# Patient Record
Sex: FEMALE | Race: WHITE | Employment: OTHER | URBAN - METROPOLITAN AREA
[De-identification: names, ages, dates, MRNs, and addresses within clinical notes are randomized per-mention and may not be internally consistent; named-entity substitution may affect disease eponyms.]

---

## 2017-02-06 RX ORDER — FENOFIBRATE 160 MG/1
160 TABLET ORAL DAILY
Qty: 90 TABLET | Refills: 3 | Status: SHIPPED | OUTPATIENT
Start: 2017-02-06 | End: 2018-02-14 | Stop reason: SDUPTHER

## 2017-02-06 RX ORDER — HYDROXYZINE HYDROCHLORIDE 10 MG/1
10 TABLET, FILM COATED ORAL 3 TIMES DAILY PRN
Qty: 60 TABLET | Refills: 0 | Status: SHIPPED | OUTPATIENT
Start: 2017-02-06 | End: 2018-11-05 | Stop reason: SDUPTHER

## 2017-03-16 RX ORDER — ATORVASTATIN CALCIUM 40 MG/1
TABLET, FILM COATED ORAL
Qty: 90 TABLET | Refills: 2 | Status: SHIPPED | OUTPATIENT
Start: 2017-03-16 | End: 2018-04-16 | Stop reason: SDUPTHER

## 2017-04-03 DIAGNOSIS — I10 ESSENTIAL HYPERTENSION: Primary | Chronic | ICD-10-CM

## 2017-04-03 DIAGNOSIS — E78.00 PURE HYPERCHOLESTEROLEMIA: Chronic | ICD-10-CM

## 2017-06-01 RX ORDER — LISINOPRIL 5 MG/1
TABLET ORAL
Qty: 90 TABLET | Refills: 2 | Status: SHIPPED | OUTPATIENT
Start: 2017-06-01 | End: 2017-11-06 | Stop reason: ALTCHOICE

## 2017-07-27 ENCOUNTER — OFFICE VISIT (OUTPATIENT)
Dept: CARDIOLOGY | Age: 62
End: 2017-07-27

## 2017-07-27 VITALS
HEART RATE: 61 BPM | DIASTOLIC BLOOD PRESSURE: 69 MMHG | OXYGEN SATURATION: 98 % | RESPIRATION RATE: 18 BRPM | BODY MASS INDEX: 23.3 KG/M2 | HEIGHT: 57 IN | SYSTOLIC BLOOD PRESSURE: 118 MMHG | WEIGHT: 108 LBS | TEMPERATURE: 97.4 F

## 2017-07-27 DIAGNOSIS — I10 ESSENTIAL HYPERTENSION: Primary | Chronic | ICD-10-CM

## 2017-07-27 DIAGNOSIS — E78.00 PURE HYPERCHOLESTEROLEMIA: Chronic | ICD-10-CM

## 2017-07-27 DIAGNOSIS — M79.605 PAIN IN BOTH LOWER EXTREMITIES: ICD-10-CM

## 2017-07-27 DIAGNOSIS — E11.8 TYPE 2 DIABETES MELLITUS WITH COMPLICATION, WITHOUT LONG-TERM CURRENT USE OF INSULIN (HCC): ICD-10-CM

## 2017-07-27 DIAGNOSIS — M79.604 PAIN IN BOTH LOWER EXTREMITIES: ICD-10-CM

## 2017-07-27 PROCEDURE — 99214 OFFICE O/P EST MOD 30 MIN: CPT | Performed by: INTERNAL MEDICINE

## 2017-07-27 PROCEDURE — 93000 ELECTROCARDIOGRAM COMPLETE: CPT | Performed by: INTERNAL MEDICINE

## 2017-07-27 NOTE — PROGRESS NOTES
[facility]     63 y.o. female  Chief Complaint   Patient presents with    6 Month Follow-Up    Hyperlipidemia    Hypertension        History and Physical:  Mrs. Aly Shook is a 80-year-old female at my office with her sister for a regular cardiovascular evaluation. She is most symptomatic with myalgia of the lower extremities, this is not a new. She does have a restless leg problem. The discomfort is not specific to the joints. She denies any chest pain or tightness. She is not having any unusual difficulty breathing. She is not having any heart palpitations and no fainting. She denies any syncope and nothing to indicate TIA or claudications. PAST MEDICAL HISTORY:  -     History mild diabetes type 2  -     Mild hypertension  -     Abnormal lipids  -     Underactive thyroid on replacement  -     Restless leg syndrome  -     Psoriasis    Past Medical History: She  has a past medical history of Alcoholism ([facility] [address] Arthritis; Chronic lung disease; Depression; Hyperlipidemia; Hypertension; Hypothyroidism; and Second hand smoke exposure. Past Surgical History: She  has a past surgical history that includes Hysterectomy; Appendectomy; Cholecystectomy; and Biopsy mouth lesion (Bilateral, 12/19/2016). Family History:   Family History   Problem Relation Age of Onset    Heart Disease Mother     Hypertension Mother     Diabetes Mother     Stroke Mother     Heart Disease Father     Hypertension Father     Heart Disease Sister     Hypertension Sister     Heart Disease Brother     Hypertension Brother     Diabetes Brother     Cancer Maternal Grandmother     Diabetes Maternal Grandfather        Social History: She  reports that she has never smoked. She does not have any smokeless tobacco history on file. She reports that she drinks about 16.8 oz of alcohol per week  She reports that she does not use illicit drugs.     Current Medications:   Current Outpatient Prescriptions on File Prior to Visit   Medication Sig Dispense Refill    lisinopril (PRINIVIL;ZESTRIL) 5 MG tablet TAKE 1 TABLET ONCE DAILY 90 tablet 2    metFORMIN (GLUCOPHAGE) 500 MG tablet Take 1 tablet by mouth 2 times daily (with meals) 60 tablet 11    atorvastatin (LIPITOR) 40 MG tablet TAKE 1 TABLET BY MOUTH DAILY 90 tablet 2    hydrOXYzine (ATARAX) 10 MG tablet Take 1 tablet by mouth 3 times daily as needed for Itching 60 tablet 0    fenofibrate 160 MG tablet Take 1 tablet by mouth daily 90 tablet 3    levothyroxine (SYNTHROID) 75 MCG tablet Take 1 tablet by mouth Daily 90 tablet 3    atorvastatin (LIPITOR) 20 MG tablet Take 20 mg by mouth daily      b complex vitamins capsule Take 1 capsule by mouth daily      ranitidine (ZANTAC) 150 MG tablet Take 1 tablet by mouth 2 times daily 180 tablet 3     No current facility-administered medications on file prior to visit. Allergies:  Morphine    Review of Systems  Review of Systems   Constitutional:        The patient is doing well she lives with her sister and is able to do all of her normal activities. HENT: Negative. Eyes: Negative. Respiratory: Negative for chest tightness and shortness of breath. Cardiovascular: Negative for chest pain, palpitations and leg swelling. Gastrointestinal: Negative. Endocrine:        The patient is being treated for mild diabetes type 2 doing well on her current medicine regimen. Genitourinary: Negative. Musculoskeletal: Positive for myalgias. The patient is complaining of increased myalgia discomfort of the lower extremities with a known history of restless leg. Allergic/Immunologic: Negative. Neurological: Negative for dizziness, syncope and light-headedness. Hematological: Negative. Psychiatric/Behavioral:        Mrs. Albert Alfonso is known to have had learning disability, currently lives with her sister            PHYSICAL EXAM: The exam reveale end of dictation d Mrs. Albert Alfonso to be in no distress she is on the thin side with a BMI of 23. O2 sat 98%. Heart rate 64/m. Blood pressure 118/70. Temperature 97.4. Skin is slightly pale but warm and dry. JVP down. No cervical bruit. No lymphadenopathy or thyromegaly. The lungs are clear. The heart sounds are normal, could not be sure of any extra sounds. The abdominal exam revealed no rigidity, no tenderness, and no organomegaly. The lower extremity exam revealed 3+ pulses and no edema. Pin Prick care examination revealed no problem to indicate peripheral neuropathy. The 12 leads EKG revealed the presence of sinus mechanism incomplete right bundle branch block and minor nonspecific ST and T wave changes    ASSESSMENT:   No overt coronary artery disease but markedly increased risk. Normotensive    Well controlled diabetes with no recurrence of hypoglycemia since the glipizide was discontinued  last year and she has refrained from exces alcohol drinking. Nonspecific myalgia of the lower extremity with no clinical evidence of any compromise to the arterial supply. This is mostly at night probably related to the restless leg. She has interrupted the statin medications for the past 3 months with no changes in symptoms and the CPK was normal.     RECOMMENDATIONS:     The above was discussed with the the patient and her sister    Recommended repeating complete lab including CPK ,TSH, lipids, sed rate, rheumatoid factor, CRP, lead count, and complete chemistry. Recommended a therapeutic trial of Neurontin 300 mg at bedtime    Recommended genetic Requip 0.25 mg also had bedtime    Noted that the Curt atenolol and SCD 0.1% cream for this arises    Regular appointment in 6 month to call earlier [facility]ocort earlier should there be any change of status      Diagnostics:    Orders Placed This Encounter   Procedures    EKG 12 lead     Order Specific Question:   Reason for Exam?     Answer:    Other       Medications:  No orders of the defined types were placed in this

## 2017-07-28 LAB
ALBUMIN SERPL-MCNC: 4.3 G/DL (ref 3.9–4.9)
ALP BLD-CCNC: 42 U/L (ref 40–130)
ALT SERPL-CCNC: 29 U/L (ref 0–33)
ANION GAP SERPL CALCULATED.3IONS-SCNC: 14 MEQ/L (ref 7–13)
AST SERPL-CCNC: 29 U/L (ref 0–35)
BILIRUB SERPL-MCNC: 0.6 MG/DL (ref 0–1.2)
BUN BLDV-MCNC: 10 MG/DL (ref 8–23)
C-REACTIVE PROTEIN: 1.2 MG/L (ref 0–5)
CALCIUM SERPL-MCNC: 8.9 MG/DL (ref 8.6–10.2)
CHLORIDE BLD-SCNC: 103 MEQ/L (ref 98–107)
CHOLESTEROL, TOTAL: 148 MG/DL (ref 0–199)
CO2: 25 MEQ/L (ref 22–29)
CREAT SERPL-MCNC: 0.44 MG/DL (ref 0.5–0.9)
GFR AFRICAN AMERICAN: >60
GFR NON-AFRICAN AMERICAN: >60
GLOBULIN: 2.4 G/DL (ref 2.3–3.5)
GLUCOSE BLD-MCNC: 109 MG/DL (ref 74–109)
HCT VFR BLD CALC: 37.1 % (ref 37–47)
HDLC SERPL-MCNC: 73 MG/DL (ref 40–59)
HEMOGLOBIN: 12.2 G/DL (ref 12–16)
LDL CHOLESTEROL CALCULATED: 57 MG/DL (ref 0–129)
MCH RBC QN AUTO: 29.5 PG (ref 27–31.3)
MCHC RBC AUTO-ENTMCNC: 32.9 % (ref 33–37)
MCV RBC AUTO: 89.7 FL (ref 82–100)
PDW BLD-RTO: 14.5 % (ref 11.5–14.5)
PLATELET # BLD: 165 K/UL (ref 130–400)
POTASSIUM SERPL-SCNC: 4.3 MEQ/L (ref 3.5–5.1)
RBC # BLD: 4.14 M/UL (ref 4.2–5.4)
RHEUMATOID FACTOR: <10 IU/ML (ref 0–14)
SEDIMENTATION RATE, ERYTHROCYTE: 2 MM (ref 0–30)
SODIUM BLD-SCNC: 142 MEQ/L (ref 132–144)
TOTAL CK: 48 U/L (ref 0–170)
TOTAL PROTEIN: 6.7 G/DL (ref 6.4–8.1)
TRIGL SERPL-MCNC: 88 MG/DL (ref 0–200)
TSH SERPL DL<=0.05 MIU/L-ACNC: 0.04 UIU/ML (ref 0.27–4.2)
WBC # BLD: 6.1 K/UL (ref 4.8–10.8)

## 2017-08-24 RX ORDER — TRIAMCINOLONE ACETONIDE 1 MG/G
CREAM TOPICAL
Refills: 11 | COMMUNITY
Start: 2017-08-07 | End: 2017-08-25 | Stop reason: SDUPTHER

## 2017-08-24 RX ORDER — ROPINIROLE 0.25 MG/1
1 TABLET, FILM COATED ORAL DAILY
Refills: 6 | COMMUNITY
Start: 2017-07-28 | End: 2018-01-02 | Stop reason: SDUPTHER

## 2017-08-24 RX ORDER — GABAPENTIN 300 MG/1
CAPSULE ORAL
Refills: 6 | COMMUNITY
Start: 2017-07-28 | End: 2017-11-06 | Stop reason: ALTCHOICE

## 2017-08-25 RX ORDER — TRIAMCINOLONE ACETONIDE 1 MG/G
CREAM TOPICAL 2 TIMES DAILY PRN
Qty: 453.6 G | Refills: 11 | Status: SHIPPED | OUTPATIENT
Start: 2017-08-25 | End: 2018-03-02 | Stop reason: SDUPTHER

## 2017-10-03 ASSESSMENT — ENCOUNTER SYMPTOMS
SHORTNESS OF BREATH: 0
ALLERGIC/IMMUNOLOGIC NEGATIVE: 1
GASTROINTESTINAL NEGATIVE: 1
EYES NEGATIVE: 1
CHEST TIGHTNESS: 0

## 2017-10-25 DIAGNOSIS — E03.9 ACQUIRED HYPOTHYROIDISM: Chronic | ICD-10-CM

## 2017-10-25 DIAGNOSIS — K21.9 GASTROESOPHAGEAL REFLUX DISEASE WITHOUT ESOPHAGITIS: ICD-10-CM

## 2017-10-25 DIAGNOSIS — R12 HEARTBURN: ICD-10-CM

## 2017-10-30 RX ORDER — RANITIDINE 150 MG/1
150 TABLET ORAL 2 TIMES DAILY
Qty: 60 TABLET | Refills: 3 | Status: SHIPPED | OUTPATIENT
Start: 2017-10-30 | End: 2017-11-06 | Stop reason: ALTCHOICE

## 2017-10-30 RX ORDER — LEVOTHYROXINE SODIUM 0.07 MG/1
75 TABLET ORAL DAILY
Qty: 30 TABLET | Refills: 3 | Status: SHIPPED | OUTPATIENT
Start: 2017-10-30 | End: 2017-11-06 | Stop reason: ALTCHOICE

## 2017-11-06 DIAGNOSIS — R73.09 ABNORMAL GLUCOSE: Chronic | ICD-10-CM

## 2017-11-06 DIAGNOSIS — F10.20 ALCOHOLISM (HCC): ICD-10-CM

## 2017-11-06 DIAGNOSIS — Z11.59 NEED FOR HEPATITIS C SCREENING TEST: ICD-10-CM

## 2017-11-06 DIAGNOSIS — E03.9 ACQUIRED HYPOTHYROIDISM: Chronic | ICD-10-CM

## 2017-11-06 DIAGNOSIS — Z11.4 SCREENING FOR HIV (HUMAN IMMUNODEFICIENCY VIRUS): ICD-10-CM

## 2017-11-06 PROBLEM — E55.9 VITAMIN D DEFICIENCY: Chronic | Status: ACTIVE | Noted: 2017-11-06

## 2017-11-06 PROBLEM — L40.9 PSORIASIS: Chronic | Status: ACTIVE | Noted: 2017-11-06

## 2017-11-06 PROBLEM — G25.81 RESTLESS LEG SYNDROME: Chronic | Status: ACTIVE | Noted: 2017-11-06

## 2017-11-06 LAB
ALBUMIN SERPL-MCNC: 4.8 G/DL (ref 3.9–4.9)
ALP BLD-CCNC: 51 U/L (ref 40–130)
ALT SERPL-CCNC: 39 U/L (ref 0–33)
ANION GAP SERPL CALCULATED.3IONS-SCNC: 17 MEQ/L (ref 7–13)
AST SERPL-CCNC: 34 U/L (ref 0–35)
BASOPHILS ABSOLUTE: 0.1 K/UL (ref 0–0.2)
BASOPHILS RELATIVE PERCENT: 1.3 %
BILIRUB SERPL-MCNC: 0.5 MG/DL (ref 0–1.2)
BUN BLDV-MCNC: 13 MG/DL (ref 8–23)
CALCIUM SERPL-MCNC: 9.9 MG/DL (ref 8.6–10.2)
CHLORIDE BLD-SCNC: 99 MEQ/L (ref 98–107)
CO2: 25 MEQ/L (ref 22–29)
CREAT SERPL-MCNC: 0.63 MG/DL (ref 0.5–0.9)
EOSINOPHILS ABSOLUTE: 0.3 K/UL (ref 0–0.7)
EOSINOPHILS RELATIVE PERCENT: 3.1 %
GFR AFRICAN AMERICAN: >60
GFR NON-AFRICAN AMERICAN: >60
GLOBULIN: 2.8 G/DL (ref 2.3–3.5)
GLUCOSE FASTING: 90 MG/DL (ref 74–109)
HBA1C MFR BLD: 5.6 % (ref 4.8–5.9)
HCT VFR BLD CALC: 39.9 % (ref 37–47)
HEMOGLOBIN: 13 G/DL (ref 12–16)
HEPATITIS C ANTIBODY INTERPRETATION: NORMAL
LYMPHOCYTES ABSOLUTE: 1.2 K/UL (ref 1–4.8)
LYMPHOCYTES RELATIVE PERCENT: 12.6 %
MCH RBC QN AUTO: 29.1 PG (ref 27–31.3)
MCHC RBC AUTO-ENTMCNC: 32.6 % (ref 33–37)
MCV RBC AUTO: 89.5 FL (ref 82–100)
MONOCYTES ABSOLUTE: 0.7 K/UL (ref 0.2–0.8)
MONOCYTES RELATIVE PERCENT: 7.1 %
NEUTROPHILS ABSOLUTE: 7.1 K/UL (ref 1.4–6.5)
NEUTROPHILS RELATIVE PERCENT: 75.9 %
PDW BLD-RTO: 14.5 % (ref 11.5–14.5)
PLATELET # BLD: 220 K/UL (ref 130–400)
POTASSIUM SERPL-SCNC: 4.9 MEQ/L (ref 3.5–5.1)
RBC # BLD: 4.46 M/UL (ref 4.2–5.4)
SODIUM BLD-SCNC: 141 MEQ/L (ref 132–144)
T4 FREE: 2.14 NG/DL (ref 0.93–1.7)
TOTAL PROTEIN: 7.6 G/DL (ref 6.4–8.1)
TSH REFLEX: 0.07 UIU/ML (ref 0.27–4.2)
WBC # BLD: 9.4 K/UL (ref 4.8–10.8)

## 2017-11-07 LAB — HIV-1 AND HIV-2 ANTIBODIES: NEGATIVE

## 2017-11-20 PROBLEM — Z13.820 SCREENING FOR OSTEOPOROSIS: Chronic | Status: ACTIVE | Noted: 2017-11-20

## 2017-11-20 PROBLEM — F39 MOOD DISORDER (HCC): Chronic | Status: ACTIVE | Noted: 2017-11-20

## 2017-11-20 PROBLEM — M79.605 BILATERAL LEG PAIN: Chronic | Status: ACTIVE | Noted: 2017-11-20

## 2017-11-20 PROBLEM — M79.604 BILATERAL LEG PAIN: Chronic | Status: ACTIVE | Noted: 2017-11-20

## 2017-11-24 ENCOUNTER — HOSPITAL ENCOUNTER (OUTPATIENT)
Dept: WOMENS IMAGING | Age: 62
Discharge: HOME | End: 2017-11-24
Payer: COMMERCIAL

## 2017-11-24 DIAGNOSIS — Z13.820 SCREENING FOR OSTEOPOROSIS: Chronic | ICD-10-CM

## 2017-11-24 PROCEDURE — 77080 DXA BONE DENSITY AXIAL: CPT

## 2018-01-19 ENCOUNTER — HOSPITAL ENCOUNTER (OUTPATIENT)
Dept: GENERAL RADIOLOGY | Age: 63
Discharge: HOME | End: 2018-01-19
Payer: COMMERCIAL

## 2018-01-19 DIAGNOSIS — M21.932: ICD-10-CM

## 2018-01-19 DIAGNOSIS — M79.642 LEFT HAND PAIN: ICD-10-CM

## 2018-01-19 PROCEDURE — 73110 X-RAY EXAM OF WRIST: CPT

## 2018-02-05 DIAGNOSIS — M79.604 BILATERAL LEG PAIN: Chronic | ICD-10-CM

## 2018-02-05 DIAGNOSIS — M79.605 BILATERAL LEG PAIN: Chronic | ICD-10-CM

## 2018-02-05 RX ORDER — ACETAMINOPHEN 500 MG
500 TABLET ORAL 2 TIMES DAILY PRN
Qty: 60 TABLET | Refills: 2 | Status: SHIPPED | OUTPATIENT
Start: 2018-02-05 | End: 2018-07-03 | Stop reason: SDUPTHER

## 2018-02-05 NOTE — TELEPHONE ENCOUNTER
Rx request   Requested Prescriptions     Pending Prescriptions Disp Refills    acetaminophen (APAP EXTRA STRENGTH) 500 MG tablet 60 tablet      Sig: Take 1 tablet by mouth 2 times daily as needed for Pain     LOV 11/20/2017  Next Visit Date:  Future Appointments  Date Time Provider Cami Ledesma   3/9/2018 2:00 PM Mehrdad Gracia MD [facility]       Patient is also asking if her Requip can be increased. Her legs are really bothering her.

## 2018-02-16 RX ORDER — FENOFIBRATE 160 MG/1
160 TABLET ORAL DAILY
Qty: 90 TABLET | Refills: 3 | Status: SHIPPED | OUTPATIENT
Start: 2018-02-16 | End: 2019-01-23 | Stop reason: SDUPTHER

## 2018-02-20 ENCOUNTER — TELEPHONE (OUTPATIENT)
Dept: FAMILY MEDICINE CLINIC | Age: 63
End: 2018-02-20

## 2018-02-20 DIAGNOSIS — E03.9 ACQUIRED HYPOTHYROIDISM: Chronic | ICD-10-CM

## 2018-02-20 DIAGNOSIS — R73.09 ABNORMAL GLUCOSE: Primary | Chronic | ICD-10-CM

## 2018-02-20 NOTE — TELEPHONE ENCOUNTER
Needs to be seen for med adjustments. Has missed her appointments with me and needs follow-up. Labs ordered.

## 2018-02-23 RX ORDER — TRIAMCINOLONE ACETONIDE 1 MG/G
CREAM TOPICAL 2 TIMES DAILY PRN
Qty: 453.6 G | Refills: 11 | OUTPATIENT
Start: 2018-02-23

## 2018-03-02 ENCOUNTER — OFFICE VISIT (OUTPATIENT)
Dept: FAMILY MEDICINE CLINIC | Age: 63
End: 2018-03-02
Payer: COMMERCIAL

## 2018-03-02 ENCOUNTER — HOSPITAL ENCOUNTER (OUTPATIENT)
Dept: GENERAL RADIOLOGY | Age: 63
Discharge: HOME | End: 2018-03-04
Payer: COMMERCIAL

## 2018-03-02 VITALS
DIASTOLIC BLOOD PRESSURE: 68 MMHG | OXYGEN SATURATION: 97 % | BODY MASS INDEX: 25.75 KG/M2 | HEART RATE: 62 BPM | TEMPERATURE: 97.5 F | WEIGHT: 119 LBS | SYSTOLIC BLOOD PRESSURE: 112 MMHG

## 2018-03-02 DIAGNOSIS — E03.9 ACQUIRED HYPOTHYROIDISM: Chronic | ICD-10-CM

## 2018-03-02 DIAGNOSIS — F39 MOOD DISORDER (HCC): Chronic | ICD-10-CM

## 2018-03-02 DIAGNOSIS — L40.9 PSORIASIS: Primary | Chronic | ICD-10-CM

## 2018-03-02 DIAGNOSIS — M79.604 BILATERAL LEG PAIN: Chronic | ICD-10-CM

## 2018-03-02 DIAGNOSIS — E78.2 MIXED HYPERLIPIDEMIA: Chronic | ICD-10-CM

## 2018-03-02 DIAGNOSIS — R73.09 ABNORMAL GLUCOSE: Chronic | ICD-10-CM

## 2018-03-02 DIAGNOSIS — M85.89 OSTEOPENIA OF MULTIPLE SITES: Chronic | ICD-10-CM

## 2018-03-02 DIAGNOSIS — G25.81 RESTLESS LEG SYNDROME: Chronic | ICD-10-CM

## 2018-03-02 DIAGNOSIS — F10.20 ALCOHOLISM (HCC): ICD-10-CM

## 2018-03-02 DIAGNOSIS — M79.605 BILATERAL LEG PAIN: Chronic | ICD-10-CM

## 2018-03-02 LAB
HBA1C MFR BLD: 6 % (ref 4.8–5.9)
TSH SERPL DL<=0.05 MIU/L-ACNC: 14.49 UIU/ML (ref 0.27–4.2)

## 2018-03-02 PROCEDURE — 3014F SCREEN MAMMO DOC REV: CPT | Performed by: FAMILY MEDICINE

## 2018-03-02 PROCEDURE — 99214 OFFICE O/P EST MOD 30 MIN: CPT | Performed by: FAMILY MEDICINE

## 2018-03-02 PROCEDURE — 73521 X-RAY EXAM HIPS BI 2 VIEWS: CPT

## 2018-03-02 PROCEDURE — 3017F COLORECTAL CA SCREEN DOC REV: CPT | Performed by: FAMILY MEDICINE

## 2018-03-02 PROCEDURE — G8482 FLU IMMUNIZE ORDER/ADMIN: HCPCS | Performed by: FAMILY MEDICINE

## 2018-03-02 PROCEDURE — G8427 DOCREV CUR MEDS BY ELIG CLIN: HCPCS | Performed by: FAMILY MEDICINE

## 2018-03-02 PROCEDURE — 1036F TOBACCO NON-USER: CPT | Performed by: FAMILY MEDICINE

## 2018-03-02 PROCEDURE — 72110 X-RAY EXAM L-2 SPINE 4/>VWS: CPT

## 2018-03-02 PROCEDURE — G8419 CALC BMI OUT NRM PARAM NOF/U: HCPCS | Performed by: FAMILY MEDICINE

## 2018-03-02 RX ORDER — LEVOTHYROXINE SODIUM 0.05 MG/1
50 TABLET ORAL DAILY
Qty: 30 TABLET | Refills: 5 | Status: SHIPPED | OUTPATIENT
Start: 2018-03-02 | End: 2018-08-31 | Stop reason: SDUPTHER

## 2018-03-02 RX ORDER — ALENDRONATE SODIUM 35 MG/1
35 TABLET ORAL
Qty: 4 TABLET | Refills: 5 | Status: SHIPPED | OUTPATIENT
Start: 2018-03-02 | End: 2018-08-19 | Stop reason: SDUPTHER

## 2018-03-02 RX ORDER — TRIAMCINOLONE ACETONIDE 1 MG/G
CREAM TOPICAL 2 TIMES DAILY PRN
Qty: 453.6 G | Refills: 2 | Status: SHIPPED | OUTPATIENT
Start: 2018-03-02 | End: 2019-04-05 | Stop reason: SDUPTHER

## 2018-03-02 RX ORDER — ROPINIROLE 0.5 MG/1
0.5 TABLET, FILM COATED ORAL NIGHTLY
Qty: 30 TABLET | Refills: 2 | Status: SHIPPED | OUTPATIENT
Start: 2018-03-02 | End: 2018-06-04 | Stop reason: SDUPTHER

## 2018-03-02 NOTE — PROGRESS NOTES
Narrative    No narrative on file     Family History   Problem Relation Age of Onset    Heart Disease Mother     Hypertension Mother     Diabetes Mother     Stroke Mother     Heart Disease Father     Hypertension Father     Heart Disease Sister     Hypertension Sister     Heart Disease Brother     Hypertension Brother     Diabetes Brother     Cancer Maternal Grandmother     Diabetes Maternal Grandfather      Allergies   Allergen Reactions    Morphine Swelling     Current Outpatient Prescriptions   Medication Sig Dispense Refill    Calcium 600-400 MG-UNIT CHEW Take 1 tablet by mouth 2 times daily 60 tablet 11    rOPINIRole (REQUIP) 0.5 MG tablet Take 1 tablet by mouth nightly 30 tablet 2    triamcinolone (KENALOG) 0.1 % cream Apply topically 2 times daily as needed (Apply to affected areas twice daily as needed.) Apply topically 2 times daily. 453.6 g 2    alendronate (FOSAMAX) 35 MG tablet Take 1 tablet by mouth every 7 days 4 tablet 5    fenofibrate 160 MG tablet Take 1 tablet by mouth daily 90 tablet 3    acetaminophen (APAP EXTRA STRENGTH) 500 MG tablet Take 1 tablet by mouth 2 times daily as needed for Pain 60 tablet 2    pantoprazole (PROTONIX) 40 MG tablet Take 1 tablet by mouth daily 30 tablet 2    Cholecalciferol (VITAMIN D) 2000 units TABS tablet Take 1 tablet by mouth daily 30 tablet 11    escitalopram (LEXAPRO) 10 MG tablet Take 1 tablet by mouth daily 30 tablet 5    atorvastatin (LIPITOR) 40 MG tablet TAKE 1 TABLET BY MOUTH DAILY 90 tablet 2    hydrOXYzine (ATARAX) 10 MG tablet Take 1 tablet by mouth 3 times daily as needed for Itching 60 tablet 0    naproxen (NAPROSYN) 500 MG tablet Take 1 tablet by mouth 2 times daily as needed for Pain 60 tablet 1     No current facility-administered medications for this visit. PMH, Surgical Hx, Family Hx, and Social Hx reviewed and updated. Health Maintenance reviewed.     Objective    Vitals:    03/02/18 1047   BP: 112/68   Site: Left Arm   Position: Sitting   Cuff Size: Medium Adult   Pulse: 62   Temp: 97.5 °F (36.4 °C)   TempSrc: Tympanic   SpO2: 97%   Weight: 119 lb (54 kg)       Physical Exam   Constitutional: She is oriented to person, place, and time. She appears well-developed and well-nourished. HENT:   Head: Normocephalic. Eyes: Conjunctivae are normal.   Pulmonary/Chest: Effort normal.   Neurological: She is alert and oriented to person, place, and time. Skin:   Psoriasis mild, shins, elbows   Psychiatric: She has a normal mood and affect. Her behavior is normal. Judgment and thought content normal.       Assessment & Plan   1. Psoriasis  triamcinolone (KENALOG) 0.1 % cream   2. Restless leg syndrome  rOPINIRole (REQUIP) 0.5 MG tablet   3. Osteopenia of multiple sites  Calcium 600-400 MG-UNIT CHEW    alendronate (FOSAMAX) 35 MG tablet   4. Bilateral leg pain  XR HIP BILATERAL W AP PELVIS (2 VIEWS)    XR LUMBAR SPINE (MIN 4 VIEWS)   5. Abnormal glucose     6. Acquired hypothyroidism     7. Alcoholism ([address]     8. Mixed hyperlipidemia     9. Mood disorder ([facility] [address]       1. Psoriasis - rx'd HEAD. May need PA. 2. RLS - increase Requip to 0.5 mg at night. 3. Osteopenia - Calcium/kevin D 1200/800 daily. Fosamax 35 mg weekly. Reassess in 2 years. Stop drinking ETOH. Hyperthyroidism may have contributed. 4. Leg and hip pain - check xrays and reassess in 2 weeks. 5. Abnl glucose - no previous documentation of abnormal A1C. Only one abnl glucose taken after iv glucose administration. D/C'd metformin and rechecked A1C. Now elevated at 6.0. Restart metformin. 6. Hypothyroidism - suspect TSH suppression caused by persistent  Hyperthyroidism caused by levothyroxine. Possible med compliance issues. Now start low dose levothyroxine and recheck lab in 2 months. 7. ETOH - encouraged cessation    8. HLD - take full 40 mg dose of Lipitor. 9. Mood disorder - improving, good control, continue lexapro.  COntinue with counseling. Reviewed with the patient: current clinical status, medications, activities and diet.      Side effects, adverse effects of the medication prescribed today, as well as treatment plan/ rationale and result expectations have been discussed with the patient who expresses understanding and desires to proceed.     Close follow up to evaluate treatment results and for coordination of care. I have reviewed the patient's medical history in detail and updated the computerized patient record. Orders Placed This Encounter   Procedures    XR HIP BILATERAL W AP PELVIS (2 VIEWS)     Standing Status:   Future     Number of Occurrences:   1     Standing Expiration Date:   6/2/2018     Order Specific Question:   Reason for exam:     Answer:   right > left hip pain, right femur pain    XR LUMBAR SPINE (MIN 4 VIEWS)     Standing Status:   Future     Number of Occurrences:   1     Standing Expiration Date:   3/2/2019     Order Specific Question:   Reason for exam:     Answer:   B/L uper leg pain     Orders Placed This Encounter   Medications    Calcium 600-400 MG-UNIT CHEW     Sig: Take 1 tablet by mouth 2 times daily     Dispense:  60 tablet     Refill:  11    rOPINIRole (REQUIP) 0.5 MG tablet     Sig: Take 1 tablet by mouth nightly     Dispense:  30 tablet     Refill:  2    triamcinolone (KENALOG) 0.1 % cream     Sig: Apply topically 2 times daily as needed (Apply to affected areas twice daily as needed.) Apply topically 2 times daily. Dispense:  453.6 g     Refill:  2     Please dispense the Jar not the bottle.  alendronate (FOSAMAX) 35 MG tablet     Sig: Take 1 tablet by mouth every 7 days     Dispense:  4 tablet     Refill:  5     Medications Discontinued During This Encounter   Medication Reason    rOPINIRole (REQUIP) 0.25 MG tablet Dose adjustment    triamcinolone (KENALOG) 0.1 % cream Reorder     Return in about 2 weeks (around 3/16/2018) for leg pain.         Controlled Substances Monitoring:

## 2018-03-05 ENCOUNTER — TELEPHONE (OUTPATIENT)
Dept: FAMILY MEDICINE CLINIC | Age: 63
End: 2018-03-05

## 2018-03-06 ENCOUNTER — TELEPHONE (OUTPATIENT)
Dept: FAMILY MEDICINE CLINIC | Age: 63
End: 2018-03-06

## 2018-03-15 ENCOUNTER — TELEPHONE (OUTPATIENT)
Dept: FAMILY MEDICINE CLINIC | Age: 63
End: 2018-03-15

## 2018-03-15 DIAGNOSIS — M79.605 BILATERAL LEG PAIN: Chronic | ICD-10-CM

## 2018-03-15 DIAGNOSIS — M79.604 BILATERAL LEG PAIN: Chronic | ICD-10-CM

## 2018-03-15 NOTE — TELEPHONE ENCOUNTER
Patient called requesting a refill for naproxen 500mg approve or deny       Cant pend the medication states I cant pend that amount of MG

## 2018-03-16 DIAGNOSIS — M79.605 BILATERAL LEG PAIN: Chronic | ICD-10-CM

## 2018-03-16 DIAGNOSIS — M79.604 BILATERAL LEG PAIN: Chronic | ICD-10-CM

## 2018-03-16 RX ORDER — NAPROXEN 500 MG/1
500 TABLET ORAL 2 TIMES DAILY PRN
Qty: 60 TABLET | Refills: 1 | Status: SHIPPED | OUTPATIENT
Start: 2018-03-16 | End: 2018-07-03 | Stop reason: SDUPTHER

## 2018-03-28 DIAGNOSIS — K21.9 GASTROESOPHAGEAL REFLUX DISEASE WITHOUT ESOPHAGITIS: Chronic | ICD-10-CM

## 2018-03-29 RX ORDER — PANTOPRAZOLE SODIUM 40 MG/1
40 TABLET, DELAYED RELEASE ORAL DAILY
Qty: 30 TABLET | Refills: 2 | Status: SHIPPED | OUTPATIENT
Start: 2018-03-29 | End: 2018-07-03 | Stop reason: SDUPTHER

## 2018-04-23 ENCOUNTER — OFFICE VISIT (OUTPATIENT)
Dept: FAMILY MEDICINE CLINIC | Age: 63
End: 2018-04-23
Payer: COMMERCIAL

## 2018-04-23 VITALS
DIASTOLIC BLOOD PRESSURE: 64 MMHG | OXYGEN SATURATION: 97 % | HEART RATE: 59 BPM | TEMPERATURE: 96.3 F | SYSTOLIC BLOOD PRESSURE: 106 MMHG | WEIGHT: 118.13 LBS | BODY MASS INDEX: 25.56 KG/M2

## 2018-04-23 DIAGNOSIS — M85.89 OSTEOPENIA OF MULTIPLE SITES: Chronic | ICD-10-CM

## 2018-04-23 DIAGNOSIS — M79.605 BILATERAL LEG PAIN: Chronic | ICD-10-CM

## 2018-04-23 DIAGNOSIS — E03.9 ACQUIRED HYPOTHYROIDISM: ICD-10-CM

## 2018-04-23 DIAGNOSIS — Z12.11 SCREEN FOR COLON CANCER: ICD-10-CM

## 2018-04-23 DIAGNOSIS — R73.09 ABNORMAL GLUCOSE: ICD-10-CM

## 2018-04-23 DIAGNOSIS — M47.819 FACET ARTHROPATHY, MULTILEVEL: Primary | Chronic | ICD-10-CM

## 2018-04-23 DIAGNOSIS — Z12.39 SCREENING FOR BREAST CANCER: ICD-10-CM

## 2018-04-23 DIAGNOSIS — M79.604 BILATERAL LEG PAIN: Chronic | ICD-10-CM

## 2018-04-23 PROCEDURE — 3017F COLORECTAL CA SCREEN DOC REV: CPT | Performed by: FAMILY MEDICINE

## 2018-04-23 PROCEDURE — G8427 DOCREV CUR MEDS BY ELIG CLIN: HCPCS | Performed by: FAMILY MEDICINE

## 2018-04-23 PROCEDURE — 1036F TOBACCO NON-USER: CPT | Performed by: FAMILY MEDICINE

## 2018-04-23 PROCEDURE — 99214 OFFICE O/P EST MOD 30 MIN: CPT | Performed by: FAMILY MEDICINE

## 2018-04-23 PROCEDURE — G8419 CALC BMI OUT NRM PARAM NOF/U: HCPCS | Performed by: FAMILY MEDICINE

## 2018-04-23 RX ORDER — ACETAMINOPHEN 500 MG
TABLET ORAL
COMMUNITY
Start: 2018-03-15 | End: 2018-04-23 | Stop reason: SDUPTHER

## 2018-04-23 ASSESSMENT — ENCOUNTER SYMPTOMS
COUGH: 0
BACK PAIN: 1
ABDOMINAL PAIN: 0

## 2018-04-27 ENCOUNTER — TELEPHONE (OUTPATIENT)
Dept: FAMILY MEDICINE CLINIC | Age: 63
End: 2018-04-27

## 2018-05-15 DIAGNOSIS — F39 MOOD DISORDER (HCC): Chronic | ICD-10-CM

## 2018-05-15 RX ORDER — ESCITALOPRAM OXALATE 10 MG/1
TABLET ORAL
Qty: 30 TABLET | Refills: 1 | Status: SHIPPED | OUTPATIENT
Start: 2018-05-15 | End: 2018-07-03 | Stop reason: SDUPTHER

## 2018-06-04 DIAGNOSIS — G25.81 RESTLESS LEG SYNDROME: Chronic | ICD-10-CM

## 2018-06-05 RX ORDER — ROPINIROLE 0.5 MG/1
0.5 TABLET, FILM COATED ORAL NIGHTLY
Qty: 30 TABLET | Refills: 2 | Status: SHIPPED | OUTPATIENT
Start: 2018-06-05 | End: 2018-09-05 | Stop reason: SDUPTHER

## 2018-07-03 DIAGNOSIS — F39 MOOD DISORDER (HCC): Chronic | ICD-10-CM

## 2018-07-03 DIAGNOSIS — M79.604 BILATERAL LEG PAIN: Chronic | ICD-10-CM

## 2018-07-03 DIAGNOSIS — K21.9 GASTROESOPHAGEAL REFLUX DISEASE WITHOUT ESOPHAGITIS: Chronic | ICD-10-CM

## 2018-07-03 DIAGNOSIS — M79.605 BILATERAL LEG PAIN: Chronic | ICD-10-CM

## 2018-07-03 RX ORDER — ACETAMINOPHEN 500 MG
500 TABLET ORAL 2 TIMES DAILY PRN
Qty: 60 TABLET | Refills: 2 | Status: SHIPPED | OUTPATIENT
Start: 2018-07-03 | End: 2019-01-18 | Stop reason: SDUPTHER

## 2018-07-03 RX ORDER — NAPROXEN 500 MG/1
500 TABLET ORAL 2 TIMES DAILY PRN
Qty: 60 TABLET | Refills: 1 | Status: SHIPPED | OUTPATIENT
Start: 2018-07-03 | End: 2019-11-19

## 2018-07-03 RX ORDER — ESCITALOPRAM OXALATE 10 MG/1
TABLET ORAL
Qty: 30 TABLET | Refills: 1 | Status: SHIPPED | OUTPATIENT
Start: 2018-07-03 | End: 2018-11-23 | Stop reason: SDUPTHER

## 2018-07-03 RX ORDER — PANTOPRAZOLE SODIUM 40 MG/1
40 TABLET, DELAYED RELEASE ORAL DAILY
Qty: 30 TABLET | Refills: 2 | Status: SHIPPED | OUTPATIENT
Start: 2018-07-03 | End: 2018-09-30 | Stop reason: SDUPTHER

## 2018-07-03 NOTE — TELEPHONE ENCOUNTER
Rx request   Requested Prescriptions     Pending Prescriptions Disp Refills    escitalopram (LEXAPRO) 10 MG tablet 30 tablet 1     Sig: TAKE 1 TABLET BY MOUTH EVERY DAY    pantoprazole (PROTONIX) 40 MG tablet 30 tablet 2     Sig: Take 1 tablet by mouth daily    acetaminophen (APAP EXTRA STRENGTH) 500 MG tablet 60 tablet 2     Sig: Take 1 tablet by mouth 2 times daily as needed for Pain    naproxen (NAPROSYN) 500 MG tablet 60 tablet 1     Sig: Take 1 tablet by mouth 2 times daily as needed for Pain     LOV 4/23/2018  Next Visit Date:  Future Appointments  Date Time Provider Cami Ledesma   7/23/2018 1:30 PM Carlos Shaw MD [address] [place] [address]

## 2018-07-16 DIAGNOSIS — R73.09 ABNORMAL GLUCOSE: ICD-10-CM

## 2018-07-16 DIAGNOSIS — E03.9 ACQUIRED HYPOTHYROIDISM: ICD-10-CM

## 2018-07-16 LAB
HBA1C MFR BLD: 5.5 % (ref 4.8–5.9)
TSH SERPL DL<=0.05 MIU/L-ACNC: 1.18 UIU/ML (ref 0.27–4.2)

## 2018-07-23 ENCOUNTER — OFFICE VISIT (OUTPATIENT)
Dept: FAMILY MEDICINE CLINIC | Age: 63
End: 2018-07-23
Payer: COMMERCIAL

## 2018-07-23 VITALS
DIASTOLIC BLOOD PRESSURE: 70 MMHG | BODY MASS INDEX: 25.24 KG/M2 | HEIGHT: 57 IN | HEART RATE: 68 BPM | SYSTOLIC BLOOD PRESSURE: 104 MMHG | WEIGHT: 117 LBS | TEMPERATURE: 98.2 F | OXYGEN SATURATION: 97 % | RESPIRATION RATE: 16 BRPM

## 2018-07-23 DIAGNOSIS — E78.2 MIXED HYPERLIPIDEMIA: Primary | Chronic | ICD-10-CM

## 2018-07-23 DIAGNOSIS — E55.9 VITAMIN D DEFICIENCY: Chronic | ICD-10-CM

## 2018-07-23 DIAGNOSIS — F10.20 ALCOHOLISM (HCC): ICD-10-CM

## 2018-07-23 DIAGNOSIS — F39 MOOD DISORDER (HCC): Chronic | ICD-10-CM

## 2018-07-23 DIAGNOSIS — G25.81 RESTLESS LEG SYNDROME: Chronic | ICD-10-CM

## 2018-07-23 DIAGNOSIS — M25.561 CHRONIC PAIN OF RIGHT KNEE: Chronic | ICD-10-CM

## 2018-07-23 DIAGNOSIS — L40.9 PSORIASIS: Chronic | ICD-10-CM

## 2018-07-23 DIAGNOSIS — K21.9 GASTROESOPHAGEAL REFLUX DISEASE WITHOUT ESOPHAGITIS: Chronic | ICD-10-CM

## 2018-07-23 DIAGNOSIS — M85.89 OSTEOPENIA OF MULTIPLE SITES: Chronic | ICD-10-CM

## 2018-07-23 DIAGNOSIS — G89.29 CHRONIC PAIN OF RIGHT KNEE: Chronic | ICD-10-CM

## 2018-07-23 DIAGNOSIS — E03.9 ACQUIRED HYPOTHYROIDISM: Chronic | ICD-10-CM

## 2018-07-23 DIAGNOSIS — R73.09 ABNORMAL GLUCOSE: Chronic | ICD-10-CM

## 2018-07-23 PROCEDURE — 1036F TOBACCO NON-USER: CPT | Performed by: FAMILY MEDICINE

## 2018-07-23 PROCEDURE — 3017F COLORECTAL CA SCREEN DOC REV: CPT | Performed by: FAMILY MEDICINE

## 2018-07-23 PROCEDURE — G8427 DOCREV CUR MEDS BY ELIG CLIN: HCPCS | Performed by: FAMILY MEDICINE

## 2018-07-23 PROCEDURE — G8419 CALC BMI OUT NRM PARAM NOF/U: HCPCS | Performed by: FAMILY MEDICINE

## 2018-07-23 PROCEDURE — 99215 OFFICE O/P EST HI 40 MIN: CPT | Performed by: FAMILY MEDICINE

## 2018-07-23 NOTE — PROGRESS NOTES
Subjective  Linda Sanchez, 66 y.o. female presents today with:  Chief Complaint   Patient presents with    Thyroid Problem     Patient is here for her follow up with thyroid and other health problems.  Knee Pain     patient has right knee and thigh pain           HPI    HLD. Patient does not follow heart healthy diet nor does she exercise. She is compliant with her atorvastatin 40 mg daily. She has no side effects from it. Abnormal glucose. Hemoglobin A1c within normal limits on metformin 500 mg twice a day which she tolerates well. GERD. On Protonix. Asymptomatic. Vitamin D deficiency takes her vitamin D daily with food. Hypothyroidism. No diarrhea or constipation. No palpitations. No weight loss. No difficulty sleeping. Compliant with levothyroxine without side effects. Alcoholism. She is running out of money and living with her sister. She is drinking less alcohol than usual for her. No signs or symptoms of delirium tremens or other alcohol withdrawal.    Restless leg syndrome well controlled on ropinirole without side effects. Mood disorder/depression. She is under a lot of stress because she can't find a job and her disability will come through for at least 18 months. She is living with her sister who is very supportive. No suicidality. Osteopenia. Is compliant with calcium and alendronate with which she has no side effects. Chronic pain of right knee. She states she has diffuse right knee pain which limits her mobility. She has equal pain going up and down stairs. Pain is sharp and severe. Worse with weightbearing than at rest.  No known injury. No edema. Anti-inflammatories and Tylenol use to help but no longer do. Psoriasis. Triamcinolone ointment controls the skin rash. No other questions and or concerns for today's visit      Review of Systems This patient has no chest pain or pressure. There is no shortness of breath.   There is no nausea, diaphoresis or radiation of pain into the neck or arm or back. No abdominal pain, diarrhea or constipation. No edema. Past Medical History:   Diagnosis Date    Alcoholism ([address]     Arthritis     Chronic lung disease     Depression     Hyperlipidemia     Hypertension     Hypothyroidism     Second hand smoke exposure      Past Surgical History:   Procedure Laterality Date    APPENDECTOMY      BIOPSY MOUTH LESION Bilateral 12/19/2016    REMOVAL  OF BILATERAL LINGUAL MACIE performed by French Spatz, DDS at [place] [address].       Social History     Social History    Marital status: Single     Spouse name: N/A    Number of children: N/A    Years of education: N/A     Occupational History    Not on file.      Social History Main Topics    Smoking status: Never Smoker    Smokeless tobacco: Never Used    Alcohol use 16.8 oz/week     28 Shots of liquor per week    Drug use: No    Sexual activity: Not on file     Other Topics Concern    Not on file     Social History Narrative    No narrative on file     Family History   Problem Relation Age of Onset    Heart Disease Mother     Hypertension Mother     Diabetes Mother     Stroke Mother     Heart Disease Father     Hypertension Father     Heart Disease Sister     Hypertension Sister     Heart Disease Brother     Hypertension Brother     Diabetes Brother     Cancer Maternal Grandmother     Diabetes Maternal Grandfather      Allergies   Allergen Reactions    Morphine Swelling     Current Outpatient Prescriptions   Medication Sig Dispense Refill    calcium carbonate-vitamin D (CALTRATE) 600-400 MG-UNIT TABS per tab Take 1 tablet by mouth 2 times daily  11    escitalopram (LEXAPRO) 10 MG tablet TAKE 1 TABLET BY MOUTH EVERY DAY 30 tablet 1    pantoprazole (PROTONIX) 40 MG tablet Take 1 tablet by mouth daily 30 tablet 2    acetaminophen (APAP EXTRA STRENGTH) 500 MG tablet Take 1 tablet by mouth 2 times daily as needed for Pain 60 tablet 2    naproxen (NAPROSYN) 500 MG tablet Take 1 tablet by mouth 2 times daily as needed for Pain 60 tablet 1    rOPINIRole (REQUIP) 0.5 MG tablet Take 1 tablet by mouth nightly 30 tablet 2    atorvastatin (LIPITOR) 40 MG tablet TAKE 1 TABLET BY MOUTH ONCE DAILY 30 tablet 5    triamcinolone (KENALOG) 0.1 % cream Apply topically 2 times daily as needed (Apply to affected areas twice daily as needed.) Apply topically 2 times daily. 453.6 g 2    alendronate (FOSAMAX) 35 MG tablet Take 1 tablet by mouth every 7 days 4 tablet 5    levothyroxine (SYNTHROID) 50 MCG tablet Take 1 tablet by mouth Daily 30 tablet 5    metFORMIN (GLUCOPHAGE) 500 MG tablet Take 1 tablet by mouth 2 times daily (with meals) 60 tablet 5    fenofibrate 160 MG tablet Take 1 tablet by mouth daily 90 tablet 3    Cholecalciferol (VITAMIN D) 2000 units TABS tablet Take 1 tablet by mouth daily 30 tablet 11    hydrOXYzine (ATARAX) 10 MG tablet Take 1 tablet by mouth 3 times daily as needed for Itching 60 tablet 0    Calcium 600-400 MG-UNIT CHEW Take 1 tablet by mouth 2 times daily 60 tablet 11     No current facility-administered medications for this visit. PMH, Surgical Hx, Family Hx, and Social Hx reviewed and updated. Health Maintenance reviewed. Objective    Vitals:    07/23/18 1335   BP: 104/70   Site: Left Arm   Position: Sitting   Cuff Size: Medium Adult   Pulse: 68   Resp: 16   Temp: 98.2 °F (36.8 °C)   TempSrc: Temporal   SpO2: 97%   Weight: 117 lb (53.1 kg)   Height: 4' 9\" (1.448 m)       Physical Exam   Constitutional: She is oriented to person, place, and time. She appears well-developed and well-nourished. HENT:   Head: Normocephalic and atraumatic. Eyes: Conjunctivae are normal.   Neck: Neck supple. Carotid bruit is not present. No thyromegaly present. Cardiovascular: Normal rate, regular rhythm, S1 normal, S2 normal and intact distal pulses.     Pulmonary/Chest: Effort normal. She has no wheezes. She has no rales. Musculoskeletal: She exhibits no edema. Right knee: She exhibits decreased range of motion, LCL laxity and abnormal meniscus (Medial and lateral joint line tenderness). She exhibits no swelling, no effusion, no ecchymosis, no deformity, no erythema, normal patellar mobility, no bony tenderness and no MCL laxity. Tenderness found. Medial joint line, lateral joint line (flexion and extension), MCL and LCL tenderness noted. No patellar tendon tenderness noted. Lymphadenopathy:     She has no cervical adenopathy. Neurological: She is alert and oriented to person, place, and time. Skin: Skin is warm and dry. Psychiatric: She has a normal mood and affect. Lab Results   Component Value Date    LABA1C 5.5 07/16/2018    LABA1C 6.0 (H) 03/02/2018    LABA1C 5.6 11/06/2017     Lab Results   Component Value Date    CREATININE 0.63 11/06/2017     Lab Results   Component Value Date    ALT 39 (H) 11/06/2017    AST 34 11/06/2017     Lab Results   Component Value Date    CHOL 148 07/28/2017    TRIG 88 07/28/2017    HDL 73 (H) 07/28/2017    LDLCALC 57 07/28/2017        Assessment & Plan   Visit Diagnoses and Associated Orders     Mixed hyperlipidemia    -  Primary    Stable, well controlled on Lipitor 40 mg daily. Follow labs. Lipid, Fasting [[id number] Custom]   - Future Order    Hepatic Function Panel [[id number] Custom]   - Future Order         Abnormal glucose        Stable, well controlled on metformin 500 mg twice a day. Follow labs and recheck in 6 months. Gastroesophageal reflux disease without esophagitis        Stable, well controlled on PPI. Next visit consider change to ranitidine. Vitamin D deficiency        Continue vitamin D 2000 units daily check level in 6 months. Acquired hypothyroidism        Stable, well controlled on 50 µg of levothyroxine. Recheck in 6 months. Alcoholism (HCC)        Improving, fair control.   Reviewed health consequences of alcohol abuse. Urged continued cessation efforts. Restless leg syndrome        Stable, well controlled on ropinirole 0.5 mg daily at bedtime. Follow clinically. Mood disorder (HCC)        Worse, fair control. Continue Lexapro 10 mg daily. Mood changes related to life stressors. Follow clinically. Osteopenia of multiple sites        Stable, no recent fractures. Continue Fosamax 35 mg weekly. Continue calcium supplementation. Chronic pain of right knee        Given diffusely painful knee with nonspecific exam and diffuse tenderness will refer to orthopedics    Amb External Referral To Orthopedic Surgery [[id number] Custom]           Psoriasis        Stable, well controlled on triamcinolone ointment. Follow clinically. ORDERS WITHOUT AN ASSOCIATED DIAGNOSIS    calcium carbonate-vitamin D (CALTRATE) 600-400 MG-UNIT TABS per tab [[id number]]              Reviewed with the patient: all disease processes, current clinical status, medications, activities and diet.      Side effects, adverse effects of the medication prescribed today, as well as treatment plan/ rationale and result expectations have been discussed with the patient who expresses understanding and desires to proceed.     Close follow up to evaluate treatment results and for coordination of care. I have reviewed the patient's medical history in detail and updated the computerized patient record. More than 50% of the appointment was spent in face-to-face counseling, education and care coordination.       Orders Placed This Encounter   Procedures    Lipid, Fasting     Standing Status:   Future     Standing Expiration Date:   1/23/2019    Hepatic Function Panel     Standing Status:   Future     Standing Expiration Date:   1/23/2019    Amb External Referral To Orthopedic Surgery     Referral Priority:   Routine     Referral Type:   Consult for Advice and Opinion     Referral Reason:   Specialty Services Required     Referred to Provider:   Jessica Atkinson MD     Requested Specialty:   Orthopedic Surgery     Number of Visits Requested:   1     No orders of the defined types were placed in this encounter. There are no discontinued medications. Return in about 3 months (around 10/23/2018) for HTN, HLD, Mood Disorder, GERD, COPD, Thyroid. Controlled Substances Monitoring:     RX Monitoring 11/6/2017   Attestation The Prescription Monitoring Report for this patient was reviewed today.        Jasmina Zhang MD

## 2018-08-17 ENCOUNTER — HOSPITAL ENCOUNTER (OUTPATIENT)
Dept: ORTHOPEDIC SURGERY | Age: 63
Discharge: HOME | End: 2018-08-19
Payer: COMMERCIAL

## 2018-08-17 DIAGNOSIS — R52 PAIN: ICD-10-CM

## 2018-08-17 PROCEDURE — 73562 X-RAY EXAM OF KNEE 3: CPT

## 2018-08-31 NOTE — TELEPHONE ENCOUNTER
Rx request   Requested Prescriptions     Pending Prescriptions Disp Refills    levothyroxine (SYNTHROID) 50 MCG tablet 30 tablet 5     Sig: Take 1 tablet by mouth Daily     LOV 7/23/2018  Next Visit Date:  Future Appointments  Date Time Provider Cami Ledesma   10/24/2018 8:00 AM Nii Rhoades MD [address] [place] [address]

## 2018-09-04 RX ORDER — LEVOTHYROXINE SODIUM 0.05 MG/1
50 TABLET ORAL DAILY
Qty: 30 TABLET | Refills: 5 | Status: SHIPPED | OUTPATIENT
Start: 2018-09-04 | End: 2019-04-05 | Stop reason: SDUPTHER

## 2018-09-04 RX ORDER — LEVOTHYROXINE SODIUM 0.05 MG/1
50 TABLET ORAL DAILY
Qty: 30 TABLET | Refills: 5 | Status: SHIPPED | OUTPATIENT
Start: 2018-09-04 | End: 2018-11-05 | Stop reason: SDUPTHER

## 2018-09-04 NOTE — TELEPHONE ENCOUNTER
Rx request   Requested Prescriptions     Pending Prescriptions Disp Refills    metFORMIN (GLUCOPHAGE) 500 MG tablet 60 tablet 5     Sig: Take 1 tablet by mouth 2 times daily (with meals)    levothyroxine (SYNTHROID) 50 MCG tablet 30 tablet 5     Sig: Take 1 tablet by mouth Daily     LOV 7/23/2018  Next Visit Date:  Future Appointments  Date Time Provider Cami Ledesma   10/24/2018 8:00 AM Niki Britton MD [address] [place] [address]

## 2018-09-05 DIAGNOSIS — G25.81 RESTLESS LEG SYNDROME: Chronic | ICD-10-CM

## 2018-09-05 RX ORDER — ROPINIROLE 0.5 MG/1
0.5 TABLET, FILM COATED ORAL NIGHTLY
Qty: 30 TABLET | Refills: 1 | Status: SHIPPED | OUTPATIENT
Start: 2018-09-05 | End: 2018-11-02 | Stop reason: SDUPTHER

## 2018-09-30 DIAGNOSIS — K21.9 GASTROESOPHAGEAL REFLUX DISEASE WITHOUT ESOPHAGITIS: Chronic | ICD-10-CM

## 2018-09-30 RX ORDER — PANTOPRAZOLE SODIUM 40 MG/1
40 TABLET, DELAYED RELEASE ORAL DAILY
Qty: 30 TABLET | Refills: 2 | Status: SHIPPED | OUTPATIENT
Start: 2018-09-30 | End: 2019-01-03 | Stop reason: SDUPTHER

## 2018-10-15 RX ORDER — ATORVASTATIN CALCIUM 40 MG/1
TABLET, FILM COATED ORAL
Qty: 30 TABLET | Refills: 5 | Status: SHIPPED | OUTPATIENT
Start: 2018-10-15 | End: 2019-04-24 | Stop reason: SDUPTHER

## 2018-11-05 ENCOUNTER — OFFICE VISIT (OUTPATIENT)
Dept: FAMILY MEDICINE CLINIC | Age: 63
End: 2018-11-05
Payer: COMMERCIAL

## 2018-11-05 VITALS
OXYGEN SATURATION: 99 % | BODY MASS INDEX: 24.81 KG/M2 | WEIGHT: 115 LBS | HEIGHT: 57 IN | HEART RATE: 53 BPM | TEMPERATURE: 98.1 F | SYSTOLIC BLOOD PRESSURE: 112 MMHG | RESPIRATION RATE: 20 BRPM | DIASTOLIC BLOOD PRESSURE: 64 MMHG

## 2018-11-05 DIAGNOSIS — G25.81 RESTLESS LEG SYNDROME: Chronic | ICD-10-CM

## 2018-11-05 DIAGNOSIS — L40.9 PSORIASIS: Chronic | ICD-10-CM

## 2018-11-05 DIAGNOSIS — E78.2 MIXED HYPERLIPIDEMIA: Primary | Chronic | ICD-10-CM

## 2018-11-05 DIAGNOSIS — F39 MOOD DISORDER (HCC): Chronic | ICD-10-CM

## 2018-11-05 DIAGNOSIS — F10.20 ALCOHOLISM (HCC): ICD-10-CM

## 2018-11-05 DIAGNOSIS — R73.09 ABNORMAL GLUCOSE: Chronic | ICD-10-CM

## 2018-11-05 DIAGNOSIS — E03.9 ACQUIRED HYPOTHYROIDISM: Chronic | ICD-10-CM

## 2018-11-05 DIAGNOSIS — K21.9 GASTROESOPHAGEAL REFLUX DISEASE WITHOUT ESOPHAGITIS: Chronic | ICD-10-CM

## 2018-11-05 DIAGNOSIS — E55.9 VITAMIN D DEFICIENCY: Chronic | ICD-10-CM

## 2018-11-05 DIAGNOSIS — Z23 FLU VACCINE NEED: ICD-10-CM

## 2018-11-05 DIAGNOSIS — J40 BRONCHITIS: ICD-10-CM

## 2018-11-05 DIAGNOSIS — E78.2 MIXED HYPERLIPIDEMIA: Chronic | ICD-10-CM

## 2018-11-05 LAB
ALBUMIN SERPL-MCNC: 4.3 G/DL (ref 3.9–4.9)
ALP BLD-CCNC: 55 U/L (ref 40–130)
ALT SERPL-CCNC: 18 U/L (ref 0–33)
AST SERPL-CCNC: 23 U/L (ref 0–35)
BILIRUB SERPL-MCNC: 0.3 MG/DL (ref 0–1.2)
BILIRUBIN DIRECT: <0.2 MG/DL (ref 0–0.3)
BILIRUBIN, INDIRECT: ABNORMAL MG/DL (ref 0–0.6)
CHOLESTEROL, FASTING: 147 MG/DL (ref 0–199)
HDLC SERPL-MCNC: 53 MG/DL (ref 40–59)
LDL CHOLESTEROL CALCULATED: 68 MG/DL (ref 0–129)
TOTAL PROTEIN: 6.3 G/DL (ref 6.4–8.1)
TRIGLYCERIDE, FASTING: 128 MG/DL (ref 0–200)

## 2018-11-05 PROCEDURE — G8420 CALC BMI NORM PARAMETERS: HCPCS | Performed by: FAMILY MEDICINE

## 2018-11-05 PROCEDURE — 90471 IMMUNIZATION ADMIN: CPT | Performed by: FAMILY MEDICINE

## 2018-11-05 PROCEDURE — 90688 IIV4 VACCINE SPLT 0.5 ML IM: CPT | Performed by: FAMILY MEDICINE

## 2018-11-05 PROCEDURE — G8427 DOCREV CUR MEDS BY ELIG CLIN: HCPCS | Performed by: FAMILY MEDICINE

## 2018-11-05 PROCEDURE — 1036F TOBACCO NON-USER: CPT | Performed by: FAMILY MEDICINE

## 2018-11-05 PROCEDURE — 3017F COLORECTAL CA SCREEN DOC REV: CPT | Performed by: FAMILY MEDICINE

## 2018-11-05 PROCEDURE — G8482 FLU IMMUNIZE ORDER/ADMIN: HCPCS | Performed by: FAMILY MEDICINE

## 2018-11-05 PROCEDURE — 99214 OFFICE O/P EST MOD 30 MIN: CPT | Performed by: FAMILY MEDICINE

## 2018-11-05 RX ORDER — HYDROXYZINE HYDROCHLORIDE 10 MG/1
10 TABLET, FILM COATED ORAL 3 TIMES DAILY PRN
Qty: 60 TABLET | Refills: 0 | Status: SHIPPED | OUTPATIENT
Start: 2018-11-05 | End: 2020-01-13 | Stop reason: SDUPTHER

## 2018-11-05 RX ORDER — AZITHROMYCIN 250 MG/1
TABLET, FILM COATED ORAL
Qty: 5 TABLET | Refills: 0 | Status: SHIPPED | OUTPATIENT
Start: 2018-11-05 | End: 2019-04-05 | Stop reason: ALTCHOICE

## 2018-11-05 ASSESSMENT — PATIENT HEALTH QUESTIONNAIRE - PHQ9
SUM OF ALL RESPONSES TO PHQ9 QUESTIONS 1 & 2: 2
SUM OF ALL RESPONSES TO PHQ QUESTIONS 1-9: 2
2. FEELING DOWN, DEPRESSED OR HOPELESS: 1
1. LITTLE INTEREST OR PLEASURE IN DOING THINGS: 1
SUM OF ALL RESPONSES TO PHQ QUESTIONS 1-9: 2

## 2018-11-05 NOTE — PROGRESS NOTES
azithromycin (ZITHROMAX) 250 MG tablet Take 2 tablets now then 1 tablet daily for four days 5 tablet 0    rOPINIRole (REQUIP) 0.5 MG tablet Take 1 tablet by mouth nightly 30 tablet 2    atorvastatin (LIPITOR) 40 MG tablet TAKE 1 TABLET BY MOUTH ONCE DAILY 30 tablet 5    pantoprazole (PROTONIX) 40 MG tablet Take 1 tablet by mouth daily 30 tablet 2    levothyroxine (SYNTHROID) 50 MCG tablet Take 1 tablet by mouth Daily 30 tablet 5    metFORMIN (GLUCOPHAGE) 500 MG tablet Take 1 tablet by mouth 2 times daily (with meals) 60 tablet 5    alendronate (FOSAMAX) 35 MG tablet TAKE 1 TABLET BY MOUTH EVERY 7 DAYS 4 tablet 5    calcium carbonate-vitamin D (CALTRATE) 600-400 MG-UNIT TABS per tab Take 1 tablet by mouth 2 times daily  11    escitalopram (LEXAPRO) 10 MG tablet TAKE 1 TABLET BY MOUTH EVERY DAY 30 tablet 1    acetaminophen (APAP EXTRA STRENGTH) 500 MG tablet Take 1 tablet by mouth 2 times daily as needed for Pain 60 tablet 2    naproxen (NAPROSYN) 500 MG tablet Take 1 tablet by mouth 2 times daily as needed for Pain 60 tablet 1    Calcium 600-400 MG-UNIT CHEW Take 1 tablet by mouth 2 times daily 60 tablet 11    triamcinolone (KENALOG) 0.1 % cream Apply topically 2 times daily as needed (Apply to affected areas twice daily as needed.) Apply topically 2 times daily. 453.6 g 2    fenofibrate 160 MG tablet Take 1 tablet by mouth daily 90 tablet 3    Cholecalciferol (VITAMIN D) 2000 units TABS tablet Take 1 tablet by mouth daily 30 tablet 11     No current facility-administered medications for this visit. PMH, Surgical Hx, Family Hx, and Social Hxreviewed and updated. Health Maintenance reviewed.     Objective    Vitals:    11/05/18 0855   BP: 112/64   Site: Left Upper Arm   Position: Sitting   Cuff Size: Medium Adult   Pulse: 53   Resp: 20   Temp: 98.1 °F (36.7 °C)   TempSrc: Temporal   SpO2: 99%   Weight: 115 lb (52.2 kg)   Height: 4' 9\" (1.448 m)       Physical Exam   Constitutional: She is oriented YRS AND OLDER, IM, MDV, 0.5ML (FLUZONE QUADV)    Hemoglobin A1C     Standing Status:   Future     Standing Expiration Date:   5/5/2019    Vitamin D 1,25 Dihydroxy     Standing Status:   Future     Standing Expiration Date:   11/5/2019    TSH Without Reflex     Standing Status:   Future     Standing Expiration Date:   11/5/2019     Orders Placed This Encounter   Medications    hydrOXYzine (ATARAX) 10 MG tablet     Sig: Take 1 tablet by mouth 3 times daily as needed for Itching     Dispense:  60 tablet     Refill:  0    azithromycin (ZITHROMAX) 250 MG tablet     Sig: Take 2 tablets now then 1 tablet daily for four days     Dispense:  5 tablet     Refill:  0     Medications Discontinued During This Encounter   Medication Reason    levothyroxine (SYNTHROID) 50 MCG tablet DUPLICATE    hydrOXYzine (ATARAX) 10 MG tablet REORDER     Return in about 3 months (around 2/5/2019) for Mood Disorder, GERD, Thyroid, others. Controlled Substances Monitoring:     RX Monitoring 11/6/2017   Attestation The Prescription Monitoring Report for this patient was reviewed today.        Jeramy Kelly MD

## 2019-01-04 DIAGNOSIS — E55.9 VITAMIN D DEFICIENCY: Chronic | ICD-10-CM

## 2019-01-04 RX ORDER — CHOLECALCIFEROL (VITAMIN D3) 50 MCG
2000 TABLET ORAL DAILY
Qty: 30 TABLET | Refills: 11 | Status: SHIPPED | OUTPATIENT
Start: 2019-01-04 | End: 2019-12-16 | Stop reason: SDUPTHER

## 2019-01-18 DIAGNOSIS — M79.604 BILATERAL LEG PAIN: Chronic | ICD-10-CM

## 2019-01-18 DIAGNOSIS — M79.605 BILATERAL LEG PAIN: Chronic | ICD-10-CM

## 2019-01-21 RX ORDER — ACETAMINOPHEN 500 MG
500 TABLET ORAL 2 TIMES DAILY PRN
Qty: 60 TABLET | Refills: 1 | Status: SHIPPED | OUTPATIENT
Start: 2019-01-21 | End: 2019-02-25 | Stop reason: SDUPTHER

## 2019-02-05 DIAGNOSIS — G25.81 RESTLESS LEG SYNDROME: Chronic | ICD-10-CM

## 2019-02-05 DIAGNOSIS — M85.89 OSTEOPENIA OF MULTIPLE SITES: Chronic | ICD-10-CM

## 2019-02-05 RX ORDER — ALENDRONATE SODIUM 35 MG/1
35 TABLET ORAL
Qty: 4 TABLET | Refills: 5 | Status: SHIPPED | OUTPATIENT
Start: 2019-02-05 | End: 2019-06-25 | Stop reason: SDUPTHER

## 2019-02-05 RX ORDER — ROPINIROLE 0.5 MG/1
0.5 TABLET, FILM COATED ORAL NIGHTLY
Qty: 30 TABLET | Refills: 5 | Status: SHIPPED | OUTPATIENT
Start: 2019-02-05 | End: 2019-07-25 | Stop reason: SDUPTHER

## 2019-02-25 RX ORDER — FENOFIBRATE 160 MG/1
160 TABLET ORAL DAILY
Qty: 90 TABLET | Refills: 1 | Status: SHIPPED | OUTPATIENT
Start: 2019-02-25 | End: 2019-08-12 | Stop reason: SDUPTHER

## 2019-04-05 ENCOUNTER — TELEPHONE (OUTPATIENT)
Dept: FAMILY MEDICINE CLINIC | Age: 64
End: 2019-04-05

## 2019-04-05 ENCOUNTER — OFFICE VISIT (OUTPATIENT)
Dept: FAMILY MEDICINE CLINIC | Age: 64
End: 2019-04-05
Payer: COMMERCIAL

## 2019-04-05 VITALS
OXYGEN SATURATION: 98 % | BODY MASS INDEX: 24.59 KG/M2 | RESPIRATION RATE: 16 BRPM | DIASTOLIC BLOOD PRESSURE: 66 MMHG | SYSTOLIC BLOOD PRESSURE: 104 MMHG | HEART RATE: 78 BPM | HEIGHT: 57 IN | WEIGHT: 114 LBS | TEMPERATURE: 98.2 F

## 2019-04-05 DIAGNOSIS — R06.09 DYSPNEA ON EXERTION: Primary | ICD-10-CM

## 2019-04-05 DIAGNOSIS — E55.9 VITAMIN D DEFICIENCY: Chronic | ICD-10-CM

## 2019-04-05 DIAGNOSIS — R53.83 FATIGUE, UNSPECIFIED TYPE: ICD-10-CM

## 2019-04-05 DIAGNOSIS — K21.9 GASTROESOPHAGEAL REFLUX DISEASE WITHOUT ESOPHAGITIS: Chronic | ICD-10-CM

## 2019-04-05 DIAGNOSIS — R94.31 ABNORMAL EKG: Chronic | ICD-10-CM

## 2019-04-05 DIAGNOSIS — H61.22 IMPACTED CERUMEN OF LEFT EAR: ICD-10-CM

## 2019-04-05 DIAGNOSIS — F39 MOOD DISORDER (HCC): Chronic | ICD-10-CM

## 2019-04-05 DIAGNOSIS — H66.001 ACUTE SUPPURATIVE OTITIS MEDIA OF RIGHT EAR WITHOUT SPONTANEOUS RUPTURE OF TYMPANIC MEMBRANE, RECURRENCE NOT SPECIFIED: ICD-10-CM

## 2019-04-05 DIAGNOSIS — E03.9 ACQUIRED HYPOTHYROIDISM: Chronic | ICD-10-CM

## 2019-04-05 DIAGNOSIS — L40.9 PSORIASIS: Chronic | ICD-10-CM

## 2019-04-05 DIAGNOSIS — M85.89 OSTEOPENIA OF MULTIPLE SITES: Chronic | ICD-10-CM

## 2019-04-05 DIAGNOSIS — F10.20 ALCOHOLISM (HCC): ICD-10-CM

## 2019-04-05 DIAGNOSIS — E78.2 MIXED HYPERLIPIDEMIA: Chronic | ICD-10-CM

## 2019-04-05 DIAGNOSIS — G25.81 RESTLESS LEG SYNDROME: Chronic | ICD-10-CM

## 2019-04-05 DIAGNOSIS — R73.09 ABNORMAL GLUCOSE: Chronic | ICD-10-CM

## 2019-04-05 PROCEDURE — 1036F TOBACCO NON-USER: CPT | Performed by: FAMILY MEDICINE

## 2019-04-05 PROCEDURE — 99215 OFFICE O/P EST HI 40 MIN: CPT | Performed by: FAMILY MEDICINE

## 2019-04-05 PROCEDURE — G8420 CALC BMI NORM PARAMETERS: HCPCS | Performed by: FAMILY MEDICINE

## 2019-04-05 PROCEDURE — G8427 DOCREV CUR MEDS BY ELIG CLIN: HCPCS | Performed by: FAMILY MEDICINE

## 2019-04-05 PROCEDURE — 96160 PT-FOCUSED HLTH RISK ASSMT: CPT | Performed by: FAMILY MEDICINE

## 2019-04-05 PROCEDURE — 3017F COLORECTAL CA SCREEN DOC REV: CPT | Performed by: FAMILY MEDICINE

## 2019-04-05 PROCEDURE — 93000 ELECTROCARDIOGRAM COMPLETE: CPT | Performed by: FAMILY MEDICINE

## 2019-04-05 RX ORDER — AMOXICILLIN 875 MG/1
875 TABLET, COATED ORAL 2 TIMES DAILY
Qty: 14 TABLET | Refills: 0 | Status: SHIPPED | OUTPATIENT
Start: 2019-04-05 | End: 2019-04-12

## 2019-04-05 RX ORDER — MELOXICAM 15 MG/1
TABLET ORAL
Refills: 2 | COMMUNITY
Start: 2019-03-24 | End: 2019-10-15 | Stop reason: SDUPTHER

## 2019-04-05 RX ORDER — TRIAMCINOLONE ACETONIDE 1 MG/G
CREAM TOPICAL 2 TIMES DAILY PRN
Qty: 453.6 G | Refills: 2 | Status: SHIPPED | OUTPATIENT
Start: 2019-04-05 | End: 2019-06-24 | Stop reason: SDUPTHER

## 2019-04-05 ASSESSMENT — PATIENT HEALTH QUESTIONNAIRE - PHQ9
10. IF YOU CHECKED OFF ANY PROBLEMS, HOW DIFFICULT HAVE THESE PROBLEMS MADE IT FOR YOU TO DO YOUR WORK, TAKE CARE OF THINGS AT HOME, OR GET ALONG WITH OTHER PEOPLE: 1
4. FEELING TIRED OR HAVING LITTLE ENERGY: 2
SUM OF ALL RESPONSES TO PHQ QUESTIONS 1-9: 9
8. MOVING OR SPEAKING SO SLOWLY THAT OTHER PEOPLE COULD HAVE NOTICED. OR THE OPPOSITE, BEING SO FIGETY OR RESTLESS THAT YOU HAVE BEEN MOVING AROUND A LOT MORE THAN USUAL: 2
SUM OF ALL RESPONSES TO PHQ QUESTIONS 1-9: 9
SUM OF ALL RESPONSES TO PHQ9 QUESTIONS 1 & 2: 3
9. THOUGHTS THAT YOU WOULD BE BETTER OFF DEAD, OR OF HURTING YOURSELF: 0
2. FEELING DOWN, DEPRESSED OR HOPELESS: 2
5. POOR APPETITE OR OVEREATING: 2
3. TROUBLE FALLING OR STAYING ASLEEP: 0
1. LITTLE INTEREST OR PLEASURE IN DOING THINGS: 1
6. FEELING BAD ABOUT YOURSELF - OR THAT YOU ARE A FAILURE OR HAVE LET YOURSELF OR YOUR FAMILY DOWN: 0

## 2019-04-05 NOTE — TELEPHONE ENCOUNTER
[facility] ambulatory encounter  FAMILY PRACTICE ANNUAL PHYSICAL EXAM    CHIEF COMPLAINT:  UTI       HISTORY OF PRESENT ILLNESS:    Lauren Vázquez is a pleasant 28 year old female who presents today for establishing care and UTI symptoms.    New concerns discussed include:     1. UTI Symptoms: Started a few a days ago as painful urination. When she is almost done urinating, she has dull intense pain in the lower abdomen/suprapubic. She is urinating more oftne. Never had on in the past. She is having some blood in her urine described as light but in last few hours has noticed clotting. She is not a smoker. Her menstrual cycle is due in a few weeks. She is having some back pain but described as a tinge. Not severe.     2. She sees psychiatrist for all her psych diagnoses.    3. History of thyroid cancer in the family.    4. BMI 37: She is busy. She is in school for medical .     PROBLEM LIST:    Patient Active Problem List   Diagnosis   • Unspecified episodic mood disorder   • Attention deficit disorder without mention of hyperactivity   • Social phobia   • Chronic right-sided low back pain with right-sided sciatica   • Thyroid fullness   • Major depressive disorder, recurrent episode, moderate (CMS/HCC)       HISTORIES:    I have reviewed the past medical history, family history, social history, medications and allergies listed in the medical record as obtained by my nursing staff and support staff and agree with their documentation.  Allergies, Medications, Medical history, Surgical history, Social history and Family history were reviewed and updated.  ALLERGIES:   Allergen Reactions   • Grass Other (See Comments)     congestion   • Ragweed Other (See Comments)     congestion     Current Outpatient Medications   Medication Sig Dispense Refill   • traZODone (DESYREL) 50 MG tablet Take 1 tablet by mouth nightly. 30 tablet 2   • methylpheniDATE ER (CONCERTA) 36 MG CR tablet Take 2 tablets by mouth every morning.  Patient called to let you know that ear drop prescribed by you are on back order at her pharmacy. Was wondering if you can prescribe something else for her. Please advise and thank you! 60 tablet 0   • gabapentin (NEURONTIN) 600 MG tablet Take 1 tablet by mouth nightly. 30 tablet 3   • fluoxetine (PROZAC) 40 MG capsule Take 1 capsule by mouth daily. 30 capsule 2   • buPROPion (WELLBUTRIN XL) 300 MG 24 hr tablet Take 1 tablet by mouth daily. 30 tablet 2   • methylpheniDATE ER (CONCERTA) 36 MG CR tablet Take 2 tablets by mouth every morning. 60 tablet 0   • clonazePAM (KLONOPIN) 0.5 MG tablet Take 1 tablet by mouth 2 times daily as needed for Anxiety. 60 tablet 2   • ibuprofen (MOTRIN) 600 MG tablet Take 600 mg by mouth every 6 hours as needed.     • nitrofurantoin (MACRODANTIN) 100 MG capsule Take 1 capsule by mouth 2 times daily for 5 days. 19 capsule 0   • Phenazopyridine HCl (AZO URINARY PAIN) 97.5 MG Tab Take 1 tablet by mouth 3 times daily as needed (urine pain). 6 tablet 0     No current facility-administered medications for this visit.        REVIEW OF SYSTEMS:    Constitutional: Negative for fever and chills.   Skin: Negative for rash.   HEENT: Negative for eye drainage, rhinorrhea, ear pain or sore throat.  Respiratory: Negative for cough, wheezing or shortness of breath.    Cardiovascular: Negative for chest pain, chest pressure, palpitations or diaphoresis.   Gastrointestinal: Negative for nausea, vomiting, diarrhea  Extremities: Negative for joint swelling or joint pain.  Neurologic: Negative for change in sensory or motor function.  Negative for headache.  Endocrine: Negative for heat or cold intolerance, weight loss or gain.  Hematological: Negative for bleeding, bruising or adenopathy.  Psychiatric: Negative for change in affect, change in mentation or sleep disturbance.     PHYSICAL EXAM:    Vital Signs:    Visit Vitals  /66   Pulse 98   Temp 98.4 °F (36.9 °C) (Tympanic)   Ht 5' 8\" (1.727 m)   Wt 112.5 kg   BMI 37.71 kg/m²     General:   Alert, cooperative, conversive in no acute distress.  Skin:  Warm and dry without rash.    Head:  Normocephalic, atraumatic.   Neck:  Trachea  is midline. No adenopathy.  Normal thyroid without mass or tenderness..   Eyes:  Normal conjunctivae and sclerae.  Pupils equal, round and reactive to light.  Extraocular movements intact.  ENT:  Mucous membranes are moist.  Normal tympanic membranes and external auditory canals bilaterally.  No pharyngeal erythema or exudate.  No facial tenderness.  Normal nasal mucosa.  Cardiovascular:  Symmetrical pulses.  Regular rate and rhythm without murmur.  Respiratory:  Normal respiratory effort.  Clear to auscultation.  No wheezes, rales or rhonchi.  Gastrointestinal:  Soft and non tender.  Normal bowel sounds.  No hepatomegaly or splenomegaly.   Musculoskeletal:  No deformity or edema.  No tenderness to palpation.  Back:   Normal alignment.  No costovertebral angle tenderness or midline bony tenderness.  Neurologic:   Oriented x4.  Cranial nerves II-XII are intact.  No focal deficits or lateralizing signs.  Psychiatric:   Cooperative.  Appropriate mood and affect.    LABORATORY DATA:    No recent labs.    IMAGING STUDIES:    N/A    ASSESSMENT:    1. Urine frequency    2. BMI 37.0-37.9, adult    3. Dysuria        PLAN:    Orders Placed This Encounter   • Urinalysis with Micro & Culture if Indicated   • Urinalysis with Micro & Culture if Indicated   • Lipid Panel with Reflex   • Thyroid Stimulating Hormone Reflex   • Glycohemoglobin   • nitrofurantoin (MACRODANTIN) 100 MG capsule   • Phenazopyridine HCl (AZO URINARY PAIN) 97.5 MG Tab       DISCUSSION:    Will treat based on her symptoms. She is going to get urinalysis today as well. Once UTI sx resolved, will repeat to rule out any continuing hematuria.    Routine labs ordered.    Discuss my fitness pal and diet changes at length. Also discussed standing/walking more than sitting. She voiced understanding. She will return to office in one month to see how she is doing up on this.      FOLLOW UP:    Return in about 1 month (around 12/29/2018) for f/u  weight.      Screenings/Treatments Needed:  Immunizations reviewed and patient needs: Influenza    Instructions provided as documented in the after visit summary.    The patient indicated understanding of the diagnosis and agreed with the plan of care.

## 2019-04-05 NOTE — PROGRESS NOTES
Subjective  Armando Keenan, 66 y.o. female presents today with:  Chief Complaint   Patient presents with    Thyroid Problem     3 month follow up, needs labs states she will complete today.  Gastroesophageal Reflux    Hyperlipidemia    Mood Swings    Alcohol Problem    Rash     psoriasis. pt requesting cream refills states pharmacy told her there is no rx there-refill showed 03/02/19 with 2 refills.  Medication Problem     pt states she is having trouble swallowing the calcium pills. HPI    GERD. Well controlled on Protonix. Does not follow special diets.     Abnormal glucose. Compliant with metformin. Has occasional diarrhea. Last hemoglobin A1c was 5.5. Not following a healthy, low carb diet     Psoriasis. Well controlled with triamcinolone cream and Atarax 10 mg 3 times a day as needed for pruritus.     Anxiety and depression. States hat she is down a lot. But doesn't feel very low all the time. Patient states it's currently fairly well controlled on Lexapro. No thoughts of suicide. Sleep is normal.     Restless leg syndrome. Well controlled with ropinirole with which she has no side effects.     Mixed HLD. Does not follow a healthy diet. Is taking fenofibrate 160 mg twice a day and Lipitor 40 mg daily.     Hypothyroidism. Well controlled on levothyroxine 50 µg daily. Compliant with meds but taking incorrectly.     Osteopenia. Compliant with alendronate, calcium, vitamin D. Not due for DEXA for one year.     Vitamin D deficiency. Taking vitamin D supplementation as instructed.     Bilateral leg pain. Well controlled on intermittent naproxen which she takes with food and water. Worse on right side. Worse with going up and down stairs. Worst in the morning and causes stiffness so bad sometimes she can't make ot to the bathroom in time. Is not related to stain or alendronate.     Alcohol use disorder. Continues to consume 4 mixed drinks a day.   She drinks Coke and on file   Tobacco Use    Smoking status: Never Smoker    Smokeless tobacco: Never Used   Substance and Sexual Activity    Alcohol use: Yes     Alcohol/week: 16.8 oz     Types: 28 Shots of liquor per week    Drug use: No    Sexual activity: Not on file   Lifestyle    Physical activity:     Days per week: Not on file     Minutes per session: Not on file    Stress: Not on file   Relationships    Social connections:     Talks on phone: Not on file     Gets together: Not on file     Attends Worship service: Not on file     Active member of club or organization: Not on file     Attends meetings of clubs or organizations: Not on file     Relationship status: Not on file    Intimate partner violence:     Fear of current or ex partner: Not on file     Emotionally abused: Not on file     Physically abused: Not on file     Forced sexual activity: Not on file   Other Topics Concern    Not on file   Social History Narrative    Not on file     Family History   Problem Relation Age of Onset    Heart Disease Mother     Hypertension Mother     Diabetes Mother     Stroke Mother     Heart Disease Father     Hypertension Father     Heart Disease Sister     Hypertension Sister     Heart Disease Brother     Hypertension Brother     Diabetes Brother     Cancer Maternal Grandmother     Diabetes Maternal Grandfather      Allergies   Allergen Reactions    Morphine Swelling     Current Outpatient Medications   Medication Sig Dispense Refill    meloxicam (MOBIC) 15 MG tablet TAKE 1 TABLET DAILY AS NEEDED.  2    triamcinolone (KENALOG) 0.1 % cream Apply topically 2 times daily as needed (Apply to affected areas twice daily as needed.) Apply topically 2 times daily.  453.6 g 2    amoxicillin (AMOXIL) 875 MG tablet Take 1 tablet by mouth 2 times daily for 7 days 14 tablet 0    carbamide peroxide (DEBROX) 6.5 % otic solution Place 5 drops into the left ear 2 times daily 14.8 mL 1    ACETAMINOPHEN EXTRA STRENGTH 500 MG tablet Take 1 tablet by mouth 2 times daily as needed for Pain 60 tablet 3    metFORMIN (GLUCOPHAGE) 500 MG tablet Take 1 tablet by mouth 2 times daily (with meals) 60 tablet 3    levothyroxine (SYNTHROID) 50 MCG tablet Take 1 tablet by mouth Daily 30 tablet 3    fenofibrate 160 MG tablet Take 1 tablet by mouth daily 90 tablet 1    alendronate (FOSAMAX) 35 MG tablet Take 1 tablet by mouth every 7 days 4 tablet 5    rOPINIRole (REQUIP) 0.5 MG tablet Take 1 tablet by mouth nightly 30 tablet 5    pantoprazole (PROTONIX) 40 MG tablet TAKE 1 TABLET BY MOUTH DAILY 30 tablet 3    Cholecalciferol (VITAMIN D) 2000 units TABS tablet Take 1 tablet by mouth daily 30 tablet 11    escitalopram (LEXAPRO) 10 MG tablet TAKE 1 TABLET BY MOUTH EVERY DAY 30 tablet 5    hydrOXYzine (ATARAX) 10 MG tablet Take 1 tablet by mouth 3 times daily as needed for Itching 60 tablet 0    atorvastatin (LIPITOR) 40 MG tablet TAKE 1 TABLET BY MOUTH ONCE DAILY 30 tablet 5    calcium carbonate-vitamin D (CALTRATE) 600-400 MG-UNIT TABS per tab Take 1 tablet by mouth 2 times daily  11    naproxen (NAPROSYN) 500 MG tablet Take 1 tablet by mouth 2 times daily as needed for Pain 60 tablet 1    Calcium 600-400 MG-UNIT CHEW Take 1 tablet by mouth 2 times daily 60 tablet 11     No current facility-administered medications for this visit. PMH, Surgical Hx, Family Hx, and Social Hxreviewed and updated. Health Maintenance reviewed. Objective    Vitals:    04/05/19 0939   BP: 104/66   Pulse: 78   Resp: 16   Temp: 98.2 °F (36.8 °C)   SpO2: 98%   Weight: 114 lb (51.7 kg)   Height: 4' 9\" (1.448 m)        Physical Exam   Constitutional: She is oriented to person, place, and time. She appears well-developed and well-nourished. HENT:   Head: Normocephalic and atraumatic.    Right Ear: External ear normal.   Left Ear: External ear normal.   Nose: Nose normal.   Mouth/Throat: Oropharynx is clear and moist.   Left cerumen impaction; right TM flat, dull, red, no d/c   Eyes: Conjunctivae are normal. No scleral icterus. Neck: Normal range of motion. Neck supple. Carotid bruit is not present. No thyromegaly present. Cardiovascular: Normal rate, regular rhythm, S1 normal, S2 normal, normal heart sounds and intact distal pulses. Pulmonary/Chest: Effort normal and breath sounds normal. She has no wheezes. She has no rales. Musculoskeletal: She exhibits no edema. Lymphadenopathy:     She has no cervical adenopathy. Neurological: She is alert and oriented to person, place, and time. Skin: Skin is warm and dry. Psychiatric: She has a normal mood and affect. EKG - RBBB, occasional PVC, unchanged Twave inversion V1-4  Lab Results   Component Value Date    LABA1C 5.5 07/16/2018    LABA1C 6.0 (H) 03/02/2018    LABA1C 5.6 11/06/2017     Lab Results   Component Value Date    CREATININE 0.63 11/06/2017     Lab Results   Component Value Date    ALT 18 11/05/2018    AST 23 11/05/2018     Lab Results   Component Value Date    CHOL 148 07/28/2017    TRIG 88 07/28/2017    HDL 53 11/05/2018    LDLCALC 68 11/05/2018        Assessment & Plan   Visit Diagnoses and Associated Orders     Dyspnea on exertion    -  Primary    ABnormmal EKG (similar to prior EKGs). Stress ECHO given h/o abnl glucose, fatigue, BARRIENTOS, HLD, ETOH. EKG 12 Lead [[id number] Custom]   - Future Order    EKG 12 Lead [[id number] Custom]      ECHO Pharmacological Stress Test [[id number] Custom]           Fatigue, unspecified type        Comprehensive Metabolic Panel [[id number] Custom]   - Future Order    CBC Auto Differential [[id number] Custom]           Gastroesophageal reflux disease without esophagitis        Stable, well-controlled on current meds. Acquired hypothyroidism        Stable, well-controlled on current meds. Reviewed proper admin. Check labs. TSH Without Reflex [[id number] Custom]   - Future Order         Osteopenia of multiple sites        Reviewed proper admin of meds. DEXA in 1 year. Abnormal glucose        Stable, well-controlled on current meds. Hemoglobin A1C [[id number] Custom]   - Future Order         Alcoholism ([address]        ETOH use persists. Encouraged cessation. Mixed hyperlipidemia        Lipid, Fasting [[id number] Custom]   - Future Order         Mood disorder (HCC)        Stable, fair control on Lexapro. Advised stopping ETOH. Restless leg syndrome        Stable, well-controlled on current meds. Vitamin D deficiency        check levels    Vitamin D 25 Hydroxy [[id number] Custom]   - Future Order         Acute suppurative otitis media of right ear without spontaneous rupture of tympanic membrane, recurrence not specified        Amoxicillin. If hearing loss persists, then ENT and audiology. amoxicillin (AMOXIL) 875 MG tablet [11723]           Impacted cerumen of left ear        Lavage and Debrox. See above. EAR CERUMEN REMOVAL [[id number] Custom]      carbamide peroxide (DEBROX) 6.5 % otic solution [0899]           Psoriasis        Stable, well-controlled on current meds. triamcinolone (KENALOG) 0.1 % cream [6813]           Abnormal EKG        ECHO Pharmacological Stress Test [[id number] Custom]           ORDERS WITHOUT AN ASSOCIATED DIAGNOSIS    meloxicam (MOBIC) 15 MG tablet [[id number]]              Reviewed with the patient: all disease processes, current clinical status, medications, activities and diet.      Side effects, adverse effects of the medication prescribed today, as well as treatment plan/ rationale and result expectations have been discussed with the patient who expresses understanding and desires to proceed.     Close follow up to evaluate treatment results and for coordination of care. I have reviewed the patient's medical history in detail and updated the computerized patient record. More than 50% of the 45 minute appointment was spent in face-to-face counseling, education and care coordination.       Orders Placed This Encounter   Procedures    EAR CERUMEN

## 2019-04-08 DIAGNOSIS — E55.9 VITAMIN D DEFICIENCY: Chronic | ICD-10-CM

## 2019-04-08 DIAGNOSIS — E78.2 MIXED HYPERLIPIDEMIA: Chronic | ICD-10-CM

## 2019-04-08 DIAGNOSIS — R73.09 ABNORMAL GLUCOSE: Chronic | ICD-10-CM

## 2019-04-08 DIAGNOSIS — E03.9 ACQUIRED HYPOTHYROIDISM: Chronic | ICD-10-CM

## 2019-04-08 DIAGNOSIS — R53.83 FATIGUE, UNSPECIFIED TYPE: ICD-10-CM

## 2019-04-08 LAB
ALBUMIN SERPL-MCNC: 4.6 G/DL (ref 3.5–4.6)
ALP BLD-CCNC: 38 U/L (ref 40–130)
ALT SERPL-CCNC: 19 U/L (ref 0–33)
ANION GAP SERPL CALCULATED.3IONS-SCNC: 17 MEQ/L (ref 9–15)
AST SERPL-CCNC: 27 U/L (ref 0–35)
BASOPHILS ABSOLUTE: 0 K/UL (ref 0–0.2)
BASOPHILS RELATIVE PERCENT: 0.6 %
BILIRUB SERPL-MCNC: 0.6 MG/DL (ref 0.2–0.7)
BUN BLDV-MCNC: 13 MG/DL (ref 8–23)
CALCIUM SERPL-MCNC: 10 MG/DL (ref 8.5–9.9)
CHLORIDE BLD-SCNC: 98 MEQ/L (ref 95–107)
CHOLESTEROL, FASTING: 136 MG/DL (ref 0–199)
CO2: 23 MEQ/L (ref 20–31)
CREAT SERPL-MCNC: 0.78 MG/DL (ref 0.5–0.9)
EOSINOPHILS ABSOLUTE: 0.3 K/UL (ref 0–0.7)
EOSINOPHILS RELATIVE PERCENT: 4.5 %
GFR AFRICAN AMERICAN: >60
GFR NON-AFRICAN AMERICAN: >60
GLOBULIN: 2.5 G/DL (ref 2.3–3.5)
GLUCOSE BLD-MCNC: 89 MG/DL (ref 70–99)
HBA1C MFR BLD: 5.2 % (ref 4.8–5.9)
HCT VFR BLD CALC: 35.5 % (ref 37–47)
HDLC SERPL-MCNC: 48 MG/DL (ref 40–59)
HEMOGLOBIN: 11.4 G/DL (ref 12–16)
LDL CHOLESTEROL CALCULATED: 58 MG/DL (ref 0–129)
LYMPHOCYTES ABSOLUTE: 1 K/UL (ref 1–4.8)
LYMPHOCYTES RELATIVE PERCENT: 18.1 %
MCH RBC QN AUTO: 29.9 PG (ref 27–31.3)
MCHC RBC AUTO-ENTMCNC: 32.2 % (ref 33–37)
MCV RBC AUTO: 93 FL (ref 82–100)
MONOCYTES ABSOLUTE: 0.4 K/UL (ref 0.2–0.8)
MONOCYTES RELATIVE PERCENT: 7.5 %
NEUTROPHILS ABSOLUTE: 3.9 K/UL (ref 1.4–6.5)
NEUTROPHILS RELATIVE PERCENT: 69.3 %
PDW BLD-RTO: 15.6 % (ref 11.5–14.5)
PLATELET # BLD: 183 K/UL (ref 130–400)
POTASSIUM SERPL-SCNC: 4.2 MEQ/L (ref 3.4–4.9)
RBC # BLD: 3.81 M/UL (ref 4.2–5.4)
SODIUM BLD-SCNC: 138 MEQ/L (ref 135–144)
TOTAL PROTEIN: 7.1 G/DL (ref 6.3–8)
TRIGLYCERIDE, FASTING: 148 MG/DL (ref 0–150)
TSH SERPL DL<=0.05 MIU/L-ACNC: 2.74 UIU/ML (ref 0.44–3.86)
VITAMIN D 25-HYDROXY: 77.1 NG/ML (ref 30–100)
WBC # BLD: 5.6 K/UL (ref 4.8–10.8)

## 2019-04-09 DIAGNOSIS — D64.9 ANEMIA, UNSPECIFIED TYPE: Primary | ICD-10-CM

## 2019-04-10 ENCOUNTER — TELEPHONE (OUTPATIENT)
Dept: FAMILY MEDICINE CLINIC | Age: 64
End: 2019-04-10

## 2019-04-10 LAB — VITAMIN D 1,25-DIHYDROXY: 41.3 PG/ML (ref 19.9–79.3)

## 2019-04-10 NOTE — TELEPHONE ENCOUNTER
Pt called into office wondering [facility] message never received a reply         4/5/19 3:49 PM      Gema Ivans contacted [facility]           4/5/19 3:49 PM   Note      Patient called to let you know that ear drop prescribed by you are on back order at her pharmacy. Was wondering if you can prescribe something else for her.    Please advise and thank you!                     4/5/19 3:51 PM   Ruth New routed this conversation to Mio Flores MD \"    Please advise

## 2019-04-24 DIAGNOSIS — K21.9 GASTROESOPHAGEAL REFLUX DISEASE WITHOUT ESOPHAGITIS: Chronic | ICD-10-CM

## 2019-04-24 RX ORDER — PANTOPRAZOLE SODIUM 40 MG/1
40 TABLET, DELAYED RELEASE ORAL DAILY
Qty: 30 TABLET | Refills: 3 | Status: SHIPPED | OUTPATIENT
Start: 2019-04-24 | End: 2019-08-19 | Stop reason: SDUPTHER

## 2019-04-24 RX ORDER — ATORVASTATIN CALCIUM 40 MG/1
TABLET, FILM COATED ORAL
Qty: 30 TABLET | Refills: 5 | Status: SHIPPED | OUTPATIENT
Start: 2019-04-24 | End: 2019-10-15 | Stop reason: SDUPTHER

## 2019-04-24 NOTE — TELEPHONE ENCOUNTER
Rx request   Requested Prescriptions     Pending Prescriptions Disp Refills    pantoprazole (PROTONIX) 40 MG tablet 30 tablet 3     Sig: Take 1 tablet by mouth daily    atorvastatin (LIPITOR) 40 MG tablet 30 tablet 5     LOV 4/5/2019  Next Visit Date:  Future Appointments   Date Time Provider Cami Ledesma   5/9/2019  9:00 AM Dagmar Sever, MD [address] [place] [address]

## 2019-05-09 ENCOUNTER — OFFICE VISIT (OUTPATIENT)
Dept: FAMILY MEDICINE CLINIC | Age: 64
End: 2019-05-09
Payer: COMMERCIAL

## 2019-05-09 VITALS
TEMPERATURE: 97.8 F | HEART RATE: 68 BPM | SYSTOLIC BLOOD PRESSURE: 106 MMHG | RESPIRATION RATE: 16 BRPM | OXYGEN SATURATION: 97 % | DIASTOLIC BLOOD PRESSURE: 70 MMHG | HEIGHT: 57 IN | WEIGHT: 109.8 LBS | BODY MASS INDEX: 23.69 KG/M2

## 2019-05-09 DIAGNOSIS — D64.9 ANEMIA, UNSPECIFIED TYPE: ICD-10-CM

## 2019-05-09 DIAGNOSIS — Z23 NEED FOR PNEUMOCOCCAL VACCINATION: ICD-10-CM

## 2019-05-09 DIAGNOSIS — H61.22 EXCESSIVE CERUMEN IN LEFT EAR CANAL: ICD-10-CM

## 2019-05-09 DIAGNOSIS — Z12.39 BREAST CANCER SCREENING: ICD-10-CM

## 2019-05-09 DIAGNOSIS — R94.31 ABNORMAL EKG: Chronic | ICD-10-CM

## 2019-05-09 DIAGNOSIS — R06.09 DOE (DYSPNEA ON EXERTION): Primary | Chronic | ICD-10-CM

## 2019-05-09 LAB
BASOPHILS ABSOLUTE: 0 K/UL (ref 0–0.2)
BASOPHILS RELATIVE PERCENT: 0.6 %
EOSINOPHILS ABSOLUTE: 0.2 K/UL (ref 0–0.7)
EOSINOPHILS RELATIVE PERCENT: 3.2 %
HCT VFR BLD CALC: 36 % (ref 37–47)
HEMOGLOBIN: 11.4 G/DL (ref 12–16)
LYMPHOCYTES ABSOLUTE: 1.2 K/UL (ref 1–4.8)
LYMPHOCYTES RELATIVE PERCENT: 25.2 %
MCH RBC QN AUTO: 29.6 PG (ref 27–31.3)
MCHC RBC AUTO-ENTMCNC: 31.6 % (ref 33–37)
MCV RBC AUTO: 93.7 FL (ref 82–100)
MONOCYTES ABSOLUTE: 0.3 K/UL (ref 0.2–0.8)
MONOCYTES RELATIVE PERCENT: 7.2 %
NEUTROPHILS ABSOLUTE: 3 K/UL (ref 1.4–6.5)
NEUTROPHILS RELATIVE PERCENT: 63.8 %
PDW BLD-RTO: 15.5 % (ref 11.5–14.5)
PLATELET # BLD: 170 K/UL (ref 130–400)
RBC # BLD: 3.84 M/UL (ref 4.2–5.4)
WBC # BLD: 4.7 K/UL (ref 4.8–10.8)

## 2019-05-09 PROCEDURE — G8420 CALC BMI NORM PARAMETERS: HCPCS | Performed by: FAMILY MEDICINE

## 2019-05-09 PROCEDURE — G8427 DOCREV CUR MEDS BY ELIG CLIN: HCPCS | Performed by: FAMILY MEDICINE

## 2019-05-09 PROCEDURE — 3017F COLORECTAL CA SCREEN DOC REV: CPT | Performed by: FAMILY MEDICINE

## 2019-05-09 PROCEDURE — 90732 PPSV23 VACC 2 YRS+ SUBQ/IM: CPT | Performed by: FAMILY MEDICINE

## 2019-05-09 PROCEDURE — 1036F TOBACCO NON-USER: CPT | Performed by: FAMILY MEDICINE

## 2019-05-09 PROCEDURE — 99214 OFFICE O/P EST MOD 30 MIN: CPT | Performed by: FAMILY MEDICINE

## 2019-05-09 PROCEDURE — 90471 IMMUNIZATION ADMIN: CPT | Performed by: FAMILY MEDICINE

## 2019-05-09 NOTE — PROGRESS NOTES
Subjective  Armando Keenan, 66 y.o. female presents today with:  Chief Complaint   Patient presents with   [address]     pt completed this morning-no results. completed last ones 04/08/19    Fatigue     pt states she is very fatigued upon exertion.  Otalgia     bilateral. completed ATB. pt states she never got the ear drops because the pharmacy didnt have.  Health Maintenance     refused mammogram           HPI    Right ear pain is a lot better. Left ear is with decreased hearing. Never got prescribed drops but is using OTC drops. BARRIENTOS persists. Can only clean one room at a time. Going up one flight of stairs leaves her breathless. Recovers after a few minutes. No chest pain. No palpitations. No edema. Never got a call for stress ECHO. States she will call them. No other questions and or concerns for today's visit      Review of Systems No fevers, chills, sweats. No unintended weight loss. No abdominal pain, nausea, vomiting, diarrhea, constipation, bloody stools, black tarry stools. No rashes. No swollen glands.         Past Medical History:   Diagnosis Date    [facility]ism ([facility] [address]     Arthritis     Chronic lung disease     Depression     Hyperlipidemia     Hypertension     Hypothyroidism     Second hand smoke exposure      Past Surgical History:   Procedure Laterality Date    APPENDECTOMY      BIOPSY MOUTH LESION Bilateral 12/19/2016    REMOVAL  OF BILATERAL LINGUAL MACIE performed by Alicia Alanis DDS at [place] [address].       Social History     Socioeconomic History    Marital status: Single     Spouse name: Not on file    Number of children: Not on file    Years of education: Not on file    Highest education level: Not on file   Occupational History    Not on file   Social Needs    Financial resource strain: Not on file    Food insecurity:     Worry: Not on file     Inability: Not on file    Transportation needs:     Medical: Not on file     Non-medical: Not on file   Tobacco Use    Smoking status: Never Smoker    Smokeless tobacco: Never Used   Substance and Sexual Activity    Alcohol use: Yes     Alcohol/week: 16.8 oz     Types: 28 Shots of liquor per week    Drug use: No    Sexual activity: Not on file   Lifestyle    Physical activity:     Days per week: Not on file     Minutes per session: Not on file    Stress: Not on file   Relationships    Social connections:     Talks on phone: Not on file     Gets together: Not on file     Attends [employer] service: Not on file     Active member of club or organization: Not on file     Attends meetings of clubs or organizations: Not on file     Relationship status: Not on file    Intimate partner violence:     Fear of current or ex partner: Not on file     Emotionally abused: Not on file     Physically abused: Not on file     Forced sexual activity: Not on file   Other Topics Concern    Not on file   Social History Narrative    Not on file     Family History   Problem Relation Age of Onset    Heart Disease Mother     Hypertension Mother     Diabetes Mother     Stroke Mother     Heart Disease Father     Hypertension Father     Heart Disease Sister     Hypertension Sister     Heart Disease Brother     Hypertension Brother     Diabetes Brother     Cancer Maternal Grandmother     Diabetes Maternal Grandfather      Allergies   Allergen Reactions    Morphine Swelling     Current Outpatient Medications   Medication Sig Dispense Refill    pantoprazole (PROTONIX) 40 MG tablet Take 1 tablet by mouth daily 30 tablet 3    atorvastatin (LIPITOR) 40 MG tablet TAKE 1 TABLET BY MOUTH ONCE DAILY 30 tablet 5    meloxicam (MOBIC) 15 MG tablet TAKE 1 TABLET DAILY AS NEEDED.  2    triamcinolone (KENALOG) 0.1 % cream Apply topically 2 times daily as needed (Apply to affected areas twice daily as needed.) Apply topically 2 times daily.  453.6 g 2    ACETAMINOPHEN EXTRA STRENGTH 500 MG Cardiovascular: Normal rate, regular rhythm, S1 normal, S2 normal, normal heart sounds and intact distal pulses. Pulmonary/Chest: Effort normal and breath sounds normal. She has no wheezes. She has no rales. Musculoskeletal: She exhibits no edema. Lymphadenopathy:     She has no cervical adenopathy. Neurological: She is alert and oriented to person, place, and time. Skin: Skin is warm and dry. Psychiatric: She has a normal mood and affect. Lab Results   Component Value Date    LABA1C 5.2 04/08/2019    LABA1C 5.5 07/16/2018    LABA1C 6.0 (H) 03/02/2018     Lab Results   Component Value Date    CREATININE 0.78 04/08/2019     Lab Results   Component Value Date    ALT 19 04/08/2019    AST 27 04/08/2019     Lab Results   Component Value Date    CHOL 148 07/28/2017    TRIG 88 07/28/2017    HDL 48 04/08/2019    LDLCALC 58 04/08/2019        Assessment & Plan   Visit Diagnoses and Associated Orders     BARRIENTOS (dyspnea on exertion)    -  Primary    Patient will schedule stress echo         Abnormal EKG        Patient will schedule stress echo         Excessive cerumen in left ear canal        Removed successfully with lavage    EAR CERUMEN REMOVAL [[id number] Custom]           Breast cancer screening        SARAH DIGITAL SCREEN W OR WO CAD BILATERAL [[id number] Custom]   - Future Order         Need for pneumococcal vaccination        PNEUMOVAX 23 subcutaneous/IM (Pneumococcal polysaccharide vaccine 23-valent >= 1yo) [[id number] Custom]                   Reviewed with the patient: all disease processes, current clinical status, medications, activities and diet.      Side effects, adverse effects of the medication prescribed today, as well as treatment plan/ rationale and result expectations have been discussed with the patient who expresses understanding and desires to proceed.     Close follow up to evaluate treatment results and for coordination of care.   I have reviewed the patient's medical history in detail and updated the computerized patient record. More than 50% of the appointment was spent in face-to-face counseling, education and care coordination. Orders Placed This Encounter   Procedures    EAR CERUMEN REMOVAL    SARAH DIGITAL SCREEN W OR WO CAD BILATERAL     Standing Status:   Future     Standing Expiration Date:   7/9/2020    PNEUMOVAX 23 subcutaneous/IM (Pneumococcal polysaccharide vaccine 23-valent >= 3yo)     No orders of the defined types were placed in this encounter. There are no discontinued medications. Return in about 4 months (around 9/9/2019) for HTN, HLD, Mood Disorder, GERD, Thyroid. Controlled Substances Monitoring:     RX Monitoring 11/6/2017   Attestation The Prescription Monitoring Report for this patient was reviewed today.        Lior Mccabe MD

## 2019-05-28 ENCOUNTER — HOSPITAL ENCOUNTER (OUTPATIENT)
Dept: NON INVASIVE DIAGNOSTICS | Age: 64
Discharge: HOME | End: 2019-05-28
Payer: COMMERCIAL

## 2019-05-28 ENCOUNTER — HOSPITAL ENCOUNTER (OUTPATIENT)
Dept: WOMENS IMAGING | Age: 64
Discharge: HOME | End: 2019-05-30
Payer: COMMERCIAL

## 2019-05-28 VITALS — BODY MASS INDEX: 22.65 KG/M2 | HEIGHT: 57 IN | WEIGHT: 105 LBS

## 2019-05-28 DIAGNOSIS — Z12.39 BREAST CANCER SCREENING: ICD-10-CM

## 2019-05-28 PROCEDURE — 2580000003 HC RX 258: Performed by: FAMILY MEDICINE

## 2019-05-28 PROCEDURE — 6360000002 HC RX W HCPCS: Performed by: FAMILY MEDICINE

## 2019-05-28 PROCEDURE — 77067 SCR MAMMO BI INCL CAD: CPT

## 2019-05-28 PROCEDURE — 93350 STRESS TTE ONLY: CPT

## 2019-05-28 PROCEDURE — 93017 CV STRESS TEST TRACING ONLY: CPT

## 2019-05-28 RX ORDER — 0.9 % SODIUM CHLORIDE 0.9 %
500 INTRAVENOUS SOLUTION INTRAVENOUS ONCE
Status: COMPLETED | OUTPATIENT
Start: 2019-05-28 | End: 2019-05-28

## 2019-05-28 RX ORDER — DOBUTAMINE HYDROCHLORIDE 200 MG/100ML
5 INJECTION INTRAVENOUS CONTINUOUS
Status: DISCONTINUED | OUTPATIENT
Start: 2019-05-28 | End: 2019-05-29 | Stop reason: HOSPADM

## 2019-05-28 RX ADMIN — DOBUTAMINE HYDROCHLORIDE 5 MCG/KG/MIN: 200 INJECTION INTRAVENOUS at 08:32

## 2019-05-28 RX ADMIN — SODIUM CHLORIDE 500 ML: 9 INJECTION, SOLUTION INTRAVENOUS at 08:32

## 2019-05-28 NOTE — FLOWSHEET NOTE
Hx,allergies and medications reviewed. Procedure explained and informed consent obtained for Dobutamine Stress Echo    Baseline four echo images started and Dobutamine infustion started at 5mcg. Reached 85% target HR of 134bpm.    Final four echo images obtained. Returned to baseline in recovery. Denies chest pain or pressure. EKG show some ST depression.

## 2019-05-29 DIAGNOSIS — F39 MOOD DISORDER (HCC): Chronic | ICD-10-CM

## 2019-05-29 DIAGNOSIS — M85.89 OSTEOPENIA OF MULTIPLE SITES: Chronic | ICD-10-CM

## 2019-05-29 RX ORDER — ESCITALOPRAM OXALATE 10 MG/1
TABLET ORAL
Qty: 30 TABLET | Refills: 5 | Status: SHIPPED | OUTPATIENT
Start: 2019-05-29 | End: 2019-11-19 | Stop reason: SDUPTHER

## 2019-05-29 NOTE — TELEPHONE ENCOUNTER
Pt requesting refill, she will need PA completed for calcium once signed. Please approve or deny this refill request. The order is pended. Thank you.     LOV 5/9/2019    Next Visit Date:  Future Appointments   Date Time Provider Cami Ledesma   9/9/2019 10:00 AM Blanca Escalante MD [facility] [place]     Requested Prescriptions     Pending Prescriptions Disp Refills    calcium carbonate-vitamin D (CALTRATE) 600-400 MG-UNIT TABS per tab 30 tablet 11     Sig: Take 1 tablet by mouth 2 times daily    escitalopram (LEXAPRO) 10 MG tablet 30 tablet 5     Sig: TAKE 1 TABLET BY MOUTH EVERY DAY

## 2019-06-24 DIAGNOSIS — L40.9 PSORIASIS: Chronic | ICD-10-CM

## 2019-06-24 DIAGNOSIS — M85.89 OSTEOPENIA OF MULTIPLE SITES: Chronic | ICD-10-CM

## 2019-06-24 NOTE — TELEPHONE ENCOUNTER
Office received incoming fax from pharmacy requesting the listed. Please review, approve or deny. Rx request   Requested Prescriptions     Pending Prescriptions Disp Refills    alendronate (FOSAMAX) 35 MG tablet 4 tablet 5     Sig: Take 1 tablet by mouth every 7 days    triamcinolone (KENALOG) 0.1 % cream 453.6 g 2     Sig: Apply topically 2 times daily as needed (Apply to affected areas twice daily as needed.) Apply topically 2 times daily.      LOV 5/9/2019  Next Visit Date:  Future Appointments   Date Time Provider Cami Ledesma   9/9/2019 10:00 AM Chantell Escalona MD [address] [place] [address]

## 2019-06-25 RX ORDER — TRIAMCINOLONE ACETONIDE 1 MG/G
CREAM TOPICAL 2 TIMES DAILY PRN
Qty: 453.6 G | Refills: 2 | Status: SHIPPED | OUTPATIENT
Start: 2019-06-25 | End: 2019-10-15 | Stop reason: SDUPTHER

## 2019-06-25 RX ORDER — ALENDRONATE SODIUM 35 MG/1
35 TABLET ORAL
Qty: 4 TABLET | Refills: 5 | Status: SHIPPED | OUTPATIENT
Start: 2019-06-25 | End: 2020-01-13 | Stop reason: SDUPTHER

## 2019-07-24 DIAGNOSIS — G25.81 RESTLESS LEG SYNDROME: Chronic | ICD-10-CM

## 2019-07-25 RX ORDER — ROPINIROLE 0.5 MG/1
0.5 TABLET, FILM COATED ORAL NIGHTLY
Qty: 30 TABLET | Refills: 5 | Status: SHIPPED | OUTPATIENT
Start: 2019-07-25 | End: 2020-01-13 | Stop reason: SDUPTHER

## 2019-08-12 RX ORDER — FENOFIBRATE 160 MG/1
160 TABLET ORAL DAILY
Qty: 90 TABLET | Refills: 1 | Status: SHIPPED | OUTPATIENT
Start: 2019-08-12 | End: 2020-01-13 | Stop reason: SDUPTHER

## 2019-08-19 DIAGNOSIS — K21.9 GASTROESOPHAGEAL REFLUX DISEASE WITHOUT ESOPHAGITIS: Chronic | ICD-10-CM

## 2019-08-19 RX ORDER — PANTOPRAZOLE SODIUM 40 MG/1
40 TABLET, DELAYED RELEASE ORAL DAILY
Qty: 30 TABLET | Refills: 0 | Status: SHIPPED | OUTPATIENT
Start: 2019-08-19 | End: 2019-09-09 | Stop reason: SDUPTHER

## 2019-09-09 DIAGNOSIS — K21.9 GASTROESOPHAGEAL REFLUX DISEASE WITHOUT ESOPHAGITIS: Chronic | ICD-10-CM

## 2019-09-09 RX ORDER — PANTOPRAZOLE SODIUM 40 MG/1
40 TABLET, DELAYED RELEASE ORAL DAILY
Qty: 30 TABLET | Refills: 1 | Status: SHIPPED | OUTPATIENT
Start: 2019-09-09 | End: 2019-11-19 | Stop reason: SDUPTHER

## 2019-09-09 NOTE — TELEPHONE ENCOUNTER
Rx request   Requested Prescriptions     Pending Prescriptions Disp Refills    pantoprazole (PROTONIX) 40 MG tablet 30 tablet 0     Sig: Take 1 tablet by mouth daily     LOV 5/9/2019  Next Visit Date:  No future appointments.

## 2019-10-15 DIAGNOSIS — L40.9 PSORIASIS: Chronic | ICD-10-CM

## 2019-10-15 RX ORDER — ATORVASTATIN CALCIUM 40 MG/1
TABLET, FILM COATED ORAL
Qty: 30 TABLET | Refills: 0 | Status: SHIPPED | OUTPATIENT
Start: 2019-10-15 | End: 2019-11-19 | Stop reason: SDUPTHER

## 2019-10-15 RX ORDER — TRIAMCINOLONE ACETONIDE 1 MG/G
CREAM TOPICAL 2 TIMES DAILY PRN
Qty: 453.6 G | Refills: 0 | Status: SHIPPED | OUTPATIENT
Start: 2019-10-15 | End: 2019-11-19 | Stop reason: SDUPTHER

## 2019-10-15 RX ORDER — MELOXICAM 15 MG/1
TABLET ORAL
Qty: 30 TABLET | Refills: 0 | Status: SHIPPED | OUTPATIENT
Start: 2019-10-15 | End: 2019-11-19

## 2019-11-18 DIAGNOSIS — L40.9 PSORIASIS: Chronic | ICD-10-CM

## 2019-11-18 DIAGNOSIS — K21.9 GASTROESOPHAGEAL REFLUX DISEASE WITHOUT ESOPHAGITIS: Chronic | ICD-10-CM

## 2019-11-18 DIAGNOSIS — F39 MOOD DISORDER (HCC): Chronic | ICD-10-CM

## 2019-11-19 RX ORDER — ESCITALOPRAM OXALATE 10 MG/1
TABLET ORAL
Qty: 30 TABLET | Refills: 0 | Status: SHIPPED | OUTPATIENT
Start: 2019-11-19 | End: 2019-12-16 | Stop reason: SDUPTHER

## 2019-11-19 RX ORDER — ATORVASTATIN CALCIUM 40 MG/1
TABLET, FILM COATED ORAL
Qty: 30 TABLET | Refills: 0 | Status: SHIPPED | OUTPATIENT
Start: 2019-11-19 | End: 2019-12-16 | Stop reason: SDUPTHER

## 2019-11-19 RX ORDER — PANTOPRAZOLE SODIUM 40 MG/1
40 TABLET, DELAYED RELEASE ORAL DAILY
Qty: 30 TABLET | Refills: 0 | Status: SHIPPED | OUTPATIENT
Start: 2019-11-19 | End: 2019-12-16 | Stop reason: SDUPTHER

## 2019-11-19 RX ORDER — TRIAMCINOLONE ACETONIDE 1 MG/G
CREAM TOPICAL 2 TIMES DAILY PRN
Qty: 453.6 G | Refills: 0 | Status: SHIPPED | OUTPATIENT
Start: 2019-11-19 | End: 2020-01-13 | Stop reason: SDUPTHER

## 2019-12-16 DIAGNOSIS — E55.9 VITAMIN D DEFICIENCY: Chronic | ICD-10-CM

## 2019-12-16 DIAGNOSIS — K21.9 GASTROESOPHAGEAL REFLUX DISEASE WITHOUT ESOPHAGITIS: Chronic | ICD-10-CM

## 2019-12-16 DIAGNOSIS — F39 MOOD DISORDER (HCC): Chronic | ICD-10-CM

## 2019-12-16 RX ORDER — ESCITALOPRAM OXALATE 10 MG/1
TABLET ORAL
Qty: 30 TABLET | Refills: 0 | Status: SHIPPED | OUTPATIENT
Start: 2019-12-16 | End: 2020-01-13 | Stop reason: SDUPTHER

## 2019-12-16 RX ORDER — ATORVASTATIN CALCIUM 40 MG/1
TABLET, FILM COATED ORAL
Qty: 30 TABLET | Refills: 0 | Status: SHIPPED | OUTPATIENT
Start: 2019-12-16 | End: 2020-01-13 | Stop reason: SDUPTHER

## 2019-12-16 RX ORDER — PANTOPRAZOLE SODIUM 40 MG/1
40 TABLET, DELAYED RELEASE ORAL DAILY
Qty: 30 TABLET | Refills: 0 | Status: SHIPPED | OUTPATIENT
Start: 2019-12-16 | End: 2020-02-17 | Stop reason: SDUPTHER

## 2019-12-16 RX ORDER — CHOLECALCIFEROL (VITAMIN D3) 50 MCG
2000 TABLET ORAL DAILY
Qty: 30 TABLET | Refills: 0 | Status: SHIPPED | OUTPATIENT
Start: 2019-12-16 | End: 2020-01-13 | Stop reason: SDUPTHER

## 2019-12-23 DIAGNOSIS — K21.9 GASTROESOPHAGEAL REFLUX DISEASE WITHOUT ESOPHAGITIS: Chronic | ICD-10-CM

## 2019-12-23 DIAGNOSIS — M79.605 BILATERAL LEG PAIN: Chronic | ICD-10-CM

## 2019-12-23 DIAGNOSIS — M85.89 OSTEOPENIA OF MULTIPLE SITES: Chronic | ICD-10-CM

## 2019-12-23 DIAGNOSIS — F39 MOOD DISORDER (HCC): Chronic | ICD-10-CM

## 2019-12-23 DIAGNOSIS — M79.604 BILATERAL LEG PAIN: Chronic | ICD-10-CM

## 2019-12-23 RX ORDER — PANTOPRAZOLE SODIUM 40 MG/1
40 TABLET, DELAYED RELEASE ORAL DAILY
Qty: 30 TABLET | Refills: 0 | Status: CANCELLED | OUTPATIENT
Start: 2019-12-23

## 2019-12-23 RX ORDER — ESCITALOPRAM OXALATE 10 MG/1
TABLET ORAL
Qty: 30 TABLET | Refills: 0 | Status: CANCELLED | OUTPATIENT
Start: 2019-12-23

## 2019-12-23 RX ORDER — ALENDRONATE SODIUM 35 MG/1
35 TABLET ORAL
Qty: 4 TABLET | Refills: 5 | Status: CANCELLED | OUTPATIENT
Start: 2019-12-23

## 2019-12-23 RX ORDER — ACETAMINOPHEN 500 MG
500 TABLET ORAL 2 TIMES DAILY PRN
Qty: 60 TABLET | Refills: 5 | Status: CANCELLED | OUTPATIENT
Start: 2019-12-23 | End: 2020-01-22

## 2020-01-10 RX ORDER — LEVOTHYROXINE SODIUM 0.05 MG/1
50 TABLET ORAL DAILY
Qty: 30 TABLET | Refills: 5 | Status: CANCELLED | OUTPATIENT
Start: 2020-01-10

## 2020-01-11 NOTE — TELEPHONE ENCOUNTER
Patient called requesting refill on the listed. Scheduled for an appt for Monday. Please advise and approve or deny.     Rx request   Requested Prescriptions     Pending Prescriptions Disp Refills    levothyroxine (SYNTHROID) 50 MCG tablet 30 tablet 5     Sig: Take 1 tablet by mouth Daily    metFORMIN (GLUCOPHAGE) 500 MG tablet 60 tablet 5     Sig: Take 1 tablet by mouth 2 times daily (with meals)     LOV 5/9/2019  Next Visit Date:  Future Appointments   Date Time Provider Cami Yeboahi   1/13/2020 12:45 PM Corey Lees MD [address] [place] [address]

## 2020-01-13 ENCOUNTER — OFFICE VISIT (OUTPATIENT)
Dept: FAMILY MEDICINE CLINIC | Age: 65
End: 2020-01-13
Payer: COMMERCIAL

## 2020-01-13 VITALS
HEIGHT: 57 IN | TEMPERATURE: 96.1 F | HEART RATE: 54 BPM | BODY MASS INDEX: 22.78 KG/M2 | WEIGHT: 105.6 LBS | OXYGEN SATURATION: 96 % | DIASTOLIC BLOOD PRESSURE: 72 MMHG | RESPIRATION RATE: 16 BRPM | SYSTOLIC BLOOD PRESSURE: 118 MMHG

## 2020-01-13 PROBLEM — D64.9 ANEMIA: Chronic | Status: ACTIVE | Noted: 2020-01-13

## 2020-01-13 PROBLEM — R63.4 WEIGHT LOSS: Chronic | Status: ACTIVE | Noted: 2020-01-13

## 2020-01-13 PROCEDURE — G8482 FLU IMMUNIZE ORDER/ADMIN: HCPCS | Performed by: FAMILY MEDICINE

## 2020-01-13 PROCEDURE — 1036F TOBACCO NON-USER: CPT | Performed by: FAMILY MEDICINE

## 2020-01-13 PROCEDURE — 99215 OFFICE O/P EST HI 40 MIN: CPT | Performed by: FAMILY MEDICINE

## 2020-01-13 PROCEDURE — G8427 DOCREV CUR MEDS BY ELIG CLIN: HCPCS | Performed by: FAMILY MEDICINE

## 2020-01-13 PROCEDURE — G8420 CALC BMI NORM PARAMETERS: HCPCS | Performed by: FAMILY MEDICINE

## 2020-01-13 PROCEDURE — 3017F COLORECTAL CA SCREEN DOC REV: CPT | Performed by: FAMILY MEDICINE

## 2020-01-13 RX ORDER — ALENDRONATE SODIUM 35 MG/1
35 TABLET ORAL
Qty: 4 TABLET | Refills: 12 | Status: SHIPPED | OUTPATIENT
Start: 2020-01-13 | End: 2020-08-06 | Stop reason: SDUPTHER

## 2020-01-13 RX ORDER — LEVOTHYROXINE SODIUM 0.05 MG/1
50 TABLET ORAL DAILY
Qty: 30 TABLET | Refills: 12 | Status: SHIPPED | OUTPATIENT
Start: 2020-01-13 | End: 2020-08-06 | Stop reason: SDUPTHER

## 2020-01-13 RX ORDER — MELOXICAM 7.5 MG/1
7.5 TABLET ORAL DAILY PRN
Qty: 30 TABLET | Refills: 1 | Status: SHIPPED | OUTPATIENT
Start: 2020-01-13 | End: 2020-08-06 | Stop reason: SDUPTHER

## 2020-01-13 RX ORDER — ESCITALOPRAM OXALATE 10 MG/1
TABLET ORAL
Qty: 30 TABLET | Refills: 12 | Status: SHIPPED | OUTPATIENT
Start: 2020-01-13 | End: 2020-08-06 | Stop reason: SDUPTHER

## 2020-01-13 RX ORDER — MELOXICAM 15 MG/1
TABLET ORAL
Refills: 0 | COMMUNITY
Start: 2019-12-12 | End: 2020-01-13 | Stop reason: SDUPTHER

## 2020-01-13 RX ORDER — CHOLECALCIFEROL (VITAMIN D3) 50 MCG
2000 TABLET ORAL DAILY
Qty: 30 TABLET | Refills: 12 | Status: SHIPPED | OUTPATIENT
Start: 2020-01-13 | End: 2021-04-15 | Stop reason: ALTCHOICE

## 2020-01-13 RX ORDER — TRIAMCINOLONE ACETONIDE 1 MG/G
CREAM TOPICAL 2 TIMES DAILY PRN
Qty: 453.6 G | Refills: 0 | Status: SHIPPED | OUTPATIENT
Start: 2020-01-13 | End: 2020-08-24 | Stop reason: ALTCHOICE

## 2020-01-13 RX ORDER — FENOFIBRATE 160 MG/1
160 TABLET ORAL DAILY
Qty: 30 TABLET | Refills: 12 | Status: SHIPPED | OUTPATIENT
Start: 2020-01-13 | End: 2020-08-06 | Stop reason: SDUPTHER

## 2020-01-13 RX ORDER — ATORVASTATIN CALCIUM 40 MG/1
TABLET, FILM COATED ORAL
Qty: 30 TABLET | Refills: 12 | Status: SHIPPED | OUTPATIENT
Start: 2020-01-13 | End: 2020-08-06 | Stop reason: SDUPTHER

## 2020-01-13 RX ORDER — ROPINIROLE 0.5 MG/1
0.5 TABLET, FILM COATED ORAL NIGHTLY
Qty: 30 TABLET | Refills: 12 | Status: SHIPPED | OUTPATIENT
Start: 2020-01-13 | End: 2020-08-06 | Stop reason: SDUPTHER

## 2020-01-13 RX ORDER — HYDROXYZINE HYDROCHLORIDE 10 MG/1
10 TABLET, FILM COATED ORAL 3 TIMES DAILY PRN
Qty: 60 TABLET | Refills: 0 | Status: SHIPPED | OUTPATIENT
Start: 2020-01-13 | End: 2020-02-17 | Stop reason: SDUPTHER

## 2020-01-13 ASSESSMENT — PATIENT HEALTH QUESTIONNAIRE - PHQ9
SUM OF ALL RESPONSES TO PHQ QUESTIONS 1-9: 0
2. FEELING DOWN, DEPRESSED OR HOPELESS: 0
SUM OF ALL RESPONSES TO PHQ9 QUESTIONS 1 & 2: 0
DEPRESSION UNABLE TO ASSESS: PT REFUSES
SUM OF ALL RESPONSES TO PHQ QUESTIONS 1-9: 0
1. LITTLE INTEREST OR PLEASURE IN DOING THINGS: 0

## 2020-01-13 NOTE — PROGRESS NOTES
Subjective  Nolijennifer Cheung, 60 y.o. female presents today with:  Chief Complaint   Patient presents with   [address] [facility] [address] [facility]Up     Patient present today for a check up and medication refill. Patient not been seen since 05/2019.  Health Maintenance     Patient refused Shingles Vaccine. HPI    GERD. Well-controlled with PPI, caffeine restriction, diet restriction. No weight loss. No abdominal pain. No bloody or black tarry stools. Hypothyroidism. Compliant with levothyroxine which is taken correctly. No diarrhea, constipation, palpitations, dry skin, depression, difficulty sleeping or fatigue. No weight gain. Does have unintentional weight loss in spite of normal appetite. No significant exercise. Eats high-carb diet. Neg Cologuard. Patient is being treated for depression and has been compliant with meds which do not cause side effects. Mood is improved. No suicidal ideation. Sleep is normal.    ETOH - drinks more than 20 drinks a week in 4 sittings total.    Fatigue present since last visit persists. Psoriasis is stable. Osteopenia - taking Fosamax, due for DEXA. No other questions and or concerns for today's visit    Review of Systems  No fevers, chills, sweats. No unintended weight loss. No abdominal pain, nausea, vomiting, diarrhea, constipation, bloody stools, black tarry stools. No rashes. No swollen glands. No cough. No SOB. No CP.     Past Medical History:   Diagnosis Date    Alcoholism ([address]     Arthritis     Chronic lung disease     Depression     Hyperlipidemia     Hypertension     Hypothyroidism     Second hand smoke exposure      Past Surgical History:   Procedure Laterality Date    APPENDECTOMY      BIOPSY MOUTH LESION Bilateral 12/19/2016    REMOVAL  OF BILATERAL LINGUAL MACIE performed by Sima Spaulding DDS at [address] [place] [address]       Social History     Socioeconomic History    Marital status: Single     Spouse name: Not on tablet [[id number]]           Alcoholism ([facility]        Improving and fair control. Comprehensive Metabolic Panel [[id number] Custom]   - Future Order    US ABDOMEN LIMITED [[id number] Custom]   - Future Order         Abnormal glucose        Stable and well-controlled on current meds. metFORMIN (GLUCOPHAGE) 500 MG tablet [[id number]]      Hemoglobin A1C [[id number] Custom]   - Future Order         Anemia, unspecified type        Follow labs. Suspect worse. May need add'l labs and EGD and colonoscopy. Encouraged limited NSAIDS and no ETOH use. CBC With Auto Differential [id number] Custom]   - Future Order    Comprehensive Metabolic Panel [[id number] Custom]   - Future Order         Mixed hyperlipidemia        Stable and well-controlled on current meds. Follow labs    Lipid, Fasting [[id number] Custom]   - Future Order    atorvastatin (LIPITOR) 40 MG tablet [[id number]]      fenofibrate 160 MG tablet [[id number]]           Psoriasis        Stable and well-controlled on current meds. hydrOXYzine (ATARAX) 10 MG tablet [2342]      triamcinolone (KENALOG) 0.1 % cream [7507]           Osteopenia of multiple sites        Dexa ordered. DEXA BONE DENSITY AXIAL SKELETON [[id number] Custom]   - Future Order    alendronate (FOSAMAX) 35 MG tablet [[id number]]      calcium carbonate-vitamin D (CALTRATE) 600-400 MG-UNIT TABS per tab [[id number]]           Weight loss        UNclear etiology. COnsider ETOH. Labs. Consider scop and CT. US ABDOMEN LIMITED [[id number] Custom]   - Future Order         Mood disorder ([facility]         Stable with fair control.  Still consuming substantial ETOH with little insight into complications related to ETOHism    escitalopram (LEXAPRO) 10 MG tablet [[id number]]           Osteopenia of multiple sites        DEXA BONE DENSITY AXIAL SKELETON [[id number] Custom]   - Future Order    alendronate (FOSAMAX) 35 MG tablet [[id number]]      calcium carbonate-vitamin D (CALTRATE) 600-400 MG-UNIT TABS per tab [[id number]]           Psoriasis        Stable, well controlled using as treatment plan/ rationale and result expectations have been discussed with the patient who expresses understanding and desires to proceed.     Close follow up to evaluate treatment results and for coordination of care. I have reviewed the patient's medical history in detail and updated the computerized patient record. More than 50% of the appointment was spent in face-to-face counseling, education and care coordination. Please note this report has been partially produced using speech recognition software and may contain mistakes related to that system including errors in grammar, punctuation and spelling as well as words and phrases that may seem inappropriate. If there are questions or concerns, please feel free to contact me to clarify. Orders Placed This Encounter   Procedures    DEXA BONE DENSITY AXIAL SKELETON     Standing Status:   Future     Standing Expiration Date:   1/13/2021     Order Specific Question:   Reason for exam:     Answer:   treated for osteopenia with alendronate    US ABDOMEN LIMITED     Standing Status:   Future     Standing Expiration Date:   1/13/2021     Order Specific Question:   Reason for exam:     Answer:   weight loss, ETOHism     Order Specific Question:   Specify organ?      Answer:   LIVER    CBC With Auto Differential     Standing Status:   Future     Standing Expiration Date:   1/13/2021    TSH Without Reflex     Standing Status:   Future     Standing Expiration Date:   1/13/2021    Lipid, Fasting     Standing Status:   Future     Standing Expiration Date:   1/13/2021    Comprehensive Metabolic Panel     Standing Status:   Future     Standing Expiration Date:   4/13/2020    Hemoglobin A1C     Standing Status:   Future     Standing Expiration Date:   3/13/2020     Orders Placed This Encounter   Medications    escitalopram (LEXAPRO) 10 MG tablet     Sig: TAKE 1 TABLET BY MOUTH EVERY DAY     Dispense:  30 tablet     Refill:  12    alendronate (FOSAMAX) 35 MG tablet     Sig: Take 1 tablet by mouth every 7 days     Dispense:  4 tablet     Refill:  12    hydrOXYzine (ATARAX) 10 MG tablet     Sig: Take 1 tablet by mouth 3 times daily as needed for Itching     Dispense:  60 tablet     Refill:  0    triamcinolone (KENALOG) 0.1 % cream     Sig: Apply topically 2 times daily as needed (Apply to affected areas twice daily as needed.) Apply topically 2 times daily. Dispense:  453.6 g     Refill:  0     Please dispense the Jar not the bottle.     rOPINIRole (REQUIP) 0.5 MG tablet     Sig: Take 1 tablet by mouth nightly     Dispense:  30 tablet     Refill:  12    Cholecalciferol (VITAMIN D) 50 MCG (2000 UT) TABS tablet     Sig: Take 1 tablet by mouth daily     Dispense:  30 tablet     Refill:  12    atorvastatin (LIPITOR) 40 MG tablet     Sig: TAKE 1 TABLET BY MOUTH ONCE DAILY     Dispense:  30 tablet     Refill:  12    calcium carbonate-vitamin D (CALTRATE) 600-400 MG-UNIT TABS per tab     Sig: Take 1 tablet by mouth 2 times daily     Dispense:  30 tablet     Refill:  12    fenofibrate 160 MG tablet     Sig: Take 1 tablet by mouth daily     Dispense:  30 tablet     Refill:  12    levothyroxine (SYNTHROID) 50 MCG tablet     Sig: Take 1 tablet by mouth Daily     Dispense:  30 tablet     Refill:  12    metFORMIN (GLUCOPHAGE) 500 MG tablet     Sig: Take 1 tablet by mouth 2 times daily (with meals)     Dispense:  60 tablet     Refill:  12    meloxicam (MOBIC) 7.5 MG tablet     Sig: Take 1 tablet by mouth daily as needed for Pain     Dispense:  30 tablet     Refill:  1     Medications Discontinued During This Encounter   Medication Reason    escitalopram (LEXAPRO) 10 MG tablet REORDER    alendronate (FOSAMAX) 35 MG tablet REORDER    hydrOXYzine (ATARAX) 10 MG tablet REORDER    triamcinolone (KENALOG) 0.1 % cream REORDER    rOPINIRole (REQUIP) 0.5 MG tablet REORDER    Cholecalciferol (VITAMIN D) 50 MCG (2000 UT) TABS tablet REORDER    atorvastatin (LIPITOR) 40 MG tablet REORDER    calcium carbonate-vitamin D (CALTRATE) 600-400 MG-UNIT TABS per tab REORDER    fenofibrate 160 MG tablet REORDER    levothyroxine (SYNTHROID) 50 MCG tablet REORDER    metFORMIN (GLUCOPHAGE) 500 MG tablet REORDER    meloxicam (MOBIC) 15 MG tablet REORDER     Return in about 3 months (around 4/13/2020) for ccc. Controlled Substance Monitoring:    Acute and Chronic Pain Monitoring:   RX Monitoring 11/6/2017   Attestation The Prescription Monitoring Report for this patient was reviewed today.            Abimael Da Silva MD

## 2020-01-22 ENCOUNTER — HOSPITAL ENCOUNTER (OUTPATIENT)
Dept: WOMENS IMAGING | Age: 65
Discharge: HOME | End: 2020-01-24
Payer: COMMERCIAL

## 2020-01-22 ENCOUNTER — HOSPITAL ENCOUNTER (OUTPATIENT)
Dept: ULTRASOUND IMAGING | Age: 65
Discharge: HOME | End: 2020-01-24
Payer: COMMERCIAL

## 2020-01-22 DIAGNOSIS — D64.9 ANEMIA, UNSPECIFIED TYPE: Chronic | ICD-10-CM

## 2020-01-22 DIAGNOSIS — R73.09 ABNORMAL GLUCOSE: Chronic | ICD-10-CM

## 2020-01-22 DIAGNOSIS — F10.20 ALCOHOLISM (HCC): ICD-10-CM

## 2020-01-22 DIAGNOSIS — E78.2 MIXED HYPERLIPIDEMIA: Chronic | ICD-10-CM

## 2020-01-22 DIAGNOSIS — E03.9 ACQUIRED HYPOTHYROIDISM: Chronic | ICD-10-CM

## 2020-01-22 LAB
ALBUMIN SERPL-MCNC: 4.9 G/DL (ref 3.5–4.6)
ALP BLD-CCNC: 43 U/L (ref 40–130)
ALT SERPL-CCNC: 25 U/L (ref 0–33)
ANION GAP SERPL CALCULATED.3IONS-SCNC: 17 MEQ/L (ref 9–15)
AST SERPL-CCNC: 38 U/L (ref 0–35)
BASOPHILS ABSOLUTE: 0 K/UL (ref 0–0.2)
BASOPHILS RELATIVE PERCENT: 0.6 %
BILIRUB SERPL-MCNC: 0.7 MG/DL (ref 0.2–0.7)
BUN BLDV-MCNC: 22 MG/DL (ref 8–23)
CALCIUM SERPL-MCNC: 10.1 MG/DL (ref 8.5–9.9)
CHLORIDE BLD-SCNC: 97 MEQ/L (ref 95–107)
CHOLESTEROL, FASTING: 165 MG/DL (ref 0–199)
CO2: 23 MEQ/L (ref 20–31)
CREAT SERPL-MCNC: 0.92 MG/DL (ref 0.5–0.9)
EOSINOPHILS ABSOLUTE: 0.1 K/UL (ref 0–0.7)
EOSINOPHILS RELATIVE PERCENT: 1.5 %
GFR AFRICAN AMERICAN: >60
GFR NON-AFRICAN AMERICAN: >60
GLOBULIN: 2.8 G/DL (ref 2.3–3.5)
GLUCOSE BLD-MCNC: 94 MG/DL (ref 70–99)
HBA1C MFR BLD: 5.4 % (ref 4.8–5.9)
HCT VFR BLD CALC: 35.4 % (ref 37–47)
HDLC SERPL-MCNC: 56 MG/DL (ref 40–59)
HEMOGLOBIN: 11.7 G/DL (ref 12–16)
LDL CHOLESTEROL CALCULATED: 76 MG/DL (ref 0–129)
LYMPHOCYTES ABSOLUTE: 1.2 K/UL (ref 1–4.8)
LYMPHOCYTES RELATIVE PERCENT: 17 %
MCH RBC QN AUTO: 30.3 PG (ref 27–31.3)
MCHC RBC AUTO-ENTMCNC: 33 % (ref 33–37)
MCV RBC AUTO: 91.6 FL (ref 82–100)
MONOCYTES ABSOLUTE: 0.5 K/UL (ref 0.2–0.8)
MONOCYTES RELATIVE PERCENT: 7.4 %
NEUTROPHILS ABSOLUTE: 5.1 K/UL (ref 1.4–6.5)
NEUTROPHILS RELATIVE PERCENT: 73.5 %
PDW BLD-RTO: 15.5 % (ref 11.5–14.5)
PLATELET # BLD: 202 K/UL (ref 130–400)
POTASSIUM SERPL-SCNC: 4.3 MEQ/L (ref 3.4–4.9)
RBC # BLD: 3.86 M/UL (ref 4.2–5.4)
SODIUM BLD-SCNC: 137 MEQ/L (ref 135–144)
TOTAL PROTEIN: 7.7 G/DL (ref 6.3–8)
TRIGLYCERIDE, FASTING: 163 MG/DL (ref 0–150)
TSH SERPL DL<=0.05 MIU/L-ACNC: 5.04 UIU/ML (ref 0.44–3.86)
WBC # BLD: 6.9 K/UL (ref 4.8–10.8)

## 2020-01-22 PROCEDURE — 77080 DXA BONE DENSITY AXIAL: CPT

## 2020-01-22 PROCEDURE — 76705 ECHO EXAM OF ABDOMEN: CPT

## 2020-02-17 RX ORDER — HYDROXYZINE HYDROCHLORIDE 10 MG/1
10 TABLET, FILM COATED ORAL 3 TIMES DAILY PRN
Qty: 60 TABLET | Refills: 1 | Status: SHIPPED | OUTPATIENT
Start: 2020-02-17 | End: 2020-04-06 | Stop reason: SDUPTHER

## 2020-02-17 RX ORDER — PANTOPRAZOLE SODIUM 40 MG/1
40 TABLET, DELAYED RELEASE ORAL DAILY
Qty: 90 TABLET | Refills: 1 | Status: SHIPPED | OUTPATIENT
Start: 2020-02-17 | End: 2020-08-06 | Stop reason: SDUPTHER

## 2020-04-06 RX ORDER — HYDROXYZINE HYDROCHLORIDE 10 MG/1
10 TABLET, FILM COATED ORAL 3 TIMES DAILY PRN
Qty: 60 TABLET | Refills: 1 | Status: SHIPPED | OUTPATIENT
Start: 2020-04-06 | End: 2020-05-22

## 2020-04-06 NOTE — TELEPHONE ENCOUNTER
\"  Requested Prescriptions     Pending Prescriptions Disp Refills    hydrOXYzine (ATARAX) 10 MG tablet 60 tablet 1     Sig: Take 1 tablet by mouth 3 times daily as needed for Itching

## 2020-05-12 ENCOUNTER — TELEPHONE (OUTPATIENT)
Dept: FAMILY MEDICINE CLINIC | Age: 65
End: 2020-05-12

## 2020-05-13 NOTE — TELEPHONE ENCOUNTER
Call patient and inquire about fenofibrate. When was it prescribed, by home, and when? Has she been on a statin medication like atorvastatin, pravastatin, simvastatin in the past?  If so, why she not on one now? Also, please make sure she has an appointment scheduled.   She also needs to have her TSH checked

## 2020-06-17 RX ORDER — HYDROXYZINE HYDROCHLORIDE 10 MG/1
TABLET, FILM COATED ORAL
Qty: 60 TABLET | Refills: 0 | Status: SHIPPED | OUTPATIENT
Start: 2020-06-17 | End: 2020-07-17

## 2020-07-17 RX ORDER — HYDROXYZINE HYDROCHLORIDE 10 MG/1
TABLET, FILM COATED ORAL
Qty: 60 TABLET | Refills: 0 | Status: SHIPPED | OUTPATIENT
Start: 2020-07-17 | End: 2020-08-06 | Stop reason: SDUPTHER

## 2020-07-17 NOTE — TELEPHONE ENCOUNTER
This will be the last refill until [place] is seen in virtual visit or in the office.   Please help her schedule that appointment

## 2020-08-07 RX ORDER — MELOXICAM 7.5 MG/1
7.5 TABLET ORAL DAILY PRN
Qty: 45 TABLET | Refills: 0 | Status: ON HOLD | OUTPATIENT
Start: 2020-08-07 | End: 2020-11-03 | Stop reason: HOSPADM

## 2020-08-07 RX ORDER — ESCITALOPRAM OXALATE 10 MG/1
TABLET ORAL
Qty: 90 TABLET | Refills: 0 | Status: SHIPPED | OUTPATIENT
Start: 2020-08-07 | End: 2020-09-30

## 2020-08-07 RX ORDER — LEVOTHYROXINE SODIUM 0.05 MG/1
50 TABLET ORAL DAILY
Qty: 90 TABLET | Refills: 0 | Status: SHIPPED | OUTPATIENT
Start: 2020-08-07 | End: 2020-09-30

## 2020-08-07 RX ORDER — ATORVASTATIN CALCIUM 40 MG/1
TABLET, FILM COATED ORAL
Qty: 90 TABLET | Refills: 0 | Status: SHIPPED | OUTPATIENT
Start: 2020-08-07 | End: 2020-09-30

## 2020-08-07 RX ORDER — ALENDRONATE SODIUM 35 MG/1
35 TABLET ORAL
Qty: 12 TABLET | Refills: 0 | Status: SHIPPED | OUTPATIENT
Start: 2020-08-07 | End: 2020-09-21

## 2020-08-07 RX ORDER — PANTOPRAZOLE SODIUM 40 MG/1
40 TABLET, DELAYED RELEASE ORAL DAILY
Qty: 90 TABLET | Refills: 0 | Status: SHIPPED | OUTPATIENT
Start: 2020-08-07 | End: 2020-09-30

## 2020-08-07 RX ORDER — ROPINIROLE 0.5 MG/1
0.5 TABLET, FILM COATED ORAL NIGHTLY
Qty: 90 TABLET | Refills: 0 | Status: SHIPPED | OUTPATIENT
Start: 2020-08-07 | End: 2020-09-30

## 2020-08-07 RX ORDER — HYDROXYZINE HYDROCHLORIDE 10 MG/1
TABLET, FILM COATED ORAL
Qty: 270 TABLET | Refills: 0 | Status: SHIPPED | OUTPATIENT
Start: 2020-08-07 | End: 2020-08-24 | Stop reason: DRUGHIGH

## 2020-08-07 RX ORDER — FENOFIBRATE 160 MG/1
160 TABLET ORAL DAILY
Qty: 90 TABLET | Refills: 0 | Status: SHIPPED | OUTPATIENT
Start: 2020-08-07 | End: 2020-11-06 | Stop reason: SDUPTHER

## 2020-08-17 ENCOUNTER — TELEPHONE (OUTPATIENT)
Dept: FAMILY MEDICINE CLINIC | Age: 65
End: 2020-08-17

## 2020-08-17 NOTE — TELEPHONE ENCOUNTER
Patient is calling to let you know that her Finofibrate 160 mg isn't covered by her insurance [facility]. She got a letter from [employer], stating that these other ones are covered:    *Triglide  *Gemfibrozil  *Fenoglide    Please advise and thanks!

## 2020-08-23 NOTE — TELEPHONE ENCOUNTER
Called patient and she was unsure as to why it was not covered and stated that the medication is 100 and is wondering if it can switched to another medication that is cheaper. Patient has an appointment for tomorrow with provider.

## 2020-08-24 ENCOUNTER — VIRTUAL VISIT (OUTPATIENT)
Dept: FAMILY MEDICINE CLINIC | Age: 65
End: 2020-08-24

## 2020-08-24 PROCEDURE — 99214 OFFICE O/P EST MOD 30 MIN: CPT | Performed by: FAMILY MEDICINE

## 2020-08-24 RX ORDER — FLUOCINONIDE 0.5 MG/G
OINTMENT TOPICAL
Qty: 180 G | Refills: 1 | Status: SHIPPED | OUTPATIENT
Start: 2020-08-24 | End: 2021-04-06

## 2020-08-24 RX ORDER — HYDROXYZINE HYDROCHLORIDE 25 MG/1
25 TABLET, FILM COATED ORAL EVERY 8 HOURS PRN
Qty: 90 TABLET | Refills: 2 | Status: SHIPPED | OUTPATIENT
Start: 2020-08-24 | End: 2020-11-16

## 2020-08-24 NOTE — PROGRESS NOTES
patches on legs and elbows. Diffusely itchy (all over body and head) in spite of Atarax which she is taking 4 times a day. Wants referral to derm and rheum. Scratches so hard it goes to her bones and then she has trouble lifting arms and legs. Feels like her arthritis is getting worse. No fevers or rashes other than psoriasis. DEXA scan in January 2020 reveals osteopenia is improving. GERD. Well-controlled with PPI, caffeine restriction, diet restriction. No weight loss. No abdominal pain. No bloody or black tarry stools. Hypothyroidism. Compliant with levothyroxine which is taken correctly. No diarrhea, constipation, palpitations, dry skin, depression, difficulty sleeping or fatigue. No weight gain or loss. Patient states that her weight loss has stabilized and her clothes have been fitting the same for several months. Her appetite is normal.    Alcoholism. Due to limitations and restrictions related to COVID-19 pandemic, Jada Simms has decreased her alcohol consumption and consumes approximately 4 drinks a week. No history of DTs. Unclear if chronic pruritus is related to psoriasis for liver damage related to alcohol use. Review of Systems    Prior to Visit Medications    Medication Sig Taking? Authorizing Provider   fluocinonide (LIDEX) 0.05 % ointment Apply topically 2 times daily.  Yes Dirk Mac MD   hydrOXYzine (ATARAX) 25 MG tablet Take 1 tablet by mouth every 8 hours as needed for Itching Yes Dirk Mac MD   metFORMIN (GLUCOPHAGE) 500 MG tablet Take 1 tablet by mouth 2 times daily (with meals)  Dirk Mac MD   meloxicam (MOBIC) 7.5 MG tablet Take 1 tablet by mouth daily as needed for Pain  Dirk Mac MD   pantoprazole (PROTONIX) 40 MG tablet Take 1 tablet by mouth daily  Dirk Mac MD   rOPINIRole (REQUIP) 0.5 MG tablet Take 1 tablet by mouth nightly  Dirk Mac MD   alendronate (FOSAMAX) 35 MG tablet Take 1 tablet by mouth every 7 days  Derwood Castleman, MD   atorvastatin (LIPITOR) 40 MG tablet TAKE 1 TABLET BY MOUTH ONCE DAILY  Derwood Castleman, MD   escitalopram (LEXAPRO) 10 MG tablet TAKE 1 TABLET BY MOUTH EVERY DAY  Derwood Castleman, MD   fenofibrate (TRIGLIDE) 160 MG tablet Take 1 tablet by mouth daily  Derwood Castleman, MD   levothyroxine (SYNTHROID) 50 MCG tablet Take 1 tablet by mouth Daily  Derwood Castleman, MD   Cholecalciferol (VITAMIN D) 50 MCG (2000 UT) TABS tablet Take 1 tablet by mouth daily  Derwood Castleman, MD   calcium carbonate-vitamin D (CALTRATE) 600-400 MG-UNIT TABS per tab Take 1 tablet by mouth 2 times daily  Derwood Castleman, MD   ACETAMINOPHEN EXTRA STRENGTH 500 MG tablet Take 1 tablet by mouth 2 times daily as needed for Pain  Derwood Castleman, MD   Calcium 600-400 MG-UNIT CHEW Take 1 tablet by mouth 2 times daily  Derwood Castleman, MD       Social History     Tobacco Use    Smoking status: Never Smoker    Smokeless tobacco: Never Used   Substance Use Topics    Alcohol use: Yes     Alcohol/week: 28.0 standard drinks     Types: 28 Shots of liquor per week    Drug use: No        Allergies   Allergen Reactions    Morphine Swelling   ,   Past Medical History:   Diagnosis Date    [facility]ism ([facility] [address]     Arthritis     Chronic lung disease     Depression     Hyperlipidemia     Hypertension     Hypothyroidism     Second hand smoke exposure    ,   Past Surgical History:   Procedure Laterality Date    APPENDECTOMY      BIOPSY MOUTH LESION Bilateral 12/19/2016    REMOVAL  OF BILATERAL LINGUAL MACIE performed by Michelle Jefferson DDS at [address]     ,   Social History     Tobacco Use    Smoking status: Never Smoker    Smokeless tobacco: Never Used   Substance Use Topics    Alcohol use:  Yes     Alcohol/week: 28.0 standard drinks Types: 29 Shots of liquor per week    Drug use: No   ,   Family History   Problem Relation Age of Onset    Heart Disease Mother     Hypertension Mother     Diabetes Mother     Stroke Mother     Heart Disease Father     Hypertension Father     Heart Disease Sister     Hypertension Sister     Heart Disease Brother     Hypertension Brother     Diabetes Brother     Cancer Maternal Grandmother     Diabetes Maternal Grandfather        PHYSICAL EXAMINATION:  [ INSTRUCTIONS:  \"[x]\" Indicates a positive item  \"[]\" Indicates a negative item  -- DELETE ALL ITEMS NOT EXAMINED]  Vital Signs: unavailable    Patient appears to be alert and oriented to person, place, time, situation and is in no acute distress. Respiratory effort appears normal. Mood appears stable and speech and thought are grossly normal.    ASSESSMENT/PLAN:  Cushing was seen today for joint pain. Diagnoses and all orders for this visit:    Psoriasis  Comments:  Changed to fluocinonide. Referred to dermatology. Patient interested in biologic. Orders:  -     Swathi Crouch MD, Dermatology, [facility]  -     Amb External Referral To Rheumatology  -     fluocinonide (LIDEX) 0.05 % ointment; Apply topically 2 times daily. -     hydrOXYzine (ATARAX) 25 MG tablet; Take 1 tablet by mouth every 8 hours as needed for Itching    Osteopenia of multiple sites  Comments:  Continue on low-dose Fosamax and calcium with vitamin D. Weight loss  Comments:  Stabilized. Follow labs. Orders:  -     Comprehensive Metabolic Panel; Future  -     Prealbumin; Future    Gastroesophageal reflux disease without esophagitis  Comments:  Stable on PPI. Acquired hypothyroidism  Comments: Follow labs. Reviewed proper administration. Orders:  -     TSH Without Reflex; Future    Vitamin D deficiency  -     Vitamin D 25 Hydroxy; Future    Alcoholism ([facility] [address]  Comments:  Improving, fair control. Review labs.   Orders:  -     CBC With Auto Differential; Future  -     Comprehensive Metabolic Panel; Future    Abnormal glucose  -     Comprehensive Metabolic Panel; Future    Multiple joint pain    Chronic pruritus  -     hydrOXYzine (ATARAX) 25 MG tablet; Take 1 tablet by mouth every 8 hours as needed for Itching    Osteopenia, unspecified location  Comments:  Continue Fosamax  Orders:  -     Amb External Referral To Rheumatology          Return in about 6 months (around 2/24/2021) for multi - 6 months - OV or VVClare Almaraz is a 63 y.o. female being evaluated by a Virtual Visit (telephonic visit) encounter to address concerns as mentioned above. A caregiver was present when appropriate. Due to this being a TeleHealth encounter (During XSPSQ-41 public health emergency), evaluation of the following organ systems was limited: Vitals/Constitutional/EENT/Resp/CV/GI//MS/Neuro/Skin/Heme-Lymph-Imm. Pursuant to the emergency declaration under the [address] [place] [address], [address] [place] [address] and the [employer] Act, this Virtual Visit was conducted with patient's (and/or legal guardian's) consent, to reduce the patient's risk of exposure to COVID-19 and provide necessary medical care. The patient (and/or legal guardian) has also been advised to contact this office for worsening conditions or problems, and seek emergency medical treatment and/or call 911 if deemed necessary. Services were provided through a telephonic synchronous discussion virtually to substitute for in-person clinic visit. Patient and provider were located at their individual homes. --Bertrand Collins MD on 8/24/2020 at 5:31 PM    An electronic signature was used to authenticate this note.

## 2020-08-31 ENCOUNTER — TELEPHONE (OUTPATIENT)
Dept: FAMILY MEDICINE CLINIC | Age: 65
End: 2020-08-31

## 2020-08-31 NOTE — TELEPHONE ENCOUNTER
.  Patient will need to call her insurance carrier to find out who is covered under her plan. Meanwhile I have written a generic order for referral to rheumatology.

## 2020-08-31 NOTE — TELEPHONE ENCOUNTER
Patient has a referral to Dr. Haroldo Valera but states that she can't go to the [facility]. Would like to have a different referral to rheumatology in [facility] if possible because she has ride issues. Please advise and thanks!

## 2020-09-04 ENCOUNTER — TELEPHONE (OUTPATIENT)
Dept: FAMILY MEDICINE CLINIC | Age: 65
End: 2020-09-04

## 2020-09-04 NOTE — TELEPHONE ENCOUNTER
Patient is calling stating that the fenofibrate is out of stock currently. Can you send in an alternative.

## 2020-09-14 NOTE — TELEPHONE ENCOUNTER
Patient called to check on the status of her medication. The Sarina Dione is not available and [facility] will need an alternative sent in. She is almost out of her meds.

## 2020-09-18 DIAGNOSIS — F10.20 ALCOHOLISM (HCC): ICD-10-CM

## 2020-09-18 DIAGNOSIS — E03.9 ACQUIRED HYPOTHYROIDISM: Chronic | ICD-10-CM

## 2020-09-18 DIAGNOSIS — R63.4 WEIGHT LOSS: Chronic | ICD-10-CM

## 2020-09-18 DIAGNOSIS — R73.09 ABNORMAL GLUCOSE: Chronic | ICD-10-CM

## 2020-09-18 DIAGNOSIS — E55.9 VITAMIN D DEFICIENCY: Chronic | ICD-10-CM

## 2020-09-18 LAB
ALBUMIN SERPL-MCNC: 4.2 G/DL (ref 3.5–4.6)
ALP BLD-CCNC: 52 U/L (ref 40–130)
ALT SERPL-CCNC: 22 U/L (ref 0–33)
ANION GAP SERPL CALCULATED.3IONS-SCNC: 13 MEQ/L (ref 9–15)
AST SERPL-CCNC: 30 U/L (ref 0–35)
BASOPHILS ABSOLUTE: 0 K/UL (ref 0–0.2)
BASOPHILS RELATIVE PERCENT: 0.6 %
BILIRUB SERPL-MCNC: 0.4 MG/DL (ref 0.2–0.7)
BUN BLDV-MCNC: 16 MG/DL (ref 8–23)
CALCIUM SERPL-MCNC: 8.5 MG/DL (ref 8.5–9.9)
CHLORIDE BLD-SCNC: 102 MEQ/L (ref 95–107)
CO2: 26 MEQ/L (ref 20–31)
CREAT SERPL-MCNC: 0.96 MG/DL (ref 0.5–0.9)
EOSINOPHILS ABSOLUTE: 0.2 K/UL (ref 0–0.7)
EOSINOPHILS RELATIVE PERCENT: 2.9 %
GFR AFRICAN AMERICAN: >60
GFR NON-AFRICAN AMERICAN: 58.6
GLOBULIN: 2.6 G/DL (ref 2.3–3.5)
GLUCOSE BLD-MCNC: 108 MG/DL (ref 70–99)
HCT VFR BLD CALC: 32.9 % (ref 37–47)
HEMOGLOBIN: 10.8 G/DL (ref 12–16)
LYMPHOCYTES ABSOLUTE: 1.2 K/UL (ref 1–4.8)
LYMPHOCYTES RELATIVE PERCENT: 15.2 %
MCH RBC QN AUTO: 29.8 PG (ref 27–31.3)
MCHC RBC AUTO-ENTMCNC: 32.8 % (ref 33–37)
MCV RBC AUTO: 90.9 FL (ref 82–100)
MONOCYTES ABSOLUTE: 0.4 K/UL (ref 0.2–0.8)
MONOCYTES RELATIVE PERCENT: 4.9 %
NEUTROPHILS ABSOLUTE: 6 K/UL (ref 1.4–6.5)
NEUTROPHILS RELATIVE PERCENT: 76.4 %
PDW BLD-RTO: 16.6 % (ref 11.5–14.5)
PLATELET # BLD: 205 K/UL (ref 130–400)
POTASSIUM SERPL-SCNC: 3.5 MEQ/L (ref 3.4–4.9)
PREALBUMIN: 18.6 MG/DL (ref 20–40)
RBC # BLD: 3.62 M/UL (ref 4.2–5.4)
SODIUM BLD-SCNC: 141 MEQ/L (ref 135–144)
TOTAL PROTEIN: 6.8 G/DL (ref 6.3–8)
TSH SERPL DL<=0.05 MIU/L-ACNC: 0.95 UIU/ML (ref 0.44–3.86)
VITAMIN D 25-HYDROXY: 50.8 NG/ML (ref 30–100)
WBC # BLD: 7.8 K/UL (ref 4.8–10.8)

## 2020-09-21 RX ORDER — ALENDRONATE SODIUM 35 MG/1
35 TABLET ORAL
Qty: 12 TABLET | Refills: 3 | Status: SHIPPED | OUTPATIENT
Start: 2020-09-21 | End: 2021-06-16

## 2020-09-21 NOTE — TELEPHONE ENCOUNTER
Rx request   Requested Prescriptions     Pending Prescriptions Disp Refills    alendronate (FOSAMAX) 35 MG tablet [Pharmacy Med Name: ALENDRONATE  35MG  TAB] 12 tablet 3     Sig: TAKE 1 TABLET BY MOUTH  EVERY 7 DAYS     LOV 8/24/2020  Next Visit Date:  Future Appointments   Date Time Provider Cami Ledesma   2/25/2021 12:30 PM Asiya Potter MD [address] [place] [address]

## 2020-09-30 RX ORDER — ESCITALOPRAM OXALATE 10 MG/1
TABLET ORAL
Qty: 90 TABLET | Refills: 3 | Status: SHIPPED | OUTPATIENT
Start: 2020-09-30 | End: 2021-02-25 | Stop reason: DRUGHIGH

## 2020-09-30 RX ORDER — ATORVASTATIN CALCIUM 40 MG/1
TABLET, FILM COATED ORAL
Qty: 90 TABLET | Refills: 3 | Status: SHIPPED | OUTPATIENT
Start: 2020-09-30 | End: 2021-07-28 | Stop reason: SDUPTHER

## 2020-09-30 RX ORDER — LEVOTHYROXINE SODIUM 0.05 MG/1
50 TABLET ORAL DAILY
Qty: 90 TABLET | Refills: 3 | Status: SHIPPED | OUTPATIENT
Start: 2020-09-30 | End: 2021-07-28 | Stop reason: SDUPTHER

## 2020-09-30 RX ORDER — PANTOPRAZOLE SODIUM 40 MG/1
40 TABLET, DELAYED RELEASE ORAL DAILY
Qty: 90 TABLET | Refills: 3 | Status: SHIPPED | OUTPATIENT
Start: 2020-09-30 | End: 2021-07-28 | Stop reason: SDUPTHER

## 2020-09-30 RX ORDER — ROPINIROLE 0.5 MG/1
TABLET, FILM COATED ORAL
Qty: 90 TABLET | Refills: 3 | Status: SHIPPED | OUTPATIENT
Start: 2020-09-30 | End: 2021-06-16

## 2020-11-02 ENCOUNTER — APPOINTMENT (OUTPATIENT)
Dept: CT IMAGING | Age: 65
DRG: 897 | End: 2020-11-02
Payer: MEDICARE

## 2020-11-02 ENCOUNTER — HOSPITAL ENCOUNTER (INPATIENT)
Age: 65
LOS: 2 days | Discharge: HOME | DRG: 897 | End: 2020-11-04
Attending: INTERNAL MEDICINE | Admitting: INTERNAL MEDICINE
Payer: MEDICARE

## 2020-11-02 ENCOUNTER — APPOINTMENT (OUTPATIENT)
Dept: GENERAL RADIOLOGY | Age: 65
DRG: 897 | End: 2020-11-02
Payer: MEDICARE

## 2020-11-02 ENCOUNTER — APPOINTMENT (OUTPATIENT)
Dept: MRI IMAGING | Age: 65
DRG: 897 | End: 2020-11-02
Payer: MEDICARE

## 2020-11-02 PROBLEM — R56.9 SEIZURE (HCC): Status: ACTIVE | Noted: 2020-11-02

## 2020-11-02 LAB
ALBUMIN SERPL-MCNC: 4.3 G/DL (ref 3.5–4.6)
ALP BLD-CCNC: 57 U/L (ref 40–130)
ALT SERPL-CCNC: 16 U/L (ref 0–33)
AMMONIA: 26 UMOL/L (ref 11–51)
AMPHETAMINE SCREEN, URINE: NORMAL
ANION GAP SERPL CALCULATED.3IONS-SCNC: 20 MEQ/L (ref 9–15)
ANION GAP SERPL CALCULATED.3IONS-SCNC: 26 MEQ/L (ref 9–15)
AST SERPL-CCNC: 27 U/L (ref 0–35)
BARBITURATE SCREEN URINE: NORMAL
BASOPHILS ABSOLUTE: 0.1 K/UL (ref 0–0.2)
BASOPHILS RELATIVE PERCENT: 0.7 %
BENZODIAZEPINE SCREEN, URINE: NORMAL
BILIRUB SERPL-MCNC: 0.5 MG/DL (ref 0.2–0.7)
BUN BLDV-MCNC: 16 MG/DL (ref 8–23)
BUN BLDV-MCNC: 17 MG/DL (ref 8–23)
CALCIUM SERPL-MCNC: 6.4 MG/DL (ref 8.5–9.9)
CALCIUM SERPL-MCNC: 6.6 MG/DL (ref 8.5–9.9)
CANNABINOID SCREEN URINE: NORMAL
CHLORIDE BLD-SCNC: 102 MEQ/L (ref 95–107)
CHLORIDE BLD-SCNC: 102 MEQ/L (ref 95–107)
CHOLESTEROL, TOTAL: 158 MG/DL (ref 0–199)
CO2: 15 MEQ/L (ref 20–31)
CO2: 19 MEQ/L (ref 20–31)
COCAINE METABOLITE SCREEN URINE: NORMAL
CREAT SERPL-MCNC: 0.96 MG/DL (ref 0.5–0.9)
CREAT SERPL-MCNC: 1 MG/DL (ref 0.5–0.9)
EKG ATRIAL RATE: 84 BPM
EKG P AXIS: 46 DEGREES
EKG P-R INTERVAL: 210 MS
EKG Q-T INTERVAL: 462 MS
EKG QRS DURATION: 130 MS
EKG QTC CALCULATION (BAZETT): 545 MS
EKG R AXIS: -42 DEGREES
EKG T AXIS: 10 DEGREES
EKG VENTRICULAR RATE: 84 BPM
EOSINOPHILS ABSOLUTE: 0.6 K/UL (ref 0–0.7)
EOSINOPHILS RELATIVE PERCENT: 5.8 %
ETHANOL PERCENT: NORMAL G/DL
ETHANOL: <10 MG/DL (ref 0–0.08)
GFR AFRICAN AMERICAN: >60
GFR AFRICAN AMERICAN: >60
GFR NON-AFRICAN AMERICAN: 55.9
GFR NON-AFRICAN AMERICAN: 58.6
GLOBULIN: 2.3 G/DL (ref 2.3–3.5)
GLUCOSE BLD-MCNC: 112 MG/DL (ref 70–99)
GLUCOSE BLD-MCNC: 117 MG/DL (ref 70–99)
HCT VFR BLD CALC: 30.7 % (ref 37–47)
HDLC SERPL-MCNC: 66 MG/DL (ref 40–59)
HEMOGLOBIN: 10.2 G/DL (ref 12–16)
LACTIC ACID: 2.2 MMOL/L (ref 0.5–2.2)
LDL CHOLESTEROL CALCULATED: 69 MG/DL (ref 0–129)
LYMPHOCYTES ABSOLUTE: 2.6 K/UL (ref 1–4.8)
LYMPHOCYTES RELATIVE PERCENT: 24.6 %
Lab: NORMAL
MAGNESIUM: 0.6 MG/DL (ref 1.7–2.4)
MAGNESIUM: 0.6 MG/DL (ref 1.7–2.4)
MCH RBC QN AUTO: 30.4 PG (ref 27–31.3)
MCHC RBC AUTO-ENTMCNC: 33.4 % (ref 33–37)
MCV RBC AUTO: 90.9 FL (ref 82–100)
METHADONE SCREEN, URINE: NORMAL
MONOCYTES ABSOLUTE: 0.8 K/UL (ref 0.2–0.8)
MONOCYTES RELATIVE PERCENT: 7.2 %
NEUTROPHILS ABSOLUTE: 6.6 K/UL (ref 1.4–6.5)
NEUTROPHILS RELATIVE PERCENT: 61.7 %
OPIATE SCREEN URINE: NORMAL
OXYCODONE URINE: NORMAL
PDW BLD-RTO: 15 % (ref 11.5–14.5)
PHENCYCLIDINE SCREEN URINE: NORMAL
PLATELET # BLD: 211 K/UL (ref 130–400)
POTASSIUM SERPL-SCNC: 3.2 MEQ/L (ref 3.4–4.9)
POTASSIUM SERPL-SCNC: 3.2 MEQ/L (ref 3.4–4.9)
PROPOXYPHENE SCREEN: NORMAL
RBC # BLD: 3.37 M/UL (ref 4.2–5.4)
SODIUM BLD-SCNC: 141 MEQ/L (ref 135–144)
SODIUM BLD-SCNC: 143 MEQ/L (ref 135–144)
TOTAL PROTEIN: 6.6 G/DL (ref 6.3–8)
TRIGL SERPL-MCNC: 114 MG/DL (ref 0–150)
TROPONIN: <0.01 NG/ML (ref 0–0.01)
WBC # BLD: 10.7 K/UL (ref 4.8–10.8)

## 2020-11-02 PROCEDURE — G0378 HOSPITAL OBSERVATION PER HR: HCPCS

## 2020-11-02 PROCEDURE — 80307 DRUG TEST PRSMV CHEM ANLYZR: CPT

## 2020-11-02 PROCEDURE — G0480 DRUG TEST DEF 1-7 CLASSES: HCPCS

## 2020-11-02 PROCEDURE — 83605 ASSAY OF LACTIC ACID: CPT

## 2020-11-02 PROCEDURE — 99285 EMERGENCY DEPT VISIT HI MDM: CPT

## 2020-11-02 PROCEDURE — 84425 ASSAY OF VITAMIN B-1: CPT

## 2020-11-02 PROCEDURE — 96375 TX/PRO/DX INJ NEW DRUG ADDON: CPT

## 2020-11-02 PROCEDURE — 2580000003 HC RX 258: Performed by: PHYSICIAN ASSISTANT

## 2020-11-02 PROCEDURE — A9579 GAD-BASE MR CONTRAST NOS,1ML: HCPCS | Performed by: PSYCHIATRY & NEUROLOGY

## 2020-11-02 PROCEDURE — 82140 ASSAY OF AMMONIA: CPT

## 2020-11-02 PROCEDURE — 93010 ELECTROCARDIOGRAM REPORT: CPT | Performed by: INTERNAL MEDICINE

## 2020-11-02 PROCEDURE — 93005 ELECTROCARDIOGRAM TRACING: CPT | Performed by: PHYSICIAN ASSISTANT

## 2020-11-02 PROCEDURE — 96372 THER/PROPH/DIAG INJ SC/IM: CPT

## 2020-11-02 PROCEDURE — 80053 COMPREHEN METABOLIC PANEL: CPT

## 2020-11-02 PROCEDURE — 80061 LIPID PANEL: CPT

## 2020-11-02 PROCEDURE — 70450 CT HEAD/BRAIN W/O DYE: CPT

## 2020-11-02 PROCEDURE — 96361 HYDRATE IV INFUSION ADD-ON: CPT

## 2020-11-02 PROCEDURE — 1210000000 HC MED SURG R&B

## 2020-11-02 PROCEDURE — 36415 COLL VENOUS BLD VENIPUNCTURE: CPT

## 2020-11-02 PROCEDURE — 96366 THER/PROPH/DIAG IV INF ADDON: CPT

## 2020-11-02 PROCEDURE — 70553 MRI BRAIN STEM W/O & W/DYE: CPT

## 2020-11-02 PROCEDURE — 6360000002 HC RX W HCPCS: Performed by: PHYSICIAN ASSISTANT

## 2020-11-02 PROCEDURE — 84484 ASSAY OF TROPONIN QUANT: CPT

## 2020-11-02 PROCEDURE — 83735 ASSAY OF MAGNESIUM: CPT

## 2020-11-02 PROCEDURE — 6360000004 HC RX CONTRAST MEDICATION: Performed by: PSYCHIATRY & NEUROLOGY

## 2020-11-02 PROCEDURE — 96365 THER/PROPH/DIAG IV INF INIT: CPT

## 2020-11-02 PROCEDURE — 83036 HEMOGLOBIN GLYCOSYLATED A1C: CPT

## 2020-11-02 PROCEDURE — 85025 COMPLETE CBC W/AUTO DIFF WBC: CPT

## 2020-11-02 PROCEDURE — 2500000003 HC RX 250 WO HCPCS: Performed by: PHYSICIAN ASSISTANT

## 2020-11-02 PROCEDURE — 71045 X-RAY EXAM CHEST 1 VIEW: CPT

## 2020-11-02 PROCEDURE — 2580000003 HC RX 258: Performed by: INTERNAL MEDICINE

## 2020-11-02 PROCEDURE — 6370000000 HC RX 637 (ALT 250 FOR IP): Performed by: INTERNAL MEDICINE

## 2020-11-02 PROCEDURE — 6360000002 HC RX W HCPCS: Performed by: INTERNAL MEDICINE

## 2020-11-02 RX ORDER — ACETAMINOPHEN 325 MG/1
650 TABLET ORAL EVERY 6 HOURS PRN
Status: DISCONTINUED | OUTPATIENT
Start: 2020-11-02 | End: 2020-11-04 | Stop reason: HOSPADM

## 2020-11-02 RX ORDER — 0.9 % SODIUM CHLORIDE 0.9 %
1000 INTRAVENOUS SOLUTION INTRAVENOUS ONCE
Status: COMPLETED | OUTPATIENT
Start: 2020-11-02 | End: 2020-11-02

## 2020-11-02 RX ORDER — LORAZEPAM 1 MG/1
3 TABLET ORAL
Status: DISCONTINUED | OUTPATIENT
Start: 2020-11-02 | End: 2020-11-04 | Stop reason: HOSPADM

## 2020-11-02 RX ORDER — ACETAMINOPHEN 650 MG/1
650 SUPPOSITORY RECTAL EVERY 6 HOURS PRN
Status: DISCONTINUED | OUTPATIENT
Start: 2020-11-02 | End: 2020-11-04 | Stop reason: HOSPADM

## 2020-11-02 RX ORDER — MAGNESIUM SULFATE IN WATER 40 MG/ML
2 INJECTION, SOLUTION INTRAVENOUS PRN
Status: DISCONTINUED | OUTPATIENT
Start: 2020-11-02 | End: 2020-11-04 | Stop reason: HOSPADM

## 2020-11-02 RX ORDER — SODIUM CHLORIDE 0.9 % (FLUSH) 0.9 %
10 SYRINGE (ML) INJECTION PRN
Status: DISCONTINUED | OUTPATIENT
Start: 2020-11-02 | End: 2020-11-04 | Stop reason: HOSPADM

## 2020-11-02 RX ORDER — POTASSIUM CHLORIDE 20 MEQ/1
40 TABLET, EXTENDED RELEASE ORAL PRN
Status: DISCONTINUED | OUTPATIENT
Start: 2020-11-02 | End: 2020-11-04 | Stop reason: HOSPADM

## 2020-11-02 RX ORDER — LORAZEPAM 2 MG/ML
4 INJECTION INTRAMUSCULAR
Status: DISCONTINUED | OUTPATIENT
Start: 2020-11-02 | End: 2020-11-04 | Stop reason: HOSPADM

## 2020-11-02 RX ORDER — LORAZEPAM 2 MG/ML
1 INJECTION INTRAMUSCULAR
Status: DISCONTINUED | OUTPATIENT
Start: 2020-11-02 | End: 2020-11-04 | Stop reason: HOSPADM

## 2020-11-02 RX ORDER — POLYETHYLENE GLYCOL 3350 17 G/17G
17 POWDER, FOR SOLUTION ORAL DAILY PRN
Status: DISCONTINUED | OUTPATIENT
Start: 2020-11-02 | End: 2020-11-04 | Stop reason: HOSPADM

## 2020-11-02 RX ORDER — MAGNESIUM SULFATE IN WATER 40 MG/ML
4 INJECTION, SOLUTION INTRAVENOUS ONCE
Status: COMPLETED | OUTPATIENT
Start: 2020-11-02 | End: 2020-11-02

## 2020-11-02 RX ORDER — SODIUM CHLORIDE 0.9 % (FLUSH) 0.9 %
10 SYRINGE (ML) INJECTION EVERY 12 HOURS SCHEDULED
Status: DISCONTINUED | OUTPATIENT
Start: 2020-11-02 | End: 2020-11-04 | Stop reason: HOSPADM

## 2020-11-02 RX ORDER — POTASSIUM CHLORIDE 1.5 G/1.77G
40 POWDER, FOR SOLUTION ORAL PRN
Status: DISCONTINUED | OUTPATIENT
Start: 2020-11-02 | End: 2020-11-04 | Stop reason: HOSPADM

## 2020-11-02 RX ORDER — LORAZEPAM 2 MG/ML
2 INJECTION INTRAMUSCULAR
Status: DISCONTINUED | OUTPATIENT
Start: 2020-11-02 | End: 2020-11-04 | Stop reason: HOSPADM

## 2020-11-02 RX ORDER — PROMETHAZINE HYDROCHLORIDE 12.5 MG/1
12.5 TABLET ORAL EVERY 6 HOURS PRN
Status: DISCONTINUED | OUTPATIENT
Start: 2020-11-02 | End: 2020-11-04 | Stop reason: HOSPADM

## 2020-11-02 RX ORDER — LORAZEPAM 1 MG/1
4 TABLET ORAL
Status: DISCONTINUED | OUTPATIENT
Start: 2020-11-02 | End: 2020-11-04 | Stop reason: HOSPADM

## 2020-11-02 RX ORDER — LORAZEPAM 1 MG/1
2 TABLET ORAL
Status: DISCONTINUED | OUTPATIENT
Start: 2020-11-02 | End: 2020-11-04 | Stop reason: HOSPADM

## 2020-11-02 RX ORDER — LORAZEPAM 2 MG/ML
1 INJECTION INTRAMUSCULAR ONCE
Status: COMPLETED | OUTPATIENT
Start: 2020-11-02 | End: 2020-11-02

## 2020-11-02 RX ORDER — LORAZEPAM 2 MG/ML
3 INJECTION INTRAMUSCULAR
Status: DISCONTINUED | OUTPATIENT
Start: 2020-11-02 | End: 2020-11-04 | Stop reason: HOSPADM

## 2020-11-02 RX ORDER — ATORVASTATIN CALCIUM 40 MG/1
40 TABLET, FILM COATED ORAL DAILY
Status: DISCONTINUED | OUTPATIENT
Start: 2020-11-02 | End: 2020-11-04 | Stop reason: HOSPADM

## 2020-11-02 RX ORDER — POTASSIUM CHLORIDE 7.45 MG/ML
10 INJECTION INTRAVENOUS PRN
Status: DISCONTINUED | OUTPATIENT
Start: 2020-11-02 | End: 2020-11-04 | Stop reason: HOSPADM

## 2020-11-02 RX ORDER — PANTOPRAZOLE SODIUM 40 MG/1
40 TABLET, DELAYED RELEASE ORAL DAILY
Status: DISCONTINUED | OUTPATIENT
Start: 2020-11-02 | End: 2020-11-04 | Stop reason: HOSPADM

## 2020-11-02 RX ORDER — LORAZEPAM 1 MG/1
1 TABLET ORAL
Status: DISCONTINUED | OUTPATIENT
Start: 2020-11-02 | End: 2020-11-04 | Stop reason: HOSPADM

## 2020-11-02 RX ORDER — TRIAMCINOLONE ACETONIDE 0.25 MG/G
CREAM TOPICAL EVERY 4 HOURS PRN
COMMUNITY
End: 2021-08-12

## 2020-11-02 RX ORDER — FENOFIBRATE 160 MG/1
160 TABLET ORAL DAILY
Status: DISCONTINUED | OUTPATIENT
Start: 2020-11-02 | End: 2020-11-04 | Stop reason: HOSPADM

## 2020-11-02 RX ORDER — ONDANSETRON 2 MG/ML
4 INJECTION INTRAMUSCULAR; INTRAVENOUS EVERY 6 HOURS PRN
Status: DISCONTINUED | OUTPATIENT
Start: 2020-11-02 | End: 2020-11-04 | Stop reason: HOSPADM

## 2020-11-02 RX ORDER — MAGNESIUM SULFATE IN WATER 40 MG/ML
4 INJECTION, SOLUTION INTRAVENOUS ONCE
Status: DISCONTINUED | OUTPATIENT
Start: 2020-11-02 | End: 2020-11-02

## 2020-11-02 RX ORDER — LEVOTHYROXINE SODIUM 0.05 MG/1
50 TABLET ORAL DAILY
Status: DISCONTINUED | OUTPATIENT
Start: 2020-11-02 | End: 2020-11-04 | Stop reason: HOSPADM

## 2020-11-02 RX ADMIN — LORAZEPAM 1 MG: 2 INJECTION INTRAMUSCULAR; INTRAVENOUS at 15:10

## 2020-11-02 RX ADMIN — ATORVASTATIN CALCIUM 40 MG: 40 TABLET, FILM COATED ORAL at 14:49

## 2020-11-02 RX ADMIN — PANTOPRAZOLE SODIUM 40 MG: 40 TABLET, DELAYED RELEASE ORAL at 14:49

## 2020-11-02 RX ADMIN — FOLIC ACID: 5 INJECTION, SOLUTION INTRAMUSCULAR; INTRAVENOUS; SUBCUTANEOUS at 10:37

## 2020-11-02 RX ADMIN — POTASSIUM CHLORIDE 40 MEQ: 1.5 POWDER, FOR SOLUTION ORAL at 14:59

## 2020-11-02 RX ADMIN — MAGNESIUM SULFATE HEPTAHYDRATE 4 G: 40 INJECTION, SOLUTION INTRAVENOUS at 16:33

## 2020-11-02 RX ADMIN — ENOXAPARIN SODIUM 40 MG: 40 INJECTION SUBCUTANEOUS at 14:49

## 2020-11-02 RX ADMIN — GADOTERIDOL 10 ML: 279.3 INJECTION, SOLUTION INTRAVENOUS at 16:09

## 2020-11-02 RX ADMIN — Medication 10 ML: at 21:15

## 2020-11-02 RX ADMIN — SODIUM CHLORIDE 1000 ML: 9 INJECTION, SOLUTION INTRAVENOUS at 08:48

## 2020-11-02 RX ADMIN — MAGNESIUM SULFATE HEPTAHYDRATE 4 G: 40 INJECTION, SOLUTION INTRAVENOUS at 10:11

## 2020-11-02 RX ADMIN — FENOFIBRATE 160 MG: 160 TABLET ORAL at 14:49

## 2020-11-02 RX ADMIN — ACETAMINOPHEN 650 MG: 325 TABLET, FILM COATED ORAL at 18:49

## 2020-11-02 ASSESSMENT — ENCOUNTER SYMPTOMS
VOMITING: 0
PHOTOPHOBIA: 0
ABDOMINAL PAIN: 0
SHORTNESS OF BREATH: 0
RHINORRHEA: 0
NAUSEA: 0
COUGH: 0
SORE THROAT: 0
EYE PAIN: 0
DIARRHEA: 0
BACK PAIN: 0

## 2020-11-02 ASSESSMENT — PAIN SCALES - GENERAL
PAINLEVEL_OUTOF10: 6
PAINLEVEL_OUTOF10: 0

## 2020-11-02 NOTE — ED NOTES
Bed: 15  Expected date: 11/2/20  Expected time: 8:01 AM  Means of arrival: [facility]  Comments:  63 F - seizure. 179/91,104,18,98% RA.  20G Lt hand/labs     Sean Solorzano RN  11/02/20 0296

## 2020-11-02 NOTE — ED PROVIDER NOTES
[facility]  eMERGENCY dEPARTMENTeNCOUnter      Pt Name: Yajaira Chavez  MRN: [id number]  [place] 1955  Date ofevaluation: 11/2/2020  Provider: Alexis Mathur PA-C    CHIEF COMPLAINT       Chief Complaint   Patient presents with    Seizures         HISTORY OF PRESENT ILLNESS   (Location/Symptom, Timing/Onset,Context/Setting, Quality, Duration, Modifying Factors, Severity)  Note limiting factors. Yajaira Chavez is a 63 y.o. female who presents to the emergency department seizure. This happened prior to arrival and it was described as a shaking seizure. She has a remote history of seizures per the patient's niece. Pt is poor historian and does not really know why she is here. Her niece is providing most of the history. She reports that she does have h/o alcohol abuse. She says the patient has been complaining of bilateral lower extremity generalized weakness for the last couple of months. Goes out frequently for drinks but they are trying to cut her back. Patient can only tell me her first name. HPI    NursingNotes were reviewed. REVIEW OF SYSTEMS    (2-9 systems for level 4, 10 or more for level 5)     Review of Systems   Constitutional: Negative for chills, diaphoresis, fatigue and fever. HENT: Negative for congestion, rhinorrhea and sore throat. Eyes: Negative for photophobia and pain. Respiratory: Negative for cough and shortness of breath. Cardiovascular: Negative for chest pain and palpitations. Gastrointestinal: Negative for abdominal pain, diarrhea, nausea and vomiting. Genitourinary: Negative for dysuria and flank pain. Musculoskeletal: Negative for back pain. Skin: Negative for rash. Neurological: Positive for seizures. Negative for dizziness, light-headedness and headaches. Psychiatric/Behavioral: Positive for confusion. All other systems reviewed and are negative.       Except as noted above the remainder of the review of systems was reviewed and negative.        PAST MEDICAL HISTORY     Past Medical History:   Diagnosis Date    Alcoholism ([facility] [address]     Arthritis     Chronic lung disease     Depression     Hyperlipidemia     Hypertension     Hypothyroidism     Second hand smoke exposure          SURGICALHISTORY       Past Surgical History:   Procedure Laterality Date    APPENDECTOMY      BIOPSY MOUTH LESION Bilateral 12/19/2016    REMOVAL  OF BILATERAL LINGUAL MACIE performed by Mckayla Woods DDS at [address] [place] [address]       Current Discharge Medication List      CONTINUE these medications which have NOT CHANGED    Details   triamcinolone (KENALOG) 0.025 % cream Apply topically every 4 hours as needed Apply Topically      levothyroxine (SYNTHROID) 50 MCG tablet TAKE 1 TABLET BY MOUTH  DAILY  Qty: 90 tablet, Refills: 3    Comments: Requesting 1 year supply  Associated Diagnoses: Acquired hypothyroidism      pantoprazole (PROTONIX) 40 MG tablet TAKE 1 TABLET BY MOUTH  DAILY  Qty: 90 tablet, Refills: 3    Comments: Requesting 1 year supply  Associated Diagnoses: Gastroesophageal reflux disease without esophagitis      atorvastatin (LIPITOR) 40 MG tablet TAKE 1 TABLET BY MOUTH ONCE DAILY  Qty: 90 tablet, Refills: 3    Comments: Requesting 1 year supply  Associated Diagnoses: Mixed hyperlipidemia      escitalopram (LEXAPRO) 10 MG tablet TAKE 1 TABLET BY MOUTH  DAILY  Qty: 90 tablet, Refills: 3    Comments: Requesting 1 year supply  Associated Diagnoses: Mood disorder (HCC)      metFORMIN (GLUCOPHAGE) 500 MG tablet TAKE 1 TABLET BY MOUTH  TWICE DAILY WITH MEALS  Qty: 180 tablet, Refills: 3    Comments: Requesting 1 year supply  Associated Diagnoses: Abnormal glucose      rOPINIRole (REQUIP) 0.5 MG tablet TAKE 1 TABLET BY MOUTH AT  NIGHT  Qty: 90 tablet, Refills: 3    Comments: Requesting 1 year supply  Associated Diagnoses: Restless leg syndrome      alendronate (FOSAMAX) 35 MG tablet TAKE 1 Not on file    Highest education level: Not on file   Occupational History    Not on file   Social Needs    Financial resource strain: Not on file    Food insecurity     Worry: Not on file     Inability: Not on file    Transportation needs     Medical: Not on file     Non-medical: Not on file   Tobacco Use    Smoking status: Never Smoker    Smokeless tobacco: Never Used   Substance and Sexual Activity    Alcohol use: Yes     Alcohol/week: 28.0 standard drinks     Types: 28 Shots of liquor per week    Drug use: No    Sexual activity: Not on file   Lifestyle    Physical activity     Days per week: Not on file     Minutes per session: Not on file    Stress: Not on file   Relationships    Social connections     Talks on phone: Not on file     Gets together: Not on file     Attends Jewish service: Not on file     Active member of club or organization: Not on file     Attends meetings of clubs or organizations: Not on file     Relationship status: Not on file    Intimate partner violence     Fear of current or ex partner: Not on file     Emotionally abused: Not on file     Physically abused: Not on file     Forced sexual activity: Not on file   Other Topics Concern    Not on file   Social History Narrative    Not on file       SCREENINGS   NIH Stroke Scale  Interval: Baseline  Level of Consciousness (1a. ): Alert  LOC Questions (1b. ):  Answers both correctly  LOC Commands (1c. ): Performs both tasks correctly  Best Gaze (2. ): Normal  Visual (3. ): No visual loss  Facial Palsy (4. ): Normal symmetrical movement  Motor Arm, Left (5a. ): No drift  Motor Arm, Right (5b. ): No drift  Motor Leg, Left (6a. ): No drift  Motor Leg, Right (6b. ): No drift  Limb Ataxia (7. ): Absent  Sensory (8. ): Normal  Best Language (9. ): No aphasia  Dysarthria (10. ): Normal  Extinction and Inattention (11): No abnormality  Total: 0Glasgow Coma Scale  Eye Opening: Spontaneous  Best Verbal Response: Oriented  Best Motor Plain radiographic images are visualized and preliminarily interpreted by the emergency physician with the below findings:        Interpretation per the Radiologist below, if available at the time ofthis note:    MRI BRAIN W WO CONTRAST   Final Result   1. There are no acute intracranial changes, there is no evidence of hemorrhage or acute ischemia. 2. There are chronic interstitial changes including microcalcifications in the basal ganglia. Foci of T2/FLAIR hyperintensity in subcortical and periventricular white matter and cerebral atrophy that are commonly associated with chronic microangiopathy. Possible exacerbating Clinical factors include diabetes, hypertension, hyperlipidemia, tobacco abuse or other etiologies. CT HEAD WO CONTRAST   Final Result      There are no acute intracranial changes. XR CHEST PORTABLE   Final Result   No radiographic evidence of acute intrathoracic process.                   ED BEDSIDE ULTRASOUND:   Performed by ED Physician - none    LABS:  Labs Reviewed   COMPREHENSIVE METABOLIC PANEL - Abnormal; Notable for the following components:       Result Value    Potassium 3.2 (*)     CO2 19 (*)     Anion Gap 20 (*)     Glucose 117 (*)     CREATININE 0.96 (*)     GFR Non- 58.6 (*)     Calcium 6.6 (*)     All other components within normal limits    Narrative:     CALL  [facility]  [facility] tel. [phone],  Mg results called to and read back by Birttany Valencia, 11/02/2020 09:28, by [facility]   CBC WITH AUTO DIFFERENTIAL - Abnormal; Notable for the following components:    RBC 3.37 (*)     Hemoglobin 10.2 (*)     Hematocrit 30.7 (*)     RDW 15.0 (*)     Neutrophils Absolute 6.6 (*)     All other components within normal limits   MAGNESIUM - Abnormal; Notable for the following components:    Magnesium 0.6 (*)     All other components within normal limits    Narrative:     CALL  [facility]  [facility] tel. [phone],  Mg results called to and read back by Brittany Valencia, 11/02/2020 09:28, by [id number] [place] [address] - Abnormal; Notable for the following components:    HDL 66 (*)     All other components within normal limits    Narrative:     january Suazo tel. [phone],  Mg results called to and read back by Jayy Eisenberg, 11/02/2020 15:20, by [facility] has been rescheduled by Jocy Atkinson at 11/02/2020 12:52 Reason:   Failed attempt at venipuncture   [address] - Abnormal; Notable for the following components:    Potassium 3.2 (*)     CO2 15 (*)     Anion Gap 26 (*)     Glucose 112 (*)     CREATININE 1.00 (*)     GFR Non- 55.9 (*)     Calcium 6.4 (*)     All other components within normal limits    Narrative:     january Suazo tel. [phone],  Mg results called to and read back by Jayy Eisenberg, 11/02/2020 15:20, by [facility] has been rescheduled by Jocy Atkinson at 11/02/2020 12:52 Reason:   Failed attempt at venipuncture   MAGNESIUM - Abnormal; Notable for the following components:    Magnesium 0.6 (*)     All other components within normal limits    Narrative:     jaunary Suazo tel. [phone],  Mg results called to and read back by Jayy Eisenberg, 11/02/2020 15:20, by [facility] has been rescheduled by Jocy Atkinson at 11/02/2020 12:52 Reason:   Failed attempt at venipuncture   LACTIC ACID, PLASMA   TROPONIN   ETHANOL   AMMONIA   URINE DRUG SCREEN   URINE RT REFLEX TO CULTURE   VITAMIN B1   HEMOGLOBIN A1C       All other labs were within normal range or not returned as of this dictation. EMERGENCY DEPARTMENT COURSE and DIFFERENTIAL DIAGNOSIS/MDM:   Vitals:    Vitals:    11/02/20 1015 11/02/20 1030 11/02/20 1045 11/02/20 1230   BP:  132/83  128/65   Pulse: 75 81 89 78   Resp: 10 13 15 16   Temp:    97.9 °F (36.6 °C)   TempSrc:    Oral   SpO2: 98% 98%  100%   Weight:       Height:               MDM    Patient presents with altered mental status full work-up was ordered. CT head shows no acute process chest x-ray shows no acute process.   Labs

## 2020-11-02 NOTE — CARE COORDINATION
Met with patient and [place]. She states that she uses Kenalog at home for her Psoriasis. She states it is bad on the patient's back. Notified RN Rell Kelly. Freedom of choice given. Declines needs at this time but will keep in mind for options. They state they would like a dermatology referral for further treatment of psoriasis. [facility] [place] Case Management Initial Discharge Assessment    Met with Patient and Family to discuss discharge plan. PCP: Javier Walker MD                                Date of Last Visit: \"its been awhile\"    If no PCP, list provided? N/A    Discharge Planning    Living Arrangements: independently at home    Who do you live with? Sister and nephew    Who helps you with your care:  self or family    If lives at home:     Do you have any barriers navigating in your home? no    Patient can perform ADL? Yes    Current Services (outpatient and in home) :  None    Dialysis: No    Is transportation available to get to your appointments? Yes    DME Equipment:  yes - walker, but doesn't utilize. Respiratory equipment: None    Respiratory provider:  no     Pharmacy:  yes - [employer] with Medication Assistance Program?  No      Patient agreeable to [facility]? Yes, Company needs list if indicated. Patient agreeable to SNF/Rehab? Yes, Company needs list if indicated    Other discharge needs identified? N/A    Freedom of choice list provided with basic dialogue that supports the patient's individualized plan of care/goals and shares the quality data associated with the providers. Yes    Does Patient Have a High-Risk for Readmission Diagnosis (CHF, PN, MI, COPD)? No    The plan for Transition of Care is related to the following treatment goals:Medical stability indicated by physician. Initial Discharge Plan? (Note: please see concurrent daily documentation for any updates after initial note). Will determine after further evaluations.      The Patient and/or patient representative: patient/niece was provided with choice of any post-acute providers for care and equipment and agrees with discharge plan  Yes    Electronically signed by Latisha Good RN on 11/2/2020 at 4:43 PM

## 2020-11-02 NOTE — ED TRIAGE NOTES
Pt in by squad, from home with sister. C/o seizure, reported from squad 4 mins. Pt is A &O  to name and birth date. Pt skin intact, afebrile, breaths are equal and unlabored. Pt presents postictal, and quiet.

## 2020-11-02 NOTE — PROGRESS NOTES
Pt admitted to floor from ER [facility] complaint of 4 minute seizure. Family present with patient states the last time she had a seizure was 5 yrs ago after a period of not drinking alcohol. Niece states that the patient has not had any alcohol in 5 days. Pt has healing scabs al over her body, on chest, back , arms, legs. She states she has psoriatic rashes she is being treated for. Seizure precautions in place.

## 2020-11-02 NOTE — H&P
Department of Internal Medicine   History and Physical      CHIEF COMPLAINT:  Seizure      HISTORY OF PRESENT ILLNESS:      The patient is a 63 y.o. female with significant past medical history of hypothyroidism,  ETOH use, mild cognitive disability who presents with seizure. Patient is a poor historian and is unable to write much of the history. She does not remember what happened to bring her to the emergency department. When asked what was her last recollection she states \"I do not know. \"  Her niece is at bedside and provides much of the history. She states that earlier this morning she was found by her sister, with seizure-like activity and myoclonic jerking and EMS was called. She is unsure how long this lasted. She states after the event she was confused and unable to recognize family members. During my initial evaluation the patient is alert to self, month, year and tells me she is in the hospital.  Review of systems she denies headache, change in vision, fever, chills, nausea, vomiting, diarrhea, sick contacts. She admits to dizziness. Her niece states that she does have a history of alcohol use and drinks about 4 drinks a day. She has not had a drink for about 1 week. Her niece reports 1 episode of alcohol withdrawal seizure several years ago.     Past Medical History:        Diagnosis Date    Alcoholism ([facility] [address]     Arthritis     Chronic lung disease     Depression     Hyperlipidemia     Hypertension     Hypothyroidism     Second hand smoke exposure      Past Surgical History:        Procedure Laterality Date    APPENDECTOMY      BIOPSY MOUTH LESION Bilateral 12/19/2016    REMOVAL  OF BILATERAL LINGUAL MACIE performed by Bertrand Olivo DDS at [place] [address].         Medications Prior to Admission:    Medications Prior to Admission: triamcinolone (KENALOG) 0.025 % cream, Apply topically every 4 hours as needed Apply Topically  levothyroxine (SYNTHROID) 50 MCG tablet, TAKE 1 TABLET BY MOUTH  DAILY  pantoprazole (PROTONIX) 40 MG tablet, TAKE 1 TABLET BY MOUTH  DAILY  atorvastatin (LIPITOR) 40 MG tablet, TAKE 1 TABLET BY MOUTH ONCE DAILY  escitalopram (LEXAPRO) 10 MG tablet, TAKE 1 TABLET BY MOUTH  DAILY  metFORMIN (GLUCOPHAGE) 500 MG tablet, TAKE 1 TABLET BY MOUTH  TWICE DAILY WITH MEALS  rOPINIRole (REQUIP) 0.5 MG tablet, TAKE 1 TABLET BY MOUTH AT  NIGHT  alendronate (FOSAMAX) 35 MG tablet, TAKE 1 TABLET BY MOUTH  EVERY 7 DAYS  hydrOXYzine (ATARAX) 25 MG tablet, Take 1 tablet by mouth every 8 hours as needed for Itching  meloxicam (MOBIC) 7.5 MG tablet, Take 1 tablet by mouth daily as needed for Pain  fenofibrate (TRIGLIDE) 160 MG tablet, Take 1 tablet by mouth daily  Cholecalciferol (VITAMIN D) 50 MCG (2000 UT) TABS tablet, Take 1 tablet by mouth daily  calcium carbonate-vitamin D (CALTRATE) 600-400 MG-UNIT TABS per tab, Take 1 tablet by mouth 2 times daily  ACETAMINOPHEN EXTRA STRENGTH 500 MG tablet, Take 1 tablet by mouth 2 times daily as needed for Pain  Calcium 600-400 MG-UNIT CHEW, Take 1 tablet by mouth 2 times daily    Allergies:  Morphine    Social History:   Non-smoker  Chronic alcohol use, 4 drinks a day  Denies illicit drug use    Family History:       Problem Relation Age of Onset    Heart Disease Mother     Hypertension Mother     Diabetes Mother     Stroke Mother     Heart Disease Father     Hypertension Father     Heart Disease Sister     Hypertension Sister     Heart Disease Brother     Hypertension Brother     Diabetes Brother     Cancer Maternal Grandmother     Diabetes Maternal Grandfather      REVIEW OF SYSTEMS:  12 point review systems is negative unless noted above  PHYSICAL EXAM:    Vitals:  /83   Pulse 89   Temp 97.1 °F (36.2 °C) (Oral)   Resp 15   Ht 5' (1.524 m)   Wt 110 lb (49.9 kg)   LMP  (LMP Unknown)   SpO2 98%   BMI 21.48 kg/m²     CONSTITUTIONAL:  awake, alert, cooperative, no apparent distress, and appears stated age  EYES:  Lids and lashes normal, pupils equal, round and reactive to light, extra ocular muscles intact, sclera clear, conjunctiva normal  NECK:  Supple, symmetrical, trachea midline, no adenopathy, thyroid symmetric  LUNGS:  No increased work of breathing, good air exchange, clear to auscultation bilaterally, no crackles or wheezing  CARDIOVASCULAR:  Normal apical impulse, regular rate and rhythm, normal S1 and S2, no S3 or S4, and no murmur noted  ABDOMEN: normal bowel sounds, soft, non-distended, non-tender  MUSCULOSKELETAL:  There is no redness, warmth, or swelling of the joints. Tone is normal.  NEUROLOGIC:  Awake, alert, oriented to name, place and time. Cranial nerves II-XII are grossly intact. Motor is 5 out of 5 bilaterally. Cerebellar finger to nose, heel to shin intact. Sensory is intact.   Gait deferred    DATA:  CBC:   Lab Results   Component Value Date    WBC 10.7 11/02/2020    RBC 3.37 11/02/2020    RBC 4.36 05/09/2012    HGB 10.2 11/02/2020    HCT 30.7 11/02/2020    MCV 90.9 11/02/2020    MCH 30.4 11/02/2020    MCHC 33.4 11/02/2020    RDW 15.0 11/02/2020     11/02/2020    MPV 10.8 03/10/2014     CMP:    Lab Results   Component Value Date     11/02/2020    K 3.2 11/02/2020     11/02/2020    CO2 19 11/02/2020    BUN 17 11/02/2020    CREATININE 0.96 11/02/2020    GFRAA >60.0 11/02/2020    LABGLOM 58.6 11/02/2020    GLUCOSE 117 11/02/2020    GLUCOSE 106 05/09/2012    PROT 6.6 11/02/2020    LABALBU 4.3 11/02/2020    LABALBU 4.9 05/09/2012    CALCIUM 6.6 11/02/2020    BILITOT 0.5 11/02/2020    ALKPHOS 57 11/02/2020    AST 27 11/02/2020    ALT 16 11/02/2020     BMP:    Lab Results   Component Value Date     11/02/2020    K 3.2 11/02/2020     11/02/2020    CO2 19 11/02/2020    BUN 17 11/02/2020    LABALBU 4.3 11/02/2020    LABALBU 4.9 05/09/2012    CREATININE 0.96 11/02/2020    CALCIUM 6.6 11/02/2020    GFRAA >60.0 11/02/2020    LABGLOM 58.6 11/02/2020    GLUCOSE 117 11/02/2020    GLUCOSE 106 05/09/2012     ASSESSMENT AND PLAN:      Seizure, with post ictal state: Suspect patient's seizure-like activity is due to alcohol withdrawal.  She does have a prior reported history of alcohol withdrawal seizure. Patient's mental status now is improved she is alert and oriented to person place and time.   -CT the brain is nonacute  -Neurology consulted  -MRI is ordered, will defer further imaging to neurology they feel it is appropriate    Alcohol abuse  -CIWA  -Supplements    Hypomagnesemia, hypokalemia  -4 g IV mag given in the ED, recheck, replete as needed    Hypothyroidism: Continue home medications

## 2020-11-03 LAB
ANION GAP SERPL CALCULATED.3IONS-SCNC: 7 MEQ/L (ref 9–15)
BUN BLDV-MCNC: 11 MG/DL (ref 8–23)
CALCIUM SERPL-MCNC: 6.7 MG/DL (ref 8.5–9.9)
CHLORIDE BLD-SCNC: 110 MEQ/L (ref 95–107)
CO2: 24 MEQ/L (ref 20–31)
CREAT SERPL-MCNC: 0.95 MG/DL (ref 0.5–0.9)
GFR AFRICAN AMERICAN: >60
GFR NON-AFRICAN AMERICAN: 59.3
GLUCOSE BLD-MCNC: 103 MG/DL (ref 70–99)
HBA1C MFR BLD: 5.2 % (ref 4.8–5.9)
MAGNESIUM: 3.1 MG/DL (ref 1.7–2.4)
POTASSIUM SERPL-SCNC: 4 MEQ/L (ref 3.4–4.9)
SODIUM BLD-SCNC: 141 MEQ/L (ref 135–144)

## 2020-11-03 PROCEDURE — 36415 COLL VENOUS BLD VENIPUNCTURE: CPT

## 2020-11-03 PROCEDURE — 6360000002 HC RX W HCPCS: Performed by: INTERNAL MEDICINE

## 2020-11-03 PROCEDURE — 6370000000 HC RX 637 (ALT 250 FOR IP): Performed by: INTERNAL MEDICINE

## 2020-11-03 PROCEDURE — 1210000000 HC MED SURG R&B

## 2020-11-03 PROCEDURE — G0378 HOSPITAL OBSERVATION PER HR: HCPCS

## 2020-11-03 PROCEDURE — 6370000000 HC RX 637 (ALT 250 FOR IP): Performed by: PSYCHIATRY & NEUROLOGY

## 2020-11-03 PROCEDURE — 2580000003 HC RX 258: Performed by: INTERNAL MEDICINE

## 2020-11-03 PROCEDURE — 80048 BASIC METABOLIC PNL TOTAL CA: CPT

## 2020-11-03 PROCEDURE — 96372 THER/PROPH/DIAG INJ SC/IM: CPT

## 2020-11-03 PROCEDURE — 83735 ASSAY OF MAGNESIUM: CPT

## 2020-11-03 PROCEDURE — 95816 EEG AWAKE AND DROWSY: CPT

## 2020-11-03 PROCEDURE — 99222 1ST HOSP IP/OBS MODERATE 55: CPT | Performed by: PSYCHIATRY & NEUROLOGY

## 2020-11-03 RX ORDER — CALCIUM CARBONATE 200(500)MG
500 TABLET,CHEWABLE ORAL 2 TIMES DAILY
Status: DISCONTINUED | OUTPATIENT
Start: 2020-11-03 | End: 2020-11-04 | Stop reason: HOSPADM

## 2020-11-03 RX ORDER — LEVETIRACETAM 500 MG/1
500 TABLET ORAL 2 TIMES DAILY
Qty: 60 TABLET | Refills: 3 | Status: SHIPPED | OUTPATIENT
Start: 2020-11-03 | End: 2020-12-02 | Stop reason: SDUPTHER

## 2020-11-03 RX ORDER — LEVETIRACETAM 500 MG/1
500 TABLET ORAL 2 TIMES DAILY
Status: DISCONTINUED | OUTPATIENT
Start: 2020-11-03 | End: 2020-11-04 | Stop reason: HOSPADM

## 2020-11-03 RX ADMIN — LEVETIRACETAM 500 MG: 500 TABLET ORAL at 22:21

## 2020-11-03 RX ADMIN — Medication 10 ML: at 22:21

## 2020-11-03 RX ADMIN — LEVOTHYROXINE SODIUM 50 MCG: 0.05 TABLET ORAL at 05:35

## 2020-11-03 RX ADMIN — FENOFIBRATE 160 MG: 160 TABLET ORAL at 09:25

## 2020-11-03 RX ADMIN — ATORVASTATIN CALCIUM 40 MG: 40 TABLET, FILM COATED ORAL at 09:26

## 2020-11-03 RX ADMIN — ENOXAPARIN SODIUM 40 MG: 40 INJECTION SUBCUTANEOUS at 09:25

## 2020-11-03 RX ADMIN — LEVETIRACETAM 500 MG: 500 TABLET ORAL at 09:33

## 2020-11-03 RX ADMIN — Medication 10 ML: at 09:26

## 2020-11-03 RX ADMIN — PANTOPRAZOLE SODIUM 40 MG: 40 TABLET, DELAYED RELEASE ORAL at 09:26

## 2020-11-03 ASSESSMENT — ENCOUNTER SYMPTOMS
NAUSEA: 0
COLOR CHANGE: 0
CHOKING: 0
PHOTOPHOBIA: 0
BACK PAIN: 0
VOMITING: 0
SHORTNESS OF BREATH: 0
TROUBLE SWALLOWING: 0

## 2020-11-03 ASSESSMENT — PAIN SCALES - GENERAL: PAINLEVEL_OUTOF10: 0

## 2020-11-03 NOTE — PROGRESS NOTES
Hospitalist Progress Note      PCP: Usman Ortega MD    Date of Admission: 11/2/2020    Chief Complaint:      Subjective: :  Patient seen and examined at bedside this morning. She states she is feeling much better. She denies weakness. Notes reviewed, no acute events overnight. The case was discussed with neurology service. The patient is to have EEG today and start Keppra. Patient stable for discharge following EEG studies and initiation of medication per neurology.       Medications:  Reviewed    Infusion Medications   Scheduled Medications    levETIRAcetam  500 mg Oral BID    atorvastatin  40 mg Oral Daily    fenofibrate  160 mg Oral Daily    levothyroxine  50 mcg Oral Daily    pantoprazole  40 mg Oral Daily    sodium chloride flush  10 mL Intravenous 2 times per day    enoxaparin  40 mg Subcutaneous Daily     PRN Meds: sodium chloride flush, acetaminophen **OR** acetaminophen, polyethylene glycol, promethazine **OR** ondansetron, potassium chloride **OR** potassium alternative oral replacement **OR** potassium chloride, magnesium sulfate, LORazepam **OR** LORazepam **OR** LORazepam **OR** LORazepam **OR** LORazepam **OR** LORazepam **OR** LORazepam **OR** LORazepam      Intake/Output Summary (Last 24 hours) at 11/3/2020 1234  Last data filed at 11/3/2020 0926  Gross per 24 hour   Intake 130 ml   Output --   Net 130 ml       Exam:    BP 90/64   Pulse 80   Temp 97.3 °F (36.3 °C) (Oral)   Resp 18   Ht 5' (1.524 m)   Wt 110 lb (49.9 kg)   LMP  (LMP Unknown)   SpO2 99%   BMI 21.48 kg/m²     CONSTITUTIONAL:  awake, alert, cooperative, no apparent distress, and appears stated age  EYES:  Lids and lashes normal, pupils equal, round and reactive to light  NECK:  Supple, symmetrical, trachea midline, no adenopathy, thyroid symmetric  LUNGS:  No increased work of breathing, good air exchange, clear to auscultation bilaterally, no crackles or wheezing  CARDIOVASCULAR:  Normal apical impulse, regular rate and rhythm, normal S1 and S2, no S3 or S4, and no murmur noted  ABDOMEN: normal bowel sounds, soft, non-distended, non-tender  MUSCULOSKELETAL:  There is no redness, warmth, or swelling of the joints. Tone is normal.  NEUROLOGIC:  Awake, alert, oriented to name, place and time. No focal motor deficit. Gait deferred    Labs:   Recent Labs     11/02/20  0830   WBC 10.7   HGB 10.2*   HCT 30.7*        Recent Labs     11/02/20 0830 11/03/20  0534     141 141   K 3.2*  3.2* 4.0     102 110*   CO2 15*  19* 24   BUN 16  17 11   CREATININE 1.00*  0.96* 0.95*   CALCIUM 6.4*  6.6* 6.7*     Recent Labs     11/02/20  0830   AST 27   ALT 16   BILITOT 0.5   ALKPHOS 57     No results for input(s): INR in the last 72 hours. Recent Labs     11/02/20 0830   TROPONINI <0.010       Urinalysis:      Lab Results   Component Value Date    NITRU Negative 09/30/2016    WBCUA 6-10 09/30/2016    BACTERIA Rare 09/30/2016    RBCUA 0-2 09/30/2016    BLOODU Negative 09/30/2016    SPECGRAV 1.021 09/30/2016    GLUCOSEU 100 09/30/2016    GLUCOSEU NEG 05/09/2012       Radiology:  MRI BRAIN W WO CONTRAST   Final Result   1. There are no acute intracranial changes, there is no evidence of hemorrhage or acute ischemia. 2. There are chronic interstitial changes including microcalcifications in the basal ganglia. Foci of T2/FLAIR hyperintensity in subcortical and periventricular white matter and cerebral atrophy that are commonly associated with chronic microangiopathy. Possible exacerbating Clinical factors include diabetes, hypertension, hyperlipidemia, tobacco abuse or other etiologies. CT HEAD WO CONTRAST   Final Result      There are no acute intracranial changes. XR CHEST PORTABLE   Final Result   No radiographic evidence of acute intrathoracic process.                     Assessment/Plan:    Active Hospital Problems    Diagnosis Date Noted    Seizure [facility]) [R56.9] 11/02/2020      Seizure, with post ictal state: Suspect patient's seizure-like activity is due to alcohol withdrawal.  She does have a prior reported history of alcohol withdrawal seizure. Patient's mental status now is improved she is alert and oriented to person place and time. -CT the brain is nonacute  -Neurology consulted, [address] initiated, EEG today  -MRI with chronic changes, nonacute     Alcohol abuse  -CIWA  -Supplements     Hypomagnesemia, hypokalemia  Resolved     Hypothyroidism: Continue home medications      Pt ok for discharge home later today on Keppra 500mg BID following EEG. Pt to follow up with neurology as outpatient.      Diet: DIET CARB CONTROL;    Code Status: Full Code    PT/OT Eval           Electronically signed by Gabriel Madrigal DO on 11/3/2020 at 12:34 PM

## 2020-11-03 NOTE — PLAN OF CARE
Problem: Skin Integrity:  Goal: Will show no infection signs and symptoms  Description: Will show no infection signs and symptoms  11/3/2020 1732 by Aramis Chilel RN  Outcome: Completed  11/3/2020 1732 by Aramis Chilel RN  Outcome: Ongoing  11/3/2020 1140 by Aramis Chilel RN  Outcome: Ongoing  Goal: Absence of new skin breakdown  Description: Absence of new skin breakdown  11/3/2020 1732 by Aramis Chilel RN  Outcome: Completed  11/3/2020 1732 by Aramis Chilel RN  Outcome: Ongoing  11/3/2020 1140 by Aramis Chilel RN  Outcome: Ongoing     Problem: Falls - Risk of:  Goal: Will remain free from falls  Description: Will remain free from falls  11/3/2020 1732 by Aramis Chilel RN  Outcome: Completed  11/3/2020 1732 by Aramis Chilel RN  Outcome: Ongoing  11/3/2020 1140 by Aramis Chilel RN  Outcome: Ongoing  Goal: Absence of physical injury  Description: Absence of physical injury  11/3/2020 1732 by Aramis Chilel RN  Outcome: Completed  11/3/2020 1732 by Aramis Chilel RN  Outcome: Ongoing  11/3/2020 1140 by Aramis Chilel RN  Outcome: Ongoing     Problem: Confusion - Acute:  Goal: Absence of continued neurological deterioration signs and symptoms  Description: Absence of continued neurological deterioration signs and symptoms  11/3/2020 1732 by Aramis Chilel RN  Outcome: Completed  11/3/2020 1732 by Aramis Chilel RN  Outcome: Ongoing  11/3/2020 1140 by Aramis Chilel RN  Outcome: Ongoing  Goal: Mental status will be restored to baseline  Description: Mental status will be restored to baseline  11/3/2020 1732 by Aramis Chilel RN  Outcome: Completed  11/3/2020 1732 by Aramis Chilel RN  Outcome: Ongoing  11/3/2020 1140 by Aramis Chilel RN  Outcome: Ongoing     Problem: Discharge Planning:  Goal: Ability to perform activities of daily living will improve  Description: Ability to perform activities of daily living will improve  11/3/2020 1732 by Aramis Chilel RN  Outcome: Completed  11/3/2020 1732 by Kenyatta Mederos Jose Beckman RN  Outcome: Ongoing  11/3/2020 1140 by Luis Ann RN  Outcome: Ongoing  Goal: Participates in care planning  Description: Participates in care planning  11/3/2020 1732 by Luis Ann RN  Outcome: Completed  11/3/2020 1732 by Luis Ann RN  Outcome: Ongoing  11/3/2020 1140 by Luis Ann RN  Outcome: Ongoing     Problem: Injury - Risk of, Physical Injury:  Goal: Will remain free from falls  Description: Will remain free from falls  11/3/2020 1732 by Luis Ann RN  Outcome: Completed  11/3/2020 1732 by Luis Ann RN  Outcome: Ongoing  11/3/2020 1140 by Luis Ann RN  Outcome: Ongoing  Goal: Absence of physical injury  Description: Absence of physical injury  11/3/2020 1732 by Luis Ann RN  Outcome: Completed  11/3/2020 1732 by Luis Ann RN  Outcome: Ongoing  11/3/2020 1140 by Luis Ann RN  Outcome: Ongoing     Problem: Mood - Altered:  Goal: Mood stable  Description: Mood stable  11/3/2020 1732 by Luis Ann RN  Outcome: Completed  11/3/2020 1732 by Luis Ann RN  Outcome: Ongoing  11/3/2020 1140 by Luis Ann RN  Outcome: Ongoing  Goal: Absence of abusive behavior  Description: Absence of abusive behavior  11/3/2020 1732 by Luis Ann RN  Outcome: Completed  11/3/2020 1732 by Luis Ann RN  Outcome: Ongoing  11/3/2020 1140 by Luis Ann RN  Outcome: Ongoing  Goal: Verbalizations of feeling emotionally comfortable while being cared for will increase  Description: Verbalizations of feeling emotionally comfortable while being cared for will increase  11/3/2020 1732 by Luis Ann RN  Outcome: Completed  11/3/2020 1732 by Luis Ann RN  Outcome: Ongoing  11/3/2020 1140 by Luis Ann RN  Outcome: Ongoing     Problem: Psychomotor Activity - Altered:  Goal: Absence of psychomotor disturbance signs and symptoms  Description: Absence of psychomotor disturbance signs and symptoms  11/3/2020 1732 by Luis Ann, RN  Outcome: Completed  11/3/2020 1732 by Romulo Monsivais RN  Outcome: Ongoing  11/3/2020 1140 by Romulo Monsivais RN  Outcome: Ongoing     Problem: Sensory Perception - Impaired:  Goal: Demonstrations of improved sensory functioning will increase  Description: Demonstrations of improved sensory functioning will increase  11/3/2020 1732 by Romulo Monsivais RN  Outcome: Completed  11/3/2020 1732 by Romulo Monsivais RN  Outcome: Ongoing  11/3/2020 1140 by Romulo Monsivais RN  Outcome: Ongoing  Goal: Decrease in sensory misperception frequency  Description: Decrease in sensory misperception frequency  11/3/2020 1732 by Romulo Monsivais RN  Outcome: Completed  11/3/2020 1732 by Romulo Monsivais RN  Outcome: Ongoing  11/3/2020 1140 by Romulo Monsivais RN  Outcome: Ongoing  Goal: Able to refrain from responding to false sensory perceptions  Description: Able to refrain from responding to false sensory perceptions  11/3/2020 1732 by Romulo Monsivais RN  Outcome: Completed  11/3/2020 1732 by Romulo Monsivais RN  Outcome: Ongoing  11/3/2020 1140 by Romulo Monsivais RN  Outcome: Ongoing  Goal: Demonstrates accurate environmental perceptions  Description: Demonstrates accurate environmental perceptions  11/3/2020 1732 by Romulo Monsivais RN  Outcome: Completed  11/3/2020 1732 by Romulo Monsivais RN  Outcome: Ongoing  11/3/2020 1140 by Romulo Monsivais RN  Outcome: Ongoing  Goal: Able to distinguish between reality-based and nonreality-based thinking  Description: Able to distinguish between reality-based and nonreality-based thinking  11/3/2020 1732 by Romulo Monsivais RN  Outcome: Completed  11/3/2020 1732 by Romulo Monsivais RN  Outcome: Ongoing  11/3/2020 1140 by Romulo Monsivais RN  Outcome: Ongoing  Goal: Able to interrupt nonreality-based thinking  Description: Able to interrupt nonreality-based thinking  11/3/2020 1732 by Romulo Monsivais RN  Outcome: Completed  11/3/2020 1732 by Romulo Monsivais RN  Outcome: Ongoing  11/3/2020 1140 by Romulo Monsivais RN  Outcome: Ongoing     Problem: Sleep Pattern Disturbance:  Goal: Appears well-rested  Description: Appears well-rested  11/3/2020 1732 by Bhumi Valenzuela RN  Outcome: Completed  11/3/2020 1732 by Bhumi Valenzuela RN  Outcome: Ongoing  11/3/2020 1140 by Bhumi Valenzuela RN  Outcome: Ongoing

## 2020-11-03 NOTE — DISCHARGE SUMMARY
Discharge Summary    Kiera Lewis  :    MRN:  [id number]    ADMIT DATE:  2020  DISCHARGE DATE:  11/3/2020    PRIMARY CARE PHYSICIAN:  Amparo Cox MD      CODE STATUS:  Full Code    DISCHARGE DIAGNOSES:  Active Problems:    Seizure ([address]  Resolved Problems:    * No resolved hospital problems. *      HOSPITAL COURSE:  81-year-old female admitted with seizure. She does have a history of alcohol use and usually drinks about 4 drinks per day. Niece reports she has not had a drink in the last week. Since admission patient has not had additional seizure-like activity. Neurology was consulted. CT the brain and subsequent MRI were nonacute. Neurology recommended starting Keppra 500 mg twice daily and EEG studies have been ordered. Patient was cleared by neurology service for discharge. She will need follow-up with neurology as an outpatient.    SIGNIFICANT DIAGNOSTIC STUDIES:  CT brain, MRI  CONSULTANTS:  Neurology  RECOMMENDED NEXT STEPS:   Continue Keppra  Follow-up with neurology as an outpatient     DISCHARGE MEDICATIONS:       Shahida Elkins, [id number]  [address]1 NAVJOT/Durga Chavis Final Medication Instructions [facility]:[id number]    Printed on:20 3655   Medication Information                      ACETAMINOPHEN EXTRA STRENGTH 500 MG tablet  Take 1 tablet by mouth 2 times daily as needed for Pain             alendronate (FOSAMAX) 35 MG tablet  TAKE 1 TABLET BY MOUTH  EVERY 7 DAYS             atorvastatin (LIPITOR) 40 MG tablet  TAKE 1 TABLET BY MOUTH ONCE DAILY             Calcium 600-400 MG-UNIT CHEW  Take 1 tablet by mouth 2 times daily             calcium carbonate-vitamin D (CALTRATE) 600-400 MG-UNIT TABS per tab  Take 1 tablet by mouth 2 times daily             Cholecalciferol (VITAMIN D) 50 MCG ( UT) TABS tablet  Take 1 tablet by mouth daily             escitalopram (LEXAPRO) 10 MG tablet  TAKE 1 TABLET BY MOUTH  DAILY             fenofibrate (TRIGLIDE) 160 MG tablet  Take 1 tablet by mouth daily             hydrOXYzine (ATARAX) 25 MG tablet  Take 1 tablet by mouth every 8 hours as needed for Itching             levETIRAcetam (KEPPRA) 500 MG tablet  Take 1 tablet by mouth 2 times daily             levothyroxine (SYNTHROID) 50 MCG tablet  TAKE 1 TABLET BY MOUTH  DAILY             metFORMIN (GLUCOPHAGE) 500 MG tablet  TAKE 1 TABLET BY MOUTH  TWICE DAILY WITH MEALS             pantoprazole (PROTONIX) 40 MG tablet  TAKE 1 TABLET BY MOUTH  DAILY             rOPINIRole (REQUIP) 0.5 MG tablet  TAKE 1 TABLET BY MOUTH AT  NIGHT             triamcinolone (KENALOG) 0.025 % cream  Apply topically every 4 hours as needed Apply Topically                 DIET: DIET CARB CONTROL;      ______________________________________________________________________  COMPLEXITY OF FOLLOW UP:   [] Moderate Complexity: follow up within 7-14 calendar days (37816)   [x] Severe Complexity: follow up within 7 calendar days (74468)    FOLLOW UP TESTING, PENDING RESULTS OR REFERRALS AT TRANSITIONAL CARE VISIT:   []  Yes    [x]  No        DISPOSITION: Home      Follow up with No follow-up provider specified. INSTRUCTIONS TO MA/SW: Please call patient on day after discharge (must document patient  contacted within 2 business days of discharge). FOLLOW UP QUESTIONS FOR MA/SW:  1. Did you get medications filled and taking them as instructed from discharge? 2. Are you following your discharge instructions from your hospital stay? 3. Please confirm patient is scheduled for a follow up appointment within the above time frame.     DISCHARGE TIME: > 30 minutes    SIGNED:  Bebeto Parham DO   11/3/2020, 12:45 PM

## 2020-11-03 NOTE — CONSULTS
Subjective:      Patient ID: Jerry Carlin is a 63 y.o. female who presents today for seizure    HPI 79-year-old right-handed female with a seizure. This happened prior to arrival and was describes a shaking episode with a seizure she bit her lip. Patient is a poor historian and reports that she has had 3 seizures in the past though she has not been giving any account of this and she is not been on medication. She was seen in the emergency room with her niece and appears to be some session of alcohol use. She is also complained of lower extremity weakness for a few months. She goes out and frequently drinks. Patient appears to be doing well this morning denies any symptoms but does not appear to be able to give me a history of her seizures she has not been on seizure medications. Review of Systems   Constitutional: Negative for fever. HENT: Negative for ear pain, tinnitus and trouble swallowing. Eyes: Negative for photophobia and visual disturbance. Respiratory: Negative for choking and shortness of breath. Cardiovascular: Negative for chest pain and palpitations. Gastrointestinal: Negative for nausea and vomiting. Musculoskeletal: Negative for back pain, gait problem, joint swelling, myalgias, neck pain and neck stiffness. Skin: Negative for color change. Allergic/Immunologic: Negative for food allergies. Neurological: Positive for weakness. Negative for dizziness, tremors, seizures, syncope, facial asymmetry, speech difficulty, light-headedness, numbness and headaches. Psychiatric/Behavioral: Negative for behavioral problems, confusion, hallucinations and sleep disturbance.        Past Medical History:   Diagnosis Date    Alcoholism ([address]     Arthritis     Chronic lung disease     Depression     Hyperlipidemia     Hypertension     Hypothyroidism     Second hand smoke exposure      Past Surgical History:   Procedure Laterality Date    APPENDECTOMY      BIOPSY MOUTH LESION Bilateral 12/19/2016    REMOVAL  OF BILATERAL LINGUAL MACIE performed by Jerry Benoit DDS at [place] [address].       Social History     Socioeconomic History    Marital status: Single     Spouse name: Not on file    Number of children: Not on file    Years of education: Not on file    Highest education level: Not on file   Occupational History    Not on file   Social Needs    Financial resource strain: Not on file    Food insecurity     Worry: Not on file     Inability: Not on file    Transportation needs     Medical: Not on file     Non-medical: Not on file   Tobacco Use    Smoking status: Never Smoker    Smokeless tobacco: Never Used   Substance and Sexual Activity    Alcohol use:  Yes     Alcohol/week: 28.0 standard drinks     Types: 28 Shots of liquor per week    Drug use: No    Sexual activity: Not on file   Lifestyle    Physical activity     Days per week: Not on file     Minutes per session: Not on file    Stress: Not on file   Relationships    Social connections     Talks on phone: Not on file     Gets together: Not on file     Attends Latter-day service: Not on file     Active member of club or organization: Not on file     Attends meetings of clubs or organizations: Not on file     Relationship status: Not on file    Intimate partner violence     Fear of current or ex partner: Not on file     Emotionally abused: Not on file     Physically abused: Not on file     Forced sexual activity: Not on file   Other Topics Concern    Not on file   Social History Narrative    Not on file     Family History   Problem Relation Age of Onset    Heart Disease Mother     Hypertension Mother     Diabetes Mother     Stroke Mother     Heart Disease Father     Hypertension Father     Heart Disease Sister     Hypertension Sister     Heart Disease Brother     Hypertension Brother     Diabetes Brother     Cancer Maternal Grandmother     Diabetes Maternal Grandfather      Allergies   Allergen Reactions    Morphine Swelling     Current Facility-Administered Medications   Medication Dose Route Frequency Provider Last Rate Last Dose    levETIRAcetam (KEPPRA) tablet 500 mg  500 mg Oral BID Toñito Worrell MD   500 mg at 11/03/20 0933    atorvastatin (LIPITOR) tablet 40 mg  40 mg Oral Daily Sowmya Saint Johns, DO   40 mg at 11/03/20 0926    fenofibrate (TRIGLIDE) tablet 160 mg  160 mg Oral Daily Sowmya Saint Johns, DO   160 mg at 11/03/20 0925    levothyroxine (SYNTHROID) tablet 50 mcg  50 mcg Oral Daily Sowmya Saint Johns, DO   50 mcg at 11/03/20 0535    pantoprazole (PROTONIX) tablet 40 mg  40 mg Oral Daily Sowmya Saint Johns, DO   40 mg at 11/03/20 0926    sodium chloride flush 0.9 % injection 10 mL  10 mL Intravenous 2 times per day Sowmya Saint Johns, DO   10 mL at 11/03/20 0926    sodium chloride flush 0.9 % injection 10 mL  10 mL Intravenous PRN Sowmya Saint Johns, DO        acetaminophen (TYLENOL) tablet 650 mg  650 mg Oral Q6H PRN Sowmya Saint Johns, DO   650 mg at 11/02/20 1849    Or    acetaminophen (TYLENOL) suppository 650 mg  650 mg Rectal Q6H PRN Sowmya Saint Johns, DO        polyethylene glycol (GLYCOLAX) packet 17 g  17 g Oral Daily PRN Sowmya Saint Johns, DO        promethazine (PHENERGAN) tablet 12.5 mg  12.5 mg Oral Q6H PRN Sowmya Saint Johns, DO        Or    ondansetron (ZOFRAN) injection 4 mg  4 mg Intravenous Q6H PRN Sowmya Saint Johns, DO        enoxaparin (LOVENOX) injection 40 mg  40 mg Subcutaneous Daily Sowmya Saint Johns, DO   40 mg at 11/03/20 0925    potassium chloride (KLOR-CON M) extended release tablet 40 mEq  40 mEq Oral PRN Sowmya Saint Johns, DO        Or    potassium chloride (KLOR-CON) packet 40 mEq  40 mEq Oral PRN Sowmya Saint Johns, DO   40 mEq at 11/02/20 1459    Or    potassium chloride 10 mEq/100 mL IVPB (Peripheral Line)  10 mEq Intravenous PRN Sowmya Saint Johns, DO        magnesium sulfate 2 g in 50 mL IVPB premix  2 g Intravenous PRN Sowmya Deras, DO        LORazepam (ATIVAN) tablet 1 mg  1 mg Oral Q1H PRN Joycie Parsley, DO        Or    LORazepam (ATIVAN) injection 1 mg  1 mg Intravenous Q1H PRN Joycie Parsley, DO        Or    LORazepam (ATIVAN) tablet 2 mg  2 mg Oral Q1H PRN Joycie Parsley, DO        Or    LORazepam (ATIVAN) injection 2 mg  2 mg Intravenous Q1H PRN Joycie Parsley, DO        Or    LORazepam (ATIVAN) tablet 3 mg  3 mg Oral Q1H PRN Joycie Parsley, DO        Or    LORazepam (ATIVAN) injection 3 mg  3 mg Intravenous Q1H PRN Joycie Parsley, DO        Or    LORazepam (ATIVAN) tablet 4 mg  4 mg Oral Q1H PRN Joycie Parsley, DO        Or    LORazepam (ATIVAN) injection 4 mg  4 mg Intravenous Q1H PRN Halina Davey DO            Objective:   /60   Pulse 87   Temp 97.9 °F (36.6 °C) (Oral)   Resp 16   Ht 5' (1.524 m)   Wt 110 lb (49.9 kg)   LMP  (LMP Unknown)   SpO2 98%   BMI 21.48 kg/m²     Physical Exam  Vitals signs reviewed. Eyes:      Pupils: Pupils are equal, round, and reactive to light. Neck:      Musculoskeletal: Normal range of motion. Cardiovascular:      Rate and Rhythm: Normal rate and regular rhythm. Heart sounds: No murmur. Pulmonary:      Effort: Pulmonary effort is normal.      Breath sounds: Normal breath sounds. Abdominal:      General: Bowel sounds are normal.   Musculoskeletal: Normal range of motion. Skin:     General: Skin is warm. Neurological:      Mental Status: She is alert and oriented to person, place, and time. Cranial Nerves: No cranial nerve deficit. Sensory: No sensory deficit. Motor: No abnormal muscle tone. Coordination: Coordination normal.      Deep Tendon Reflexes: Reflexes are normal and symmetric. Babinski sign absent on the right side. Babinski sign absent on the left side. Psychiatric:         Mood and Affect: Mood normal.     Patient has dysarthria and she has a mild lip trauma on the right. Patient is areflexic in the lower extremity.     Ct Head Wo Contrast    Result Date: 11/2/2020  EXAMINATION: CT HEAD WO CONTRAST, 11/2/2020 9:35 AM CLINICAL HISTORY:  ams poss seizure COMPARISON: None TECHNIQUE:  Multiple contiguous axial images of the head were obtained from the skull base through the skull vertex without intravenous contrast. Sagittal and coronal reformats were obtained. All CT scans at this facility use dose modulation, iterative reconstruction, and/or weight based dosing when appropriate to reduce radiation dose to as low as reasonably achievable. BRAIN CT FINDINGS: Gray-white matter differentiation is maintained. No acute hemorrhage, mass, mass effect, or midline shift. There is prominence of sulci and ventricles indicating mild global cerebral atrophy and chronic involutional changes. The subcortical and periventricular white matter is within normal limits. The basal ganglia are within normal limits. The visualized portions of the orbits are within normal limits. The globes are intact. There is a 3 cm mucous retention cyst versus polyp in the right maxillary sinus. The remaining paranasal sinuses are well-aerated. The calvarium is intact. There are no acute intracranial changes. Xr Chest Portable    Result Date: 11/2/2020  Exam: XR CHEST PORTABLE History:  ams Technique: AP portable view of the chest obtained. Comparison: Chest x-ray from September 30, 2016 Findings: The cardiomediastinal silhouette is within normal limits. There are no infiltrates, consolidations or effusions. There is a marked S-shaped scoliosis of the thoracic and lumbar spine, stable since the prior study. No radiographic evidence of acute intrathoracic process. Mri Brain W Wo Contrast    Result Date: 11/2/2020  EXAMINATION: MRI BRAIN W WO CONTRAST CLINICAL HISTORY:  Encephalopathy, R/o stroke COMPARISONS: NONE AVAILABLE TECHNIQUE:  Brain CT from September 30, 2016 Multiplanar multisequence images of the brain were obtained without contrast. Diffusion perfusion imaging was obtained. FINDINGS:  There are no extra-axial collections.  There is no evidence of hemorrhage. There are no areas of signal abnormality on perfusion diffusion imaging to suggest ischemia. The susceptibility images do not demonstrate evidence of hemosiderin deposition within  the brain parenchyma or the leptomeninges. There is preservation of the gray-white matter differentiation. There are numerous foci of T2/FLAIR hyperintensities in the subcortical and periventricular white matter in a symmetric distribution throughout both hemispheres. There is mild prominence of the sulci and ventricles indicating chronic involutional changes and mild global cerebral atrophy. There are susceptibility artifact in the basal ganglia bilaterally indicating microcalcifications. The midline structures are intact, the corpus callosum is within normal limits. The region of the pineal gland and the sella turcica are unremarkable. There are no space-occupying lesions in the posterior fossa. The basilar cisterns are patent. The craniocervical junction is unremarkable. The visualized portions of the orbits are within normal limits, the globes are intact. There is a 3.0 x 2.4 cm mucous retention cyst versus polyp in the right maxillary sinus consistent with  mild chronic sinusitis. The remaining visualized paranasal sinuses are well aerated. The calvarium and soft tissues are unremarkable. 1. There are no acute intracranial changes, there is no evidence of hemorrhage or acute ischemia. 2. There are chronic interstitial changes including microcalcifications in the basal ganglia. Foci of T2/FLAIR hyperintensity in subcortical and periventricular white matter and cerebral atrophy that are commonly associated with chronic microangiopathy. Possible exacerbating Clinical factors include diabetes, hypertension, hyperlipidemia, tobacco abuse or other etiologies.       Lab Results   Component Value Date    WBC 10.7 11/02/2020    RBC 3.37 11/02/2020    RBC 4.36 05/09/2012    HGB 10.2 11/02/2020    HCT 30.7 11/02/2020    MCV 90.9 11/02/2020    MCH 30.4 11/02/2020    MCHC 33.4 11/02/2020    RDW 15.0 11/02/2020     11/02/2020    MPV 10.8 03/10/2014     Lab Results   Component Value Date     11/03/2020    K 4.0 11/03/2020     11/03/2020    CO2 24 11/03/2020    BUN 11 11/03/2020    CREATININE 0.95 11/03/2020    GFRAA >60.0 11/03/2020    LABGLOM 59.3 11/03/2020    GLUCOSE 103 11/03/2020    GLUCOSE 106 05/09/2012    PROT 6.6 11/02/2020    LABALBU 4.3 11/02/2020    LABALBU 4.9 05/09/2012    CALCIUM 6.7 11/03/2020    BILITOT 0.5 11/02/2020    ALKPHOS 57 11/02/2020    AST 27 11/02/2020    ALT 16 11/02/2020     Lab Results   Component Value Date    PROTIME 10.5 12/12/2016    INR 1.0 12/12/2016     Lab Results   Component Value Date    TSH 0.955 09/18/2020    AXNQEPZZ39 366 11/18/2016     Lab Results   Component Value Date    TRIG 114 11/02/2020    HDL 66 11/02/2020    LDLCALC 69 11/02/2020     Lab Results   Component Value Date    LABAMPH Neg 11/02/2020    BARBSCNU Neg 11/02/2020    LABBENZ Neg 11/02/2020    LABMETH Neg 11/02/2020    OPIATESCREENURINE Neg 11/02/2020    PHENCYCLIDINESCREENURINE Neg 11/02/2020    ETOH <10 11/02/2020     No results found for: LITHIUM, DILFRTOT, VALPROATE    Assessment:   Generalized seizure with a session of low threshold for seizures with alcohol use. Patient reports that she has had seizures in the past but I do not see that she is ever been treated she has infrequent seizures. Recommend that he start on Keppra 500 mg twice a day for now and obtain an EEG. His MRI is pending as well. 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 The details of this are difficult ascertain given her history and her not able to give us better history. Try and obtain more history with the nieces around. She may be discharged for MRI does not show any thing significant and will assess her EEG later. Pritesh Reed MD, Sweeite Castillo, American Board of Psychiatry & Neurology  Board Certified in Vascular Neurology  Board Certified in Neuromuscular Medicine  Certified in [facility]:

## 2020-11-03 NOTE — PLAN OF CARE
Problem: Skin Integrity:  Goal: Will show no infection signs and symptoms  Description: Will show no infection signs and symptoms  Outcome: Ongoing  Goal: Absence of new skin breakdown  Description: Absence of new skin breakdown  Outcome: Ongoing     Problem: Falls - Risk of:  Goal: Will remain free from falls  Description: Will remain free from falls  Outcome: Ongoing  Goal: Absence of physical injury  Description: Absence of physical injury  Outcome: Ongoing     Problem: Confusion - Acute:  Goal: Absence of continued neurological deterioration signs and symptoms  Description: Absence of continued neurological deterioration signs and symptoms  Outcome: Ongoing  Goal: Mental status will be restored to baseline  Description: Mental status will be restored to baseline  Outcome: Ongoing     Problem: Discharge Planning:  Goal: Ability to perform activities of daily living will improve  Description: Ability to perform activities of daily living will improve  Outcome: Ongoing  Goal: Participates in care planning  Description: Participates in care planning  Outcome: Ongoing     Problem: Injury - Risk of, Physical Injury:  Goal: Will remain free from falls  Description: Will remain free from falls  Outcome: Ongoing  Goal: Absence of physical injury  Description: Absence of physical injury  Outcome: Ongoing     Problem: Mood - Altered:  Goal: Mood stable  Description: Mood stable  Outcome: Ongoing  Goal: Absence of abusive behavior  Description: Absence of abusive behavior  Outcome: Ongoing  Goal: Verbalizations of feeling emotionally comfortable while being cared for will increase  Description: Verbalizations of feeling emotionally comfortable while being cared for will increase  Outcome: Ongoing     Problem: Psychomotor Activity - Altered:  Goal: Absence of psychomotor disturbance signs and symptoms  Description: Absence of psychomotor disturbance signs and symptoms  Outcome: Ongoing     Problem: Sensory Perception - Impaired:  Goal: Demonstrations of improved sensory functioning will increase  Description: Demonstrations of improved sensory functioning will increase  Outcome: Ongoing  Goal: Decrease in sensory misperception frequency  Description: Decrease in sensory misperception frequency  Outcome: Ongoing  Goal: Able to refrain from responding to false sensory perceptions  Description: Able to refrain from responding to false sensory perceptions  Outcome: Ongoing  Goal: Demonstrates accurate environmental perceptions  Description: Demonstrates accurate environmental perceptions  Outcome: Ongoing  Goal: Able to distinguish between reality-based and nonreality-based thinking  Description: Able to distinguish between reality-based and nonreality-based thinking  Outcome: Ongoing  Goal: Able to interrupt nonreality-based thinking  Description: Able to interrupt nonreality-based thinking  Outcome: Ongoing     Problem: Sleep Pattern Disturbance:  Goal: Appears well-rested  Description: Appears well-rested  Outcome: Ongoing

## 2020-11-04 VITALS
SYSTOLIC BLOOD PRESSURE: 126 MMHG | RESPIRATION RATE: 16 BRPM | TEMPERATURE: 97.3 F | HEIGHT: 60 IN | OXYGEN SATURATION: 100 % | DIASTOLIC BLOOD PRESSURE: 68 MMHG | BODY MASS INDEX: 21.6 KG/M2 | HEART RATE: 66 BPM | WEIGHT: 110 LBS

## 2020-11-04 LAB
ANION GAP SERPL CALCULATED.3IONS-SCNC: 14 MEQ/L (ref 9–15)
BUN BLDV-MCNC: 10 MG/DL (ref 8–23)
CALCIUM SERPL-MCNC: 8.2 MG/DL (ref 8.5–9.9)
CHLORIDE BLD-SCNC: 107 MEQ/L (ref 95–107)
CO2: 21 MEQ/L (ref 20–31)
CREAT SERPL-MCNC: 0.93 MG/DL (ref 0.5–0.9)
GFR AFRICAN AMERICAN: >60
GFR NON-AFRICAN AMERICAN: >60
GLUCOSE BLD-MCNC: 93 MG/DL (ref 70–99)
MAGNESIUM: 2.2 MG/DL (ref 1.7–2.4)
POTASSIUM SERPL-SCNC: 4.6 MEQ/L (ref 3.4–4.9)
SODIUM BLD-SCNC: 142 MEQ/L (ref 135–144)

## 2020-11-04 PROCEDURE — 36415 COLL VENOUS BLD VENIPUNCTURE: CPT

## 2020-11-04 PROCEDURE — 95816 EEG AWAKE AND DROWSY: CPT | Performed by: PSYCHIATRY & NEUROLOGY

## 2020-11-04 PROCEDURE — 80048 BASIC METABOLIC PNL TOTAL CA: CPT

## 2020-11-04 PROCEDURE — 83735 ASSAY OF MAGNESIUM: CPT

## 2020-11-04 PROCEDURE — 6370000000 HC RX 637 (ALT 250 FOR IP): Performed by: INTERNAL MEDICINE

## 2020-11-04 PROCEDURE — 2580000003 HC RX 258: Performed by: INTERNAL MEDICINE

## 2020-11-04 PROCEDURE — G0378 HOSPITAL OBSERVATION PER HR: HCPCS

## 2020-11-04 PROCEDURE — 6370000000 HC RX 637 (ALT 250 FOR IP): Performed by: PSYCHIATRY & NEUROLOGY

## 2020-11-04 RX ADMIN — LEVOTHYROXINE SODIUM 50 MCG: 0.05 TABLET ORAL at 06:26

## 2020-11-04 RX ADMIN — FENOFIBRATE 160 MG: 160 TABLET ORAL at 10:15

## 2020-11-04 RX ADMIN — LEVETIRACETAM 500 MG: 500 TABLET ORAL at 10:13

## 2020-11-04 RX ADMIN — PANTOPRAZOLE SODIUM 40 MG: 40 TABLET, DELAYED RELEASE ORAL at 10:15

## 2020-11-04 RX ADMIN — ATORVASTATIN CALCIUM 40 MG: 40 TABLET, FILM COATED ORAL at 10:15

## 2020-11-04 RX ADMIN — Medication 10 ML: at 10:16

## 2020-11-04 NOTE — FLOWSHEET NOTE
Pt. A&Ox4. Pt. Had no c/o pain. Pt. Refused her Tums & lovenox. Pt. IV was removed when this nurse arrived to discharge. Discharge paperwork was explained to both Pt. & family member. Pt. Did not have any personal clothes & An offer was made to pt. To get some clothing for her to go home in but pt. & family member declined. Pt. Left in hospital gown.,  Assoc. Mgr. Made aware Taina Small).

## 2020-11-06 ENCOUNTER — VIRTUAL VISIT (OUTPATIENT)
Dept: FAMILY MEDICINE CLINIC | Age: 65
End: 2020-11-06
Payer: MEDICARE

## 2020-11-06 PROBLEM — E83.42 HYPOMAGNESEMIA: Status: ACTIVE | Noted: 2020-11-06

## 2020-11-06 PROBLEM — E87.6 HYPOKALEMIA: Chronic | Status: ACTIVE | Noted: 2020-11-06

## 2020-11-06 PROCEDURE — 99496 TRANSJ CARE MGMT HIGH F2F 7D: CPT | Performed by: FAMILY MEDICINE

## 2020-11-06 PROCEDURE — 1111F DSCHRG MED/CURRENT MED MERGE: CPT | Performed by: FAMILY MEDICINE

## 2020-11-06 RX ORDER — FENOFIBRATE 160 MG/1
160 TABLET ORAL DAILY
Qty: 90 TABLET | Refills: 1 | Status: SHIPPED | OUTPATIENT
Start: 2020-11-06 | End: 2021-01-04 | Stop reason: SDUPTHER

## 2020-11-06 NOTE — PROGRESS NOTES
Post-Discharge Transitional Care Management Services or Hospital Follow Up  Valentine Belcher is a 63 y.o. female evaluated via audiovisual meanson 11/6/2020. Consent:  She and/or health care decision maker is aware that that she may receive a bill for this telephone service, depending on her insurance coverage, and has provided verbal consent to proceed: Yes      Documentation:  I communicated with the patient and/or health care decision maker about see below. Details of this discussion including any medical advice provided: see below      I affirm this is a Patient Initiated Episode with an Established Patient who has not had a related appointment within my department in the past 7 days or scheduled within the next 24 hours. Note: not billable if this call serves to triage the patient into an appointment for the relevant concern      Orly KUMARI     11/6/2020  Valentine Belcher is a 63 y.o. female being evaluated by a Virtual Visit (telephonic visit) encounter to address concerns as mentioned above. A caregiver was present when appropriate. Due to this being a TeleHealth encounter (During ODKOO-50 public health emergency), evaluation of the following organ systems was limited: Vitals/Constitutional/EENT/Resp/CV/GI//MS/Neuro/Skin/Heme-Lymph-Imm. Pursuant to the emergency declaration under the [address] [place] [address], [address] [place] [address] authority and the [employer] Act, this Virtual Visit was conducted with patient's (and/or legal guardian's) consent, to reduce the patient's risk of exposure to COVID-19 and provide necessary medical care. The patient (and/or legal guardian) has also been advised to contact this office for worsening conditions or problems, and seek emergency medical treatment and/or call 911 if deemed necessary.      Services were provided through a telephonic synchronous discussion virtually to substitute for in-person clinic visit. Patient and provider were located at their individual homes. --Felicity Mathur MD on 11/6/2020 at 2:24 PM    An electronic signature was used to authenticate this note. Mary Saravia   YOB: 1955    Date of Office Visit:  11/6/2020  Date of Hospital Admission: 11/2/20  Date of Hospital Discharge: 11/4/20  Readmission Risk Score(high >=14%.  Medium >=10%):Readmission Risk Score: 15      Care management risk score Rising risk (score 2-5) and Complex Care (Scores >=6): 4     Non face to face  following discharge, date last encounter closed (first attempt may have been earlier): *No documented post hospital discharge outreach found in the last 14 days *No documented post hospital discharge outreach found in the last 14 days    Call initiated 2 business days of discharge: *No response recorded in the last 14 days     Patient Active Problem List   Diagnosis    Mixed hyperlipidemia    Abnormal glucose    Gastroesophageal reflux disease without esophagitis    [facility]ism ([facility]    Psoriasis    Vitamin D deficiency    Restless leg syndrome    Bilateral leg pain    Mood disorder ([facility]    Hypothyroidism    Osteopenia of multiple sites    Facet arthropathy, multilevel    Chronic pain of right knee    Abnormal EKG    BARRIENTOS (dyspnea on exertion)    Anemia    Weight loss    Seizure (HCC)       Allergies   Allergen Reactions    Morphine Swelling       Medications listed as ordered at the time of discharge from hospital   Rossy LEWIS   Home Medication Instructions NOLVIA:    Printed on:11/06/20 2239   Medication Information                      ACETAMINOPHEN EXTRA STRENGTH 500 MG tablet  Take 1 tablet by mouth 2 times daily as needed for Pain             alendronate (FOSAMAX) 35 MG tablet  TAKE 1 TABLET BY MOUTH  EVERY 7 DAYS             atorvastatin (LIPITOR) 40 MG tablet  TAKE 1 TABLET BY MOUTH ONCE DAILY             Calcium 600-400 EVERY 7 DAYS 12 tablet 3    hydrOXYzine (ATARAX) 25 MG tablet Take 1 tablet by mouth every 8 hours as needed for Itching 90 tablet 2    fenofibrate (TRIGLIDE) 160 MG tablet Take 1 tablet by mouth daily 90 tablet 0    Cholecalciferol (VITAMIN D) 50 MCG (2000 UT) TABS tablet Take 1 tablet by mouth daily 30 tablet 12    calcium carbonate-vitamin D (CALTRATE) 600-400 MG-UNIT TABS per tab Take 1 tablet by mouth 2 times daily 30 tablet 12    ACETAMINOPHEN EXTRA STRENGTH 500 MG tablet Take 1 tablet by mouth 2 times daily as needed for Pain 60 tablet 5    Calcium 600-400 MG-UNIT CHEW Take 1 tablet by mouth 2 times daily 60 tablet 11        Medications patient taking as of now reconciled against medications ordered at time of hospital discharge: Yes    Chief Complaint   Patient presents with    Follow-Up from Hospital    Seizures     pt is still pretty foggy, squad was called. found in room in the middle of seizure. they heard her scream and found her that was. heavy drinker? [facility]     she will stop in for flu and then I let them know AWV is due! HPI    Inpatient course: Discharge summary reviewed- see chart. D/ summary as follows:    Cindy Bridges                 :  [id number]                     MRN:  [id number]     ADMIT DATE:  2020                  DISCHARGE DATE:  11/3/2020     PRIMARY CARE PHYSICIAN:  George Bravo MD        CODE STATUS:  Full Code     DISCHARGE DIAGNOSES:  Active Problems:    Seizure ([facility]  Resolved Problems:    * No resolved hospital problems. *        HOSPITAL COURSE:  62-year-old female admitted with seizure. She does have a history of alcohol use and usually drinks about 4 drinks per day. Niece reports she has not had a drink in the last week. Since admission patient has not had additional seizure-like activity. Neurology was consulted. CT the brain and subsequent MRI were nonacute.   Neurology recommended starting Keppra 500 mg Without Reflex; Future    Alcoholism ([facility]  Comments:  Improving, well-controlled. No alcohol in over 3 weeks. Orders:  -     SD DISCHARGE MEDS RECONCILED W/ CURRENT OUTPATIENT MED LIST    Altered mental status, unspecified altered mental status type  Comments:  Unclear etiology. Consider Keppra v.  Subclinical seizures  v. recurrent electrolyte abnormality. Check labs and referred back to neuro. Hypomagnesemia  Comments:  Was lower than 0.6 during recent hospitalization. No supplementation currently. Follow levels closely. Consider EtOH, renal, meds. Orders:  -     Magnesium; Future    Hypokalemia  Comments:  Repleted during hospitalization. Follow levels. Orders:  -     Basic Metabolic Panel; Future    Acquired hypothyroidism  Comments:  May be contributing to alteration in mental status. Check labs. Orders:  -     TSH Without Reflex; Future    Mixed hyperlipidemia  Comments:  Stable and well-controlled on current meds. Follow labs  Orders:  -     fenofibrate (TRIGLIDE) 160 MG tablet;  Take 1 tablet by mouth daily            Medical Decision Making: high complexity

## 2020-11-07 DIAGNOSIS — E87.6 HYPOKALEMIA: Chronic | ICD-10-CM

## 2020-11-07 DIAGNOSIS — R56.9 SEIZURE (HCC): ICD-10-CM

## 2020-11-07 DIAGNOSIS — E03.9 ACQUIRED HYPOTHYROIDISM: Chronic | ICD-10-CM

## 2020-11-07 DIAGNOSIS — E83.42 HYPOMAGNESEMIA: ICD-10-CM

## 2020-11-07 LAB
ANION GAP SERPL CALCULATED.3IONS-SCNC: 16 MEQ/L (ref 9–15)
BUN BLDV-MCNC: 13 MG/DL (ref 8–23)
CALCIUM SERPL-MCNC: 10.5 MG/DL (ref 8.5–9.9)
CHLORIDE BLD-SCNC: 101 MEQ/L (ref 95–107)
CO2: 20 MEQ/L (ref 20–31)
CREAT SERPL-MCNC: 0.94 MG/DL (ref 0.5–0.9)
GFR AFRICAN AMERICAN: >60
GFR NON-AFRICAN AMERICAN: >60
GLUCOSE BLD-MCNC: 92 MG/DL (ref 70–99)
MAGNESIUM: 1.4 MG/DL (ref 1.7–2.4)
POTASSIUM SERPL-SCNC: 4.6 MEQ/L (ref 3.4–4.9)
SODIUM BLD-SCNC: 137 MEQ/L (ref 135–144)
TSH SERPL DL<=0.05 MIU/L-ACNC: 7.47 UIU/ML (ref 0.44–3.86)

## 2020-11-08 NOTE — PROCEDURES
[address]                      [address], [address] [facility]                          ELECTROENCEPHALOGRAM REPORT    PATIENT NAME: Halie Vance                :        1955  MED REC NO:   [id number]                            ROOM:       N510  ACCOUNT NO:   [de-identified]                           ADMIT DATE: 2020  PROVIDER:     Prosper Antonio MD    DATE OF EE2020    EEG FINDINGS:  This is a spontaneous 21-channel EEG recording for this  patient with questionable seizures. The background rhythm of this EEG  shows some muscle artifacts in between. There appears to be movement  artifacts, but a definite alpha rhythm of about 8-9 Hz. This continues  throughout the EEG recording. There is mild asymmetry to the left for  higher amplitude tracings than the right though in between the muscle  artifacts. This appears to represent muscle activity. Hyperventilation  is not performed. Photic stimulation is without any photic responses. The record, otherwise, remains symmetrical without any asymmetries or  epileptiform discharge. IMPRESSION:  This is an essentially normal EEG recording with surface  muscle artifacts. Clinical correlation is recommended.         Stacie Fabian MD    D: 2020 10:42:20       T: 2020 10:44:23     [email or web address]  Job#: [id number]     Doc#: [id number]    CC:

## 2020-11-10 PROBLEM — E83.52 HYPERCALCEMIA: Status: ACTIVE | Noted: 2020-11-10

## 2020-11-11 LAB — VITAMIN B1, PLASMA: 3 NMOL/L (ref 4–15)

## 2020-11-30 LAB
ANION GAP SERPL CALCULATED.3IONS-SCNC: 14 MMOL/L (ref 10–20)
BICARBONATE: 27 MMOL/L (ref 21–32)
CALCIUM SERPL-MCNC: 9.8 MG/DL (ref 8.6–10.3)
CHLORIDE BLD-SCNC: 101 MMOL/L (ref 98–107)
CREAT SERPL-MCNC: 1.21 MG/DL (ref 0.5–1)
GFR AFRICAN AMERICAN: 54 ML/MIN/1.73M2
GFR NON-AFRICAN AMERICAN: 45 ML/MIN/1.73M2
GLUCOSE: 113 MG/DL (ref 74–99)
MAGNESIUM: 1.3 MG/DL (ref 1.6–2.4)
POTASSIUM SERPL-SCNC: 3.6 MMOL/L (ref 3.5–5.3)
SODIUM BLD-SCNC: 138 MMOL/L (ref 136–145)
TSH SERPL DL<=0.05 MIU/L-ACNC: 1.37 MIU/L (ref 0.44–3.9)
UREA NITROGEN: 23 MG/DL (ref 6–23)

## 2020-12-02 RX ORDER — LEVETIRACETAM 500 MG/1
500 TABLET ORAL 2 TIMES DAILY
Qty: 180 TABLET | Refills: 2 | Status: SHIPPED | OUTPATIENT
Start: 2020-12-02 | End: 2021-07-30 | Stop reason: SDUPTHER

## 2020-12-04 ENCOUNTER — TELEPHONE (OUTPATIENT)
Dept: FAMILY MEDICINE CLINIC | Age: 65
End: 2020-12-04

## 2020-12-10 PROBLEM — G93.40 ENCEPHALOPATHY: Status: ACTIVE | Noted: 2020-12-10

## 2020-12-10 PROBLEM — R55 SYNCOPE AND COLLAPSE: Status: ACTIVE | Noted: 2020-12-10

## 2020-12-10 PROBLEM — G40.311 GENERALIZED IDIOPATHIC EPILEPSY AND EPILEPTIC SYNDROMES, INTRACTABLE, WITH STATUS EPILEPTICUS (HCC): Status: ACTIVE | Noted: 2020-12-10

## 2020-12-18 ENCOUNTER — VIRTUAL VISIT (OUTPATIENT)
Dept: FAMILY MEDICINE CLINIC | Age: 65
End: 2020-12-18
Payer: MEDICARE

## 2020-12-18 PROCEDURE — 99215 OFFICE O/P EST HI 40 MIN: CPT | Performed by: FAMILY MEDICINE

## 2020-12-18 PROCEDURE — G8427 DOCREV CUR MEDS BY ELIG CLIN: HCPCS | Performed by: FAMILY MEDICINE

## 2020-12-18 PROCEDURE — 3017F COLORECTAL CA SCREEN DOC REV: CPT | Performed by: FAMILY MEDICINE

## 2020-12-18 RX ORDER — ONDANSETRON 4 MG/1
4 TABLET, FILM COATED ORAL 3 TIMES DAILY PRN
Qty: 30 TABLET | Refills: 0 | Status: SHIPPED | OUTPATIENT
Start: 2020-12-18 | End: 2021-04-28 | Stop reason: ALTCHOICE

## 2020-12-18 NOTE — PROGRESS NOTES
2020    TELEHEALTH EVALUATION -- Audio/Visual (During [facility] public [facility] emergency)    HPI:    Christopher Smiley (:  1955) has requested an audio/video evaluation for the following concern(s):    Seizures (Pt not feeling well since having seizure in Nov. Son states that pt was out of seizure meds x1 1/2 weeks) and Diarrhea (pt c/o vomitting,dizziness,nausea and headache sleeping a lot )    Ever since seizure she hasn't been feeling too good. N/V/D and excessive fatigue. Feels very weak. Can't get up and clean. Has lost more than 112 pounds. She notes that she is pale. Was without Keppra for 10 days when N/V/D was at it's worst.    Review of Systems    Prior to Visit Medications    Medication Sig Taking?  Authorizing Provider   ondansetron (ZOFRAN) 4 MG tablet Take 1 tablet by mouth 3 times daily as needed for Nausea or Vomiting Yes Kelsey Rowe MD   Nutritional Supplements (ENSURE COMPLETE) LIQD Take 1 Bottle by mouth 2 times daily Yes Kelsey Rowe MD   levETIRAcetam (KEPPRA) 500 MG tablet Take 1 tablet by mouth 2 times daily Yes Yair Wynne MD   hydrOXYzine (ATARAX) 10 MG tablet Take 1 tablet by mouth every 8 hours as needed for Itching Yes Kelsey Rowe MD   Magnesium 400 MG CAPS Take 1 capsule by mouth 2 times daily Yes Kelsey Rowe MD   fenofibrate (TRIGLIDE) 160 MG tablet Take 1 tablet by mouth daily Yes Kelsey Rowe MD   triamcinolone (KENALOG) 0.025 % cream Apply topically every 4 hours as needed Apply Topically Yes Historical Provider, MD   levothyroxine (SYNTHROID) 50 MCG tablet TAKE 1 TABLET BY MOUTH  DAILY Yes Kelsey Rowe MD   pantoprazole (PROTONIX) 40 MG tablet TAKE 1 TABLET BY MOUTH  DAILY Yes Kelsey Rowe MD   atorvastatin (LIPITOR) 40 MG tablet TAKE 1 TABLET BY MOUTH ONCE DAILY Yes Kelsey Rowe MD Types: 29 Shots of liquor per week    Drug use: No   ,   Family History   Problem Relation Age of Onset    Heart Disease Mother     Hypertension Mother     Diabetes Mother     Stroke Mother     Heart Disease Father     Hypertension Father     Heart Disease Sister     Hypertension Sister     Heart Disease Brother     Hypertension Brother     Diabetes Brother     Cancer Maternal Grandmother     Diabetes Maternal Grandfather        PHYSICAL EXAMINATION:  [ INSTRUCTIONS:  \"[x]\" Indicates a positive item  \"[]\" Indicates a negative item  -- DELETE ALL ITEMS NOT EXAMINED]  Vital Signs: (As obtained by patient/caregiver or practitioner observation)    Blood pressure-  Heart rate-    Respiratory rate-    Temperature-  Pulse oximetry-     Constitutional: [] Appears well-developed and well-nourished [] No apparent distress      [x] Abnormal- borderline cachexia, fatigued, no distress    Mental status[x] Alert and awake  [x] Oriented to person/place/time []Able to follow commands      Eyes:  EOM    [x]  Normal  [] Abnormal-  Sclera  []  Normal  [] Abnormal -         Discharge []  None visible  [] Abnormal -    HENT:   [x] Normocephalic, atraumatic.   [] Abnormal   [] Mouth/Throat: Mucous membranes are moist.     External Ears [] Normal  [] Abnormal-     Neck: [] No visualized mass     Pulmonary/Chest: [] Respiratory effort normal.  [] No visualized signs of difficulty breathing or respiratory distress        [] Abnormal-      Musculoskeletal:   [x] Normal gait with no signs of ataxia         [x] Normal range of motion of neck        [] Abnormal-       Neurological:        [x] No Facial Asymmetry (Cranial nerve 7 motor function) (limited exam to video visit)          [x] No gaze palsy        [] Abnormal-         Skin:        [] No significant exanthematous lesions or discoloration noted on facial skin         [x] Abnormal- pallor         Psychiatric:       [x] Normal Affect [] No Hallucinations [] Abnormal-     Other pertinent observable physical exam findings-     ASSESSMENT/PLAN:  Yasmani Nelson was seen today for seizures and diarrhea. Diagnoses and all orders for this visit:    Weight loss, unintentional  -     Liz Dumont MD, Gastroenterology, [place]  -     CBC With Auto Differential; Future  -     Comprehensive Metabolic Panel; Future  -     TSH Without Reflex; Future  -     CT ABDOMEN PELVIS W WO CONTRAST Additional Contrast? Radiologist Recommendation; Future  -     CT CHEST W CONTRAST; Future  -     Prealbumin; Future  -     Nutritional Supplements (ENSURE COMPLETE) LIQD; Take 1 Bottle by mouth 2 times daily    Diarrhea, unspecified type  -     Liz Dumont MD, Gastroenterology, [place]  -     CBC With Auto Differential; Future  -     Comprehensive Metabolic Panel; Future  -     CT ABDOMEN PELVIS W WO CONTRAST Additional Contrast? Radiologist Recommendation; Future  -     Clostridium Difficile Toxin/Antigen; Future    Non-intractable vomiting with nausea, unspecified vomiting type  -     Liz Dumont MD, Gastroenterology, [place]  -     CBC With Auto Differential; Future  -     Comprehensive Metabolic Panel; Future  -     CT ABDOMEN PELVIS W WO CONTRAST Additional Contrast? Radiologist Recommendation; Future  -     ondansetron (ZOFRAN) 4 MG tablet; Take 1 tablet by mouth 3 times daily as needed for Nausea or Vomiting    Fatigue, unspecified type  -     Levetiracetam Level; Future  -     CBC With Auto Differential; Future  -     Comprehensive Metabolic Panel; Future  -     CT CHEST W CONTRAST; Future    Dysuria  -     Urinalysis; Future    Hypokalemia    Hypomagnesemia  -     Magnesium; Future  -     Cancel: Magnesium; Future    Hypercalcemia    Myopathy  -     CK;  Future    Encounter for medication monitoring Since Brodie Knife seizure (no ETOH in months) in November she has not felt well and she has had diarrhea, nausea, vomiting and fatigue which were especially bad during a 10-day period during which she did not have her Keppra. She has had no seizures since she was hospitalized. Given her reduced appetite, unintentional weight loss, nausea, vomiting, diarrhea, we will recheck her labs, perform CT scan of chest, abdomen and pelvis and refer to GI for probable diagnostic colonoscopy. Return in about 2 weeks (around 1/1/2021) for N/V/D wt loss, fatigue - VV may overbook. Jocelin Sterling is a 63 y.o. female being evaluated by a Virtual Visit (video visit) encounter to address concerns as mentioned above. A caregiver was present when appropriate. Due to this being a TeleHealth encounter (During [address] emergency), evaluation of the following organ systems was limited: Vitals/Constitutional/EENT/Resp/CV/GI//MS/Neuro/Skin/Heme-Lymph-Imm. Pursuant to the emergency declaration under the [address] [place] [address], [address] [place] [address] authority and the [employer] Act, this Virtual Visit was conducted with patient's (and/or legal guardian's) consent, to reduce the patient's risk of exposure to COVID-19 and provide necessary medical care. The patient (and/or legal guardian) has also been advised to contact this office for worsening conditions or problems, and seek emergency medical treatment and/or call 911 if deemed necessary. Patient identification was verified at the start of the visit: Yes    Total time spent on this encounter: Not billed by time    Services were provided through a video synchronous discussion virtually to substitute for in-person clinic visit. Patient and provider were located at their individual homes.     --Harper Da Silva MD on 12/18/2020 at 1:18 PM

## 2020-12-30 ENCOUNTER — HOSPITAL ENCOUNTER (OUTPATIENT)
Dept: CT IMAGING | Age: 65
Discharge: HOME | End: 2021-01-01
Payer: MEDICARE

## 2020-12-30 VITALS — BODY MASS INDEX: 21.14 KG/M2 | WEIGHT: 98 LBS | HEIGHT: 57 IN

## 2020-12-30 DIAGNOSIS — R53.83 FATIGUE, UNSPECIFIED TYPE: ICD-10-CM

## 2020-12-30 DIAGNOSIS — R19.7 DIARRHEA, UNSPECIFIED TYPE: ICD-10-CM

## 2020-12-30 DIAGNOSIS — R11.2 NON-INTRACTABLE VOMITING WITH NAUSEA, UNSPECIFIED VOMITING TYPE: ICD-10-CM

## 2020-12-30 DIAGNOSIS — G72.9 MYOPATHY: ICD-10-CM

## 2020-12-30 DIAGNOSIS — R63.4 WEIGHT LOSS, UNINTENTIONAL: ICD-10-CM

## 2020-12-30 DIAGNOSIS — E83.42 HYPOMAGNESEMIA: ICD-10-CM

## 2020-12-30 PROCEDURE — 71260 CT THORAX DX C+: CPT

## 2020-12-30 PROCEDURE — 74177 CT ABD & PELVIS W/CONTRAST: CPT

## 2020-12-30 PROCEDURE — 6360000004 HC RX CONTRAST MEDICATION: Performed by: FAMILY MEDICINE

## 2020-12-30 PROCEDURE — 2500000003 HC RX 250 WO HCPCS: Performed by: FAMILY MEDICINE

## 2020-12-30 RX ADMIN — BARIUM SULFATE 450 ML: 20 SUSPENSION ORAL at 13:44

## 2020-12-30 RX ADMIN — IOPAMIDOL 100 ML: 612 INJECTION, SOLUTION INTRAVENOUS at 13:44

## 2020-12-31 LAB
ALBUMIN SERPL-MCNC: 4.7 G/DL (ref 3.5–4.6)
ALP BLD-CCNC: 69 U/L (ref 40–130)
ALT SERPL-CCNC: 24 U/L (ref 0–33)
ANION GAP SERPL CALCULATED.3IONS-SCNC: 13 MEQ/L (ref 9–15)
AST SERPL-CCNC: 38 U/L (ref 0–35)
BASOPHILS ABSOLUTE: 0 K/UL (ref 0–0.2)
BASOPHILS RELATIVE PERCENT: 0.6 %
BILIRUB SERPL-MCNC: 0.9 MG/DL (ref 0.2–0.7)
BUN BLDV-MCNC: 23 MG/DL (ref 8–23)
CALCIUM SERPL-MCNC: 9.6 MG/DL (ref 8.5–9.9)
CHLORIDE BLD-SCNC: 102 MEQ/L (ref 95–107)
CO2: 25 MEQ/L (ref 20–31)
CREAT SERPL-MCNC: 0.92 MG/DL (ref 0.5–0.9)
EOSINOPHILS ABSOLUTE: 0.2 K/UL (ref 0–0.7)
EOSINOPHILS RELATIVE PERCENT: 2.9 %
GFR AFRICAN AMERICAN: >60
GFR NON-AFRICAN AMERICAN: >60
GLOBULIN: 2.9 G/DL (ref 2.3–3.5)
GLUCOSE BLD-MCNC: 105 MG/DL (ref 70–99)
HCT VFR BLD CALC: 35.6 % (ref 37–47)
HEMOGLOBIN: 11.6 G/DL (ref 12–16)
LYMPHOCYTES ABSOLUTE: 1 K/UL (ref 1–4.8)
LYMPHOCYTES RELATIVE PERCENT: 19.2 %
MAGNESIUM: 1.4 MG/DL (ref 1.7–2.4)
MCH RBC QN AUTO: 30.4 PG (ref 27–31.3)
MCHC RBC AUTO-ENTMCNC: 32.6 % (ref 33–37)
MCV RBC AUTO: 93.3 FL (ref 82–100)
MONOCYTES ABSOLUTE: 0.4 K/UL (ref 0.2–0.8)
MONOCYTES RELATIVE PERCENT: 6.8 %
NEUTROPHILS ABSOLUTE: 3.9 K/UL (ref 1.4–6.5)
NEUTROPHILS RELATIVE PERCENT: 70.5 %
PDW BLD-RTO: 18 % (ref 11.5–14.5)
PLATELET # BLD: 209 K/UL (ref 130–400)
POTASSIUM SERPL-SCNC: 3.7 MEQ/L (ref 3.4–4.9)
PREALBUMIN: 23.8 MG/DL (ref 20–40)
RBC # BLD: 3.81 M/UL (ref 4.2–5.4)
SODIUM BLD-SCNC: 140 MEQ/L (ref 135–144)
TOTAL CK: 35 U/L (ref 0–170)
TOTAL PROTEIN: 7.6 G/DL (ref 6.3–8)
TSH SERPL DL<=0.05 MIU/L-ACNC: 1.26 UIU/ML (ref 0.44–3.86)
WBC # BLD: 5.5 K/UL (ref 4.8–10.8)

## 2021-01-02 DIAGNOSIS — R19.7 DIARRHEA, UNSPECIFIED TYPE: ICD-10-CM

## 2021-01-03 LAB
C DIFF TOXIN/ANTIGEN: NORMAL
KEPPRA: 28 UG/ML (ref 12–46)

## 2021-01-04 ENCOUNTER — VIRTUAL VISIT (OUTPATIENT)
Dept: FAMILY MEDICINE CLINIC | Age: 66
End: 2021-01-04
Payer: MEDICARE

## 2021-01-04 DIAGNOSIS — E87.6 HYPOKALEMIA: Chronic | ICD-10-CM

## 2021-01-04 DIAGNOSIS — R63.4 WEIGHT LOSS: Chronic | ICD-10-CM

## 2021-01-04 DIAGNOSIS — R59.0 LYMPHADENOPATHY, AXILLARY: Chronic | ICD-10-CM

## 2021-01-04 DIAGNOSIS — G72.9 MYOPATHY: ICD-10-CM

## 2021-01-04 DIAGNOSIS — E83.52 HYPERCALCEMIA: ICD-10-CM

## 2021-01-04 DIAGNOSIS — Z12.31 ENCOUNTER FOR SCREENING MAMMOGRAM FOR MALIGNANT NEOPLASM OF BREAST: ICD-10-CM

## 2021-01-04 DIAGNOSIS — R56.9 SEIZURE (HCC): ICD-10-CM

## 2021-01-04 DIAGNOSIS — R19.7 DIARRHEA, UNSPECIFIED TYPE: ICD-10-CM

## 2021-01-04 DIAGNOSIS — D64.9 ANEMIA, UNSPECIFIED TYPE: Chronic | ICD-10-CM

## 2021-01-04 DIAGNOSIS — K59.00 CONSTIPATION, UNSPECIFIED CONSTIPATION TYPE: Primary | ICD-10-CM

## 2021-01-04 DIAGNOSIS — E78.2 MIXED HYPERLIPIDEMIA: Chronic | ICD-10-CM

## 2021-01-04 DIAGNOSIS — E83.42 HYPOMAGNESEMIA: ICD-10-CM

## 2021-01-04 DIAGNOSIS — R53.83 FATIGUE, UNSPECIFIED TYPE: ICD-10-CM

## 2021-01-04 DIAGNOSIS — E03.9 ACQUIRED HYPOTHYROIDISM: Chronic | ICD-10-CM

## 2021-01-04 PROCEDURE — 3017F COLORECTAL CA SCREEN DOC REV: CPT | Performed by: FAMILY MEDICINE

## 2021-01-04 PROCEDURE — 99215 OFFICE O/P EST HI 40 MIN: CPT | Performed by: FAMILY MEDICINE

## 2021-01-04 PROCEDURE — G8482 FLU IMMUNIZE ORDER/ADMIN: HCPCS | Performed by: FAMILY MEDICINE

## 2021-01-04 PROCEDURE — 1036F TOBACCO NON-USER: CPT | Performed by: FAMILY MEDICINE

## 2021-01-04 PROCEDURE — G8420 CALC BMI NORM PARAMETERS: HCPCS | Performed by: FAMILY MEDICINE

## 2021-01-04 PROCEDURE — G8427 DOCREV CUR MEDS BY ELIG CLIN: HCPCS | Performed by: FAMILY MEDICINE

## 2021-01-04 RX ORDER — BISACODYL 10 MG
10 SUPPOSITORY, RECTAL RECTAL
Qty: 10 SUPPOSITORY | Refills: 0 | Status: SHIPPED | OUTPATIENT
Start: 2021-01-04 | End: 2021-02-03

## 2021-01-04 RX ORDER — SODIUM PHOSPHATE, DIBASIC AND SODIUM PHOSPHATE, MONOBASIC 7; 19 G/133ML; G/133ML
1 ENEMA RECTAL
Qty: 4 BOTTLE | Refills: 0 | Status: SHIPPED | OUTPATIENT
Start: 2021-01-04 | End: 2021-04-15 | Stop reason: ALTCHOICE

## 2021-01-04 RX ORDER — POLYETHYLENE GLYCOL 3350 17 G/17G
17 POWDER, FOR SOLUTION ORAL DAILY
Qty: 850 G | Refills: 5 | Status: SHIPPED | OUTPATIENT
Start: 2021-01-04 | End: 2021-04-28 | Stop reason: ALTCHOICE

## 2021-01-04 RX ORDER — FENOFIBRATE 160 MG/1
160 TABLET ORAL DAILY
Qty: 90 TABLET | Refills: 4 | Status: SHIPPED | OUTPATIENT
Start: 2021-01-04 | End: 2021-03-11 | Stop reason: SDUPTHER

## 2021-01-04 ASSESSMENT — PATIENT HEALTH QUESTIONNAIRE - PHQ9
SUM OF ALL RESPONSES TO PHQ QUESTIONS 1-9: 0
SUM OF ALL RESPONSES TO PHQ QUESTIONS 1-9: 0
SUM OF ALL RESPONSES TO PHQ9 QUESTIONS 1 & 2: 0
1. LITTLE INTEREST OR PLEASURE IN DOING THINGS: 0

## 2021-01-04 NOTE — PROGRESS NOTES
2021    TELEHEALTH EVALUATION -- Audio/Visual (During [facility]-82 public health emergency)    HPI:    Palomo Buchanan (:  1955) has requested an audio/video evaluation for the following concern(s):    2 Week Follow-Up (Pt requesting 90 day supply RF for pending med)    Gerald Reinoso is here for follow-up of fatigue, diarrhea, nausea, vomiting. Recent chest CT was unremarkable with exception of right axillary lymph node. Abdominal CT was significant for fecal impaction and mild thickening of intestine and mild pericolonic fat stranding. CBC showed mild but improving anemia with hemoglobin 11.6 and hematocrit 35.6. Her hypomagnesemia persists in spite of 3 times daily supplementation with magnesium of 1.4. Last mammogram was May 2019 and was normal.  Stool negative for C. Difficile. Keppra level was normal.  Thyroid level was normal.  As that is this the same dizziness you have been having a result worse    Is dizzy today. Last was dizzy a week ago. Has had diarrhea all day. No blood in stool. Patient is here for hyperlipidemia. Is compliant with medications and has no apparent side effects from them. Review of Systems    Prior to Visit Medications    Medication Sig Taking?  Authorizing Provider   ondansetron (ZOFRAN) 4 MG tablet Take 1 tablet by mouth 3 times daily as needed for Nausea or Vomiting Yes Ehsan Cnoner MD   Nutritional Supplements (ENSURE COMPLETE) LIQD Take 1 Bottle by mouth 2 times daily Yes Ehsan Conner MD   levETIRAcetam (KEPPRA) 500 MG tablet Take 1 tablet by mouth 2 times daily Yes Arjun Somers MD   hydrOXYzine (ATARAX) 10 MG tablet Take 1 tablet by mouth every 8 hours as needed for Itching Yes Ehsan Conner MD   Magnesium 400 MG CAPS Take 1 capsule by mouth 2 times daily Yes Ehsan Conner MD   fenofibrate (TRIGLIDE) 160 MG tablet Take 1 tablet by mouth daily Yes Ehsan Conner MD triamcinolone (KENALOG) 0.025 % cream Apply topically every 4 hours as needed Apply Topically Yes Tiara Dc MD   levothyroxine (SYNTHROID) 50 MCG tablet TAKE 1 TABLET BY MOUTH  DAILY Yes Suly Dudley MD   pantoprazole (PROTONIX) 40 MG tablet TAKE 1 TABLET BY MOUTH  DAILY Yes Suly Dudley MD   atorvastatin (LIPITOR) 40 MG tablet TAKE 1 TABLET BY MOUTH ONCE DAILY Yes Suly Dudley MD   escitalopram (LEXAPRO) 10 MG tablet TAKE 1 TABLET BY MOUTH  DAILY Yes Suly Dudley MD   metFORMIN (GLUCOPHAGE) 500 MG tablet TAKE 1 TABLET BY MOUTH  TWICE DAILY WITH MEALS Yes Suly Dudley MD   rOPINIRole (REQUIP) 0.5 MG tablet TAKE 1 TABLET BY MOUTH AT  NIGHT Yes Suly Dudley MD   alendronate (FOSAMAX) 35 MG tablet TAKE 1 TABLET BY MOUTH  EVERY 7 DAYS Yes Suly Dudley MD   Cholecalciferol (VITAMIN D) 50 MCG (2000 UT) TABS tablet Take 1 tablet by mouth daily Yes Suly Dudley MD   calcium carbonate-vitamin D (CALTRATE) 600-400 MG-UNIT TABS per tab Take 1 tablet by mouth 2 times daily Yes Suly Dudley MD   Calcium 600-400 MG-UNIT CHEW Take 1 tablet by mouth 2 times daily Yes Suly Dudley MD   ACETAMINOPHEN EXTRA STRENGTH 500 MG tablet Take 1 tablet by mouth 2 times daily as needed for Pain  Suly Dudley MD       Social History     Tobacco Use    Smoking status: Never Smoker    Smokeless tobacco: Never Used   Substance Use Topics    Alcohol use:  Yes     Alcohol/week: 28.0 standard drinks     Types: 28 Shots of liquor per week    Drug use: No          PHYSICAL EXAMINATION:  [ INSTRUCTIONS:  \"[x]\" Indicates a positive item  \"[]\" Indicates a negative item  -- DELETE ALL ITEMS NOT EXAMINED]  Vital Signs: (As obtained by patient/caregiver or practitioner observation) Patient appears to be alert and oriented to person, place, time, situation and is in no acute distress. Mood appears stable and speech and thought are grossly normal. ABle to rise to standing and walk briskly without difficulty. SOmewhat pale appearing. ASSESSMENT/PLAN:  Jose Chiu was seen today for 2 week follow-up. Diagnoses and all orders for this visit:    Constipation, unspecified constipation type  Comments:  Possible cause of diarrhea. Dulcolax alternating with fleets until test results then MiraLAX and Metamucil. Has GI appointment    Diarrhea, unspecified type  Comments:  Possibly related to Metformin, Keppra, constipation, colitis. Stop Metformin. Treat constipation. Consider adjustment to Keppra given side effects. Fatigue, unspecified type  Comments:  Suspect related to Keppra side effects and diarrhea. Anemia, unspecified type  Comments:  Improving. Continue to monitor. Lymphadenopathy, axillary  Comments:  New. Mammogram ordered. Acquired hypothyroidism  Comments:  Improving, well-controlled on current meds. Seizure ([address]  Comments:  Improving, fair control on Keppra. Suspect Keppra is contributing to fatigue. Has appointment with neuro this week. Hypomagnesemia  Comments:  Increase magnesium to 400 mg 3 times daily. Follow. May improve when diarrhea resolves. Hypokalemia  Comments:  Resolved. Weight loss  Comments:  Stabilized. Needs to gain at this point. Continue Ensure supplementation. Hypercalcemia  Comments:  Resolved. Mixed hyperlipidemia  Comments:  Stable and well-controlled on current meds. Follow labs    Myopathy  Comments:  Ruled out with CK. Encounter for screening mammogram for malignant neoplasm of breast          Return in about 6 weeks (around 2/15/2021) for diarrhea, fatigue, constipation.

## 2021-01-08 ENCOUNTER — VIRTUAL VISIT (OUTPATIENT)
Dept: GASTROENTEROLOGY | Age: 66
End: 2021-01-08
Payer: MEDICARE

## 2021-01-08 DIAGNOSIS — R19.4 ALTERED BOWEL HABITS: Primary | ICD-10-CM

## 2021-01-08 DIAGNOSIS — R63.0 ANOREXIA: ICD-10-CM

## 2021-01-08 DIAGNOSIS — Z01.818 PRE-OP TESTING: ICD-10-CM

## 2021-01-08 DIAGNOSIS — R63.4 WEIGHT LOSS: ICD-10-CM

## 2021-01-08 PROCEDURE — 99443 PR PHYS/QHP TELEPHONE EVALUATION 21-30 MIN: CPT | Performed by: SPECIALIST

## 2021-01-08 RX ORDER — SODIUM, POTASSIUM,MAG SULFATES 17.5-3.13G
SOLUTION, RECONSTITUTED, ORAL ORAL
Qty: 1 BOTTLE | Refills: 0 | Status: SHIPPED | OUTPATIENT
Start: 2021-01-08 | End: 2021-02-25 | Stop reason: ALTCHOICE

## 2021-01-08 ASSESSMENT — ENCOUNTER SYMPTOMS
RESPIRATORY NEGATIVE: 1
ABDOMINAL DISTENTION: 0
RECTAL PAIN: 0
BLOOD IN STOOL: 0
ANAL BLEEDING: 0
EYES NEGATIVE: 1
DIARRHEA: 0
VOMITING: 0
CONSTIPATION: 0
ABDOMINAL PAIN: 1
NAUSEA: 0

## 2021-01-08 NOTE — PROGRESS NOTES
Gastroenterology Clinic Visit    Ross Osborn  [id number]  Chief Complaint   Patient presents with    Consultation     Since November, pt has been nauseated along with vomiting and diarrhea. Fatigue. Pt states she is no longer experiencing diarrhea, BM back to normal. Has lost 40 lbs in the past 3-4 months. Does not see blood in stool. HPI: 63 y.o. female presents to the clinic with history of abdominal pain and vomiting and altered bowel habits. This was a telephone encounter and patient was at home and I was in our office. She was told it is a billable service and she had agreed for that. This was a patient initiated telephone encounter. Since November she has been experiencing abdominal discomfort and had few episodes of vomiting and nausea, appetite is decreased and has lost about 40 pound, she also has noticed change in bowel habits with constipation and also had occasional diarrhea. Stool for C. difficile toxin was done that was negative, no fever no chills, no history of any night sweats, patient was advised to take MiraLAX. Christopher Louisorne Patient had a CBC that showed mild anemia. A CT of the abdomen pelvis was done that did not show any significant abnormality other than stool in the colon, she reports no history of any hematemesis melena or rectal bleeding, social history does not smoke used to drink heavily in the past she has been abstaining since last several months, surgical history includes appendectomy and cholecystectomy. Family history is unremarkable, duration of phone call was 21 minutes    Review of Systems   Constitutional: Negative. HENT: Negative. Eyes: Negative. Respiratory: Negative. Cardiovascular: Negative. Gastrointestinal: Positive for abdominal pain. Negative for abdominal distention, anal bleeding, blood in stool, constipation, diarrhea, nausea, rectal pain and vomiting. Altered bowel habits, anorexia and weight loss   Endocrine: Negative. Genitourinary: Negative. Musculoskeletal: Negative. Skin: Negative. Allergic/Immunologic: Negative for food allergies. Neurological: Negative. History of seizure disorder on Keppra   Hematological: Negative. Psychiatric/Behavioral: Negative.          Past Medical History:   Diagnosis Date    Alcoholism ([facility] [address]     Arthritis     Chronic lung disease     Depression     Diabetes mellitus ([facility] [address]     Hyperlipidemia     Hypertension     Hypokalemia 11/6/2020    Hypomagnesemia 11/6/2020    Hypothyroidism     Lymphadenopathy, axillary 1/4/2021    Second hand smoke exposure       Past Surgical History:   Procedure Laterality Date    APPENDECTOMY      BIOPSY MOUTH LESION Bilateral 12/19/2016    REMOVAL  OF BILATERAL LINGUAL MACIE performed by Michael Alvares DDS at [place] [address].       Current Outpatient Medications on File Prior to Visit   Medication Sig Dispense Refill    fenofibrate (TRIGLIDE) 160 MG tablet Take 1 tablet by mouth daily 90 tablet 4    bisacodyl (DULCOLAX) 10 MG suppository Place 1 suppository rectally every 2 hours as needed for Constipation (up to 4 times, 1 hour prior to Fleet Enema) 10 suppository 0    Sodium Phosphates (FLEET) 7-19 GM/118ML Place 1 enema rectally every 2 hours as needed (constipation) 4 Bottle 0    polyethylene glycol (MIRALAX) 17 GM/SCOOP powder Take 17 g by mouth daily 850 g 5    psyllium (METAMUCIL SMOOTH TEXTURE) 28 % packet Take 1 packet by mouth 2 times daily Take with full glass of h20 or juice 60 packet 5    Magnesium 400 MG CAPS Take 1 capsule by mouth 3 times daily 270 capsule 4    ondansetron (ZOFRAN) 4 MG tablet Take 1 tablet by mouth 3 times daily as needed for Nausea or Vomiting 30 tablet 0    Nutritional Supplements (ENSURE COMPLETE) LIQD Take 1 Bottle by mouth 2 times daily 60 Bottle 5    levETIRAcetam (KEPPRA) 500 MG tablet Take 1 tablet by mouth 2 times daily 180 tablet 2  hydrOXYzine (ATARAX) 10 MG tablet Take 1 tablet by mouth every 8 hours as needed for Itching 90 tablet 0    triamcinolone (KENALOG) 0.025 % cream Apply topically every 4 hours as needed Apply Topically      levothyroxine (SYNTHROID) 50 MCG tablet TAKE 1 TABLET BY MOUTH  DAILY 90 tablet 3    pantoprazole (PROTONIX) 40 MG tablet TAKE 1 TABLET BY MOUTH  DAILY 90 tablet 3    atorvastatin (LIPITOR) 40 MG tablet TAKE 1 TABLET BY MOUTH ONCE DAILY 90 tablet 3    escitalopram (LEXAPRO) 10 MG tablet TAKE 1 TABLET BY MOUTH  DAILY 90 tablet 3    metFORMIN (GLUCOPHAGE) 500 MG tablet TAKE 1 TABLET BY MOUTH  TWICE DAILY WITH MEALS 180 tablet 3    rOPINIRole (REQUIP) 0.5 MG tablet TAKE 1 TABLET BY MOUTH AT  NIGHT 90 tablet 3    alendronate (FOSAMAX) 35 MG tablet TAKE 1 TABLET BY MOUTH  EVERY 7 DAYS 12 tablet 3    Cholecalciferol (VITAMIN D) 50 MCG (2000 UT) TABS tablet Take 1 tablet by mouth daily 30 tablet 12    calcium carbonate-vitamin D (CALTRATE) 600-400 MG-UNIT TABS per tab Take 1 tablet by mouth 2 times daily 30 tablet 12    Calcium 600-400 MG-UNIT CHEW Take 1 tablet by mouth 2 times daily 60 tablet 11    ACETAMINOPHEN EXTRA STRENGTH 500 MG tablet Take 1 tablet by mouth 2 times daily as needed for Pain 60 tablet 5     No current facility-administered medications on file prior to visit.       Family History   Problem Relation Age of Onset    Heart Disease Mother     Hypertension Mother     Diabetes Mother     Stroke Mother     Heart Disease Father     Hypertension Father     Heart Disease Sister     Hypertension Sister     Heart Disease Brother     Hypertension Brother     Diabetes Brother     Cancer Maternal Grandmother     Diabetes Maternal Grandfather       Social History     Socioeconomic History    Marital status: Single     Spouse name: Not on file    Number of children: Not on file    Years of education: Not on file    Highest education level: Not on file   Occupational History  Not on file   Social Needs    Financial resource strain: Not on file    Food insecurity     Worry: Not on file     Inability: Not on file    Transportation needs     Medical: Not on file     Non-medical: Not on file   Tobacco Use    Smoking status: Never Smoker    Smokeless tobacco: Never Used   Substance and Sexual Activity    Alcohol use: Yes     Alcohol/week: 28.0 standard drinks     Types: 28 Shots of liquor per week    Drug use: No    Sexual activity: Not on file   Lifestyle    Physical activity     Days per week: Not on file     Minutes per session: Not on file    Stress: Not on file   Relationships    Social connections     Talks on phone: Not on file     Gets together: Not on file     Attends Restorationist service: Not on file     Active member of club or organization: Not on file     Attends meetings of clubs or organizations: Not on file     Relationship status: Not on file    Intimate partner violence     Fear of current or ex partner: Not on file     Emotionally abused: Not on file     Physically abused: Not on file     Forced sexual activity: Not on file   Other Topics Concern    Not on file   Social History Narrative    Not on file       not currently breastfeeding.     Physical Exam    Laboratory, Pathology, Radiology reviewed indetail with relevant important investigations summarized below:  Lab Results   Component Value Date    WBC 5.5 12/31/2020    HGB 11.6 (L) 12/31/2020    HCT 35.6 (L) 12/31/2020    MCV 93.3 12/31/2020     12/31/2020     Lab Results   Component Value Date    ALT 24 12/31/2020    AST 38 (H) 12/31/2020    ALKPHOS 69 12/31/2020    BILITOT 0.9 (H) 12/31/2020       Ct Chest W Contrast    Result Date: 12/30/2020 Mildly enlarged right axillary lymph node. Thoracolumbar scoliosis. Otherwise, negative CT chest. CT OF THE ABDOMEN AND PELVIS WITH INTRAVENOUS CONTRAST MEDIUM. FINDINGS: Liver:  Normal in size, shape, and decreased in attenuation. Bile Ducts:  Normal in caliber. Gallbladder:  Surgically absent. Pancreas:  Normal without masses, cysts, ductal dilatation or calcification. Spleen:  Normal in size without masses or calcifications. No splenules. Kidneys:  Normal in size and enhancement. No hydronephrosis, masses, or stones. Adrenals:  Normal. Small bowel:  Normal in caliber. Appendix: Not visualized. Colon:  Copious quantity and distal sigmoid colon and rectum, with transverse diameter rectum, 5.5 cm. No mass. Ascending and transverse colon normal in caliber, mildly decompressed, with mild bowel wall thickening, and mild pericolonic fat stranding (series 606, images 37 through 40). Peritoneum:  No ascites, free air, or fluid collections. Vessels: Aorta normal in course and caliber. Portal vein, splenic vein, superior mesenteric vein are patent. Lymph nodes:  Retroperitoneal:  No enlarged retroperitoneal lymph nodes. Mesenteric:  No enlarged mesenteric lymph nodes. Pelvic: No enlarged pelvic lymph nodes. Ureters: Normal in course and caliber. No calcifications. Bladder: Decompressed. Reproductive organs: No pelvic masses. Abdominal Wall:  No hernia identified. No diastasis of rectus musculature. No edema or masses. Bones:  No bone lesions. Thoracolumbar scoliosis. No post operative changes. IMPRESSION: Fecal impaction. Mild bowel wall thickening, with minimal/mild pericolonic fat stranding, transverse colon. While findings may be secondary in part to nondistended bowel, other inflammatory or infectious etiology such as colitis are not excluded. Hepatic steatosis. Cholecystectomy. Thoracolumbar scoliosis. Other findings discussed.  All CT scans at this facility use dose modulation, iterative reconstruction, and/or weight based dosing when appropriate to reduce radiation dose to as low as reasonably achievable. Ct Abdomen Pelvis W Iv Contrast Additional Contrast? Radiologist Recommendation    Result Date: 12/30/2020  CT of the chest, abdomen, pelvis, with intravenous contrast medium. History:  Weight loss, fatigue. Nausea vomiting. Technical Factors: CT imaging of the chest, abdomen, and pelvis were obtained and formatted as 5 mm contiguous axial images from the lung apices to the symphysis pubis. Sagittal and coronal reconstructions were also obtained. Oral contrast medium:  None. Intravenous contrast medium:  Isovue-300, 100 mL. Comparison:  None. CT OF THE CHEST WITH INTRAVENOUS CONTRAST MEDIUM. FINDINGS: Right and left lungs show no nodules, masses, consolidation, pleural effusion. Thoracic aorta normal in course and caliber. Cardiac size normal. No pericardial effusion. Right axillary lymph notes, largest measuring 10 x 16 x 17 mm, and having central nidus of fat (series 2, image 13, series 601, image 43). No hilar and no mediastinal lymph node enlargement. S shaped thoracolumbar scoliosis no osteoblastic, and no osteolytic lesion. weight based dosing when appropriate to reduce radiation dose to as low as reasonably achievable. Endoscopic investigations:     Assessmentand Plan:  63 y.o. female with history of abdominal pain anorexia weight loss and altered bowel habits and patient has mild normocytic anemia, CT of the abdomen pelvis was done which did not show any significant abnormality, rule out GI neoplasm. We will schedule the patient for EGD and colonoscopy. Diagnosis Orders   1. Altered bowel habits  Endoscopy, colon, diagnostic   2. Anorexia  EGD   3. Weight loss  EGD     No follow-ups on file. Saida Almanzar MD   Staff Gastroenterologist  [facility]    Please note this report has been partially produced using speech recognition software and may cause contain errors related to thatsystem including grammar, punctuation and spelling as well as words and phrases that may seem inappropriate. If there are questions or concerns please feel free to contact me to clarify.

## 2021-01-14 ENCOUNTER — NURSE ONLY (OUTPATIENT)
Dept: PRIMARY CARE CLINIC | Age: 66
End: 2021-01-14

## 2021-01-14 DIAGNOSIS — Z01.818 PRE-OP TESTING: ICD-10-CM

## 2021-01-15 LAB
SARS-COV-2: NOT DETECTED
SOURCE: NORMAL

## 2021-01-21 ENCOUNTER — ANCILLARY PROCEDURE (OUTPATIENT)
Dept: ENDOSCOPY | Age: 66
End: 2021-01-21
Attending: SPECIALIST
Payer: MEDICARE

## 2021-01-21 ENCOUNTER — ANESTHESIA EVENT (OUTPATIENT)
Dept: ENDOSCOPY | Age: 66
End: 2021-01-21
Payer: MEDICARE

## 2021-01-21 ENCOUNTER — ANESTHESIA (OUTPATIENT)
Dept: ENDOSCOPY | Age: 66
End: 2021-01-21
Payer: MEDICARE

## 2021-01-21 ENCOUNTER — HOSPITAL ENCOUNTER (OUTPATIENT)
Age: 66
Setting detail: OUTPATIENT SURGERY
Discharge: HOME | End: 2021-01-21
Attending: SPECIALIST | Admitting: SPECIALIST
Payer: MEDICARE

## 2021-01-21 VITALS
OXYGEN SATURATION: 100 % | WEIGHT: 98 LBS | SYSTOLIC BLOOD PRESSURE: 144 MMHG | HEIGHT: 59 IN | TEMPERATURE: 98.4 F | HEART RATE: 70 BPM | DIASTOLIC BLOOD PRESSURE: 70 MMHG | BODY MASS INDEX: 19.76 KG/M2 | RESPIRATION RATE: 18 BRPM

## 2021-01-21 VITALS
OXYGEN SATURATION: 100 % | RESPIRATION RATE: 17 BRPM | DIASTOLIC BLOOD PRESSURE: 64 MMHG | SYSTOLIC BLOOD PRESSURE: 114 MMHG

## 2021-01-21 LAB
GLUCOSE BLD-MCNC: 83 MG/DL (ref 60–115)
PERFORMED ON: NORMAL

## 2021-01-21 PROCEDURE — 88305 TISSUE EXAM BY PATHOLOGIST: CPT

## 2021-01-21 PROCEDURE — 6370000000 HC RX 637 (ALT 250 FOR IP): Performed by: SPECIALIST

## 2021-01-21 PROCEDURE — 43239 EGD BIOPSY SINGLE/MULTIPLE: CPT | Performed by: SPECIALIST

## 2021-01-21 PROCEDURE — 2580000003 HC RX 258

## 2021-01-21 PROCEDURE — 2580000003 HC RX 258: Performed by: SPECIALIST

## 2021-01-21 PROCEDURE — 7100000010 HC PHASE II RECOVERY - FIRST 15 MIN: Performed by: SPECIALIST

## 2021-01-21 PROCEDURE — 2500000003 HC RX 250 WO HCPCS: Performed by: NURSE ANESTHETIST, CERTIFIED REGISTERED

## 2021-01-21 PROCEDURE — 2709999900 HC NON-CHARGEABLE SUPPLY: Performed by: SPECIALIST

## 2021-01-21 PROCEDURE — 3700000000 HC ANESTHESIA ATTENDED CARE: Performed by: SPECIALIST

## 2021-01-21 PROCEDURE — 3700000001 HC ADD 15 MINUTES (ANESTHESIA): Performed by: SPECIALIST

## 2021-01-21 PROCEDURE — 3609027000 HC COLONOSCOPY: Performed by: SPECIALIST

## 2021-01-21 PROCEDURE — 88342 IMHCHEM/IMCYTCHM 1ST ANTB: CPT

## 2021-01-21 PROCEDURE — 3609017100 HC EGD: Performed by: SPECIALIST

## 2021-01-21 PROCEDURE — G0105 COLORECTAL SCRN; HI RISK IND: HCPCS | Performed by: SPECIALIST

## 2021-01-21 PROCEDURE — 7100000011 HC PHASE II RECOVERY - ADDTL 15 MIN: Performed by: SPECIALIST

## 2021-01-21 PROCEDURE — 2720000010 HC SURG SUPPLY STERILE: Performed by: SPECIALIST

## 2021-01-21 PROCEDURE — 6360000002 HC RX W HCPCS: Performed by: NURSE ANESTHETIST, CERTIFIED REGISTERED

## 2021-01-21 RX ORDER — LIDOCAINE HYDROCHLORIDE 20 MG/ML
INJECTION, SOLUTION INFILTRATION; PERINEURAL PRN
Status: DISCONTINUED | OUTPATIENT
Start: 2021-01-21 | End: 2021-01-21 | Stop reason: SDUPTHER

## 2021-01-21 RX ORDER — SIMETHICONE 20 MG/.3ML
EMULSION ORAL PRN
Status: DISCONTINUED | OUTPATIENT
Start: 2021-01-21 | End: 2021-01-21 | Stop reason: ALTCHOICE

## 2021-01-21 RX ORDER — SODIUM CHLORIDE 9 MG/ML
INJECTION, SOLUTION INTRAVENOUS
Status: COMPLETED
Start: 2021-01-21 | End: 2021-01-21

## 2021-01-21 RX ORDER — ONDANSETRON 2 MG/ML
4 INJECTION INTRAMUSCULAR; INTRAVENOUS
Status: DISCONTINUED | OUTPATIENT
Start: 2021-01-21 | End: 2021-01-21 | Stop reason: HOSPADM

## 2021-01-21 RX ORDER — SODIUM CHLORIDE 9 MG/ML
INJECTION, SOLUTION INTRAVENOUS CONTINUOUS
Status: DISCONTINUED | OUTPATIENT
Start: 2021-01-21 | End: 2021-01-21 | Stop reason: HOSPADM

## 2021-01-21 RX ORDER — PROPOFOL 10 MG/ML
INJECTION, EMULSION INTRAVENOUS PRN
Status: DISCONTINUED | OUTPATIENT
Start: 2021-01-21 | End: 2021-01-21 | Stop reason: SDUPTHER

## 2021-01-21 RX ORDER — MAGNESIUM HYDROXIDE 1200 MG/15ML
LIQUID ORAL CONTINUOUS PRN
Status: COMPLETED | OUTPATIENT
Start: 2021-01-21 | End: 2021-01-21

## 2021-01-21 RX ADMIN — PROPOFOL 10 MG: 10 INJECTION, EMULSION INTRAVENOUS at 11:48

## 2021-01-21 RX ADMIN — PROPOFOL 50 MG: 10 INJECTION, EMULSION INTRAVENOUS at 11:30

## 2021-01-21 RX ADMIN — PROPOFOL 50 MG: 10 INJECTION, EMULSION INTRAVENOUS at 11:29

## 2021-01-21 RX ADMIN — PROPOFOL 30 MG: 10 INJECTION, EMULSION INTRAVENOUS at 11:36

## 2021-01-21 RX ADMIN — PROPOFOL 20 MG: 10 INJECTION, EMULSION INTRAVENOUS at 11:45

## 2021-01-21 RX ADMIN — PROPOFOL 30 MG: 10 INJECTION, EMULSION INTRAVENOUS at 11:38

## 2021-01-21 RX ADMIN — PROPOFOL 40 MG: 10 INJECTION, EMULSION INTRAVENOUS at 11:34

## 2021-01-21 RX ADMIN — PROPOFOL 50 MG: 10 INJECTION, EMULSION INTRAVENOUS at 11:28

## 2021-01-21 RX ADMIN — PROPOFOL 50 MG: 10 INJECTION, EMULSION INTRAVENOUS at 11:32

## 2021-01-21 RX ADMIN — PROPOFOL 30 MG: 10 INJECTION, EMULSION INTRAVENOUS at 11:43

## 2021-01-21 RX ADMIN — SODIUM CHLORIDE: 9 INJECTION, SOLUTION INTRAVENOUS at 09:42

## 2021-01-21 RX ADMIN — PROPOFOL 50 MG: 10 INJECTION, EMULSION INTRAVENOUS at 11:25

## 2021-01-21 RX ADMIN — LIDOCAINE HYDROCHLORIDE 40 MG: 20 INJECTION, SOLUTION INFILTRATION; PERINEURAL at 11:25

## 2021-01-21 RX ADMIN — PROPOFOL 30 MG: 10 INJECTION, EMULSION INTRAVENOUS at 11:40

## 2021-01-21 ASSESSMENT — ENCOUNTER SYMPTOMS: SHORTNESS OF BREATH: 1

## 2021-01-21 ASSESSMENT — PAIN - FUNCTIONAL ASSESSMENT: PAIN_FUNCTIONAL_ASSESSMENT: 0-10

## 2021-01-21 NOTE — ANESTHESIA POSTPROCEDURE EVALUATION
Department of Anesthesiology  Postprocedure Note    Patient: Bhakti Daniel  MRN: [id number]  YOB: 1955  Date of evaluation: 1/21/2021  Time:  11:59 AM     Procedure Summary     Date: 01/21/21 Room / Location: [address] [place] [address]    Anesthesia Start: 1120 Anesthesia Stop: 2496    Procedures:       EGD with polypectomy (N/A )      COLONOSCOPY DIAGNOSTIC (N/A ) Diagnosis: (Anorexia, weight loss, altered bowel habits)    Surgeons: Armida Collins MD Responsible Provider: ALLEN Ashby CRNA    Anesthesia Type: MAC ASA Status: 4          Anesthesia Type: MAC    Sandra Phase I: Sandra Score: 10    Sandra Phase II:      Last vitals: Reviewed and per EMR flowsheets.        Anesthesia Post Evaluation    Patient location during evaluation: PACU  Patient participation: waiting for patient participation  Level of consciousness: sleepy but conscious  Pain score: 0  Airway patency: patent  Nausea & Vomiting: no nausea and no vomiting  Complications: no  Cardiovascular status: blood pressure returned to baseline and hemodynamically stable  Respiratory status: acceptable  Hydration status: euvolemic

## 2021-01-21 NOTE — PROGRESS NOTES
Patient stated that she drank water, 6-8oz out in the waiting room around 0930 prior to getting called back. Dr. Arlington Pallas updated and states he was going to proceed with the procedure.

## 2021-01-21 NOTE — H&P
Patient Name: Ross Osborn  : 1955  MRN: [id number]  DATE: 21      ENDOSCOPY  History and Physical    Procedure:    [x] Diagnostic Colonoscopy       [] Screening Colonoscopy  [x] EGD      [] ERCP      [] EUS       [] Other    [x] Previous office notes/History and Physical reviewed from the patients chart. Please see EMR for further details of HPI. I have examined the patient's status immediately prior to the procedure and:      Indications/HPI:    [x]Abdominal Pain  []Cancer- GI/Lung  []Fhx of colon CA/polyps  []History of Polyps  []Talaveras   []Melena  []Abnormal Imaging  []Dysphagia    []Persistent Pneumonia  []Anemia  []Food Impaction  []History of Polyps  []GI Bleed  []Pulmonary nodule/Mass  [x]Change in bowel habits []Heartburn/Reflux  []Rectal Bleed (BRBPR)  []Chest Pain - Non Cardiac []Heme (+) Stoo  l[]Ulcers  []Constipation  []Hemoptysis   []Varices  []Diarrhea  []Hypoxemia  []Nausea/Vomiting  []Screening   []Crohns/Colitis  []Other: anorexia  Anesthesia:   [x] MAC [] Moderate Sedation   [] General   [] None     ROS: 12 pt Review of Symptoms was negative unless mentioned above    Medications:   Prior to Admission medications    Medication Sig Start Date End Date Taking?  Authorizing Provider   fenofibrate (TRIGLIDE) 160 MG tablet Take 1 tablet by mouth daily 21  Yes Mandy Barker MD   Magnesium 400 MG CAPS Take 1 capsule by mouth 3 times daily 21  Yes Mandy Barker MD   ondansetron [facility]) 4 MG tablet Take 1 tablet by mouth 3 times daily as needed for Nausea or Vomiting 20  Yes Mandy Barker MD   Nutritional Supplements (ENSURE COMPLETE) LIQD Take 1 Bottle by mouth 2 times daily 20  Yes Mandy Barker MD   levETIRAcetam (KEPPRA) 500 MG tablet Take 1 tablet by mouth 2 times daily 12/2/20 3/2/21 Yes Pritesh Allan MD   hydrOXYzine (ATARAX) 10 MG tablet Take 1 tablet by mouth every 8 hours as needed for Itching 11/16/20  Yes Ehsan Conner MD   levothyroxine (SYNTHROID) 50 MCG tablet TAKE 1 TABLET BY MOUTH  DAILY 9/30/20  Yes Ehsan Conner MD   pantoprazole (PROTONIX) 40 MG tablet TAKE 1 TABLET BY MOUTH  DAILY 9/30/20  Yes Ehsan Conner MD   atorvastatin (LIPITOR) 40 MG tablet TAKE 1 TABLET BY MOUTH ONCE DAILY 9/30/20  Yes Ehsan Conner MD   escitalopram (LEXAPRO) 10 MG tablet TAKE 1 TABLET BY MOUTH  DAILY 9/30/20  Yes Ehsan Conner MD   rOPINIRole (REQUIP) 0.5 MG tablet TAKE 1 TABLET BY MOUTH AT  NIGHT 9/30/20  Yes Ehsan Conner MD   calcium carbonate-vitamin D (CALTRATE) 600-400 MG-UNIT TABS per tab Take 1 tablet by mouth 2 times daily 1/13/20  Yes Ehsan Conner MD   ACETAMINOPHEN EXTRA STRENGTH 500 MG tablet Take 1 tablet by mouth 2 times daily as needed for Pain 6/19/19 1/21/21 Yes Ehsan Conner MD   Calcium 600-400 MG-UNIT CHEW Take 1 tablet by mouth 2 times daily 4/23/18  Yes Ehsan Conner MD   Na Sulfate-K Sulfate-Mg Sulf 17.5-3.13-1.6 GM/177ML SOLN As directed 1/8/21   Ruperto Duran MD   bisacodyl (DULCOLAX) 10 MG suppository Place 1 suppository rectally every 2 hours as needed for Constipation (up to 4 times, 1 hour prior to Fleet Enema) 1/4/21 2/3/21  Ehsan Conner MD   Sodium Phosphates (FLEET) 7-19 GM/118ML Place 1 enema rectally every 2 hours as needed (constipation) 1/4/21   Ehsan Conner MD   polyethylene glycol [facility]) 17 GM/SCOOP powder Take 17 g by mouth daily 1/4/21   Ehsan Conner MD   psyllium (METAMUCIL SMOOTH TEXTURE) 28 % packet Take 1 packet by mouth 2 times daily Take with full glass of h20 or juice 1/4/21 2/3/21  Ehsan Conner MD   triamcinolone (KENALOG) 0.025 % cream Apply topically every 4 hours as needed Apply Topically    Tiara Dc MD   metFORMIN (GLUCOPHAGE) 500 MG tablet TAKE 1 TABLET BY MOUTH procedure. Plan:  Proceed with planned sedation and procedure as above.     Saida Almanzar MD  9:54 AM

## 2021-01-21 NOTE — PROGRESS NOTES
JARVIS Watson updated with patient status on drinking water in waiting room by Sandeep Mandel RN. JARVIS Watson spoke with patient and explained to her that we will have to postpone the procedure for a couple of hours because of the potential of aspirating during the procedure. Call made to patients niece with updates.

## 2021-01-21 NOTE — ANESTHESIA PRE PROCEDURE
Department of Anesthesiology  Preprocedure Note       Name:  Elise Ulloa   Age:  63 y.o.  :  1955                                          MRN:  [id number]         Date:  2021      Surgeon: Benjy Orona):  Zita Farias MD    Procedure: Procedure(s):  EGD DIAGNOSTIC ONLY  COLONOSCOPY DIAGNOSTIC    Medications prior to admission:   Prior to Admission medications    Medication Sig Start Date End Date Taking?  Authorizing Provider   fenofibrate (TRIGLIDE) 160 MG tablet Take 1 tablet by mouth daily 21  Yes Stephen Fang MD   Magnesium 400 MG CAPS Take 1 capsule by mouth 3 times daily 21  Yes Stephen Fang MD   ondansetron [facility]) 4 MG tablet Take 1 tablet by mouth 3 times daily as needed for Nausea or Vomiting 20  Yes Stephen Fang MD   Nutritional Supplements (ENSURE COMPLETE) LIQD Take 1 Bottle by mouth 2 times daily 20  Yes Stephen Fang MD   levETIRAcetam (KEPPRA) 500 MG tablet Take 1 tablet by mouth 2 times daily 12/2/20 3/2/21 Yes Pritesh Thompson MD   hydrOXYzine (ATARAX) 10 MG tablet Take 1 tablet by mouth every 8 hours as needed for Itching 20  Yes Stephen Fang MD   levothyroxine (SYNTHROID) 50 MCG tablet TAKE 1 TABLET BY MOUTH  DAILY 20  Yes Stephen Fang MD   pantoprazole (PROTONIX) 40 MG tablet TAKE 1 TABLET BY MOUTH  DAILY 20  Yes Stephen Fang MD   atorvastatin (LIPITOR) 40 MG tablet TAKE 1 TABLET BY MOUTH ONCE DAILY 20  Yes Stephen Fang MD   escitalopram (LEXAPRO) 10 MG tablet TAKE 1 TABLET BY MOUTH  DAILY 20  Yes Stephen Fang MD   rOPINIRole (REQUIP) 0.5 MG tablet TAKE 1 TABLET BY MOUTH AT  NIGHT 20  Yes Stephen Fang MD   calcium carbonate-vitamin D (CALTRATE) 600-400 MG-UNIT TABS per tab Take 1 tablet by mouth 2 times daily 20  Yes Stephen Fang MD   ACETAMINOPHEN [facility] STRENGTH 500 MG tablet Take 1 tablet by mouth 2 times daily as needed for Pain 6/19/19 1/21/21 Yes Maricarmen Avendano MD   Calcium 600-400 MG-UNIT CHEW Take 1 tablet by mouth 2 times daily 4/23/18  Yes Maricarmen Avendano MD   Na Sulfate-K Sulfate-Mg Sulf 17.5-3.13-1.6 GM/177ML SOLN As directed 1/8/21   Zaynab Rocah MD   bisacodyl (DULCOLAX) 10 MG suppository Place 1 suppository rectally every 2 hours as needed for Constipation (up to 4 times, 1 hour prior to Fleet Enema) 1/4/21 2/3/21  Maricarmen Avendano MD   Sodium Phosphates (FLEET) 7-19 GM/118ML Place 1 enema rectally every 2 hours as needed (constipation) 1/4/21   Maricarmen Avendano MD   polyethylene glycol [facility]) 17 GM/SCOOP powder Take 17 g by mouth daily 1/4/21   Maricarmen Avendano MD   psyllium (METAMUCIL SMOOTH TEXTURE) 28 % packet Take 1 packet by mouth 2 times daily Take with full glass of h20 or juice 1/4/21 2/3/21  Maricarmen Avendano MD   triamcinolone (KENALOG) 0.025 % cream Apply topically every 4 hours as needed Apply Topically    Historical Provider, MD   metFORMIN (GLUCOPHAGE) 500 MG tablet TAKE 1 TABLET BY MOUTH  TWICE DAILY WITH MEALS 9/30/20   Maricarmen Avendano MD   alendronate (FOSAMAX) 35 MG tablet TAKE 1 TABLET BY MOUTH  EVERY 7 DAYS 9/21/20   Maricarmen Avendano MD   Cholecalciferol (VITAMIN D) 50 MCG (2000 UT) TABS tablet Take 1 tablet by mouth daily 1/13/20   Maricarmen Avendano MD       Current medications:    Current Facility-Administered Medications   Medication Dose Route Frequency Provider Last Rate Last Admin    ondansetron (ZOFRAN) injection 4 mg  4 mg Intravenous Once PRN Zaynab Roach MD        simethicone (MYLICON) 40 BY/2.7GU drops    PRN Zaynab Roach MD   40 mg at 01/21/21 0901    sodium chloride 0.9 % irrigation    Continuous PRN Zaynab Roach MD   500 mL at 01/21/21 0901    0.9 % sodium chloride infusion   Intravenous Continuous Baldwin Cheadle, MD        sodium chloride 0.9 % infusion                Allergies:     Allergies   Allergen Reactions    Morphine Swelling       Problem List:    Patient Active Problem List   Diagnosis Code    Mixed hyperlipidemia E78.2    Abnormal glucose R73.09    Gastroesophageal reflux disease without esophagitis K21.9    Alcoholism ([facility] F10.20    Psoriasis L40.9    Vitamin D deficiency E55.9    Restless leg syndrome G25.81    Bilateral leg pain M79.604, M79.605    Mood disorder ([facility] F39    Hypothyroidism E03.9    Osteopenia of multiple sites M85.89    Facet arthropathy, multilevel M47.819    Chronic pain of right knee M25.561, G89.29    Abnormal EKG R94.31    BARRIENTOS (dyspnea on exertion) R06.00    Anemia D64.9    Weight loss R63.4    Seizure ([facility]) R56.9    Hypomagnesemia E83.42    Hypokalemia E87.6    Hypercalcemia E83.52    Polyneuropathy associated with underlying disease ([facility]) G63    Generalized idiopathic epilepsy and epileptic syndromes, intractable, with status epilepticus ([facility] G40.311    Encephalopathy G93.40    Syncope and collapse R55    Lymphadenopathy, axillary R59.0    Constipation K59.00    Diarrhea R19.7    Fatigue R53.83       Past Medical History:        Diagnosis Date    Alcoholism ([facility]     Arthritis     Chronic lung disease     Depression     Diabetes mellitus ([facility]     Hyperlipidemia     Hypertension     Hypokalemia 11/6/2020    Hypomagnesemia 11/6/2020    Hypothyroidism     Lymphadenopathy, axillary 1/4/2021    Second hand smoke exposure        Past Surgical History:        Procedure Laterality Date    APPENDECTOMY      BIOPSY MOUTH LESION Bilateral 12/19/2016    REMOVAL  OF BILATERAL LINGUAL MACIE performed by Florian Hunt DDS at [address] [place] [address] CHOLECYSTECTOMY      HYSTERECTOMY         Social History:    Social History     Tobacco Use    Smoking status: Never Smoker    Smokeless tobacco: Never Used   Substance Use Topics    Alcohol use: Yes     Alcohol/week: 28.0 standard drinks     Types: 28 Shots of liquor per week                                Counseling given: Not Answered      Vital Signs (Current): There were no vitals filed for this visit. BP Readings from Last 3 Encounters:   11/04/20 126/68   01/13/20 118/72   05/09/19 106/70       NPO Status:                                                                                 BMI:   Wt Readings from Last 3 Encounters:   12/30/20 98 lb (44.5 kg)   11/02/20 110 lb (49.9 kg)   01/13/20 105 lb 9.6 oz (47.9 kg)     There is no height or weight on file to calculate BMI.    CBC:   Lab Results   Component Value Date    WBC 5.5 12/31/2020    RBC 3.81 12/31/2020    RBC 4.36 05/09/2012    HGB 11.6 12/31/2020    HCT 35.6 12/31/2020    MCV 93.3 12/31/2020    RDW 18.0 12/31/2020     12/31/2020       CMP:   Lab Results   Component Value Date     12/31/2020    K 3.7 12/31/2020     12/31/2020    CO2 25 12/31/2020    BUN 23 12/31/2020    CREATININE 0.92 12/31/2020    GFRAA >60.0 12/31/2020    LABGLOM >60.0 12/31/2020    GLUCOSE 105 12/31/2020    GLUCOSE 113 11/30/2020    PROT 7.6 12/31/2020    CALCIUM 9.6 12/31/2020    BILITOT 0.9 12/31/2020    ALKPHOS 69 12/31/2020    AST 38 12/31/2020    ALT 24 12/31/2020       POC Tests: No results for input(s): POCGLU, POCNA, POCK, POCCL, POCBUN, POCHEMO, POCHCT in the last 72 hours.     Coags:   Lab Results   Component Value Date    PROTIME 10.5 12/12/2016    INR 1.0 12/12/2016    APTT 21.6 09/30/2016       HCG (If Applicable): No results found for: PREGTESTUR, PREGSERUM, HCG, HCGQUANT     ABGs: No results found for: PHART, PO2ART, YZR8PAI, FQQ5VGI, BEART, Y4IZSHZN     Type & Screen (If Applicable):  No results found for: LABABO, LABRH    Drug/Infectious Status (If Applicable):  No results found for: HIV, HEPCAB    COVID-19 Screening (If Applicable):   Lab Results   Component Value Date    COVID19 Not Detected

## 2021-01-27 ENCOUNTER — TELEPHONE (OUTPATIENT)
Dept: FAMILY MEDICINE CLINIC | Age: 66
End: 2021-01-27

## 2021-01-27 NOTE — TELEPHONE ENCOUNTER
Patient is calling to find out if she should go back to her Metformin. She states that you took her off the medication on 01/04/2021. Please advise and thanks!

## 2021-02-03 ENCOUNTER — HOSPITAL ENCOUNTER (OUTPATIENT)
Dept: WOMENS IMAGING | Age: 66
Discharge: HOME | End: 2021-02-05
Payer: MEDICARE

## 2021-02-03 ENCOUNTER — OFFICE VISIT (OUTPATIENT)
Dept: NEUROLOGY | Age: 66
End: 2021-02-03
Payer: MEDICARE

## 2021-02-03 VITALS
WEIGHT: 82.9 LBS | SYSTOLIC BLOOD PRESSURE: 102 MMHG | DIASTOLIC BLOOD PRESSURE: 68 MMHG | BODY MASS INDEX: 16.74 KG/M2 | HEART RATE: 75 BPM

## 2021-02-03 DIAGNOSIS — Z12.31 ENCOUNTER FOR SCREENING MAMMOGRAM FOR MALIGNANT NEOPLASM OF BREAST: ICD-10-CM

## 2021-02-03 DIAGNOSIS — F32.9 MAJOR DEPRESSIVE DISORDER, REMISSION STATUS UNSPECIFIED, UNSPECIFIED WHETHER RECURRENT: ICD-10-CM

## 2021-02-03 DIAGNOSIS — Z09 HOSPITAL DISCHARGE FOLLOW-UP: ICD-10-CM

## 2021-02-03 DIAGNOSIS — F10.20 ALCOHOLISM (HCC): ICD-10-CM

## 2021-02-03 DIAGNOSIS — E83.42 HYPOMAGNESEMIA: ICD-10-CM

## 2021-02-03 DIAGNOSIS — R56.9 SEIZURE (HCC): Primary | ICD-10-CM

## 2021-02-03 DIAGNOSIS — R63.4 WEIGHT LOSS: Chronic | ICD-10-CM

## 2021-02-03 LAB — MAGNESIUM: 1.9 MG/DL (ref 1.7–2.4)

## 2021-02-03 PROCEDURE — G8482 FLU IMMUNIZE ORDER/ADMIN: HCPCS | Performed by: NURSE PRACTITIONER

## 2021-02-03 PROCEDURE — 77067 SCR MAMMO BI INCL CAD: CPT

## 2021-02-03 PROCEDURE — 1036F TOBACCO NON-USER: CPT | Performed by: NURSE PRACTITIONER

## 2021-02-03 PROCEDURE — G8419 CALC BMI OUT NRM PARAM NOF/U: HCPCS | Performed by: NURSE PRACTITIONER

## 2021-02-03 PROCEDURE — 3017F COLORECTAL CA SCREEN DOC REV: CPT | Performed by: NURSE PRACTITIONER

## 2021-02-03 PROCEDURE — 99214 OFFICE O/P EST MOD 30 MIN: CPT | Performed by: NURSE PRACTITIONER

## 2021-02-03 PROCEDURE — G8427 DOCREV CUR MEDS BY ELIG CLIN: HCPCS | Performed by: NURSE PRACTITIONER

## 2021-02-03 ASSESSMENT — ENCOUNTER SYMPTOMS
COLOR CHANGE: 0
WHEEZING: 0
TROUBLE SWALLOWING: 0
COUGH: 0
SHORTNESS OF BREATH: 0
VOMITING: 0
NAUSEA: 0
CHEST TIGHTNESS: 0

## 2021-02-03 NOTE — PROGRESS NOTES
Subjective:      Patient ID: Case Franks is a 63 y.o. female who presents today for:  Chief Complaint   Patient presents with    Follow-Up from Hospital     Patient states that in November she had a seizure and was taken to the hospital. They are unaware of exactly how many she has had over all. The first one she claims was about 8 years ago. They don't know how long it lasted, they found her laying in her bed making noises, and states that she was disoriented for about 5 hours afterwards. she says she was sleeping alot and had nausea and loosing a lot of weight.  Other     Her neice states that about a week after that incident they were in the car and it seemed like she was nodding off, said she didn't feel good, she says that her eyes started rolling in to the back of her head and she started vomiting, they called an ambulance and she was taken to [facility], but they sent her home after short observation. She states that her magnesium levels are very low as well, she is on medication but the levels are still not stable. HPI  Pt seen and examined in the office for hospital follow up. Pt was admitted to [facility] from 11/2/2020 to 11/3/2020 for seizure. Hospital records reviewed. Patient presented to [facility] emergency room on 11/2/2020 for generalized seizure. Patient does have history of seizures in the past and was seen last in 2016 by Dr. Elodia Forbes. At that time she was on Topamax. Per Dr. Kei Smith notes seizures are typically secondary to metabolic disturbance such as hypoglycemia or alcohol use/withdrawal.  Patient has long history of alcohol abuse. Dr. Leny Sheth, neurology, saw patient on 11/3/2020 while in the hospital and his notes were reviewed. He noted patient with generalized seizure secondary to low threshold for seizures with alcohol use. Of note patient had significantly low magnesium level of 0.6 on admission. This was replaced.   She was initiated on Keppra 500 mg twice a day. EEG was obtained which was normal.  MRI of the brain was done which did not show anything significant. S VID was noted. 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 Patient is accompanied to today's appointment by her niece who is her caregiver. Patient lives at home with her sister currently. Patient is alert and oriented x3, no acute distress, cooperative. She reports daily compliance with Keppra. She did have an episode a couple months ago of possible syncope secondary to nausea vomiting and vasovagal episode. [facility]. She states they did not do much of anything for the patient and discharged her home. Patient has other to have chronically low magnesium level. Last magnesium level done in December 2020 was 1.4. This is being followed by primary care. She is on magnesium 400 mg 3 times a day and reports compliance with this. Patient with significant weight loss over the last several months. She has had intermittent nausea vomiting and diarrhea and is undergone work-up with GI. She had EGD and colonoscopy done. P.o. intake has been poor. Appetite poor. Patient does report some session of depression. No suicidal ideation. She is currently on Lexapro. She reports daily fatigue.   Past Medical History:   Diagnosis Date    [facility]     Arthritis     Chronic lung disease     Depression     Diabetes mellitus ([facility]     Hyperlipidemia     Hypertension     Hypokalemia 11/6/2020    Hypomagnesemia 11/6/2020    Hypothyroidism     Lymphadenopathy, axillary 1/4/2021    Second hand smoke exposure     Seizure [facility])      Past Surgical History:   Procedure Laterality Date    APPENDECTOMY      BIOPSY MOUTH LESION Bilateral 12/19/2016    REMOVAL  OF BILATERAL LINGUAL MACIE performed by Dion Ramirez DDS at [facility] COLONOSCOPY N/A 1/21/2021    COLONOSCOPY DIAGNOSTIC performed by Eden Newby Bora Carcamo MD at [address] [facility] ENDOSCOPY N/A 1/21/2021    EGD with polypectomy performed by Mercedes Guzmán MD at [facility] Marital status: Single     Spouse name: Not on file    Number of children: Not on file    Years of education: Not on file    Highest education level: Not on file   Occupational History    Not on file   Social Needs    Financial resource strain: Not on file    Food insecurity     Worry: Not on file     Inability: Not on file    Transportation needs     Medical: Not on file     Non-medical: Not on file   Tobacco Use    Smoking status: Never Smoker    Smokeless tobacco: Never Used   Substance and Sexual Activity    Alcohol use: Not Currently     Alcohol/week: 28.0 standard drinks     Types: 28 Shots of liquor per week     Comment: quit drinking in November 2020    Drug use: No    Sexual activity: Not on file   Lifestyle    Physical activity     Days per week: Not on file     Minutes per session: Not on file    Stress: Not on file   Relationships    Social connections     Talks on phone: Not on file     Gets together: Not on file     Attends Mosque service: Not on file     Active member of club or organization: Not on file     Attends meetings of clubs or organizations: Not on file     Relationship status: Not on file    Intimate partner violence     Fear of current or ex partner: Not on file     Emotionally abused: Not on file     Physically abused: Not on file     Forced sexual activity: Not on file   Other Topics Concern    Not on file   Social History Narrative    Not on file     Family History   Problem Relation Age of Onset    Heart Disease Mother     Hypertension Mother     Diabetes Mother     Stroke Mother     Heart Disease Father     Hypertension Father     Heart Disease Sister     Hypertension Sister     Heart Disease Brother     Hypertension Brother tablet 3    rOPINIRole (REQUIP) 0.5 MG tablet TAKE 1 TABLET BY MOUTH AT  NIGHT 90 tablet 3    alendronate (FOSAMAX) 35 MG tablet TAKE 1 TABLET BY MOUTH  EVERY 7 DAYS 12 tablet 3    Cholecalciferol (VITAMIN D) 50 MCG (2000 UT) TABS tablet Take 1 tablet by mouth daily 30 tablet 12    calcium carbonate-vitamin D (CALTRATE) 600-400 MG-UNIT TABS per tab Take 1 tablet by mouth 2 times daily 30 tablet 12    ACETAMINOPHEN EXTRA STRENGTH 500 MG tablet Take 1 tablet by mouth 2 times daily as needed for Pain 60 tablet 5    Calcium 600-400 MG-UNIT CHEW Take 1 tablet by mouth 2 times daily 60 tablet 11     No current facility-administered medications on file prior to visit. Review of Systems   Constitutional: Positive for appetite change, fatigue and unexpected weight change. Negative for chills and fever. HENT: Negative for hearing loss and trouble swallowing. Eyes: Negative for visual disturbance. Respiratory: Negative for cough, chest tightness, shortness of breath and wheezing. Cardiovascular: Negative for chest pain, palpitations and leg swelling. Gastrointestinal: Negative for nausea and vomiting. Musculoskeletal: Negative for gait problem. Skin: Negative for color change and rash. Neurological: Positive for weakness. Negative for dizziness, tremors, seizures, syncope, facial asymmetry, speech difficulty, light-headedness, numbness and headaches. Psychiatric/Behavioral: Positive for dysphoric mood. Negative for agitation, confusion, hallucinations and suicidal ideas. The patient is not nervous/anxious. Objective:   /68 (Site: Left Upper Arm, Position: Sitting, Cuff Size: Medium Adult)   Pulse 75   Wt 82 lb 14.4 oz (37.6 kg)   LMP  (LMP Unknown)   BMI 16.74 kg/m²     Physical Exam  Vitals signs reviewed. Constitutional:       General: She is not in acute distress. Appearance: She is not diaphoretic. HENT:      Head: Normocephalic and atraumatic.    Eyes: PHENCYCLIDINESCREENURINE Neg 11/02/2020    ETOH <10 11/02/2020     No results found for: LITHIUM, DILFRTOT, VALPROATE    Assessment and Plan:      1. Seizure [facility])  -Patient with history of generalized seizure since 2016. These are typically secondary to alcohol use or withdrawal or metabolic disturbance such as hypoglycemia or significant hypomagnesemia. Patient has been initiated on Keppra 500 mg twice daily. She reports some fatigue with this. No recurrent seizures since discharge from the hospital.  Advised her she may take half a tablet in the morning and continue a whole tablet at bedtime. We discussed underlying alcohol use and withdrawal and metabolic issues that are contributing to her seizures. I educated the patient regarding importance of alcohol cessation. We discussed proper diet given her weight loss and poor p.o. intake and nutritional deficiencies. We discussed for her to be seen by primary care due to her underlying fatigue and depression and weight loss and possible need to increase Lexapro or switch to a different medication. Patient will continue on Keppra at this time given the fact that she continues to drink occasionally and still has underlying electrolyte abnormalities. Will recheck magnesium level. 2. Hypomagnesemia    3. Alcoholism ([facility] [address]    4. Weight loss    5. Major depressive disorder, remission status unspecified, unspecified whether recurrent    6. Hospital discharge follow-up      Return in about 3 months (around 5/3/2021), or if symptoms worsen or fail to improve.     Ardie Kussmaul, APRN - CNP     Collaborating Physician Dr Ramirez Saenz

## 2021-02-05 DIAGNOSIS — L40.9 PSORIASIS: Chronic | ICD-10-CM

## 2021-02-05 DIAGNOSIS — L29.9 CHRONIC PRURITUS: ICD-10-CM

## 2021-02-05 NOTE — TELEPHONE ENCOUNTER
Rx request   Requested Prescriptions     Pending Prescriptions Disp Refills    hydrOXYzine (ATARAX) 10 MG tablet [Pharmacy Med Name: HYDROXYZINE HYDROCHLORIDE  10MG  TAB] 90 tablet 0     Sig: TAKE 1 TABLET BY MOUTH  EVERY 8 HOURS AS NEEDED FOR ITCHING     LOV 1/4/2021  Next Visit Date:  Future Appointments   Date Time Provider Cami Ledesma   2/25/2021 12:30 PM Elmer Song MD [address]   5/6/2021  9:00 AM Gabino Santacruz, Micheal Villasenor

## 2021-02-08 RX ORDER — HYDROXYZINE HYDROCHLORIDE 10 MG/1
10 TABLET, FILM COATED ORAL EVERY 8 HOURS PRN
Qty: 90 TABLET | Refills: 0 | Status: SHIPPED | OUTPATIENT
Start: 2021-02-08 | End: 2021-03-11

## 2021-02-10 ENCOUNTER — TELEPHONE (OUTPATIENT)
Dept: GASTROENTEROLOGY | Age: 66
End: 2021-02-10

## 2021-02-25 ENCOUNTER — VIRTUAL VISIT (OUTPATIENT)
Dept: FAMILY MEDICINE CLINIC | Age: 66
End: 2021-02-25
Payer: MEDICARE

## 2021-02-25 DIAGNOSIS — E87.6 HYPOKALEMIA: Chronic | ICD-10-CM

## 2021-02-25 DIAGNOSIS — E83.42 HYPOMAGNESEMIA: ICD-10-CM

## 2021-02-25 DIAGNOSIS — E44.0 MODERATE MALNUTRITION (HCC): ICD-10-CM

## 2021-02-25 DIAGNOSIS — R63.4 WEIGHT LOSS: Primary | ICD-10-CM

## 2021-02-25 DIAGNOSIS — G63 POLYNEUROPATHY IN DISEASES CLASSIFIED ELSEWHERE (HCC): ICD-10-CM

## 2021-02-25 DIAGNOSIS — E83.52 HYPERCALCEMIA: ICD-10-CM

## 2021-02-25 DIAGNOSIS — F10.20 ALCOHOLISM (HCC): ICD-10-CM

## 2021-02-25 DIAGNOSIS — E03.9 ACQUIRED HYPOTHYROIDISM: Chronic | ICD-10-CM

## 2021-02-25 DIAGNOSIS — R73.09 ABNORMAL GLUCOSE: Chronic | ICD-10-CM

## 2021-02-25 DIAGNOSIS — F39 MOOD DISORDER (HCC): Chronic | ICD-10-CM

## 2021-02-25 DIAGNOSIS — G40.311 GENERALIZED IDIOPATHIC EPILEPSY AND EPILEPTIC SYNDROMES, INTRACTABLE, WITH STATUS EPILEPTICUS (HCC): ICD-10-CM

## 2021-02-25 DIAGNOSIS — G63 POLYNEUROPATHY ASSOCIATED WITH UNDERLYING DISEASE (HCC): ICD-10-CM

## 2021-02-25 PROBLEM — R19.7 DIARRHEA: Status: RESOLVED | Noted: 2021-01-04 | Resolved: 2021-02-25

## 2021-02-25 PROBLEM — R55 SYNCOPE AND COLLAPSE: Status: RESOLVED | Noted: 2020-12-10 | Resolved: 2021-02-25

## 2021-02-25 PROCEDURE — 99214 OFFICE O/P EST MOD 30 MIN: CPT | Performed by: FAMILY MEDICINE

## 2021-02-25 PROCEDURE — 3017F COLORECTAL CA SCREEN DOC REV: CPT | Performed by: FAMILY MEDICINE

## 2021-02-25 PROCEDURE — G8427 DOCREV CUR MEDS BY ELIG CLIN: HCPCS | Performed by: FAMILY MEDICINE

## 2021-02-25 RX ORDER — ESCITALOPRAM OXALATE 20 MG/1
20 TABLET ORAL DAILY
Qty: 1 TABLET | Refills: 0
Start: 2021-02-25 | End: 2021-12-14

## 2021-02-25 NOTE — Clinical Note
Cognitive impairment; ETOH (inactive); sister (primary care giver)  last week. Has to move out. HUD? Not sure how we can help.

## 2021-02-25 NOTE — PROGRESS NOTES
2021    TELEHEALTH EVALUATION -- Audio/Visual (During SBJGD-56 public health emergency)    HPI:    Palomo Buchanan (:  1955) has requested an audio/video evaluation for the following concern(s):    Seizures (follow-up) and Anorexia (follow-up)    Sister, Irving Story, recently  of multisystem organ failure, sepsis, UTI, cirrhosis. Irving Story was primary caregiver to patient. Managed meds; provided housing; went to [employer]; served as advocate    Phoebe Tyler is eating better. Is sleeping a lot. Having tearful episodes. \"I have a whole lot on my plate right now. \" Needs to move out of house and is interested in [address] [place] [address]. Looking into [employer]. IS tired all the time. Has lost strength and has lost 16 pounds. Normal bowel movements. No nausea or vomiting. Colonoscopy normal.    No ETOH since seizure in December. Patient is being treated for depression and has been compliant with meds which do not cause side effects. Mood is not improved, possibly worse since loss of sister. No suicidal ideation. Prior to Visit Medications    Medication Sig Taking?  Authorizing Provider   escitalopram (LEXAPRO) 20 MG tablet Take 1 tablet by mouth daily Yes Ehsan Conner MD   hydrOXYzine (ATARAX) 10 MG tablet TAKE 1 TABLET BY MOUTH  EVERY 8 HOURS AS NEEDED FOR ITCHING Yes Ehsan Conner MD   fenofibrate (TRIGLIDE) 160 MG tablet Take 1 tablet by mouth daily Yes Ehsan Conner MD   Sodium Phosphates (FLEET) 7-19 GM/118ML Place 1 enema rectally every 2 hours as needed (constipation) Yes Ehsan Conner MD   polyethylene glycol (MIRALAX) 17 GM/SCOOP powder Take 17 g by mouth daily Yes Ehsan Conner MD   Magnesium 400 MG CAPS Take 1 capsule by mouth 3 times daily Yes Ehsan Conner MD   ondansetron (ZOFRAN) 4 MG tablet Take 1 tablet by mouth 3 times daily as needed for Nausea or Vomiting Yes Ehsan Conner MD Patient appears to be alert and oriented to person, place, time, situation and is in no acute distress. Mood appears stable and speech and thought are grossly normal.    ASSESSMENT/PLAN:  Manjit Mckinnon was seen today for seizures and anorexia. Diagnoses and all orders for this visit:    Weight loss  Comments:  Patient does not have scale. Unable to come in for visit due to death of sister. Urged protein supplement drinks. Reviewed healthy diet. Follow closely. Orders:  -     Comprehensive Metabolic Panel; Future  -     CBC With Auto Differential; Future    Moderate malnutrition (HCC)  Comments:  See above. Stable, fair. Diarrhea, nausea, vomiting have resolved. Orders:  -     Comprehensive Metabolic Panel; Future  -     CBC With Auto Differential; Future    Acquired hypothyroidism  Comments: Follow labs and adjust meds as needed  Orders:  -     TSH Without Reflex; Future    Generalized idiopathic epilepsy and epileptic syndromes, intractable, with status epilepticus ([facility]  Comments:  Likely related to alcohol or alcohol withdrawal.  No seizures lately. No alcohol lately. Polyneuropathy associated with underlying disease ([facility]  Comments:  Stable, fair control. Abnormal glucose  Comments:  Well-controlled at this time. Do not restart Metformin. Follow labs. Orders:  -     Hemoglobin A1C; Future    Mood disorder ([facility]  Comments: Worse, fair control. Related to death of sister. Resources including transportation are limited at this time. Follow closely. Orders:  -     escitalopram (LEXAPRO) 20 MG tablet; Take 1 tablet by mouth daily    Hypomagnesemia  Comments: Follow labs. Orders:  -     Magnesium; Future    Hypercalcemia  Comments: Follow labs. Orders:  -     Comprehensive Metabolic Panel; Future    Hypokalemia  Comments: Follow labs. Orders:  -     Comprehensive Metabolic Panel; Future    Polyneuropathy in diseases classified elsewhere [facility])  Comments:  See above.     Alcoholism ([facility]

## 2021-03-04 ENCOUNTER — CARE COORDINATION (OUTPATIENT)
Dept: CARE COORDINATION | Age: 66
End: 2021-03-04

## 2021-03-04 SDOH — SOCIAL STABILITY: SOCIAL NETWORK: ARE YOU MARRIED, WIDOWED, DIVORCED, SEPARATED, NEVER MARRIED, OR LIVING WITH A PARTNER?: NEVER MARRIED

## 2021-03-04 SDOH — ECONOMIC STABILITY: TRANSPORTATION INSECURITY
IN THE PAST 12 MONTHS, HAS THE LACK OF TRANSPORTATION KEPT YOU FROM MEDICAL APPOINTMENTS OR FROM GETTING MEDICATIONS?: YES

## 2021-03-04 SDOH — ECONOMIC STABILITY: FOOD INSECURITY: WITHIN THE PAST 12 MONTHS, THE FOOD YOU BOUGHT JUST DIDN'T LAST AND YOU DIDN'T HAVE MONEY TO GET MORE.: NEVER TRUE

## 2021-03-04 SDOH — SOCIAL STABILITY: SOCIAL NETWORK: HOW OFTEN DO YOU ATTEND CHURCH OR RELIGIOUS SERVICES?: NEVER

## 2021-03-04 SDOH — SOCIAL STABILITY: SOCIAL NETWORK: HOW OFTEN DO YOU ATTENT MEETINGS OF THE CLUB OR ORGANIZATION YOU BELONG TO?: NEVER

## 2021-03-04 NOTE — CARE COORDINATION
Telephone call to [facility] to discuss transportation benefits. Representative indicated that patient has transportation benefit to medical appointments. Patient has 60 one way or 30 round trips per year. [facility] ([phone]) to arrange transportation.

## 2021-03-04 NOTE — LETTER
3/4/2021           Irena Iqbal  [address] [place] [id number]           Dear Irena Iqbal,    My name is Cleo Marcano and I am a registered nurse who partners with Lianne Astudillo MD to improve patients' health. Lianne Astudillo MD believes you would benefit from working with me. As a member of your health care team, I would work with other providers involved in your care, offer education for your specific health conditions, and connect you with additional resources as needed. I will collaborate with Lianne Astudillo MD to support you in following your treatment plan. The additional support I provide is no additional cost to you. My primary focus is to help you achieve specific goals and improve your health. We are committed to walk with you on this journey and look forward to working with you. I am available by phone. You can reach me at [phone].         In good health,           Cleo Marcano RN       [facility]/same day appointment handout

## 2021-03-04 NOTE — CARE COORDINATION
Ambulatory Care Coordination Note  3/4/2021  CM Risk Score: 4  Charlson 10 Year Mortality Risk Score: 10%     ACC: Cleo Marcano RN    Summary Note: ACM contact Wayne Holt at the request of Dr. Nelson Cruz to follow for psychosocial needs, disease management and weight loss. I spoke with Wayne Holt and discussed Care Coordination and my role in helping her mange her care. She was agreeable to enroll in Care Coordination. I reviewed falls, allergies, medications, CC protocol, SDOH, education, goals, ACP, and travel. Jed verbalized concerns about needing to find housing soon, as the home she lives in with may be sold , the home belongs to her sister who passed away last month,.she also reports weight loss of 40 pounds in past 5 months. Referral made to dietician ans . I will follow to help coodinatte care needs. Introductory letter mailed with information on [facility]. Care Coordination Plan of Care: This nurse Care Coordinator will for ongoing needs  1.SW referral - housing and  Transportation- check outcome  2, dietician referral was information mailed to her??does she understand, does she want phone call form dietician??  3,is she interested in pharmacy referral to review medications? ?  4 is weight stable? ?    Ambulatory Care Coordination Assessment    Care Coordination Protocol  Program Enrollment: Rising Risk  Referral from Primary Care Provider: Yes  Week 1 - Initial Assessment     Do you have all of your prescriptions and are they filled?: Yes  Barriers to medication adherence: None  Are you able to afford your medications?: Yes  How often do you have trouble taking your medications the way you have been told to take them?: I always take them as prescribed. Do you have Home O2 Therapy?: No      Ability to seek help/take action for Emergent Urgent situations i.e. fire, crime, inclement weather or health crisis. : Independent  Ability to ambulate to restroom: Independent  Ability handle personal hygeine needs (bathing/dressing/grooming): Independent  Ability to manage Medications: Independent  Ability to prepare Food Preparation: Needs Assistance  Ability to maintain home (clean home, laundry): Needs Assistance  Ability to drive and/or has transportation: Dependent  Ability to do shopping: Needs Assistance  Ability to manage finances: Independent  Is patient able to live independently?: Yes     Current Housing: Private Residence        Per the Fall Risk Screening, did the patient have 2 or more falls or 1 fall with injury in the past year?: No     Frequent urination at night?: Yes  Do you use rails/bars?: No  Do you have a non-slip tub mat?: No        Suggested Interventions and Community Resources                  Prior to Admission medications    Medication Sig Start Date End Date Taking?  Authorizing Provider   escitalopram (LEXAPRO) 20 MG tablet Take 1 tablet by mouth daily 2/25/21  Yes Kylah Posadas MD   hydrOXYzine (ATARAX) 10 MG tablet TAKE 1 TABLET BY MOUTH  EVERY 8 HOURS AS NEEDED FOR ITCHING 2/8/21  Yes Kylah Posadas MD   fenofibrate (TRIGLIDE) 160 MG tablet Take 1 tablet by mouth daily 1/4/21  Yes Kylah Posadas MD   Sodium Phosphates (FLEET) 7-19 GM/118ML Place 1 enema rectally every 2 hours as needed (constipation) 1/4/21  Yes Kylah Posadas MD   polyethylene glycol [facility]) 17 GM/SCOOP powder Take 17 g by mouth daily 1/4/21  Yes Kylah Posadas MD   Magnesium 400 MG CAPS Take 1 capsule by mouth 3 times daily  Patient taking differently: Take 1 capsule by mouth 2 times daily  1/4/21  Yes Kylah Posadas MD   ondansetron (ZOFRAN) 4 MG tablet Take 1 tablet by mouth 3 times daily as needed for Nausea or Vomiting 12/18/20  Yes Kylah Posadas MD   Nutritional Supplements (ENSURE COMPLETE) LIQD Take 1 Bottle by mouth 2 times daily 12/18/20  Yes Kylah Posadas MD   triamcinolone (KENALOG) 0.025 % cream Apply topically every 4 hours as needed Apply Topically   Yes Historical Provider, MD   levothyroxine (SYNTHROID) 50 MCG tablet TAKE 1 TABLET BY MOUTH  DAILY 9/30/20  Yes Zeina Munoz MD   pantoprazole (PROTONIX) 40 MG tablet TAKE 1 TABLET BY MOUTH  DAILY 9/30/20  Yes Zeina Munoz MD   atorvastatin (LIPITOR) 40 MG tablet TAKE 1 TABLET BY MOUTH ONCE DAILY 9/30/20  Yes Zeina Munoz MD   rOPINIRole (REQUIP) 0.5 MG tablet TAKE 1 TABLET BY MOUTH AT  NIGHT 9/30/20  Yes Zeina Munoz MD   alendronate (FOSAMAX) 35 MG tablet TAKE 1 TABLET BY MOUTH  EVERY 7 DAYS 9/21/20  Yes Zeina Munoz MD   calcium carbonate-vitamin D (CALTRATE) 600-400 MG-UNIT TABS per tab Take 1 tablet by mouth 2 times daily 1/13/20  Yes Zeina Munoz MD   levETIRAcetam (KEPPRA) 500 MG tablet Take 1 tablet by mouth 2 times daily  Patient taking differently: Take 500 mg by mouth 2 times daily Take 1/2 (250mg) tablet in the morning and 1 tablet (500mg) if the afternoon.  12/2/20 3/2/21  Governor MD Gamaliel   Cholecalciferol (VITAMIN D) 50 MCG (2000 UT) TABS tablet Take 1 tablet by mouth daily  Patient not taking: Reported on 3/4/2021 1/13/20   Zeina Munoz MD   ACETAMINOPHEN EXTRA STRENGTH 500 MG tablet Take 1 tablet by mouth 2 times daily as needed for Pain 6/19/19 2/3/21  Zeina Munoz MD       Future Appointments   Date Time Provider Cami Ledesma   4/15/2021 10:00 AM MD X[facility]   5/6/2021  9:00 AM Shahana Coronado, [facility]

## 2021-03-08 ENCOUNTER — CARE COORDINATION (OUTPATIENT)
Dept: CARE COORDINATION | Age: 66
End: 2021-03-08

## 2021-03-08 NOTE — LETTER
Bhakti Daniel  [address] [place] [address]      Dear Jessica Monsalve,    I hope this letter finds you doing well! I am sending you resource information on Affordable Housing/[place]. I hope you find the information helpful. Please look them over and let me know if you have any questions or need further assistance. You can contact me at any of the numbers listed below. Sincerely,    Magdi Marcano, [address]  , [place]  Cell Phone: [phone]  Email: [email or web address]

## 2021-03-08 NOTE — CARE COORDINATION
Telephone call with patient. She explained that her sister  and she will need to find a new place to live. The house that she is living in is going to be sold. Patient indicated that she has submitted application to [facility]. She was told that it would take a couple of months before their would be an opening at [facility]. Patient indicated that she has also completed a pre-application with [facility]. Her niece is going to mail the application. Patient indicated she would need to have a place to live that rent is based on income. She receives 1,220.00 a month in SSD. Patient is open to this writer sending her out a list of [address]. Discussed transportation and patient is in need of transportation to medical appointments. She is presently living with nephew who provides her other transportation. Prov[facility] patient with [facility]  [facility].

## 2021-03-10 ENCOUNTER — CARE COORDINATION (OUTPATIENT)
Dept: CARE COORDINATION | Age: 66
End: 2021-03-10

## 2021-03-10 DIAGNOSIS — E78.2 MIXED HYPERLIPIDEMIA: Chronic | ICD-10-CM

## 2021-03-10 DIAGNOSIS — L40.9 PSORIASIS: Chronic | ICD-10-CM

## 2021-03-10 DIAGNOSIS — L29.9 CHRONIC PRURITUS: ICD-10-CM

## 2021-03-10 NOTE — CARE COORDINATION
Ambulatory Care Coordination Note  3/10/2021  CM Risk Score: 4  Charlson 10 Year Mortality Risk Score: 10%     ACC: Meg Tyler, RN    Summary Note: ACM FOLLOWING FOR ONGOING CARE COORDINATION NEEDS    1.SW referral -Requests Ly[employer] assist for l;ab draw 3/15/21and follow up with Dr. Mathis Lefort 4/15/21- SW to follow to review with her and assist as needed  2, dietician referral- States she has not received any information from Ruddy Garcia, will reach out to Ruddy Garcia and ask her to call Mely Piedra as well  3,is she interested in pharmacy referral to review medications? ? Declines at this time -will offer again a later date  4 is weight stable -does not have scale- last documented weight was 82 pounds- will ask Ruddy Garcia if we provide scales? ?      Care Coordination Plan of Care: This nurse Care Coordinator judson follow up on    1Transportation to lab and appointments?  2, dietician referral -was information mailed to her  ??does she understand, did she get phone call from dietician??  3,is she interested in pharmacy referral to review medications? ?     4 is weight stable -scale ? ? Care Coordination Interventions    Program Enrollment: Rising Risk  Referral from Primary Care Provider: Yes  Suggested Interventions and Community Resources  Grief Counselor: Declined (Comment: 3/4/21 recent loss of sister- declined [facility] support services)  Occupational Therapy: Declined  Pharmacist: Elisa Reason (Comment: 3/4/21 declined -will offer again in a few weeks)  Physical Therapy: Declined  Registered Dietician:  (Comment: 3/4/21 referral made)  Social Work: Completed (Comment: 3/4/21  referal made for transportation, housing)  Zone Management Tools: Completed (Comment: 3/4/21 ready clinic information mailed)         Goals Addressed    None         Prior to Admission medications    Medication Sig Start Date End Date Taking?  Authorizing Provider   escitalopram (LEXAPRO) 20 MG tablet Take 1 tablet by mouth daily 2/25/21   Ross Cano MD Carla   hydrOXYzine (ATARAX) 10 MG tablet TAKE 1 TABLET BY MOUTH  EVERY 8 HOURS AS NEEDED FOR ITCHING 2/8/21   Naa Morales MD   fenofibrate (TRIGLIDE) 160 MG tablet Take 1 tablet by mouth daily 1/4/21   Naa Morales MD   Sodium Phosphates (FLEET) 7-19 GM/118ML Place 1 enema rectally every 2 hours as needed (constipation) 1/4/21   Naa Morales MD   polyethylene glycol [facility]) 17 GM/SCOOP powder Take 17 g by mouth daily 1/4/21   Naa Morales MD   Magnesium 400 MG CAPS Take 1 capsule by mouth 3 times daily  Patient taking differently: Take 1 capsule by mouth 2 times daily  1/4/21   Naa Morales MD   ondansetron (ZOFRAN) 4 MG tablet Take 1 tablet by mouth 3 times daily as needed for Nausea or Vomiting 12/18/20   Naa Morales MD   Nutritional Supplements (ENSURE COMPLETE) LIQD Take 1 Bottle by mouth 2 times daily 12/18/20   Naa Morales MD   levETIRAcetam (KEPPRA) 500 MG tablet Take 1 tablet by mouth 2 times daily  Patient taking differently: Take 500 mg by mouth 2 times daily Take 1/2 (250mg) tablet in the morning and 1 tablet (500mg) if the afternoon.  12/2/20 3/2/21  Kylee Andino MD   triamcinolone (KENALOG) 0.025 % cream Apply topically every 4 hours as needed Apply Topically    Historical Provider, MD   levothyroxine (SYNTHROID) 50 MCG tablet TAKE 1 TABLET BY MOUTH  DAILY 9/30/20   Naa Morales MD   pantoprazole (PROTONIX) 40 MG tablet TAKE 1 TABLET BY MOUTH  DAILY 9/30/20   Naa Morales MD   atorvastatin (LIPITOR) 40 MG tablet TAKE 1 TABLET BY MOUTH ONCE DAILY 9/30/20   Naa Morales MD   rOPINIRole (REQUIP) 0.5 MG tablet TAKE 1 TABLET BY MOUTH AT  NIGHT 9/30/20   Naa Morales MD   alendronate (FOSAMAX) 35 MG tablet TAKE 1 TABLET BY MOUTH  EVERY 7 DAYS 9/21/20   Naa Morales MD   Cholecalciferol (VITAMIN D) 50 MCG (2000 UT) TABS tablet Take 1 tablet by mouth daily  Patient not taking: Reported on 3/4/2021 1/13/20   Aleksandra Monson MD   calcium carbonate-vitamin D (CALTRATE) 600-400 MG-UNIT TABS per tab Take 1 tablet by mouth 2 times daily 1/13/20   Aleksandra Monson MD   ACETAMINOPHEN EXTRA STRENGTH 500 MG tablet Take 1 tablet by mouth 2 times daily as needed for Pain 6/19/19 2/3/21  Aleksandra Monson MD       Future Appointments   Date Time Provider Cami Ledesma   4/15/2021 10:00 AM Aleksandra Monson MD [facility]   5/6/2021  9:00 AM Desirae Evans, [address]

## 2021-03-10 NOTE — CARE COORDINATION
Telephone call to [facility]. Representative indicated they would provide transportation for medical laboratory services. They would need 48 hours notice for transportation.

## 2021-03-10 NOTE — CARE COORDINATION
Requested by HANDY Sotomayor to investigate if patient's insurance benefit for transportation would cover to take patient for lab work. Telephone call to with [employer] and they referred the following phone number of [facility] ([phone]).

## 2021-03-10 NOTE — TELEPHONE ENCOUNTER
Rx request   Requested Prescriptions     Pending Prescriptions Disp Refills    hydrOXYzine (ATARAX) 10 MG tablet [Pharmacy Med Name: HYDROXYZINE HYDROCHLORIDE  10MG  TAB] 90 tablet 0     Sig: TAKE 1 TABLET BY MOUTH  EVERY 8 HOURS AS NEEDED FOR ITCHING    fenofibrate (TRIGLIDE) 160 MG tablet 90 tablet 4     Sig: Take 1 tablet by mouth daily     LOV 2/25/2021  Next Visit Date:  Future Appointments   Date Time Provider Cami Ledesma   4/15/2021 10:00 AM MD Xiang M[facility]   5/6/2021  9:00 AM Micheal Henson

## 2021-03-11 ENCOUNTER — CARE COORDINATION (OUTPATIENT)
Dept: CARE COORDINATION | Age: 66
End: 2021-03-11

## 2021-03-11 DIAGNOSIS — E83.42 HYPOMAGNESEMIA: ICD-10-CM

## 2021-03-11 RX ORDER — HYDROXYZINE HYDROCHLORIDE 10 MG/1
10 TABLET, FILM COATED ORAL EVERY 8 HOURS PRN
Qty: 90 TABLET | Refills: 0 | Status: SHIPPED | OUTPATIENT
Start: 2021-03-11 | End: 2021-04-06

## 2021-03-11 RX ORDER — FENOFIBRATE 160 MG/1
160 TABLET ORAL DAILY
Qty: 90 TABLET | Refills: 4 | Status: SHIPPED | OUTPATIENT
Start: 2021-03-11 | End: 2021-11-16 | Stop reason: SDUPTHER

## 2021-03-11 NOTE — CARE COORDINATION
Telephone call with patient. Explained that [facility] would provide transportation for lab work. Provided patient with phone number for [facility]. Patient stated in past she used transportation for medical appointments but she had problems with the return trip. She stated a stranger offered her a ride home. Explained that [facility] only helps with transportation if their are no other resources. Discussed that if she should run into problem with return trip to contact this writer.

## 2021-03-11 NOTE — CARE COORDINATION
Telephone call to patient. Left detailed voicemail message about availability of transportation for lab work with [facility]. Left request for return phone call. Call back number was provided.

## 2021-03-12 DIAGNOSIS — E87.6 HYPOKALEMIA: Chronic | ICD-10-CM

## 2021-03-12 DIAGNOSIS — E83.52 HYPERCALCEMIA: ICD-10-CM

## 2021-03-12 DIAGNOSIS — E83.42 HYPOMAGNESEMIA: ICD-10-CM

## 2021-03-12 DIAGNOSIS — R63.4 WEIGHT LOSS: ICD-10-CM

## 2021-03-12 DIAGNOSIS — R73.09 ABNORMAL GLUCOSE: Chronic | ICD-10-CM

## 2021-03-12 DIAGNOSIS — E03.9 ACQUIRED HYPOTHYROIDISM: Chronic | ICD-10-CM

## 2021-03-12 DIAGNOSIS — E44.0 MODERATE MALNUTRITION (HCC): ICD-10-CM

## 2021-03-12 LAB
BASOPHILS ABSOLUTE: 0 K/UL (ref 0–0.2)
BASOPHILS RELATIVE PERCENT: 0.6 %
EOSINOPHILS ABSOLUTE: 0.3 K/UL (ref 0–0.7)
EOSINOPHILS RELATIVE PERCENT: 5.8 %
HBA1C MFR BLD: 4.5 % (ref 4.8–5.9)
HCT VFR BLD CALC: 31.3 % (ref 37–47)
HEMOGLOBIN: 10.2 G/DL (ref 12–16)
LYMPHOCYTES ABSOLUTE: 0.9 K/UL (ref 1–4.8)
LYMPHOCYTES RELATIVE PERCENT: 16.4 %
MAGNESIUM: 2 MG/DL (ref 1.7–2.4)
MCH RBC QN AUTO: 30.9 PG (ref 27–31.3)
MCHC RBC AUTO-ENTMCNC: 32.5 % (ref 33–37)
MCV RBC AUTO: 95.3 FL (ref 82–100)
MONOCYTES ABSOLUTE: 0.4 K/UL (ref 0.2–0.8)
MONOCYTES RELATIVE PERCENT: 6.6 %
NEUTROPHILS ABSOLUTE: 3.9 K/UL (ref 1.4–6.5)
NEUTROPHILS RELATIVE PERCENT: 70.6 %
PDW BLD-RTO: 13.4 % (ref 11.5–14.5)
PLATELET # BLD: 174 K/UL (ref 130–400)
RBC # BLD: 3.29 M/UL (ref 4.2–5.4)
TSH SERPL DL<=0.05 MIU/L-ACNC: 2.29 UIU/ML (ref 0.44–3.86)
WBC # BLD: 5.5 K/UL (ref 4.8–10.8)

## 2021-03-17 ENCOUNTER — CARE COORDINATION (OUTPATIENT)
Dept: CARE COORDINATION | Age: 66
End: 2021-03-17

## 2021-03-17 ENCOUNTER — TELEPHONE (OUTPATIENT)
Dept: FAMILY MEDICINE CLINIC | Age: 66
End: 2021-03-17

## 2021-03-17 NOTE — CARE COORDINATION
Ambulatory Care Coordination Note  3/17/2021  CM Risk Score: 4  Charlson 10 Year Mortality Risk Score: 10%     ACC: Rhona Parker, CHLOE    Summary Note: ACM FOLLOWING FOR ONGOING CARE COORDINATION NEEDS- PHONE CALL TO [facility], SPOKE WITH HER NEPHEW- Chay IS NOT CURRENTLY HOME- HE WILL RELAY MESSAGE AND HAVE HER RETURN MY CALL WHEN SHE GETS BACK      Care Coordination Plan of Care: This nurse Care Coordinator judson follow up on     1 did transportation go ok to lab and return trip?  2, dietician referral -was information mailed to her  ??does she understand, did she get phone call from dietician??  3,is she interested in pharmacy referral to review medications? ?     4 is weight stable -scale ?? 5.did she get medications refilled, atarax, magnesium, fenofibrate??     6. Dis she find housing? ?           Care Coordination Interventions    Program Enrollment: Rising Risk  Referral from Primary Care Provider: Yes  Suggested Interventions and Community Resources  Grief Counselor: Declined (Comment: 3/4/21 recent loss of sister- declined [facility] support services)  Occupational Therapy: Declined  Pharmacist: Brandy Kehr (Comment: 3/4/21 declined -will offer again in a few weeks)  Physical Therapy: Declined  Registered Dietician:  (Comment: 3/4/21 referral made)  Social Work: Completed (Comment: 3/4/21 sw referal made for transportation, housing)  Zone Management Tools: Completed (Comment: 3/4/21 ready clinic information mailed)         Goals Addressed    None         Prior to Admission medications    Medication Sig Start Date End Date Taking?  Authorizing Provider   hydrOXYzine (ATARAX) 10 MG tablet TAKE 1 TABLET BY MOUTH  EVERY 8 HOURS AS NEEDED FOR ITCHING 3/11/21   Stephen Fang MD   fenofibrate (TRIGLIDE) 160 MG tablet Take 1 tablet by mouth daily 3/11/21   Stephen Fang MD   Magnesium 400 MG CAPS Take 1 capsule by mouth 3 times daily 3/11/21   Diane Antonio tablet Take 1 tablet by mouth 2 times daily as needed for Pain 6/19/19 2/3/21  Kyalh Posadas MD       Future Appointments   Date Time Provider Cami Ledesma   4/15/2021 10:00 AM Kylah Posadas MD [facility]   5/6/2021  9:00 AM Lyndsay Kelly, [address] [place] [address]

## 2021-03-19 ENCOUNTER — CARE COORDINATION (OUTPATIENT)
Dept: CARE COORDINATION | Age: 66
End: 2021-03-19

## 2021-03-19 LAB
ALBUMIN SERPL-MCNC: 4.4 G/DL (ref 3.5–4.6)
ALP BLD-CCNC: 84 U/L (ref 40–130)
ALT SERPL-CCNC: 23 U/L (ref 0–33)
ANION GAP SERPL CALCULATED.3IONS-SCNC: 18 MEQ/L (ref 9–15)
AST SERPL-CCNC: 39 U/L (ref 0–35)
BILIRUB SERPL-MCNC: 0.3 MG/DL (ref 0.2–0.7)
BUN BLDV-MCNC: 30 MG/DL (ref 8–23)
CALCIUM SERPL-MCNC: 9.2 MG/DL (ref 8.5–9.9)
CHLORIDE BLD-SCNC: 98 MEQ/L (ref 95–107)
CO2: 27 MEQ/L (ref 20–31)
CREAT SERPL-MCNC: 1.01 MG/DL (ref 0.5–0.9)
GFR AFRICAN AMERICAN: >60
GFR NON-AFRICAN AMERICAN: 55.2
GLOBULIN: 2.4 G/DL (ref 2.3–3.5)
GLUCOSE BLD-MCNC: 99 MG/DL (ref 70–99)
POTASSIUM SERPL-SCNC: 3.9 MEQ/L (ref 3.4–4.9)
SODIUM BLD-SCNC: 143 MEQ/L (ref 135–144)
TOTAL PROTEIN: 6.8 G/DL (ref 6.3–8)

## 2021-03-19 NOTE — CARE COORDINATION
Contacted Esteban Hugo and left voicemail regarding Dietitian referral. Left call back number and will follow up as appropriate.          [address] [facility], [address] [place] [address]

## 2021-03-25 ENCOUNTER — CARE COORDINATION (OUTPATIENT)
Dept: CARE COORDINATION | Age: 66
End: 2021-03-25

## 2021-03-25 NOTE — CARE COORDINATION
Contacted Kathya Allan and left voicemail regarding Dietitian referral. Left call back number. RD outreached 3/19 and today 3/25- left VM both outreaches. RN requested RD send educational handouts to patient with business card. RD sent Tips for Adding Protein and High Calorie High Protein Nutrition Therapy to patient. RD will continue to follow/assist with patient return call.        [address] [facility], [address] [place] [address]

## 2021-03-25 NOTE — CARE COORDINATION
Telephone call to patient. She indicated now was not a good time to talk. She stated that she was in the process of moving. Provided patient with call back number . Patient expressed plan to return call at a better time.

## 2021-03-26 ENCOUNTER — CARE COORDINATION (OUTPATIENT)
Dept: CARE COORDINATION | Age: 66
End: 2021-03-26

## 2021-03-26 NOTE — CARE COORDINATION
Ambulatory Care Coordination Note  3/26/2021  CM Risk Score: 4  Charlson 10 Year Mortality Risk Score: 10%     ACC: Dayanara Najera, RN    Summary Note: ACM called Mickey Solis to discuss ongoing Care Coordination needs, no answer, I left message with the nature of my call and requested call back  Care Coordination Plan of Care: This nurse Care Coordinator judson follow up on     1, dietician referral -was information mailed to her  ??does she understand, did she get phone call from dietician??  2,is she interested in pharmacy referral to review medications? ?     3 is weight stable -scale ?? 4.did she get medications refilled, atarax, magnesium, fenofibrate??     5. Dis she find housing? ?           Care Coordination Interventions    Program Enrollment: Rising Risk  Referral from Primary Care Provider: Yes  Suggested Interventions and Community Resources  Grief Counselor: Declined (Comment: 3/4/21 recent loss of sister- declined [facility] support services)  Occupational Therapy: Declined  Pharmacist: Darryl Wheatley (Comment: 3/4/21 declined -will offer again in a few weeks)  Physical Therapy: Declined  Registered Dietician:  (Comment: 3/4/21 referral made)  Social Work: Completed (Comment: 3/4/21 sw referal made for transportation, housing)  Zone Management Tools: Completed (Comment: 3/4/21 ready clinic information mailed)         Goals Addressed    None         Prior to Admission medications    Medication Sig Start Date End Date Taking?  Authorizing Provider   hydrOXYzine (ATARAX) 10 MG tablet TAKE 1 TABLET BY MOUTH  EVERY 8 HOURS AS NEEDED FOR ITCHING 3/11/21   [place] Rios MD   fenofibrate (TRIGLIDE) 160 MG tablet Take 1 tablet by mouth daily 3/11/21   [place] Rios MD   Magnesium 400 MG CAPS Take 1 capsule by mouth 3 times daily 3/11/21   [place] Rios MD   escitalopram (LEXAPRO) 20 MG tablet Take 1 tablet by mouth daily 2/25/21   [place] Rios MD   Sodium Phosphates (FLEET) 7-19 GM/118ML Place 1 enema rectally every 2 hours as needed (constipation) 1/4/21   Olive Loving MD   polyethylene glycol [facility]) 17 GM/SCOOP powder Take 17 g by mouth daily 1/4/21   Olive Loving MD   ondansetron [facility]) 4 MG tablet Take 1 tablet by mouth 3 times daily as needed for Nausea or Vomiting 12/18/20   Olive Loving MD   Nutritional Supplements (ENSURE COMPLETE) LIQD Take 1 Bottle by mouth 2 times daily 12/18/20   Olive Loving MD   levETIRAcetam (KEPPRA) 500 MG tablet Take 1 tablet by mouth 2 times daily  Patient taking differently: Take 500 mg by mouth 2 times daily Take 1/2 (250mg) tablet in the morning and 1 tablet (500mg) if the afternoon.  12/2/20 3/2/21  Suzy Vazquez MD   triamcinolone (KENALOG) 0.025 % cream Apply topically every 4 hours as needed Apply Topically    Historical Provider, MD   levothyroxine (SYNTHROID) 50 MCG tablet TAKE 1 TABLET BY MOUTH  DAILY 9/30/20   Olive Loving MD   pantoprazole (PROTONIX) 40 MG tablet TAKE 1 TABLET BY MOUTH  DAILY 9/30/20   Olive Loving MD   atorvastatin (LIPITOR) 40 MG tablet TAKE 1 TABLET BY MOUTH ONCE DAILY 9/30/20   Olive Loving MD   rOPINIRole (REQUIP) 0.5 MG tablet TAKE 1 TABLET BY MOUTH AT  NIGHT 9/30/20   Olive Loving MD   alendronate (FOSAMAX) 35 MG tablet TAKE 1 TABLET BY MOUTH  EVERY 7 DAYS 9/21/20   Olive Loving MD   Cholecalciferol (VITAMIN D) 50 MCG (2000 UT) TABS tablet Take 1 tablet by mouth daily  Patient not taking: Reported on 3/4/2021 1/13/20   Olive Loving MD   calcium carbonate-vitamin D (CALTRATE) 600-400 MG-UNIT TABS per tab Take 1 tablet by mouth 2 times daily 1/13/20   Olive Loving MD   ACETAMINOPHEN EXTRA STRENGTH 500 MG tablet Take 1 tablet by mouth 2 times daily as needed for Pain 6/19/19 2/3/21  Olive Loving MD       Future Appointments

## 2021-04-06 DIAGNOSIS — L29.9 CHRONIC PRURITUS: ICD-10-CM

## 2021-04-06 DIAGNOSIS — L40.9 PSORIASIS: Chronic | ICD-10-CM

## 2021-04-06 RX ORDER — FLUOCINONIDE 0.5 MG/G
OINTMENT TOPICAL
Qty: 180 G | Refills: 1 | Status: SHIPPED | OUTPATIENT
Start: 2021-04-06 | End: 2021-06-16

## 2021-04-06 RX ORDER — HYDROXYZINE HYDROCHLORIDE 10 MG/1
10 TABLET, FILM COATED ORAL EVERY 8 HOURS PRN
Qty: 90 TABLET | Refills: 0 | Status: SHIPPED | OUTPATIENT
Start: 2021-04-06 | End: 2021-05-04 | Stop reason: SDUPTHER

## 2021-04-06 NOTE — TELEPHONE ENCOUNTER
Pharmacy  requesting medication refill.  Please approve or deny this request.    Rx requested:  Requested Prescriptions     Pending Prescriptions Disp Refills    hydrOXYzine (ATARAX) 10 MG tablet [Pharmacy Med Name: HYDROXYZINE HYDROCHLORIDE  10MG  TAB] 90 tablet 0     Sig: TAKE 1 TABLET BY MOUTH  EVERY 8 HOURS AS NEEDED FOR ITCHING    fluocinonide (LIDEX) 0.05 % ointment [Pharmacy Med Name: FLUOCINONIDE  0.05%  OIN] 180 g 1     Sig: APPLY TWICE DAILY         Last Office Visit:   2/25/2021      Next Visit Date:  Future Appointments   Date Time Provider aCmi Ledesma   4/15/2021 10:00 AM Herberth Kate MD [address]   5/6/2021  9:00 AM Elias Boswell, [address] [place] [address]

## 2021-04-08 ENCOUNTER — CARE COORDINATION (OUTPATIENT)
Dept: CARE COORDINATION | Age: 66
End: 2021-04-08

## 2021-04-10 ENCOUNTER — CARE COORDINATION (OUTPATIENT)
Dept: CARE COORDINATION | Age: 66
End: 2021-04-10

## 2021-04-10 NOTE — LETTER
4/10/2021           [facility] [address]     I have enjoyed working with you to improve your health. I would like to continue to provide you support. However, I have been unable to reach you at [phone]. Please let me know if you have moved or have a different phone number. If you would like continued access to your Nurse Care Coordinator, Vita Baldwin RN, you can reach me at [phone]. I will wait to hear from you.          In good health,         Vita Baldwin RN

## 2021-04-10 NOTE — CARE COORDINATION
Ambulatory Care Coordination Note  4/10/2021  CM Risk Score: 4  Charlson 10 Year Mortality Risk Score: 10%     ACC: Thermon Cushing, RN  Summary Note: [facility] called Cushing to discuss ongoing Care Coordination needs, no answer, this is the third week I have been unable to reach Cushing at her contact number. I have left messages with the nature of my call and have not received a call back, I will mail a letter to Cushing asking her to contact me, and if she has moved- I requested updated contact information. Care Coordination Plan of Care: This nurse Care Coordinator judson follow up on     1, dietician referral -was information mailed to her  ??does she understand, did she get phone call from dietician??  2,is she interested in pharmacy referral to review medications? ?     3 is weight stable -scale ? ?     4.did she get medications refilled, atarax, magnesium, fenofibrate? ?     5. Did she find housing? ?did she move? ?               Care Coordination Interventions    Program Enrollment: Rising Risk  Referral from Primary Care Provider: Yes  Suggested Interventions and Community Resources  Grief Counselor: Declined (Comment: 3/4/21 recent loss of sister- declined [facility] support services)  Occupational Therapy: Declined  Pharmacist: [address] (Comment: 3/4/21 declined -will offer again in a few weeks)  Physical Therapy: Declined  Registered Dietician:  (Comment: 3/4/21 referral made)  Social Work: Completed (Comment: 3/4/21 sw referal made for transportation, housing)  [employer] Management Tools: Completed (Comment: 3/4/21 ready clinic information mailed)         Goals Addressed    None         Prior to Admission medications    Medication Sig Start Date End Date Taking?  Authorizing Provider   hydrOXYzine (ATARAX) 10 MG tablet TAKE 1 TABLET BY MOUTH  EVERY 8 HOURS AS NEEDED FOR ITCHING 4/6/21   Barby Crespo MD   fluocinonide (LIDEX) 0.05 % ointment APPLY TWICE DAILY 4/6/21   Barby Crespo MD Guillermo Sweeney MD   calcium carbonate-vitamin D (CALTRATE) 600-400 MG-UNIT TABS per tab Take 1 tablet by mouth 2 times daily 1/13/20   Candy Vivas MD   ACETAMINOPHEN EXTRA STRENGTH 500 MG tablet Take 1 tablet by mouth 2 times daily as needed for Pain 6/19/19 2/3/21  Candy Vivas MD       Future Appointments   Date Time Provider Cami Yeboahi   4/15/2021 10:00 AM Candy Vivas MD [address]   5/6/2021  9:00 AM Leidy Dooley, [address] [place] [address]

## 2021-04-14 ENCOUNTER — CARE COORDINATION (OUTPATIENT)
Dept: CARE COORDINATION | Age: 66
End: 2021-04-14

## 2021-04-14 NOTE — CARE COORDINATION
Ambulatory Care Coordination Note  4/14/2021  CM Risk Score: 4  Charlson 10 Year Mortality Risk Score: 10%     ACC: Haylee Camejo, CHLOE    Summary Note:   Received return phone call from Yaniv Gia who states she just moved to her own apartment, she really likes it- but her bedrooms are upstairs and she has a lot of steps- states her legs are hurting her more and she feels more fatigued,taking ES Tylenol for pain- she requested Home Care referral- SN, PT,OT, she will be following with Dr. Danilo Morales will ask Dr.De Billie Wilkins if she agrees with [address] and if she can she order, will ask Kimberly to follow for assistance for possible Passport referral as well, Priti Gong reports her weight is going up, she weighed 94 pounds this week, she was really excited about that, states she is drinking  nutritional supplements, reminded her to use coupons that were mailed to her. Sent updated contact information to [facility]  and [facility]. Priti Gong states she has all her medications and is taking them as ordered. Updated new address and phone in [address] [place] [address] Rd. Claudine Mccarty reports her family are still helping her ans needed with shopping and transportation. Care Coordination Plan of Care: This nurse Care Coordinator judson follow up on     1, dietician referral -does she understand info mailed to her, did she get phone call from dietician??  2,is she interested in pharmacy referral to review medications? ?     3 is weight stable?     4.does  she have all her medications?     5. Anderson Summers referral?  6.  Follow up PCP visit note            Care Coordination Interventions    Program Enrollment: Rising Risk  Referral from Primary Care Provider: Yes  Suggested Interventions and Community Resources  Grief Counselor: Declined (Comment: 3/4/21 recent loss of sister- declined [facility] support services)  Occupational Therapy: Declined  Pharmacist: Declined (Comment: 3/4/21 declined -will offer again in a few weeks)  Physical Therapy: pantoprazole (PROTONIX) 40 MG tablet TAKE 1 TABLET BY MOUTH  DAILY 9/30/20   Jeannie Rivera MD   atorvastatin (LIPITOR) 40 MG tablet TAKE 1 TABLET BY MOUTH ONCE DAILY 9/30/20   Jeannie Rivera MD   rOPINIRole (REQUIP) 0.5 MG tablet TAKE 1 TABLET BY MOUTH AT  NIGHT 9/30/20   Jeannie Rivera MD   alendronate (FOSAMAX) 35 MG tablet TAKE 1 TABLET BY MOUTH  EVERY 7 DAYS 9/21/20   Jeannie Rivera MD   Cholecalciferol (VITAMIN D) 50 MCG (2000 UT) TABS tablet Take 1 tablet by mouth daily  Patient not taking: Reported on 3/4/2021 1/13/20   Jeannie Rivera MD   calcium carbonate-vitamin D (CALTRATE) 600-400 MG-UNIT TABS per tab Take 1 tablet by mouth 2 times daily 1/13/20   Jeannie Rivera MD   ACETAMINOPHEN EXTRA STRENGTH 500 MG tablet Take 1 tablet by mouth 2 times daily as needed for Pain 6/19/19 2/3/21  Jeannie Rivera MD       Future Appointments   Date Time Provider Cami Ledesma   4/15/2021 10:00 AM Jeannie Rivera MD [facility]   5/6/2021  9:00 AM Abeba Grasston, Micheal Villasenor

## 2021-04-15 ENCOUNTER — OFFICE VISIT (OUTPATIENT)
Dept: FAMILY MEDICINE CLINIC | Age: 66
End: 2021-04-15
Payer: MEDICARE

## 2021-04-15 ENCOUNTER — APPOINTMENT (OUTPATIENT)
Dept: CT IMAGING | Age: 66
DRG: 287 | End: 2021-04-15
Payer: MEDICARE

## 2021-04-15 ENCOUNTER — APPOINTMENT (OUTPATIENT)
Dept: GENERAL RADIOLOGY | Age: 66
DRG: 287 | End: 2021-04-15
Payer: MEDICARE

## 2021-04-15 ENCOUNTER — HOSPITAL ENCOUNTER (INPATIENT)
Age: 66
LOS: 4 days | Discharge: HOME HEALTH | DRG: 287 | End: 2021-04-19
Attending: EMERGENCY MEDICINE | Admitting: INTERNAL MEDICINE
Payer: MEDICARE

## 2021-04-15 VITALS
TEMPERATURE: 97.3 F | WEIGHT: 96.6 LBS | BODY MASS INDEX: 19.51 KG/M2 | RESPIRATION RATE: 20 BRPM | SYSTOLIC BLOOD PRESSURE: 118 MMHG | DIASTOLIC BLOOD PRESSURE: 70 MMHG | OXYGEN SATURATION: 97 % | HEART RATE: 69 BPM

## 2021-04-15 DIAGNOSIS — R07.2 PRECORDIAL PAIN: Primary | ICD-10-CM

## 2021-04-15 DIAGNOSIS — R73.09 ABNORMAL GLUCOSE: Chronic | ICD-10-CM

## 2021-04-15 DIAGNOSIS — R63.4 WEIGHT LOSS: Chronic | ICD-10-CM

## 2021-04-15 DIAGNOSIS — F10.20 ALCOHOLISM (HCC): ICD-10-CM

## 2021-04-15 DIAGNOSIS — R56.9 SEIZURE (HCC): ICD-10-CM

## 2021-04-15 DIAGNOSIS — I20.0 UNSTABLE ANGINA (HCC): Primary | ICD-10-CM

## 2021-04-15 DIAGNOSIS — E83.42 HYPOMAGNESEMIA: ICD-10-CM

## 2021-04-15 DIAGNOSIS — F43.21 GRIEF REACTION: Chronic | ICD-10-CM

## 2021-04-15 DIAGNOSIS — E87.6 HYPOKALEMIA: Chronic | ICD-10-CM

## 2021-04-15 DIAGNOSIS — F39 MOOD DISORDER (HCC): Chronic | ICD-10-CM

## 2021-04-15 DIAGNOSIS — F41.9 ANXIETY: Chronic | ICD-10-CM

## 2021-04-15 DIAGNOSIS — E83.52 HYPERCALCEMIA: ICD-10-CM

## 2021-04-15 PROBLEM — F43.20 GRIEF REACTION: Chronic | Status: ACTIVE | Noted: 2021-04-15

## 2021-04-15 PROBLEM — R41.89 COGNITIVE IMPAIRMENT: Chronic | Status: ACTIVE | Noted: 2021-04-15

## 2021-04-15 PROBLEM — G93.40 ENCEPHALOPATHY: Status: RESOLVED | Noted: 2020-12-10 | Resolved: 2021-04-15

## 2021-04-15 LAB
ALBUMIN SERPL-MCNC: 4 G/DL (ref 3.5–4.6)
ALP BLD-CCNC: 80 U/L (ref 40–130)
ALT SERPL-CCNC: 25 U/L (ref 0–33)
AMPHETAMINE SCREEN, URINE: NORMAL
ANION GAP SERPL CALCULATED.3IONS-SCNC: 9 MEQ/L (ref 9–15)
AST SERPL-CCNC: 34 U/L (ref 0–35)
BARBITURATE SCREEN URINE: NORMAL
BASOPHILS ABSOLUTE: 0 K/UL (ref 0–0.2)
BASOPHILS RELATIVE PERCENT: 0.4 %
BENZODIAZEPINE SCREEN, URINE: NORMAL
BILIRUB SERPL-MCNC: <0.2 MG/DL (ref 0.2–0.7)
BUN BLDV-MCNC: 34 MG/DL (ref 8–23)
CALCIUM SERPL-MCNC: 8.7 MG/DL (ref 8.5–9.9)
CANNABINOID SCREEN URINE: NORMAL
CHLORIDE BLD-SCNC: 101 MEQ/L (ref 95–107)
CO2: 28 MEQ/L (ref 20–31)
COCAINE METABOLITE SCREEN URINE: NORMAL
CREAT SERPL-MCNC: 0.96 MG/DL (ref 0.5–0.9)
D DIMER: 0.84 MG/L FEU (ref 0–0.5)
EOSINOPHILS ABSOLUTE: 0.3 K/UL (ref 0–0.7)
EOSINOPHILS RELATIVE PERCENT: 5 %
ETHANOL PERCENT: NORMAL G/DL
ETHANOL: <10 MG/DL (ref 0–0.08)
GFR AFRICAN AMERICAN: >60
GFR NON-AFRICAN AMERICAN: 58.5
GLOBULIN: 2.4 G/DL (ref 2.3–3.5)
GLUCOSE BLD-MCNC: 89 MG/DL (ref 70–99)
HCT VFR BLD CALC: 29.6 % (ref 37–47)
HEMOGLOBIN: 9.8 G/DL (ref 12–16)
INR BLD: 0.9
LYMPHOCYTES ABSOLUTE: 1 K/UL (ref 1–4.8)
LYMPHOCYTES RELATIVE PERCENT: 19 %
Lab: NORMAL
MCH RBC QN AUTO: 30 PG (ref 27–31.3)
MCHC RBC AUTO-ENTMCNC: 33.2 % (ref 33–37)
MCV RBC AUTO: 90.5 FL (ref 82–100)
METHADONE SCREEN, URINE: NORMAL
MONOCYTES ABSOLUTE: 0.5 K/UL (ref 0.2–0.8)
MONOCYTES RELATIVE PERCENT: 10.1 %
NEUTROPHILS ABSOLUTE: 3.5 K/UL (ref 1.4–6.5)
NEUTROPHILS RELATIVE PERCENT: 65.5 %
OPIATE SCREEN URINE: NORMAL
PDW BLD-RTO: 13.5 % (ref 11.5–14.5)
PHENCYCLIDINE SCREEN URINE: NORMAL
PLATELET # BLD: 170 K/UL (ref 130–400)
POTASSIUM SERPL-SCNC: 4 MEQ/L (ref 3.4–4.9)
PRO-BNP: 632 PG/ML
PROPOXYPHENE SCREEN, URINE: NORMAL
PROTHROMBIN TIME: 12.6 SEC (ref 12.3–14.9)
RBC # BLD: 3.27 M/UL (ref 4.2–5.4)
SARS-COV-2, NAAT: NOT DETECTED
SODIUM BLD-SCNC: 138 MEQ/L (ref 135–144)
TOTAL PROTEIN: 6.4 G/DL (ref 6.3–8)
TROPONIN: <0.01 NG/ML (ref 0–0.01)
TROPONIN: <0.01 NG/ML (ref 0–0.01)
UR OXYCODONE RAPID SCREEN: NORMAL
WBC # BLD: 5.4 K/UL (ref 4.8–10.8)

## 2021-04-15 PROCEDURE — 6370000000 HC RX 637 (ALT 250 FOR IP): Performed by: EMERGENCY MEDICINE

## 2021-04-15 PROCEDURE — 80053 COMPREHEN METABOLIC PANEL: CPT

## 2021-04-15 PROCEDURE — 83880 ASSAY OF NATRIURETIC PEPTIDE: CPT

## 2021-04-15 PROCEDURE — 71045 X-RAY EXAM CHEST 1 VIEW: CPT

## 2021-04-15 PROCEDURE — 96372 THER/PROPH/DIAG INJ SC/IM: CPT

## 2021-04-15 PROCEDURE — 70450 CT HEAD/BRAIN W/O DYE: CPT

## 2021-04-15 PROCEDURE — 85610 PROTHROMBIN TIME: CPT

## 2021-04-15 PROCEDURE — 3017F COLORECTAL CA SCREEN DOC REV: CPT | Performed by: FAMILY MEDICINE

## 2021-04-15 PROCEDURE — 2580000003 HC RX 258: Performed by: EMERGENCY MEDICINE

## 2021-04-15 PROCEDURE — 96375 TX/PRO/DX INJ NEW DRUG ADDON: CPT

## 2021-04-15 PROCEDURE — 71275 CT ANGIOGRAPHY CHEST: CPT

## 2021-04-15 PROCEDURE — 82077 ASSAY SPEC XCP UR&BREATH IA: CPT

## 2021-04-15 PROCEDURE — 93000 ELECTROCARDIOGRAM COMPLETE: CPT | Performed by: FAMILY MEDICINE

## 2021-04-15 PROCEDURE — 2060000000 HC ICU INTERMEDIATE R&B

## 2021-04-15 PROCEDURE — 6360000004 HC RX CONTRAST MEDICATION: Performed by: EMERGENCY MEDICINE

## 2021-04-15 PROCEDURE — G8420 CALC BMI NORM PARAMETERS: HCPCS | Performed by: FAMILY MEDICINE

## 2021-04-15 PROCEDURE — 6360000002 HC RX W HCPCS: Performed by: EMERGENCY MEDICINE

## 2021-04-15 PROCEDURE — 84484 ASSAY OF TROPONIN QUANT: CPT

## 2021-04-15 PROCEDURE — 87635 SARS-COV-2 COVID-19 AMP PRB: CPT

## 2021-04-15 PROCEDURE — 96374 THER/PROPH/DIAG INJ IV PUSH: CPT

## 2021-04-15 PROCEDURE — 85025 COMPLETE CBC W/AUTO DIFF WBC: CPT

## 2021-04-15 PROCEDURE — 1036F TOBACCO NON-USER: CPT | Performed by: FAMILY MEDICINE

## 2021-04-15 PROCEDURE — 36415 COLL VENOUS BLD VENIPUNCTURE: CPT

## 2021-04-15 PROCEDURE — 80306 DRUG TEST PRSMV INSTRMNT: CPT

## 2021-04-15 PROCEDURE — G0378 HOSPITAL OBSERVATION PER HR: HCPCS

## 2021-04-15 PROCEDURE — 99285 EMERGENCY DEPT VISIT HI MDM: CPT

## 2021-04-15 PROCEDURE — G8427 DOCREV CUR MEDS BY ELIG CLIN: HCPCS | Performed by: FAMILY MEDICINE

## 2021-04-15 PROCEDURE — 93005 ELECTROCARDIOGRAM TRACING: CPT | Performed by: INTERNAL MEDICINE

## 2021-04-15 PROCEDURE — 85379 FIBRIN DEGRADATION QUANT: CPT

## 2021-04-15 PROCEDURE — 99215 OFFICE O/P EST HI 40 MIN: CPT | Performed by: FAMILY MEDICINE

## 2021-04-15 RX ORDER — POLYETHYLENE GLYCOL 3350 17 G/17G
17 POWDER, FOR SOLUTION ORAL DAILY PRN
Status: DISCONTINUED | OUTPATIENT
Start: 2021-04-15 | End: 2021-04-19 | Stop reason: HOSPADM

## 2021-04-15 RX ORDER — ONDANSETRON 2 MG/ML
4 INJECTION INTRAMUSCULAR; INTRAVENOUS EVERY 6 HOURS PRN
Status: DISCONTINUED | OUTPATIENT
Start: 2021-04-15 | End: 2021-04-19 | Stop reason: HOSPADM

## 2021-04-15 RX ORDER — SODIUM CHLORIDE 0.9 % (FLUSH) 0.9 %
5-40 SYRINGE (ML) INJECTION EVERY 12 HOURS SCHEDULED
Status: DISCONTINUED | OUTPATIENT
Start: 2021-04-15 | End: 2021-04-19 | Stop reason: HOSPADM

## 2021-04-15 RX ORDER — ACETAMINOPHEN 325 MG/1
650 TABLET ORAL EVERY 6 HOURS PRN
Status: DISCONTINUED | OUTPATIENT
Start: 2021-04-15 | End: 2021-04-19 | Stop reason: HOSPADM

## 2021-04-15 RX ORDER — PROMETHAZINE HYDROCHLORIDE 12.5 MG/1
12.5 TABLET ORAL EVERY 6 HOURS PRN
Status: DISCONTINUED | OUTPATIENT
Start: 2021-04-15 | End: 2021-04-19 | Stop reason: HOSPADM

## 2021-04-15 RX ORDER — ATORVASTATIN CALCIUM 40 MG/1
80 TABLET, FILM COATED ORAL NIGHTLY
Status: DISCONTINUED | OUTPATIENT
Start: 2021-04-15 | End: 2021-04-16

## 2021-04-15 RX ORDER — NITROGLYCERIN 0.4 MG/1
0.4 TABLET SUBLINGUAL EVERY 5 MIN PRN
Status: DISCONTINUED | OUTPATIENT
Start: 2021-04-15 | End: 2021-04-19 | Stop reason: HOSPADM

## 2021-04-15 RX ORDER — DIPHENHYDRAMINE HYDROCHLORIDE 50 MG/ML
25 INJECTION INTRAMUSCULAR; INTRAVENOUS ONCE
Status: COMPLETED | OUTPATIENT
Start: 2021-04-15 | End: 2021-04-15

## 2021-04-15 RX ORDER — ASPIRIN 81 MG/1
81 TABLET, CHEWABLE ORAL DAILY
Status: DISCONTINUED | OUTPATIENT
Start: 2021-04-16 | End: 2021-04-19 | Stop reason: HOSPADM

## 2021-04-15 RX ORDER — BUSPIRONE HYDROCHLORIDE 5 MG/1
5 TABLET ORAL 3 TIMES DAILY
Qty: 90 TABLET | Refills: 2 | Status: SHIPPED | OUTPATIENT
Start: 2021-04-15 | End: 2021-05-15

## 2021-04-15 RX ORDER — SODIUM CHLORIDE 0.9 % (FLUSH) 0.9 %
5-40 SYRINGE (ML) INJECTION PRN
Status: DISCONTINUED | OUTPATIENT
Start: 2021-04-15 | End: 2021-04-19 | Stop reason: HOSPADM

## 2021-04-15 RX ORDER — ACETAMINOPHEN 650 MG/1
650 SUPPOSITORY RECTAL EVERY 6 HOURS PRN
Status: DISCONTINUED | OUTPATIENT
Start: 2021-04-15 | End: 2021-04-19 | Stop reason: HOSPADM

## 2021-04-15 RX ORDER — HALOPERIDOL 5 MG/ML
5 INJECTION INTRAMUSCULAR ONCE
Status: COMPLETED | OUTPATIENT
Start: 2021-04-15 | End: 2021-04-15

## 2021-04-15 RX ORDER — LORAZEPAM 2 MG/ML
2 INJECTION INTRAMUSCULAR ONCE
Status: COMPLETED | OUTPATIENT
Start: 2021-04-15 | End: 2021-04-15

## 2021-04-15 RX ORDER — SODIUM CHLORIDE 9 MG/ML
25 INJECTION, SOLUTION INTRAVENOUS PRN
Status: DISCONTINUED | OUTPATIENT
Start: 2021-04-15 | End: 2021-04-19 | Stop reason: HOSPADM

## 2021-04-15 RX ADMIN — LORAZEPAM 2 MG: 2 INJECTION INTRAMUSCULAR; INTRAVENOUS at 14:21

## 2021-04-15 RX ADMIN — HALOPERIDOL LACTATE 5 MG: 5 INJECTION, SOLUTION INTRAMUSCULAR at 14:50

## 2021-04-15 RX ADMIN — Medication 10 ML: at 20:48

## 2021-04-15 RX ADMIN — NITROGLYCERIN 0.4 MG: 0.4 TABLET, ORALLY DISINTEGRATING SUBLINGUAL at 13:20

## 2021-04-15 RX ADMIN — METOPROLOL TARTRATE 25 MG: 25 TABLET, FILM COATED ORAL at 15:54

## 2021-04-15 RX ADMIN — METOPROLOL TARTRATE 25 MG: 25 TABLET, FILM COATED ORAL at 20:48

## 2021-04-15 RX ADMIN — IOPAMIDOL 100 ML: 612 INJECTION, SOLUTION INTRAVENOUS at 14:10

## 2021-04-15 RX ADMIN — ATORVASTATIN CALCIUM 80 MG: 40 TABLET, FILM COATED ORAL at 20:48

## 2021-04-15 RX ADMIN — DIPHENHYDRAMINE HYDROCHLORIDE 25 MG: 50 INJECTION, SOLUTION INTRAMUSCULAR; INTRAVENOUS at 15:54

## 2021-04-15 RX ADMIN — ENOXAPARIN SODIUM 40 MG: 40 INJECTION SUBCUTANEOUS at 14:52

## 2021-04-15 RX ADMIN — NITROGLYCERIN 0.4 MG: 0.4 TABLET, ORALLY DISINTEGRATING SUBLINGUAL at 13:26

## 2021-04-15 ASSESSMENT — PAIN SCALES - GENERAL: PAINLEVEL_OUTOF10: 3

## 2021-04-15 ASSESSMENT — ENCOUNTER SYMPTOMS
WHEEZING: 0
VOMITING: 0
NAUSEA: 0
ABDOMINAL PAIN: 0
EYE PAIN: 0
CHEST TIGHTNESS: 0
SHORTNESS OF BREATH: 0
SORE THROAT: 0

## 2021-04-15 ASSESSMENT — PAIN DESCRIPTION - ORIENTATION: ORIENTATION: LEFT

## 2021-04-15 ASSESSMENT — PAIN DESCRIPTION - LOCATION: LOCATION: ARM;CHEST

## 2021-04-15 NOTE — PROGRESS NOTES
[address], 63 y.o. female presents today with:  Chief Complaint   Patient presents with    Weight Loss     follow-up           HPI    Right now she feels sore and tired because she just moved into a new place and she hasn't been sleeping well because she is stressed out. Psoriasis ais driving her nuts because of stress. Wants to get more aggressive about the treatment of psoriasis. Appetite has been improving and she is snacking more than dining on meals. Chest pain - anterior chest pain radiating to left arm > right arm x 2 weeks - feels like an elephant sitting on chest. No pain with deep inspiration. Has associated SOB. No dizziness. Seems to occur more with exertion and requires her to rest. It goes away but sometimes comes back even when she is at rest.     Has a lot of stress and anxiety and sadness d/t move and loss of sister. Is taking lexapro 20 mg daily. Has been talking to . No other questions and or concerns for today's visit            Past Medical History:   Diagnosis Date    Alcoholism ([address]     Arthritis     Chronic lung disease     Cognitive impairment 4/15/2021    Depression     Diabetes mellitus ([address]     Diarrhea 1/4/2021    Possibly related to Metformin, Keppra, constipation, colitis. Stop Metformin. Treat constipation. Consider adjustment to Keppra given side effects.     Encephalopathy 12/10/2020    Grief reaction 4/15/2021    Hyperlipidemia     Hypertension     Hypokalemia 11/6/2020    Hypomagnesemia 11/6/2020    Hypothyroidism     Lymphadenopathy, axillary 1/4/2021    Second hand smoke exposure     Seizure ([address]     Syncope and collapse 12/10/2020     Past Surgical History:   Procedure Laterality Date    APPENDECTOMY      BIOPSY MOUTH LESION Bilateral 12/19/2016    REMOVAL  OF BILATERAL LINGUAL MACIE performed by Nisha Henderson DDS at [facility] COLONOSCOPY N/A 1/21/2021    COLONOSCOPY DIAGNOSTIC performed by Cyndee Gallegos MD at [facility] ENDOSCOPY N/A 1/21/2021    EGD with polypectomy performed by Cyndee Gallegos MD at [facility] Marital status: Single     Spouse name: Not on file    Number of children: Not on file    Years of education: 15    Highest education level: Not on file   Occupational History    Occupation: disabled   Social Needs    Financial resource strain: Not very hard    Food insecurity     Worry: Never true     Inability: Never true    Transportation needs     Medical: Yes     Non-medical: Yes   Tobacco Use    Smoking status: Never Smoker    Smokeless tobacco: Never Used   Substance and Sexual Activity    Alcohol use: Not Currently     Alcohol/week: 28.0 standard drinks     Types: 28 Shots of liquor per week     Comment: quit drinking in November 2020    Drug use: No    Sexual activity: Not Currently   Lifestyle    Physical activity     Days per week: 0 days     Minutes per session: 0 min    Stress: Rather much   Relationships    Social connections     Talks on phone: Once a week     Gets together: Once a week     Attends [employer] service: Never     Active member of club or organization: No     Attends meetings of clubs or organizations: Never     Relationship status: Never     Intimate partner violence     Fear of current or ex partner: No     Emotionally abused: No     Physically abused: No     Forced sexual activity: No   Other Topics Concern    Not on file   Social History Narrative    3/4/21 Giorgi Mcknight lives in a private residence she had shared with her sister, her sister was the home owner, and she recently passed on 2/21, She is now needing to find a new home, as the house she is living in may be sold soon, Giorgi Mcknight states she is independent with self care, does have some limitations but states able to function independently for most self care tasks. She does not drive related to history of seizures.  referral made for concerns with housing and transportation.      Family History   Problem Relation Age of Onset    Heart Disease Mother     Hypertension Mother     Diabetes Mother     Stroke Mother     Heart Disease Father     Hypertension Father     Heart Disease Sister     Hypertension Sister     Heart Disease Brother     Hypertension Brother     Diabetes Brother     Cancer Maternal Grandmother     Diabetes Maternal Grandfather      Allergies   Allergen Reactions    Morphine Swelling     Current Outpatient Medications   Medication Sig Dispense Refill    busPIRone (BUSPAR) 5 MG tablet Take 1 tablet by mouth 3 times daily 90 tablet 2    hydrOXYzine (ATARAX) 10 MG tablet TAKE 1 TABLET BY MOUTH  EVERY 8 HOURS AS NEEDED FOR ITCHING 90 tablet 0    fluocinonide (LIDEX) 0.05 % ointment APPLY TWICE DAILY 180 g 1    fenofibrate (TRIGLIDE) 160 MG tablet Take 1 tablet by mouth daily 90 tablet 4    Magnesium 400 MG CAPS Take 1 capsule by mouth 3 times daily 270 capsule 4    escitalopram (LEXAPRO) 20 MG tablet Take 1 tablet by mouth daily 1 tablet 0    Nutritional Supplements (ENSURE COMPLETE) LIQD Take 1 Bottle by mouth 2 times daily 60 Bottle 5    triamcinolone (KENALOG) 0.025 % cream Apply topically every 4 hours as needed Apply Topically      levothyroxine (SYNTHROID) 50 MCG tablet TAKE 1 TABLET BY MOUTH  DAILY 90 tablet 3    pantoprazole (PROTONIX) 40 MG tablet TAKE 1 TABLET BY MOUTH  DAILY 90 tablet 3    atorvastatin (LIPITOR) 40 MG tablet TAKE 1 TABLET BY MOUTH ONCE DAILY 90 tablet 3    rOPINIRole (REQUIP) 0.5 MG tablet TAKE 1 TABLET BY MOUTH AT  NIGHT 90 tablet 3    alendronate (FOSAMAX) 35 MG tablet TAKE 1 TABLET BY MOUTH  EVERY 7 DAYS 12 tablet 3    calcium carbonate-vitamin D (CALTRATE) 600-400 MG-UNIT TABS per tab Take 1 tablet by mouth 2 times daily 30 tablet 12    polyethylene glycol (MIRALAX) 17 GM/SCOOP powder Take 17 g by mouth daily (Patient not taking: Reported on 4/15/2021) 850 g 5    ondansetron (ZOFRAN) 4 MG tablet Take 1 tablet by mouth 3 times daily as needed for Nausea or Vomiting (Patient not taking: Reported on 4/15/2021) 30 tablet 0    levETIRAcetam (KEPPRA) 500 MG tablet Take 1 tablet by mouth 2 times daily (Patient taking differently: Take 500 mg by mouth 2 times daily Take 1/2 (250mg) tablet in the morning and 1 tablet (500mg) if the afternoon.) 180 tablet 2    ACETAMINOPHEN EXTRA STRENGTH 500 MG tablet Take 1 tablet by mouth 2 times daily as needed for Pain 60 tablet 5     No current facility-administered medications for this visit. PMH, Surgical Hx, Family Hx, and Social Hxreviewed and updated. Health Maintenance reviewed. Objective    Vitals:    04/15/21 1005 04/15/21 1137   BP: 128/78 118/70   Site: Right Upper Arm    Position: Sitting    Cuff Size: Medium Adult    Pulse: 69    Resp: 20    Temp: 97.3 °F (36.3 °C)    TempSrc: Tympanic    SpO2: 97%    Weight: 96 lb 9.6 oz (43.8 kg)         Physical Exam  Constitutional:       General: She is not in acute distress. Appearance: She is well-developed. HENT:      Head: Normocephalic and atraumatic. Eyes:      General: No scleral icterus. Conjunctiva/sclera: Conjunctivae normal.   Cardiovascular:      Rate and Rhythm: Normal rate and regular rhythm. Heart sounds: Normal heart sounds. Pulmonary:      Effort: Pulmonary effort is normal. No respiratory distress. Breath sounds: Normal breath sounds. No wheezing or rales. Abdominal:      General: Bowel sounds are normal. There is no distension. Palpations: Abdomen is soft. There is no mass. Tenderness: There is no abdominal tenderness. Musculoskeletal:      Right lower leg: No edema. Left lower leg: No edema. Skin:     General: Skin is warm and dry. Neurological:      Mental Status: She is alert and oriented to person, place, and time.    Psychiatric: Mood and Affect: Mood normal.         Behavior: Behavior normal.         Thought Content: Thought content normal.         Judgment: Judgment normal.         EKG with right bundle branch block and nonspecific T wave changes which are unchanged  Lab Results   Component Value Date    LABA1C 4.5 (L) 03/12/2021    LABA1C 5.2 11/02/2020    LABA1C 5.4 01/22/2020     Lab Results   Component Value Date    CREATININE 1.01 (H) 03/12/2021     Lab Results   Component Value Date    ALT 23 03/12/2021    AST 39 (H) 03/12/2021     Lab Results   Component Value Date    CHOL 158 11/02/2020    TRIG 114 11/02/2020    HDL 66 (H) 11/02/2020    LDLCALC 69 11/02/2020        Assessment & Plan   Visit Diagnoses and Associated Orders     Precordial pain    -  Primary    Unstable angina v. MSK v. anxiety. To ER. ASA 81 mg, NTG 0.4 mg SL  and O2 2L NC ordered.     EKG 12 lead [[id number] Custom]   - Future Order    EKG 12 lead [[id number] Custom]      Keila Kimball MD, Cardiology, [facility] [[id number] Custom]           Weight loss        Improving, reinforced healthy diet and ETOH cessation         Seizure ([facility]        improving, well-controlled         Hypomagnesemia        stable, follow labs    Magnesium [[id number] Custom]   - Future Order         Hypokalemia        stable, follow labs    Basic Metabolic Panel [[id number] Custom]   - Future Order         Hypercalcemia        stable, follow labs    Basic Metabolic Panel [[id number] Custom]   - Future Order         Alcoholism [facility])        improving, fair control; urged continued cessation    Rochelle Lama, PhD, Psychology, [facility] [[id number] Custom]           Abnormal glucose        normalized         Mood disorder ([facility]        worse, fair control; anxiety and grief    Rochelle Lama PhD, Psychology, [place] [[id number] Custom]           Anxiety        referred for [facility]; add buspirone    busPIRone (BUSPAR) 5 MG tablet Gianna Thompson, PhD, Psychology, [place] [facility] Grief reaction        referred for [facility]. César Forte, PhD, Psychology, [facility] [[id number] Custom]                 Patient has had two episodes of left-sided chest pressure 5/10 while in the office without associated symptoms. Given possible unstable angina v. MSK v. Anxiety, will transport to ER by EMS. Rec'd NTG 0.4 mg with some improvement. ALso received ASA 81 mg. Orders Placed This Encounter   Procedures    Basic Metabolic Panel     Standing Status:   Future     Standing Expiration Date:   8/15/2021    Magnesium     Standing Status:   Future     Standing Expiration Date:   8/15/2021   César Forte, PhD, Psychology, [place]     Referral Priority:   Routine     Referral Type:   Eval and Treat     Referral Reason:   Specialty Services Required     Referred to Provider:   Rafiq Valverde PSYD     Requested Specialty:   Psychology     Number of Visits Requested:   Leonid Granda MD, Cardiology, [place]     Referral Priority:   Routine     Referral Type:   Eval and Treat     Referral Reason:   Specialty Services Required     Referred to Provider:   Rafi Alexis MD     Requested Specialty:   Cardiology     Number of Visits Requested:   1    EKG 12 lead     Standing Status:   Future     Number of Occurrences:   1     Standing Expiration Date:   6/14/2021     Order Specific Question:   Reason for Exam?     Answer:   Chest pain     Orders Placed This Encounter   Medications    busPIRone (BUSPAR) 5 MG tablet     Sig: Take 1 tablet by mouth 3 times daily     Dispense:  90 tablet     Refill:  2     Medications Discontinued During This Encounter   Medication Reason    Cholecalciferol (VITAMIN D) 50 MCG (2000 UT) TABS tablet Therapy completed    Sodium Phosphates (FLEET) 7-19 GM/118ML Therapy completed     Return in about 4 weeks (around 5/13/2021) for CP, anxiety - VV.         Controlled Substance Monitoring:    Acute and Chronic Pain Monitoring:   RX Monitoring 11/6/2017

## 2021-04-15 NOTE — CARE COORDINATION
[facility] EMERGENCY [facility] [place] Case Management Initial Discharge Assessment    Met with DORCASMILLER Quekristin Tracee to discuss discharge plan. PCP: Mary Bolanos MD            Date of Last Visit: THIS MORNING    If no PCP, list provided? N/A    Discharge Planning    Living Arrangements: independently at home, [address], SHE IS CAPABLE OF TAKING CARE OF HERSELF    Who do you live with? SELF, SHE WAS LIVING WITH HER SISTER WHO RECENTLY PASSED AWAY ONE WEEK AGO    Who helps you with your care:  self    If lives at home:     Do you have any barriers navigating in your home? yes - HAS STAIRS ABLE TO TAKE THEM    Patient can perform ADL? Yes    Current Services (outpatient and in home) :  None    Dialysis: No    Is transportation available to get to your appointments? Yes, SHE DOES NOT DRIVE, HAS SEIZURE DISORDER    DME Equipment:  no    Respiratory equipment: None    Respiratory provider:  no     Pharmacy:  yes - MAIL ORDER OR [facility] [place]    Consult with Medication Assistance Program?  No      Patient agreeable to Darion[facility] Summers? Yes, Company  WOULD NEED LIST, HER PCP SUGGESTED THAT AN AID TO HELP HER AT HOME WITH CLEANING ETC MAY BE HELPFUL. THIS WAS DISCUSSED IN HER OFFICE THIS MORNING    Patient agreeable to SNF/Rehab? N/A    Other discharge needs identified? Other AID FOR SUPPORT, PT IS DEVELOPMENTALLY DELAYED    Freedom of choice list provided with basic dialogue that supports the patient's individualized plan of care/goals and shares the quality data associated with the providers. Yes - ONLY [facility]    Does Patient Have a High-Risk for Readmission Diagnosis (CHF, PN, MI, COPD)? No    The plan for Transition of Care is related to the following treatment goals: CARDIAC WORK-UP, PSYCH EVAL, PT HAVING NOT FOR PT BEHAVIORS AND HAS LOST HER LIVE-IN SISTER LAST WEEK ALONG WITH MOVING INTO A PLACE WHERE SHE LIVES ALONE. Initial Discharge Plan?  (Note: please see concurrent daily documentation for any updates after initial note). TBD    The Patient and/or patient representative: PT AND FAMILY WILL BE provided with choice of any post-acute providers for care and equipment and agrees with discharge plan  Yes    Electronically signed by Drea Hines.  CHLOE Perla on 4/15/2021 at 3:08 PM

## 2021-04-15 NOTE — FLOWSHEET NOTE
Pt arrived to floor from ER with niece. Pt is alert but confused at times. Pt unable to stay still in bed. Pt keeps kicking legs in the air and trying to get out of bed. Spoke with Dr Eva Duenas who stated to make patient a 1:1 and consult hospitalist for confusion. Dr Eva Duenas will give further orders and see pt in the morning.  PCA sitting at bedside Electronically signed by Pravin Cordon RN on 4/15/2021 at 6:43 PM

## 2021-04-15 NOTE — ED PROVIDER NOTES
[address] [facility]  EMERGENCY DEPARTMENT ENCOUNTER      Pt Name: Asia Carias  MRN: [id number]  [place] 1955  Date of evaluation: 4/15/2021  Provider: Romana Leys, DO    CHIEF COMPLAINT       Chief Complaint   Patient presents with    Chest Pain         HISTORY OF PRESENT ILLNESS   (Location/Symptom, Timing/Onset, Context/Setting, Quality, Duration, Modifying Factors, Severity)  Note limiting factors. Asia Carias is a 63 y.o. female who presents to the emergency department . Patient comes in with chest pain on and off for 2 weeks. Whenever she exerts herself she gets chest pain described as an elephant sitting on her chest.  If she rests it seems to get better but sometimes it does linger. She does have some mild discomfort right now but not like an elephant. Was given aspirin and 1 nitro at her doctor's office and it did not help. No cough or congestion. No history of heart disease but family history of heart disease. States he used to see Dr. Mary Turner from cardiology but has not followed up since he retired. HPI    Nursing Notes were reviewed. REVIEW OF SYSTEMS    (2-9 systems for level 4, 10 or more for level 5)     Review of Systems   Constitutional: Negative for activity change, appetite change, fatigue and fever. HENT: Negative for congestion and sore throat. Eyes: Negative for pain and visual disturbance. Respiratory: Negative for chest tightness, shortness of breath and wheezing. Cardiovascular: Positive for chest pain. Negative for palpitations and leg swelling. Gastrointestinal: Negative for abdominal pain, nausea and vomiting. Endocrine: Negative for polydipsia. Genitourinary: Negative for flank pain and urgency. Musculoskeletal: Negative for gait problem and neck stiffness. Skin: Negative for rash. Neurological: Negative for weakness, light-headedness and headaches. Psychiatric/Behavioral: Negative for confusion and sleep disturbance. Except as noted above the remainder of the review of systems was reviewed and negative. PAST MEDICAL HISTORY     Past Medical History:   Diagnosis Date    [facility]ism [facility] [address]     Arthritis     Chronic lung disease     Cognitive impairment 4/15/2021    Depression     Diabetes mellitus ([facility] [address]     Diarrhea 1/4/2021    Possibly related to Metformin, Keppra, constipation, colitis. Stop Metformin. Treat constipation. Consider adjustment to Keppra given side effects.     Encephalopathy 12/10/2020    Grief reaction 4/15/2021    Hyperlipidemia     Hypertension     Hypokalemia 11/6/2020    Hypomagnesemia 11/6/2020    Hypothyroidism     Lymphadenopathy, axillary 1/4/2021    Second hand smoke exposure     Seizure ([facility] [address]     Syncope and collapse 12/10/2020         SURGICAL HISTORY       Past Surgical History:   Procedure Laterality Date    APPENDECTOMY      BIOPSY MOUTH LESION Bilateral 12/19/2016    REMOVAL  OF BILATERAL LINGUAL MACIE performed by Augusto Alba DDS at [facility] N/A 1/21/2021    COLONOSCOPY DIAGNOSTIC performed by Stalney Husain MD at [facility] N/A 1/21/2021    EGD with polypectomy performed by Stanley Husain MD at [address] [place] [address]       Previous Medications    ACETAMINOPHEN EXTRA STRENGTH 500 MG TABLET    Take 1 tablet by mouth 2 times daily as needed for Pain    ALENDRONATE (FOSAMAX) 35 MG TABLET    TAKE 1 TABLET BY MOUTH  EVERY 7 DAYS    ATORVASTATIN (LIPITOR) 40 MG TABLET    TAKE 1 TABLET BY MOUTH ONCE DAILY    BUSPIRONE (BUSPAR) 5 MG TABLET    Take 1 tablet by mouth 3 times daily    CALCIUM CARBONATE-VITAMIN D (CALTRATE) 600-400 MG-UNIT TABS PER TAB    Take 1 tablet by mouth 2 times daily    ESCITALOPRAM (LEXAPRO) 20 MG TABLET    Take 1 tablet by mouth daily    FENOFIBRATE (TRIGLIDE) 160 MG TABLET    Take 1 tablet by mouth daily FLUOCINONIDE (LIDEX) 0.05 % OINTMENT    APPLY TWICE DAILY    HYDROXYZINE (ATARAX) 10 MG TABLET    TAKE 1 TABLET BY MOUTH  EVERY 8 HOURS AS NEEDED FOR ITCHING    LEVETIRACETAM (KEPPRA) 500 MG TABLET    Take 1 tablet by mouth 2 times daily    LEVOTHYROXINE (SYNTHROID) 50 MCG TABLET    TAKE 1 TABLET BY MOUTH  DAILY    MAGNESIUM 400 MG CAPS    Take 1 capsule by mouth 3 times daily    NUTRITIONAL SUPPLEMENTS (ENSURE COMPLETE) LIQD    Take 1 Bottle by mouth 2 times daily    ONDANSETRON (ZOFRAN) 4 MG TABLET    Take 1 tablet by mouth 3 times daily as needed for Nausea or Vomiting    PANTOPRAZOLE (PROTONIX) 40 MG TABLET    TAKE 1 TABLET BY MOUTH  DAILY    POLYETHYLENE GLYCOL (MIRALAX) 17 GM/SCOOP POWDER    Take 17 g by mouth daily    ROPINIROLE (REQUIP) 0.5 MG TABLET    TAKE 1 TABLET BY MOUTH AT  NIGHT    TRIAMCINOLONE (KENALOG) 0.025 % CREAM    Apply topically every 4 hours as needed Apply Topically       ALLERGIES     Morphine    FAMILY HISTORY       Family History   Problem Relation Age of Onset    Heart Disease Mother     Hypertension Mother     Diabetes Mother     Stroke Mother     Heart Disease Father     Hypertension Father     Heart Disease Sister     Hypertension Sister     Heart Disease Brother     Hypertension Brother     Diabetes Brother     Cancer Maternal Grandmother     Diabetes Maternal Grandfather           SOCIAL HISTORY       Social History     Socioeconomic History    Marital status: Single     Spouse name: None    Number of children: None    Years of education: 15    Highest education level: None   Occupational History    Occupation: disabled   Social Needs    Financial resource strain: Not very hard    Food insecurity     Worry: Never true     Inability: Never true    Transportation needs     Medical: Yes     Non-medical: Yes   Tobacco Use    Smoking status: Never Smoker    Smokeless tobacco: Never Used   Substance and Sexual Activity    Alcohol use: Not Currently Conjunctiva/sclera: Conjunctivae normal.      Pupils: Pupils are equal, round, and reactive to light. Neck:      Musculoskeletal: Normal range of motion and neck supple. Thyroid: No thyromegaly. Vascular: No JVD. Trachea: No tracheal deviation. Cardiovascular:      Rate and Rhythm: Normal rate and regular rhythm. Heart sounds: Normal heart sounds. No murmur. Pulmonary:      Effort: Pulmonary effort is normal. No respiratory distress. Breath sounds: Normal breath sounds. No wheezing, rhonchi or rales. Abdominal:      General: Bowel sounds are normal.      Palpations: Abdomen is soft. Tenderness: There is no abdominal tenderness. There is no guarding. Musculoskeletal: Normal range of motion. Skin:     General: Skin is warm and dry. Findings: No rash. Neurological:      Mental Status: She is alert and oriented to person, place, and time. Cranial Nerves: No cranial nerve deficit. Psychiatric:         Behavior: Behavior normal.         DIAGNOSTIC RESULTS     EKG: All EKG's are interpreted by the Emergency Department Physician who either signs or Co-signs this chart in the absence of a cardiologist.    Sinus rhythm with first-degree block 62 bpm left axis deviation. Right bundle branch block.   T wave inversion laterally    RADIOLOGY:   Non-plain film images such as CT, Ultrasound and MRI are read by the radiologist. Plain radiographic images are visualized and preliminarily interpreted by the emergency physician with the below findings:    Chest x-ray no acute pathology    Interpretation per the Radiologist below, if available at the time of this note:    XR CHEST PORTABLE    (Results Pending)         ED BEDSIDE ULTRASOUND:   Performed by ED Physician - none    LABS:  Labs Reviewed   COVID-19, RAPID   CBC WITH AUTO DIFFERENTIAL   ETHANOL   COMPREHENSIVE METABOLIC PANEL   URINE DRUG SCREEN   TROPONIN   PROTIME-INR   D-DIMER, QUANTITATIVE       All other labs were within normal range or not returned as of this dictation. EMERGENCY DEPARTMENT COURSE and DIFFERENTIAL DIAGNOSIS/MDM:   Vitals:    Vitals:    04/15/21 1211   BP: (!) 145/74   Pulse: 68   Resp: 16   Temp: 98.7 °F (37.1 °C)   TempSrc: Oral   SpO2: 100%   Weight: 94 lb (42.6 kg)   Height: 4' 9\" (1.448 m)       Patient comes in with 2 weeks of exertional chest pain. Patient to be admitted for unstable angina for further work-up and evaluation    MDM      REASSESSMENT          CRITICAL CARE TIME   Total Critical Care time was 30 minutes, excluding separately reportable procedures. There was a high probability of clinically significant/life threatening deterioration in the patient's condition which required my urgent intervention. CONSULTS:  None    PROCEDURES:  Unless otherwise noted below, none     Procedures        FINAL IMPRESSION      1. Unstable angina (HCC)          DISPOSITION/PLAN   DISPOSITION  Admit      PATIENT REFERRED TO:  No follow-up provider specified. DISCHARGE MEDICATIONS:  New Prescriptions    No medications on file     Controlled Substances Monitoring:     RX Monitoring 11/6/2017   Attestation The Prescription Monitoring Report for this patient was reviewed today.        (Please note that portions of this note were completed with a voice recognition program.  Efforts were made to edit the dictations but occasionally words are mis-transcribed.)    Gurjit Montana DO (electronically signed)  Attending Emergency Physician           Gurjit Montana DO  04/19/21 1640

## 2021-04-15 NOTE — Clinical Note
I saw Oli How today. I agree she could use some more support but I can't find a justification for HHC because she is not homebound or experiencing any conditions that would allow her to qualify. Am I missing something? Suggestions?

## 2021-04-15 NOTE — ED TRIAGE NOTES
Pt states c/p that stared this morning with exertion. Pt is A&Ox4, skin intact, msps intact, afebrile, breaths are equal and unlabored. Pt received 1 nitro and one 81 mg aspirin at providers office.

## 2021-04-16 LAB
BACTERIA: NEGATIVE /HPF
BILIRUBIN URINE: NEGATIVE
BLOOD, URINE: NEGATIVE
CHOLESTEROL, TOTAL: 108 MG/DL (ref 0–199)
CLARITY: CLEAR
COLOR: YELLOW
EKG ATRIAL RATE: 57 BPM
EKG ATRIAL RATE: 62 BPM
EKG P AXIS: 24 DEGREES
EKG P AXIS: 27 DEGREES
EKG P-R INTERVAL: 220 MS
EKG P-R INTERVAL: 220 MS
EKG Q-T INTERVAL: 454 MS
EKG Q-T INTERVAL: 494 MS
EKG QRS DURATION: 128 MS
EKG QRS DURATION: 138 MS
EKG QTC CALCULATION (BAZETT): 460 MS
EKG QTC CALCULATION (BAZETT): 480 MS
EKG R AXIS: -31 DEGREES
EKG R AXIS: -32 DEGREES
EKG T AXIS: -39 DEGREES
EKG T AXIS: -43 DEGREES
EKG VENTRICULAR RATE: 57 BPM
EKG VENTRICULAR RATE: 62 BPM
EPITHELIAL CELLS, UA: ABNORMAL /HPF (ref 0–5)
FOLATE: 19.7 NG/ML (ref 7.3–26.1)
GLUCOSE BLD-MCNC: 107 MG/DL (ref 60–115)
GLUCOSE BLD-MCNC: 163 MG/DL (ref 60–115)
GLUCOSE URINE: NEGATIVE MG/DL
HCT VFR BLD CALC: 30.5 % (ref 37–47)
HDLC SERPL-MCNC: 47 MG/DL (ref 40–59)
HEMOGLOBIN: 10.2 G/DL (ref 12–16)
HYALINE CASTS: ABNORMAL /HPF (ref 0–5)
KETONES, URINE: NEGATIVE MG/DL
LDL CHOLESTEROL CALCULATED: 43 MG/DL (ref 0–129)
LEUKOCYTE ESTERASE, URINE: ABNORMAL
LV EF: 60 %
LVEF MODALITY: NORMAL
MAGNESIUM: 2 MG/DL (ref 1.7–2.4)
MCH RBC QN AUTO: 30.2 PG (ref 27–31.3)
MCHC RBC AUTO-ENTMCNC: 33.5 % (ref 33–37)
MCV RBC AUTO: 90.1 FL (ref 82–100)
NITRITE, URINE: NEGATIVE
PDW BLD-RTO: 13.3 % (ref 11.5–14.5)
PERFORMED ON: ABNORMAL
PERFORMED ON: NORMAL
PH UA: 5 (ref 5–9)
PLATELET # BLD: 176 K/UL (ref 130–400)
PROTEIN UA: NEGATIVE MG/DL
RBC # BLD: 3.38 M/UL (ref 4.2–5.4)
RBC UA: ABNORMAL /HPF (ref 0–5)
SPECIFIC GRAVITY UA: 1.02 (ref 1–1.03)
TRIGL SERPL-MCNC: 89 MG/DL (ref 0–150)
TROPONIN: <0.01 NG/ML (ref 0–0.01)
TROPONIN: <0.01 NG/ML (ref 0–0.01)
TSH SERPL DL<=0.05 MIU/L-ACNC: 0.99 UIU/ML (ref 0.44–3.86)
UROBILINOGEN, URINE: 0.2 E.U./DL
VITAMIN B-12: 333 PG/ML (ref 232–1245)
VITAMIN D 25-HYDROXY: 31.8 NG/ML (ref 30–100)
WBC # BLD: 6.9 K/UL (ref 4.8–10.8)
WBC UA: ABNORMAL /HPF (ref 0–5)

## 2021-04-16 PROCEDURE — 93010 ELECTROCARDIOGRAM REPORT: CPT | Performed by: INTERNAL MEDICINE

## 2021-04-16 PROCEDURE — 2580000003 HC RX 258: Performed by: EMERGENCY MEDICINE

## 2021-04-16 PROCEDURE — 82746 ASSAY OF FOLIC ACID SERUM: CPT

## 2021-04-16 PROCEDURE — 93005 ELECTROCARDIOGRAM TRACING: CPT | Performed by: EMERGENCY MEDICINE

## 2021-04-16 PROCEDURE — 84443 ASSAY THYROID STIM HORMONE: CPT

## 2021-04-16 PROCEDURE — 2060000000 HC ICU INTERMEDIATE R&B

## 2021-04-16 PROCEDURE — 81001 URINALYSIS AUTO W/SCOPE: CPT

## 2021-04-16 PROCEDURE — 36415 COLL VENOUS BLD VENIPUNCTURE: CPT

## 2021-04-16 PROCEDURE — 83735 ASSAY OF MAGNESIUM: CPT

## 2021-04-16 PROCEDURE — 96372 THER/PROPH/DIAG INJ SC/IM: CPT

## 2021-04-16 PROCEDURE — 6370000000 HC RX 637 (ALT 250 FOR IP): Performed by: EMERGENCY MEDICINE

## 2021-04-16 PROCEDURE — 82607 VITAMIN B-12: CPT

## 2021-04-16 PROCEDURE — 99220 PR INITIAL OBSERVATION CARE/DAY 70 MINUTES: CPT | Performed by: INTERNAL MEDICINE

## 2021-04-16 PROCEDURE — 6370000000 HC RX 637 (ALT 250 FOR IP): Performed by: PHYSICIAN ASSISTANT

## 2021-04-16 PROCEDURE — G0378 HOSPITAL OBSERVATION PER HR: HCPCS

## 2021-04-16 PROCEDURE — 80061 LIPID PANEL: CPT

## 2021-04-16 PROCEDURE — 6360000002 HC RX W HCPCS: Performed by: EMERGENCY MEDICINE

## 2021-04-16 PROCEDURE — 82306 VITAMIN D 25 HYDROXY: CPT

## 2021-04-16 PROCEDURE — 97165 OT EVAL LOW COMPLEX 30 MIN: CPT

## 2021-04-16 PROCEDURE — 85027 COMPLETE CBC AUTOMATED: CPT

## 2021-04-16 PROCEDURE — 93306 TTE W/DOPPLER COMPLETE: CPT

## 2021-04-16 PROCEDURE — APPSS45 APP SPLIT SHARED TIME 31-45 MINUTES: Performed by: PHYSICIAN ASSISTANT

## 2021-04-16 PROCEDURE — 84484 ASSAY OF TROPONIN QUANT: CPT

## 2021-04-16 RX ORDER — LANOLIN ALCOHOL/MO/W.PET/CERES
400 CREAM (GRAM) TOPICAL 3 TIMES DAILY
Status: DISCONTINUED | OUTPATIENT
Start: 2021-04-16 | End: 2021-04-19 | Stop reason: HOSPADM

## 2021-04-16 RX ORDER — OYSTER SHELL CALCIUM WITH VITAMIN D 500; 200 MG/1; [IU]/1
1 TABLET, FILM COATED ORAL 2 TIMES DAILY
Status: DISCONTINUED | OUTPATIENT
Start: 2021-04-16 | End: 2021-04-19 | Stop reason: HOSPADM

## 2021-04-16 RX ORDER — LEVOTHYROXINE SODIUM 0.05 MG/1
50 TABLET ORAL DAILY
Status: DISCONTINUED | OUTPATIENT
Start: 2021-04-16 | End: 2021-04-19 | Stop reason: HOSPADM

## 2021-04-16 RX ORDER — ESCITALOPRAM OXALATE 20 MG/1
20 TABLET ORAL DAILY
Status: DISCONTINUED | OUTPATIENT
Start: 2021-04-16 | End: 2021-04-19 | Stop reason: HOSPADM

## 2021-04-16 RX ORDER — LEVETIRACETAM 500 MG/1
500 TABLET ORAL 2 TIMES DAILY
Status: DISCONTINUED | OUTPATIENT
Start: 2021-04-16 | End: 2021-04-19 | Stop reason: HOSPADM

## 2021-04-16 RX ORDER — ROPINIROLE 0.25 MG/1
0.5 TABLET, FILM COATED ORAL NIGHTLY
Status: DISCONTINUED | OUTPATIENT
Start: 2021-04-16 | End: 2021-04-19 | Stop reason: HOSPADM

## 2021-04-16 RX ORDER — HYDROXYZINE HYDROCHLORIDE 10 MG/1
10 TABLET, FILM COATED ORAL EVERY 8 HOURS PRN
Status: DISCONTINUED | OUTPATIENT
Start: 2021-04-16 | End: 2021-04-19 | Stop reason: HOSPADM

## 2021-04-16 RX ORDER — POLYETHYLENE GLYCOL 3350 17 G/17G
17 POWDER, FOR SOLUTION ORAL DAILY
Status: DISCONTINUED | OUTPATIENT
Start: 2021-04-16 | End: 2021-04-19 | Stop reason: HOSPADM

## 2021-04-16 RX ORDER — PANTOPRAZOLE SODIUM 40 MG/1
40 TABLET, DELAYED RELEASE ORAL DAILY
Status: DISCONTINUED | OUTPATIENT
Start: 2021-04-16 | End: 2021-04-19 | Stop reason: HOSPADM

## 2021-04-16 RX ORDER — FENOFIBRATE 160 MG/1
160 TABLET ORAL DAILY
Refills: 4 | Status: DISCONTINUED | OUTPATIENT
Start: 2021-04-16 | End: 2021-04-19 | Stop reason: HOSPADM

## 2021-04-16 RX ORDER — BUSPIRONE HYDROCHLORIDE 5 MG/1
5 TABLET ORAL 3 TIMES DAILY
Status: DISCONTINUED | OUTPATIENT
Start: 2021-04-16 | End: 2021-04-19 | Stop reason: HOSPADM

## 2021-04-16 RX ORDER — ATORVASTATIN CALCIUM 40 MG/1
40 TABLET, FILM COATED ORAL NIGHTLY
Status: DISCONTINUED | OUTPATIENT
Start: 2021-04-16 | End: 2021-04-19 | Stop reason: HOSPADM

## 2021-04-16 RX ADMIN — HYDROXYZINE HYDROCHLORIDE 10 MG: 10 TABLET, FILM COATED ORAL at 09:08

## 2021-04-16 RX ADMIN — ASPIRIN 81 MG: 81 TABLET, CHEWABLE ORAL at 09:00

## 2021-04-16 RX ADMIN — ROPINIROLE HYDROCHLORIDE 0.5 MG: 0.25 TABLET, FILM COATED ORAL at 20:07

## 2021-04-16 RX ADMIN — METOPROLOL TARTRATE 25 MG: 25 TABLET, FILM COATED ORAL at 09:00

## 2021-04-16 RX ADMIN — PANTOPRAZOLE SODIUM 40 MG: 40 TABLET, DELAYED RELEASE ORAL at 09:07

## 2021-04-16 RX ADMIN — BUSPIRONE HYDROCHLORIDE 5 MG: 5 TABLET ORAL at 09:08

## 2021-04-16 RX ADMIN — MAGNESIUM OXIDE TAB 400 MG (240 MG ELEMENTAL MG) 400 MG: 400 (240 MG) TAB at 20:07

## 2021-04-16 RX ADMIN — BUSPIRONE HYDROCHLORIDE 5 MG: 5 TABLET ORAL at 20:07

## 2021-04-16 RX ADMIN — Medication 10 ML: at 20:08

## 2021-04-16 RX ADMIN — LEVOTHYROXINE SODIUM 50 MCG: 0.05 TABLET ORAL at 09:08

## 2021-04-16 RX ADMIN — MAGNESIUM OXIDE TAB 400 MG (240 MG ELEMENTAL MG) 400 MG: 400 (240 MG) TAB at 16:03

## 2021-04-16 RX ADMIN — ATORVASTATIN CALCIUM 40 MG: 40 TABLET, FILM COATED ORAL at 20:07

## 2021-04-16 RX ADMIN — ESCITALOPRAM OXALATE 20 MG: 20 TABLET ORAL at 09:08

## 2021-04-16 RX ADMIN — OYSTER SHELL CALCIUM WITH VITAMIN D 1 TABLET: 500; 200 TABLET, FILM COATED ORAL at 09:08

## 2021-04-16 RX ADMIN — FENOFIBRATE 160 MG: 160 TABLET ORAL at 09:07

## 2021-04-16 RX ADMIN — MAGNESIUM OXIDE TAB 400 MG (240 MG ELEMENTAL MG) 400 MG: 400 (240 MG) TAB at 09:07

## 2021-04-16 RX ADMIN — ENOXAPARIN SODIUM 40 MG: 40 INJECTION SUBCUTANEOUS at 09:00

## 2021-04-16 RX ADMIN — METOPROLOL TARTRATE 25 MG: 25 TABLET, FILM COATED ORAL at 20:07

## 2021-04-16 RX ADMIN — Medication 10 ML: at 09:10

## 2021-04-16 RX ADMIN — BUSPIRONE HYDROCHLORIDE 5 MG: 5 TABLET ORAL at 16:03

## 2021-04-16 RX ADMIN — OYSTER SHELL CALCIUM WITH VITAMIN D 1 TABLET: 500; 200 TABLET, FILM COATED ORAL at 20:07

## 2021-04-16 RX ADMIN — LEVETIRACETAM 500 MG: 500 TABLET ORAL at 20:07

## 2021-04-16 ASSESSMENT — ENCOUNTER SYMPTOMS
COLOR CHANGE: 0
SHORTNESS OF BREATH: 1
VOMITING: 0
CHEST TIGHTNESS: 1
NAUSEA: 0
ABDOMINAL PAIN: 0

## 2021-04-16 NOTE — PROGRESS NOTES
Comprehensive Nutrition Assessment    Type and Reason for Visit:  Initial, Consult    Nutrition Recommendations/Plan:  Liberalize diet to low sodium with no caffeine  Provide high calorie ONS (Ensure) TID    Nutrition Assessment:  Pt admitted for angina. At nutritional risk secondary to low BMI and suboptimal po intake pta. +Mild malnutrition due to weight loss. Will liberalize diet to low sodium with no caffeine and add high calorite ONS TID. Malnutrition Assessment:  Malnutrition Status:  Mild malnutrition    Context:  Social/Environmental Circumstances     Findings of the 6 clinical characteristics of malnutrition:  Energy Intake:  Mild decrease in energy intake (Comment)  Weight Loss:  7 - Greater than 10% over 6 months     Body Fat Loss:  No significant body fat loss     Muscle Mass Loss:  No significant muscle mass loss    Fluid Accumulation:  No significant fluid accumulation     Strength:  Not Performed    Estimated Daily Nutrient Needs:  Energy (kcal):  9860-6556 (33-37kcal/kg) to promote wt gain; Weight Used for Energy Requirements:  Current(38.4kg)     Protein (g):  57g (1.5g/kg); Weight Used for Protein Requirements:  Current(38kg)        Fluid (ml/day):  ~1400ml or per md; Method Used for Fluid Requirements:     1m/kcal    Nutrition Related Findings:  pmhx: cholecystectomy, hypothyroidism, hld, htn, DM, chronic lung disease, alcoholism, slight cognitive impairment. Meds noted: synthroid. Labs noted (4/15)-slightly elavated renal labs. Glu 89. No BM recorded. No edema. Pt reports appetite improving but at home all she does is snack and never eats full meals. She was admitted in Jan for alcohol w/d. has not drank since Nov 2020. Pt states she has lost 40 lbs since November. Unable to confirm due to insufficient data in EMR. 26lb weight loss since 11/2/20 but only based on stated weights. No visual signs of wasting. Pt agreeable to ONS, wants strawberry or vanilla.  Pt states she is not a diabetic despite dx. glucose wnl. Wounds:  None(Rash on buttocks noted)       Current Nutrition Therapies:    Dietary Nutrition Supplements: Standard High Calorie Oral Supplement  DIET CARDIAC; No Caffeine    Anthropometric Measures:  · Height: 4' 9\" (144.8 cm)  · Current Body Weight: 84 lb 9.6 oz (38.4 kg)   · Admission Body Weight: 84 lb 9.6 oz (38.4 kg)([facility])    · Usual Body Weight: 105 lb (47.6 kg)(bedscale 1/13/20. 110# stated 11/2/20)     · Ideal Body Weight: 85 lbs; % Ideal Body Weight 99.5 %   · BMI: 18.3  · BMI Categories: Underweight (BMI less than 18.5)       Nutrition Diagnosis:   · Mild malnutrition, In context of social or environmental circumstances related to inadequate protein-energy intake as evidenced by poor intake prior to admission, BMI, weight loss  · Inadequate oral intake related to psychological cause or life stress as evidenced by weight loss, BMI, poor intake prior to admission    Nutrition Interventions:   Food and/or Nutrient Delivery:  Modify Current Diet  Nutrition Education/Counseling:  No recommendation at this time   Coordination of Nutrition Care:  Continue to monitor while inpatient    Goals:  Pt to consume >50% of meals and supplements. wt gain >84#       Nutrition Monitoring and Evaluation:   Behavioral-Environmental Outcomes:  None Identified   Food/Nutrient Intake Outcomes:  Food and Nutrient Intake, Supplement Intake  Physical Signs/Symptoms Outcomes:  Biochemical Data, Weight, Meal Time Behavior, Nutrition Focused Physical Findings     Discharge Planning:     Too soon to determine     Electronically signed by Emil Escobar MS, RD, LD on 4/16/21 at 2:34 PM EDT

## 2021-04-16 NOTE — PLAN OF CARE
Nutrition Problem #1: Mild malnutrition, In context of social or environmental circumstances  Intervention: Food and/or Nutrient Delivery: Modify Current Diet  Nutritional Goals: Pt to consume >50% of meals and supplements.  wt gain >84#

## 2021-04-16 NOTE — CARE COORDINATION
This CM met with patient and spoke with patient's primary contact and decision maker Christopher Hillman ([phone]). Julieta Lewis is patient's niece and has been assisting her over the last several weeks. Patient just moved into a town home and now lives by herself. Patient reports wanting assistance with housekeeping but is able to care for herself in terms of ADLs and mobility at baseline. Family assists with IADLs. Niece states that patient will be safe returning home and does not need any equipment. If recommended, patient and her niece Julieta Lewis are agreeable to [facility]. Will need [facility] order if physician feels [facility] is appropriate for this patient. OT eval has been completed, await PT eval. Patient's PCP's SW is in the process of helping patient apply for [employer] services.

## 2021-04-16 NOTE — H&P
History and Physical  Patient: Leslie Casiano  Unit/Bed:W163/W163-01  YOB: 1955  MRN: [id number]  Acct: [de-identified]   Admitting Diagnosis: Unstable angina ([address] [I20.0]  Admit Date:  4/15/2021  Hospital Day: 1      Chief Complaint:   Chest pain    History of Present Illness: This is a pleasant 21-year-old  female with past medical history significant for dyslipidemia, history of seizures, hypothyroidism, psoriasis, history of EtOH abuse and anxiety who presented to the emergency room yesterday with chief complaint of chest pain. Patient was seen her family physician yesterday in the office at which time she was complaining of intermittent chest pain episodes and due to concern for angina she was sent to the ER for further evaluation. Patient sister recently passed away in February and since that time she has been experiencing intermittent episodes of midsternal to left-sided chest pressure and heaviness which radiates toward her left arm. These episodes occur mostly with exertion and are alleviated with rest.  She states that they have been increasing in frequency recently. She does report being under a significant amount of stress and anxiety since the death of her sister. Also, she reports some shortness of breath with exertion. She denies nausea, vomiting, dizziness, lightheadedness, diaphoresis, palpitations, paroxysmal nocturnal dyspnea, orthopnea, lower extremity edema, syncope, fever or chills. On presentation to the emergency room, blood pressure 145/74, heart rate 68, respiratory rate 16, pulse ox 100% on room air, amateur of 98.7 °F.  Sodium 138, potassium 4.0, chloride 101, total CO2 28, BUN elevated 34, creatinine elevated 0.96, GFR low at 58.5, glucose 89. Troponin negative less than 0.010. ALT 25, AST 34. WBC 5.4, hemoglobin low at 9.8, hematocrit low at 29.6, platelets 032. D-dimer elevated 0.84. CTA of the chest showed no evidence of pulmonary embolism. Chest x-ray revealed no radiographic evidence of acute intrathoracic process. EKG showed sinus rhythm 62 bpm, right bundle branch block, ST depression with T wave inversion in inferior leads and V1 through V6 with poor R wave progression in V2 through V6,  ms. She was subsequently omitted for further evaluation. Apparently following admission yesterday evening, patient was noted to be confused at times and restless in the bed. Patient was made a one-on-one and hospitalist was consulted regarding confusion. Apparently, patient received Ativan, Haldol and Benadryl in the ER. Urine drug screen was negative, alcohol level negative. CT of the brain was completed and showed no acute intracranial pathology. At time my evaluation today, patient is resting comfortably and in no acute distress. She is alert and oriented x3. Her serial troponins have been negative x3 at less than 0.010. She is hemodynamically stable. Currently on telemetry she sinus rhythm with heart rate in the 70s with occasional PACs noted. She is not known to have any prior cardiac history including history of myocardial infarction, congestive heart failure or arrhythmia. TSH within normal range at 0.991. Status post stress echo in May 2019 which was negative. No prior cardiac catheterization.     Allergies   Allergen Reactions    Morphine Swelling       Current Facility-Administered Medications   Medication Dose Route Frequency Provider Last Rate Last Admin    atorvastatin (LIPITOR) tablet 40 mg  40 mg Oral Nightly Duane Gonzalez        busPIRone (BUSPAR) tablet 5 mg  5 mg Oral TID Emily Brenner PA   5 mg at 04/16/21 0908    calcium-vitamin D (OSCAL-500) 500-200 MG-UNIT per tablet 1 tablet  1 tablet Oral BID Emily RivasArnelma   1 tablet at 04/16/21 0908    escitalopram (LEXAPRO) tablet 20 mg  20 mg Oral Daily Emily Brenner Alastepanma   20 mg at 04/16/21 0908    fenofibrate (TRIGLIDE) tablet 160 mg 160 mg Oral Daily Bianca Brand [place], [place]   160 mg at 04/16/21 0961    hydrOXYzine (ATARAX) tablet 10 mg  10 mg Oral Q8H PRN Lonie Mems, [place]   10 mg at 04/16/21 0908    levothyroxine (SYNTHROID) tablet 50 mcg  50 mcg Oral Daily Lonie Mems, [place]   50 mcg at 04/16/21 0908    magnesium oxide (MAG-OX) tablet 400 mg  400 mg Oral TID [facility]   400 mg at 04/16/21 4204    pantoprazole (PROTONIX) tablet 40 mg  40 mg Oral Daily [facility]   40 mg at 04/16/21 3837    polyethylene glycol (GLYCOLAX) packet 17 g  17 g Oral Daily [place], [place]        rOPINIRole (REQUIP) tablet 0.5 mg  0.5 mg Oral Nightly [place], [place]        nitroGLYCERIN (NITROSTAT) SL tablet 0.4 mg  0.4 mg Sublingual Q5 Min PRN Avelino Vazquez, DO   0.4 mg at 04/15/21 1326    sodium chloride flush 0.9 % injection 5-40 mL  5-40 mL Intravenous 2 times per day Avelino Vazquez, DO   10 mL at 04/16/21 0910    sodium chloride flush 0.9 % injection 5-40 mL  5-40 mL Intravenous PRN Maria M Doss, DO        0.9 % sodium chloride infusion  25 mL Intravenous PRN Maria M Currie Rash, DO        promethazine (PHENERGAN) tablet 12.5 mg  12.5 mg Oral Q6H PRN Maria M Doss, DO        Or    ondansetron (ZOFRAN) injection 4 mg  4 mg Intravenous Q6H PRN Maria M Currie Rash, DO        acetaminophen (TYLENOL) tablet 650 mg  650 mg Oral Q6H PRN Maria M Rosey Rash, DO        Or    acetaminophen (TYLENOL) suppository 650 mg  650 mg Rectal Q6H PRN Maria M Rosey Rash, DO        polyethylene glycol (GLYCOLAX) packet 17 g  17 g Oral Daily PRN Avelino Vazquez, DO        aspirin chewable tablet 81 mg  81 mg Oral Daily Maria M Doss, DO   81 mg at 04/16/21 0900    enoxaparin (LOVENOX) injection 40 mg  40 mg Subcutaneous Daily Maria M Doss DO   40 mg at 04/16/21 0900    nitroGLYCERIN (NITROSTAT) SL tablet 0.4 mg  0.4 mg Sublingual Q5 Min PRN Maria M Currie Rash, DO        metoprolol tartrate (LOPRESSOR) tablet 25 mg  25 mg Oral BID Maria M Doss, DO   25 mg at 04/16/21 0900       PMHx:  Past Medical History:   Diagnosis Date    Alcoholism ([facility]     Arthritis     Chronic lung disease     Cognitive impairment 4/15/2021    Depression     Diabetes mellitus ([facility]     Diarrhea 1/4/2021    Possibly related to Metformin, Keppra, constipation, colitis. Stop Metformin. Treat constipation. Consider adjustment to Keppra given side effects.     Encephalopathy 12/10/2020    Grief reaction 4/15/2021    Hyperlipidemia     Hypertension     Hypokalemia 11/6/2020    Hypomagnesemia 11/6/2020    Hypothyroidism     Lymphadenopathy, axillary 1/4/2021    Second hand smoke exposure     Seizure ([facility] [address]     Syncope and collapse 12/10/2020       PSHx:  Past Surgical History:   Procedure Laterality Date    APPENDECTOMY      BIOPSY MOUTH LESION Bilateral 12/19/2016    REMOVAL  OF BILATERAL LINGUAL MACIE performed by Toby Ogden DDS at [facility] COLONOSCOPY N/A 1/21/2021    COLONOSCOPY DIAGNOSTIC performed by Christen Rodriguez MD at [address] [place] [address] ENDOSCOPY N/A 1/21/2021    EGD with polypectomy performed by Christen Rodriguez MD at [facility] Hx:  Social History     Socioeconomic History    Marital status: Single     Spouse name: None    Number of children: None    Years of education: 15    Highest education level: None   Occupational History    Occupation: disabled   Social Needs    Financial resource strain: Not very hard    Food insecurity     Worry: Never true     Inability: Never true    Transportation needs     Medical: Yes     Non-medical: Yes   Tobacco Use    Smoking status: Never Smoker    Smokeless tobacco: Never Used   Substance and Sexual Activity    Alcohol use: Not Currently     Alcohol/week: 28.0 standard drinks     Types: 28 Shots of liquor per week     Comment: quit drinking in November 2020    Drug use: No    Sexual activity: Not Currently   Lifestyle    Physical activity     Days per week: 0 days     Minutes per session: 0 min    Stress: Rather much   Relationships    Social connections     Talks on phone: Once a week     Gets together: Once a week     Attends [employer] witness service: Never     Active member of club or organization: No     Attends meetings of clubs or organizations: Never     Relationship status: Never     Intimate partner violence     Fear of current or ex partner: No     Emotionally abused: No     Physically abused: No     Forced sexual activity: No   Other Topics Concern    None   Social History Narrative    3/4/21 Shena Rodrigues lives in a private residence she had shared with her sister, her sister was the home owner, and she recently passed on 2/21, She is now needing to find a new home, as the house she is living in may be sold soon, Shena Rodrigues states she is independent with self care, does have some limitations but states able to function independently for most self care tasks. She does not drive related to history of seizures.  referral made for concerns with housing and transportation. Family Hx:  Family History   Problem Relation Age of Onset    Heart Disease Mother     Hypertension Mother     Diabetes Mother     Stroke Mother     Heart Disease Father     Hypertension Father     Heart Disease Sister     Hypertension Sister     Heart Disease Brother     Hypertension Brother     Diabetes Brother     Cancer Maternal Grandmother     Diabetes Maternal Grandfather        Review ofSystems:   Review of Systems   Constitutional: Negative for activity change, appetite change and fever. HENT: Negative for congestion. Respiratory: Positive for chest tightness and shortness of breath (with exertion). Cardiovascular: Positive for chest pain. Negative for palpitations and leg swelling.    Gastrointestinal: Negative for abdominal pain, nausea and vomiting. Genitourinary: Negative for difficulty urinating. Musculoskeletal: Negative for arthralgias. Skin: Negative for color change and pallor. Neurological: Negative for dizziness and syncope. Psychiatric/Behavioral: Negative for agitation, behavioral problems and confusion. Physical Examination:    BP (!) 105/55   Pulse 75   Temp 98.2 °F (36.8 °C) (Oral)   Resp 18   Ht 4' 9\" (1.448 m)   Wt 84 lb 9.6 oz (38.4 kg)   LMP  (LMP Unknown)   SpO2 96%   BMI 18.31 kg/m²    Physical Exam  Constitutional:       General: She is not in acute distress. Appearance: Normal appearance. HENT:      Head: Normocephalic and atraumatic. Neck:      Musculoskeletal: Normal range of motion and neck supple. Cardiovascular:      Rate and Rhythm: Normal rate and regular rhythm. Pulmonary:      Effort: Pulmonary effort is normal. No respiratory distress. Breath sounds: No wheezing, rhonchi or rales. Abdominal:      Tenderness: There is no abdominal tenderness. Musculoskeletal: Normal range of motion. Right lower leg: No edema. Left lower leg: No edema. Skin:     General: Skin is warm and dry. Neurological:      General: No focal deficit present. Mental Status: She is alert and oriented to person, place, and time. Cranial Nerves: No cranial nerve deficit.    Psychiatric:         Mood and Affect: Mood normal.         Behavior: Behavior normal.          LABS:  CBC:   Lab Results   Component Value Date    WBC 6.9 04/16/2021    RBC 3.38 04/16/2021    RBC 4.36 05/09/2012    HGB 10.2 04/16/2021    HCT 30.5 04/16/2021    MCV 90.1 04/16/2021    MCH 30.2 04/16/2021    MCHC 33.5 04/16/2021    RDW 13.3 04/16/2021     04/16/2021    MPV 10.8 03/10/2014     CBC with Differential:   Lab Results   Component Value Date    WBC 6.9 04/16/2021    RBC 3.38 04/16/2021    RBC 4.36 05/09/2012    HGB 10.2 04/16/2021    HCT 30.5 04/16/2021     04/16/2021    MCV 90.1 04/16/2021 parenchyma: No  parenchymal mass,  mass effect,  signs of acute infarct, or extra-axial fluid. There are mild patchy nonspecific white matter hypodensities that may be related to microvascular ischemic change or  normal aging. Hemorrhage:No acute intracranial hemorrhage. Skull base: The bony calvarium and skull base are normal.  Retention cyst or polyp in the right maxillary sinus again noted     NO ACUTE INTRACRANIAL PATHOLOGY. Cta Chest W Wo  (pe Study)    Result Date: 4/15/2021  CT PULMONARY ANGIOGRAM WITH INTRAVENOUS CONTRAST MEDIUM. REASON FOR EXAMINATION:  CHEST PAIN TECHNIQUE: Helical CTA was performed through the chest utilizing 100 ml of Isovue-300 intravenous contrast.  Images were obtained with bolus tracking in order to opacify the pulmonary arteries. Thick section coronal MIP 3D reconstructions were performed  on a separate workstation. COMPARISON: CT chest, December 30, 2020 FINDINGS:  Pulmonary arteries: No intraluminal filling defects. Thoracic aorta: Normal in course and caliber. Cardiac Size: Normal. Pericardial effusion: None. Right lung: No nodules, masses, consolidation, pleural effusion, pneumothorax. Left lung: No nodules, masses, consolidation, pleural effusion, pneumothorax. Lymph nodes: No hilar, mediastinal, or axillary lymph node enlargement. Upper abdomen:Limited imaging upper abdomen shows gallbladder surgically absent. Musculoskeletal:No osteoblastic, and no osteolytic lesions. Kyphotic, with associated thoracolumbar scoliosis with cephalad convexity to right apex at T8, and caudal convexity to left, apex L2-L3. No CT evidence pulmonary embolism. Cholecystectomy. All CT scans at this facility use dose modulation, iterative reconstruction, and/or weight based dosing when appropriate to reduce radiation dose to as low as reasonably achievable.      Xr Chest Portable    Result Date: 4/15/2021  Exam: XR CHEST PORTABLE History:  cp Technique: AP portable view of the chest obtained. Comparison: none Chest x-ray portable Findings: The cardiomediastinal silhouette is within normal limits. There are no infiltrates, consolidations or effusions. There is thoracolumbar scoliosis. No radiographic evidence of acute intrathoracic process. Stress echo 19:  Conclusions   Summary   Tolerated well. denied Chest Pain or SOB. Target HR achieved with   Dobutamine 20mcg. Normal LV size and function. Normal pharmacological (dobutamine) stress echo. Signature   ----------------------------------------------------------------   Electronically signed by Debo Ware MD(Interpreting   physician) on 2019 02:32 PM      EKG 21: SB 57, ST depression with T wave inversion leads II, III, aVF and V1-V6, poor R wave progression V2-V6, QTc 480ms    Telemetry 21: SR 70s, occasional PACs      Assessment:    Active Hospital Problems    Diagnosis Date Noted    Unstable angina ([facility] [address] [I20.0] 04/15/2021     1. Chest pain/Angina class III  2. Altered mental status--resolved. Likely medication induced as received Haldol, Ativan and benadryl in ER  3. Dyslipidemia  4. Hx DM--meds discontinued per patient  5. History ETOH abuse  6. Abnormal EKG  7. Hx seizures  8. Anemia--Hgb 10.2 today, chronic    Plan:  1. ACS orders initiated  2. Maximize medical therapy-aspirin 81 mg p.o. daily, Lopressor 25 mg p.o. twice daily, Lipitor 40 mg p.o. daily, Protonix 40 mg p.o. daily, levothyroxine 50 mcg p.o. daily, fenofibrate 160 mg p.o. daily, BuSpar 5 mg p.o. daily, Lexapro 20 mg p.o. daily, sublingual nitroglycerin as needed for chest pain  3. Home medication list included Keppra however order has  so medicine was not resumed during admission. 4. Check echocardiogram  5. Monitor on telemetry for any tachycardia or bradycardia arrhythmias  6. Maintain potassium greater than 4, magnesium greater than 2  7. GI/DVT prophylaxis  8.  Internal medicine recommendations regarding altered mental status and medical management  9. Coronary evaluation per Dr. Samantha Jiménez--? inpt vs outpatient  10. Further recommendations to follow        Electronically signed by LEODAN Lucas on 4/16/2021 at 10:03 AM      Attending Supervising [de-identified] Attestation Statement  The patient is a 63 y.o. female. I have performed a history and physical examination of the patient. I discussed the case with the physician assistant. I reviewed the patient's Past Medical History, Past Surgical History, Medications, and Allergies. Physical Exam:  Vitals:    04/15/21 1753 04/15/21 2030 04/16/21 0920 04/16/21 1338   BP: 125/83 (!) 105/55  (!) 87/57   Pulse: 78 75  52   Resp: 19 18  18   Temp: 97.9 °F (36.6 °C) 98.2 °F (36.8 °C)  97.7 °F (36.5 °C)   TempSrc: Axillary Oral  Oral   SpO2: 100% 96%  95%   Weight: 84 lb 9.6 oz (38.4 kg)      Height: 4' 9\" (1.448 m)  4' 9\" (1.448 m)        Review of Systems - Respiratory ROS: no cough, shortness of breath, or wheezing  Cardiovascular ROS: positive for - chest pain  Gastrointestinal ROS: no abdominal pain, change in bowel habits, or black or bloody stools    Pulmonary/Chest: clear to auscultation bilaterally- no wheezes, rales or rhonchi, normal air movement, no respiratory distress  Cardiovascular: normal rate, normal S1 and S2, no gallops, intact distal pulses and no carotid bruits  Abdomen: soft, non-tender, non-distended, normal bowel sounds, no masses or organomegaly    Active Hospital Problems    Diagnosis Date Noted    Unstable angina ([facility] [address] [I20.0] 04/15/2021     Priority: Low        I reviewed and agree with the findings and plan documented in her note . Impression    Unstable angina  Abnormal EKG  History of diabetes  Dyslipidemia  Anemia  History of alcohol abuse    Plan     1. Plan for cardiac catheterization on Monday. Patient is agreeable to proceed. 2. We will review 2D echocardiogram  3. Maximize cardiac medications  4.  Monitor on telemetry  5. GI/DVT prophylaxis  6.  Key potassium greater than 4, magnesium greater than 2      Electronically signed by Charly Bartlett DO on 4/16/2021 at 2:29 PM         Electronically signed by Charly Bartlett DO on 4/16/21 at 2:28 PM EDT

## 2021-04-16 NOTE — CONSULTS
Hospital Medicine  Consult not    Patient:  Tone Mills  MRN: [id number]    CHIEF COMPLAINT:    Chief Complaint   Patient presents with    Chest Pain       History Obtained From:  patient, electronic medical record  Primary Care Physician: Eliecer Cornelius MD    HISTORY OF PRESENT ILLNESS:   The patient is a 63 y.o. female who presents with unstable angina to the emergency department. Patient is a 22-year-old female with a past medical history significant for previous alcohol abuse and addiction, diabetes hypertension hyperlipidemia hypothyroid disease. She was admitted in January for alcohol withdrawal seizures but has had no subsequent seizure activity. She has some level of cognitive impairment to the extent which is unable to be determined. She saw her PCP with progressing chest pain worse with exertion was referred to the ER for admission. She was admitted to the cardiology service but apparently had some degree of agitation throughout the day some medicine is consulted for altered mental status. Initially in the emergency department the patient is afebrile vital signs are stable and the patient is saturating well on room air, this metabolic profile is benign LFTs are normal and the alcohol level is negative. Troponins remain flat and CBC is significant only for a normocytic anemia. Urine drug screen was negative at that time and blood alcohol level is negative. My evaluation the patient is alert and oriented x3 initially she thought she is a [address] but then did realize that she is at [facility]. She does have an odd affect and behavior however calls to her niece to obtain her baseline level of functioning I was unsuccessful. Message was left to discuss the patient. Subjective however is quite limited the patient received Ativan and Haldol and Benadryl in the ED, limiting the effectiveness of his subjective evaluation.   Of note the patient did recently move into an apartment by herself and has some assistance with ADLs however per PCP notes there is does seem to be some level of assistance that will be required for the patient to maintain independence. Past Medical History:      Diagnosis Date    [facility] [address]     Arthritis     Chronic lung disease     Cognitive impairment 4/15/2021    Depression     Diabetes mellitus ([facility] [address]     Diarrhea 1/4/2021    Possibly related to Metformin, Keppra, constipation, colitis. Stop Metformin. Treat constipation. Consider adjustment to Keppra given side effects.  Encephalopathy 12/10/2020    Grief reaction 4/15/2021    Hyperlipidemia     Hypertension     Hypokalemia 11/6/2020    Hypomagnesemia 11/6/2020    Hypothyroidism     Lymphadenopathy, axillary 1/4/2021    Second hand smoke exposure     Seizure ([facility] [address]     Syncope and collapse 12/10/2020       Past Surgical History:      Procedure Laterality Date    APPENDECTOMY      BIOPSY MOUTH LESION Bilateral 12/19/2016    REMOVAL  OF BILATERAL LINGUAL MACIE performed by Lalitha Coffman DDS at [facility] COLONOSCOPY N/A 1/21/2021    COLONOSCOPY DIAGNOSTIC performed by Herb Gardner MD at [address] [place] [address] ENDOSCOPY N/A 1/21/2021    EGD with polypectomy performed by Herb Gardner MD at [facility]       Medications Prior to Admission:    Prior to Admission medications    Medication Sig Start Date End Date Taking?  Authorizing Provider   busPIRone (BUSPAR) 5 MG tablet Take 1 tablet by mouth 3 times daily 4/15/21 5/15/21 Yes Stephanie Carranza MD   hydrOXYzine (ATARAX) 10 MG tablet TAKE 1 TABLET BY MOUTH  EVERY 8 HOURS AS NEEDED FOR ITCHING 4/6/21  Yes Stephanie Carranza MD   fluocinonide (LIDEX) 0.05 % ointment APPLY TWICE DAILY 4/6/21  Yes Stephanie Carranza MD   fenofibrate (TRIGLIDE) 160 MG tablet Take 1 tablet by mouth daily 3/11/21  Yes Brittni Goss Adalberto Le MD   Magnesium 400 MG CAPS Take 1 capsule by mouth 3 times daily 3/11/21  Yes Amanda Mathews MD   escitalopram (LEXAPRO) 20 MG tablet Take 1 tablet by mouth daily 2/25/21  Yes Amanda Mathews MD   polyethylene glycol [facility]) 17 GM/SCOOP powder Take 17 g by mouth daily 1/4/21  Yes Amanda Mathews MD   ondansetron [facility]) 4 MG tablet Take 1 tablet by mouth 3 times daily as needed for Nausea or Vomiting 12/18/20  Yes Amanda Mathews MD   Nutritional Supplements (ENSURE COMPLETE) LIQD Take 1 Bottle by mouth 2 times daily 12/18/20  Yes Amanda Mathews MD   levETIRAcetam (KEPPRA) 500 MG tablet Take 1 tablet by mouth 2 times daily  Patient taking differently: Take 500 mg by mouth 2 times daily Take 1/2 (250mg) tablet in the morning and 1 tablet (500mg) if the afternoon.  12/2/20 4/15/21 Yes Pritesh Georges MD   triamcinolone (KENALOG) 0.025 % cream Apply topically every 4 hours as needed Apply Topically   Yes Tiara Dc MD   levothyroxine (SYNTHROID) 50 MCG tablet TAKE 1 TABLET BY MOUTH  DAILY 9/30/20  Yes Amanda Mathews MD   pantoprazole (PROTONIX) 40 MG tablet TAKE 1 TABLET BY MOUTH  DAILY 9/30/20  Yes Amanda Mathews MD   atorvastatin (LIPITOR) 40 MG tablet TAKE 1 TABLET BY MOUTH ONCE DAILY 9/30/20  Yes Amanda Mathews MD   rOPINIRole (REQUIP) 0.5 MG tablet TAKE 1 TABLET BY MOUTH AT  NIGHT 9/30/20  Yes Amanda Mathews MD   alendronate (FOSAMAX) 35 MG tablet TAKE 1 TABLET BY MOUTH  EVERY 7 DAYS 9/21/20  Yes Amanda Mathews MD   calcium carbonate-vitamin D (CALTRATE) 600-400 MG-UNIT TABS per tab Take 1 tablet by mouth 2 times daily 1/13/20  Yes Amanda Mathews MD   ACETAMINOPHEN EXTRA STRENGTH 500 MG tablet Take 1 tablet by mouth 2 times daily as needed for Pain 6/19/19 4/15/21 Yes Amanda Mathews MD       Allergies:  Morphine    Social History: 04/15/21  1230      K 4.0      CO2 28   BUN 34*   CREATININE 0.96*   GLUCOSE 89   AST 34   ALT 25   BILITOT <0.2   ALKPHOS 80     Troponin T:   Recent Labs     04/15/21  1230 04/15/21  1637   TROPONINI <0.010 <0.010     INR:   Recent Labs     04/15/21  1230   INR 0.9     URINALYSIS:No results for input(s): NITRITE, COLORU, PHUR, LABCAST, WBCUA, RBCUA, MUCUS, TRICHOMONAS, YEAST, BACTERIA, CLARITYU, SPECGRAV, LEUKOCYTESUR, UROBILINOGEN, BILIRUBINUR, BLOODU, GLUCOSEU, AMORPHOUS in the last 72 hours. Invalid input(s): [employer]  -----------------------------------------------------------------   Cta Chest W Wo  (pe Study)    Result Date: 4/15/2021  CT PULMONARY ANGIOGRAM WITH INTRAVENOUS CONTRAST MEDIUM. REASON FOR EXAMINATION:  CHEST PAIN TECHNIQUE: Helical CTA was performed through the chest utilizing 100 ml of Isovue-300 intravenous contrast.  Images were obtained with bolus tracking in order to opacify the pulmonary arteries. Thick section coronal MIP 3D reconstructions were performed  on a separate workstation. COMPARISON: CT chest, December 30, 2020 FINDINGS:  Pulmonary arteries: No intraluminal filling defects. Thoracic aorta: Normal in course and caliber. Cardiac Size: Normal. Pericardial effusion: None. Right lung: No nodules, masses, consolidation, pleural effusion, pneumothorax. Left lung: No nodules, masses, consolidation, pleural effusion, pneumothorax. Lymph nodes: No hilar, mediastinal, or axillary lymph node enlargement. Upper abdomen:Limited imaging upper abdomen shows gallbladder surgically absent. Musculoskeletal:No osteoblastic, and no osteolytic lesions. Kyphotic, with associated thoracolumbar scoliosis with cephalad convexity to right apex at T8, and caudal convexity to left, apex L2-L3. No CT evidence pulmonary embolism. Cholecystectomy.  All CT scans at this facility use dose modulation, iterative reconstruction, and/or weight based dosing when appropriate to reduce radiation dose to as low as reasonably achievable. Xr Chest Portable    Result Date: 4/15/2021  Exam: XR CHEST PORTABLE History:  cp Technique: AP portable view of the chest obtained. Comparison: none Chest x-ray portable Findings: The cardiomediastinal silhouette is within normal limits. There are no infiltrates, consolidations or effusions. There is thoracolumbar scoliosis. No radiographic evidence of acute intrathoracic process. EKG: Ordered, pending    Assessment and Plan   1. Altered mental status: Check CT head. Check urinalysis. Urine drug screen was negative for drug abuse alcohol level is negative. Once sedation is worn off patient can be reevaluated with psychiatry consultation if necessary or neurology consultation if necessary. Check B12 vitamin D levels. Patient also has apparently had some depression issues since loss of her sister there may be some component of crease associated delirium/pseudodementia complementing the symptoms  2. Unstable angina: Management per cardiology. Patient has no chest pain troponins are negative repeat EKG. 3. History of alcohol abuse: Continues to recommend cessation. 4. Hypothyroid disease: Check TSH  5.  Protein calorie malnutrition: Supplements as needed dietitian consult  DVT prophylaxis with Lovenox  Patient Active Problem List   Diagnosis Code    Mixed hyperlipidemia E78.2    Abnormal glucose R73.09    Gastroesophageal reflux disease without esophagitis K21.9    Alcoholism ([facility] [address] F10.20    Psoriasis L40.9    Vitamin D deficiency E55.9    Restless leg syndrome G25.81    Bilateral leg pain M79.604, M79.605    Mood disorder (HCC) F39    Hypothyroidism E03.9    Osteopenia of multiple sites M85.89    Facet arthropathy, multilevel M47.819    Chronic pain of right knee M25.561, G89.29    Abnormal EKG R94.31    BARRIENTOS (dyspnea on exertion) R06.00    Anemia D64.9    Weight loss R63.4    Seizure (HCC) R56.9    Hypomagnesemia E83.42    Hypokalemia

## 2021-04-16 NOTE — PROGRESS NOTES
Occupational Therapy  [facility] OCCUPATIONAL THERAPY EVALUATION - ACUTE     NAME: Jose Torre  : [id number] (10 y.o.)  MRN: [id number]  CODE STATUS: Full Code  Room: O401/V587-60    Date of Service: 2021    Patient Diagnosis(es): Unstable angina [facility]) [I20.0]   Chief Complaint   Patient presents with    Chest Pain     Patient Active Problem List    Diagnosis Date Noted    Cognitive impairment 04/15/2021    Anxiety 04/15/2021    Grief reaction 04/15/2021    Unstable angina ([address] 04/15/2021    Epigastric pain     Gastric polyp     Altered bowel habits     Lymphadenopathy, axillary 2021    Constipation 2021    Fatigue 2021    Generalized idiopathic epilepsy and epileptic syndromes, intractable, with status epilepticus ([address] 12/10/2020    Polyneuropathy associated with underlying disease ([address]     Hypercalcemia 11/10/2020    Hypomagnesemia 2020    Hypokalemia 2020    Seizure ([address] 2020    Anemia 2020    Weight loss 2020    BARRIENTOS (dyspnea on exertion) 2019    Abnormal EKG 2019    Chronic pain of right knee 2018    Facet arthropathy, multilevel 2018    Osteopenia of multiple sites 2018    Hypothyroidism 2018    Bilateral leg pain 2017    Mood disorder ([address] 2017    Psoriasis 2017    Vitamin D deficiency 2017    Restless leg syndrome 2017    Alcoholism ([address]     Gastroesophageal reflux disease without esophagitis 10/26/2016    Abnormal glucose 10/03/2016    Mixed hyperlipidemia 1981        Past Medical History:   Diagnosis Date    Alcoholism ([address]     Arthritis     Chronic lung disease     Cognitive impairment 4/15/2021    Depression     Diabetes mellitus ([address]     Diarrhea 2021    Possibly related to Metformin, Keppra, constipation, colitis. Stop Metformin. Treat constipation. Consider adjustment to Keppra given side effects.     Status: Within Functional Limits    Observation:  Observation/Palpation  Posture: Fair  Observation: pleasant and cooperative, no acute distress, agreeable to OT evaluation    Cognition Status:  Cognition  Overall Cognitive Status: WFL    Perception Status:  Perception  Overall Perceptual Status: WFL    Sensation Status:  Sensation  Overall Sensation Status: Impaired(tingling in B UEs occasionally)    Vision and Hearing Status:  Vision  Vision: Impaired  Vision Exceptions: Wears glasses for reading  Hearing  Hearing: Exceptions to [facility]  Hearing Exceptions: Hard of hearing/hearing concerns     ROM:   LUE AROM (degrees)  LUE AROM : Exceptions  LUE General AROM: limited to 95º  Left Hand AROM (degrees)  Left Hand AROM: WFL  RUE AROM (degrees)  RUE AROM : Exceptions  RUE General AROM: limited to 95º  Right Hand AROM (degrees)  Right Hand AROM: WFL    Strength:  LUE Strength  Gross LUE Strength: Exceptions to [facility]  L Hand General: 3-/5  LUE Strength Comment: 3-/5 all planes  RUE Strength  Gross RUE Strength: Exceptions to [facility]  R Hand General: 3-/5  RUE Strength Comment: 3-/5 all planes    Coordination, Tone, Quality of Movement:    Tone RUE  RUE Tone: Normotonic  Tone LUE  LUE Tone: Normotonic  Coordination  Movements Are Fluid And Coordinated: No  Coordination and Movement description: Fine motor impairments, Decreased accuracy, Right UE, Left UE    Hand Dominance:  Hand Dominance  Hand Dominance: Right    ADL Status:  ADL  Feeding: Independent  Grooming: Independent  UE Bathing: Independent  LE Bathing: Independent  UE Dressing: Independent  LE Dressing: Independent  Toileting: Independent  Additional Comments: Simulated all ADLs as above  Toilet Transfers  Toilet - Technique: Ambulating  Equipment Used: Grab bars  Toilet Transfer: Modified independent       Therapy key for assistance levels -   Independent = Pt. is able to perform task with no assistance but may require a device   Stand by assistance = Pt. does not perform task at an independent level but does not need physical assistance, requires verbal cues  Minimal, Moderate, Maximal Assistance = Pt. requires physical assistance (25%, 50%, 75% assist from helper) for task but is able to actively participate in task   Dependent = Pt. requires total assistance with task and is not able to actively participate with task completion     Functional Mobility:  Functional Mobility  Functional - Mobility Device: No device  Activity: To/from bathroom  Assist Level: Independent  Transfers  Sit to stand: Modified independent  Stand to sit: Modified independent    Bed Mobility  Bed mobility  Supine to Sit: Supervision  Sit to Supine: Unable to assess  Comment: Up to chair after ADL    Seated and Standing Balance:  Balance  Sitting Balance: Independent  Standing Balance: Independent    Functional Endurance:  Activity Tolerance  Activity Tolerance: Patient Tolerated treatment well    D/C Recommendations:  OT D/C RECOMMENDATIONS  REQUIRES OT FOLLOW UP: No    Equipment Recommendations:  OT Equipment Recommendations  Other: N/A    OT Education:   OT Education  OT Education: OT Role    OT Follow Up:  OT D/C RECOMMENDATIONS  REQUIRES OT FOLLOW UP: No       Assessment/Discharge Disposition:  Assessment: Pt is a 62 y/o woman from home alone with family support who [facility] with the above deficits. After initial evaluation, pt functioning at an independent level at baseline and would not benefit from further OT services at this time.   Performance deficits / Impairments: Decreased strength  Prognosis: Good  No Skilled OT: Independent with functional mobility, Independent with ADL's, Safe to return home, No OT goals identified  Decision Making: Low Complexity  History: Pt's medical history is moderately complex  Exam: 1 perf deficit  Assistance / Modification: Independent    Six Click Score   How much help for putting on and taking off regular lower body clothing?: None  How much help for Bathing?: None  How much help for Toileting?: None  How much help for putting on and taking off regular upper body clothing?: None  How much help for taking care of personal grooming?: None  How much help for eating meals?: None  AM-PAC Inpatient Daily Activity Raw Score: 24  AM-PAC Inpatient ADL T-Scale Score : 57.54  ADL Inpatient CMS 0-100% Score: 0    Patient Goal: Patient goals : \"to get out\"    Discussed and agreed upon: Yes Comments:     Therapy Time:   OT Individual Minutes  Time In: 1320  Time Out: 1337  Minutes: 17    Eval: 17 minutes     Electronically signed by:    NATA Mcfadden  4/16/2021, 1:49 PM

## 2021-04-17 LAB
ANION GAP SERPL CALCULATED.3IONS-SCNC: 14 MEQ/L (ref 9–15)
BACTERIA: NEGATIVE /HPF
BILIRUBIN URINE: NEGATIVE
BLOOD, URINE: NEGATIVE
BUN BLDV-MCNC: 38 MG/DL (ref 8–23)
CALCIUM SERPL-MCNC: 9.3 MG/DL (ref 8.5–9.9)
CHLORIDE BLD-SCNC: 102 MEQ/L (ref 95–107)
CLARITY: CLEAR
CO2: 23 MEQ/L (ref 20–31)
COLOR: YELLOW
CREAT SERPL-MCNC: 1.12 MG/DL (ref 0.5–0.9)
EPITHELIAL CELLS, UA: NORMAL /HPF (ref 0–5)
GFR AFRICAN AMERICAN: 59.3
GFR NON-AFRICAN AMERICAN: 49
GLUCOSE BLD-MCNC: 109 MG/DL (ref 70–99)
GLUCOSE BLD-MCNC: 122 MG/DL (ref 60–115)
GLUCOSE URINE: NEGATIVE MG/DL
HYALINE CASTS: NORMAL /HPF (ref 0–5)
KETONES, URINE: NEGATIVE MG/DL
LEUKOCYTE ESTERASE, URINE: ABNORMAL
NITRITE, URINE: NEGATIVE
PERFORMED ON: ABNORMAL
PH UA: 6.5 (ref 5–9)
POTASSIUM SERPL-SCNC: 3.9 MEQ/L (ref 3.4–4.9)
PROTEIN UA: NEGATIVE MG/DL
RBC UA: NORMAL /HPF (ref 0–5)
SODIUM BLD-SCNC: 139 MEQ/L (ref 135–144)
SPECIFIC GRAVITY UA: 1.01 (ref 1–1.03)
UROBILINOGEN, URINE: 0.2 E.U./DL
WBC UA: NORMAL /HPF (ref 0–5)

## 2021-04-17 PROCEDURE — 2580000003 HC RX 258: Performed by: INTERNAL MEDICINE

## 2021-04-17 PROCEDURE — 6370000000 HC RX 637 (ALT 250 FOR IP): Performed by: EMERGENCY MEDICINE

## 2021-04-17 PROCEDURE — 97161 PT EVAL LOW COMPLEX 20 MIN: CPT

## 2021-04-17 PROCEDURE — 6360000002 HC RX W HCPCS: Performed by: INTERNAL MEDICINE

## 2021-04-17 PROCEDURE — 99232 SBSQ HOSP IP/OBS MODERATE 35: CPT | Performed by: INTERNAL MEDICINE

## 2021-04-17 PROCEDURE — 6370000000 HC RX 637 (ALT 250 FOR IP): Performed by: PHYSICIAN ASSISTANT

## 2021-04-17 PROCEDURE — 81001 URINALYSIS AUTO W/SCOPE: CPT

## 2021-04-17 PROCEDURE — 2580000003 HC RX 258: Performed by: EMERGENCY MEDICINE

## 2021-04-17 PROCEDURE — 87086 URINE CULTURE/COLONY COUNT: CPT

## 2021-04-17 PROCEDURE — 6360000002 HC RX W HCPCS: Performed by: EMERGENCY MEDICINE

## 2021-04-17 PROCEDURE — 36415 COLL VENOUS BLD VENIPUNCTURE: CPT

## 2021-04-17 PROCEDURE — G0378 HOSPITAL OBSERVATION PER HR: HCPCS

## 2021-04-17 PROCEDURE — 96365 THER/PROPH/DIAG IV INF INIT: CPT

## 2021-04-17 PROCEDURE — 96372 THER/PROPH/DIAG INJ SC/IM: CPT

## 2021-04-17 PROCEDURE — 6370000000 HC RX 637 (ALT 250 FOR IP): Performed by: NURSE PRACTITIONER

## 2021-04-17 PROCEDURE — 80048 BASIC METABOLIC PNL TOTAL CA: CPT

## 2021-04-17 PROCEDURE — 2060000000 HC ICU INTERMEDIATE R&B

## 2021-04-17 PROCEDURE — 97162 PT EVAL MOD COMPLEX 30 MIN: CPT

## 2021-04-17 RX ORDER — MECOBALAMIN 5000 MCG
5 TABLET,DISINTEGRATING ORAL NIGHTLY
Status: DISCONTINUED | OUTPATIENT
Start: 2021-04-18 | End: 2021-04-19 | Stop reason: HOSPADM

## 2021-04-17 RX ORDER — SODIUM CHLORIDE 9 MG/ML
INJECTION, SOLUTION INTRAVENOUS CONTINUOUS
Status: DISPENSED | OUTPATIENT
Start: 2021-04-17 | End: 2021-04-18

## 2021-04-17 RX ADMIN — Medication 10 ML: at 19:46

## 2021-04-17 RX ADMIN — LEVETIRACETAM 500 MG: 500 TABLET ORAL at 19:44

## 2021-04-17 RX ADMIN — BUSPIRONE HYDROCHLORIDE 5 MG: 5 TABLET ORAL at 19:46

## 2021-04-17 RX ADMIN — LEVOTHYROXINE SODIUM 50 MCG: 0.05 TABLET ORAL at 06:53

## 2021-04-17 RX ADMIN — ASPIRIN 81 MG: 81 TABLET, CHEWABLE ORAL at 08:08

## 2021-04-17 RX ADMIN — HYDROXYZINE HYDROCHLORIDE 10 MG: 10 TABLET, FILM COATED ORAL at 00:05

## 2021-04-17 RX ADMIN — SODIUM CHLORIDE: 9 INJECTION, SOLUTION INTRAVENOUS at 12:30

## 2021-04-17 RX ADMIN — OYSTER SHELL CALCIUM WITH VITAMIN D 1 TABLET: 500; 200 TABLET, FILM COATED ORAL at 08:08

## 2021-04-17 RX ADMIN — Medication 10 ML: at 08:08

## 2021-04-17 RX ADMIN — ROPINIROLE HYDROCHLORIDE 0.5 MG: 0.25 TABLET, FILM COATED ORAL at 19:46

## 2021-04-17 RX ADMIN — BUSPIRONE HYDROCHLORIDE 5 MG: 5 TABLET ORAL at 12:30

## 2021-04-17 RX ADMIN — HYDROXYZINE HYDROCHLORIDE 10 MG: 10 TABLET, FILM COATED ORAL at 16:57

## 2021-04-17 RX ADMIN — ESCITALOPRAM OXALATE 20 MG: 20 TABLET ORAL at 08:08

## 2021-04-17 RX ADMIN — MAGNESIUM OXIDE TAB 400 MG (240 MG ELEMENTAL MG) 400 MG: 400 (240 MG) TAB at 08:08

## 2021-04-17 RX ADMIN — Medication 5 MG: at 23:55

## 2021-04-17 RX ADMIN — MAGNESIUM OXIDE TAB 400 MG (240 MG ELEMENTAL MG) 400 MG: 400 (240 MG) TAB at 19:45

## 2021-04-17 RX ADMIN — ACETAMINOPHEN 650 MG: 325 TABLET ORAL at 14:28

## 2021-04-17 RX ADMIN — ATORVASTATIN CALCIUM 40 MG: 40 TABLET, FILM COATED ORAL at 19:45

## 2021-04-17 RX ADMIN — MAGNESIUM OXIDE TAB 400 MG (240 MG ELEMENTAL MG) 400 MG: 400 (240 MG) TAB at 12:30

## 2021-04-17 RX ADMIN — CEFTRIAXONE SODIUM 1000 MG: 1 INJECTION, POWDER, FOR SOLUTION INTRAMUSCULAR; INTRAVENOUS at 12:30

## 2021-04-17 RX ADMIN — OYSTER SHELL CALCIUM WITH VITAMIN D 1 TABLET: 500; 200 TABLET, FILM COATED ORAL at 19:45

## 2021-04-17 RX ADMIN — ENOXAPARIN SODIUM 40 MG: 40 INJECTION SUBCUTANEOUS at 08:09

## 2021-04-17 RX ADMIN — BUSPIRONE HYDROCHLORIDE 5 MG: 5 TABLET ORAL at 08:08

## 2021-04-17 RX ADMIN — HYDROXYZINE HYDROCHLORIDE 10 MG: 10 TABLET, FILM COATED ORAL at 08:08

## 2021-04-17 RX ADMIN — METOPROLOL TARTRATE 25 MG: 25 TABLET, FILM COATED ORAL at 08:08

## 2021-04-17 RX ADMIN — METOPROLOL TARTRATE 25 MG: 25 TABLET, FILM COATED ORAL at 19:44

## 2021-04-17 RX ADMIN — PANTOPRAZOLE SODIUM 40 MG: 40 TABLET, DELAYED RELEASE ORAL at 08:08

## 2021-04-17 RX ADMIN — LEVETIRACETAM 500 MG: 500 TABLET ORAL at 08:08

## 2021-04-17 RX ADMIN — FENOFIBRATE 160 MG: 160 TABLET ORAL at 08:08

## 2021-04-17 ASSESSMENT — ENCOUNTER SYMPTOMS
BLOOD IN STOOL: 0
COUGH: 0
STRIDOR: 0
CHEST TIGHTNESS: 0
SHORTNESS OF BREATH: 0
NAUSEA: 0
GASTROINTESTINAL NEGATIVE: 1
EYES NEGATIVE: 1
RESPIRATORY NEGATIVE: 1
WHEEZING: 0

## 2021-04-17 ASSESSMENT — PAIN SCALES - GENERAL
PAINLEVEL_OUTOF10: 0
PAINLEVEL_OUTOF10: 6

## 2021-04-17 NOTE — PROGRESS NOTES
Physical Therapy Med Surg Initial Assessment  Facility/Department: [facility]  [facility]       NAME: Malorie Lopez  : [id number] (88 y.o.)  MRN: [id number]  CODE STATUS: Full Code    Date of Service: 2021    Patient Diagnosis(es): Unstable angina [facility]) [I20.0]   Chief Complaint   Patient presents with    Chest Pain     Patient Active Problem List    Diagnosis Date Noted    Cognitive impairment 04/15/2021    Anxiety 04/15/2021    Grief reaction 04/15/2021    Unstable angina ([address] 04/15/2021    Epigastric pain     Gastric polyp     Altered bowel habits     Lymphadenopathy, axillary 2021    Constipation 2021    Fatigue 2021    Generalized idiopathic epilepsy and epileptic syndromes, intractable, with status epilepticus ([address] 12/10/2020    Polyneuropathy associated with underlying disease ([address]     Hypercalcemia 11/10/2020    Hypomagnesemia 2020    Hypokalemia 2020    Seizure ([address] 2020    Anemia 2020    Weight loss 2020    BARRIENTOS (dyspnea on exertion) 2019    Abnormal EKG 2019    Chronic pain of right knee 2018    Facet arthropathy, multilevel 2018    Osteopenia of multiple sites 2018    Hypothyroidism 2018    Bilateral leg pain 2017    Mood disorder ([address] 2017    Psoriasis 2017    Vitamin D deficiency 2017    Restless leg syndrome 2017    Alcoholism ([address]     Gastroesophageal reflux disease without esophagitis 10/26/2016    Abnormal glucose 10/03/2016    Mixed hyperlipidemia 1981        Past Medical History:   Diagnosis Date    Alcoholism ([address]     Arthritis     Chronic lung disease     Cognitive impairment 4/15/2021    Depression     Diabetes mellitus ([address]     Diarrhea 2021    Possibly related to Metformin, Keppra, constipation, colitis. Stop Metformin. Treat constipation. Consider adjustment to Keppra given side effects.     Encephalopathy 12/10/2020    Grief reaction 4/15/2021    Hyperlipidemia     Hypertension     Hypokalemia 11/6/2020    Hypomagnesemia 11/6/2020    Hypothyroidism     Lymphadenopathy, axillary 1/4/2021    Second hand smoke exposure     Se[facility] [address] [facility]     Syncope and collapse 12/10/2020     Past Surgical History:   Procedure Laterality Date    APPENDECTOMY      BIOPSY MOUTH LESION Bilateral 12/19/2016    REMOVAL  OF BILATERAL LINGUAL MACIE performed by Kei Tijerina DDS at [facility] COLONOSCOPY N/A 1/21/2021    COLONOSCOPY DIAGNOSTIC performed by Bette Walker MD at [address] [place] [address] ENDOSCOPY N/A 1/21/2021    EGD with polypectomy performed by Bette Walker MD at [facility]       Chart Reviewed: Yes  Patient assessed for rehabilitation services?: Yes  Family / Caregiver Present: No  General Comment  Comments: Pt up ad tristen in room, pt agreeable to PT eval    Restrictions:  Restrictions/Precautions: Fall Risk     SUBJECTIVE: Subjective: Pt reports that she is here d/t chest pains. Pt admits to increased weakness progressing over the last year.  Pt states that she has desire to become stronger and be able to do more with out becoming fatigued    Pain  Pre Treatment Pain Screening  Pain at present: 0  Scale Used: Numeric Score    Post Treatment Pain Screening:   Pain Screening  Patient Currently in Pain: No  Pain Assessment  Pain Assessment: 0-10  Pain Level: 0    Prior Level of Function:  Social/Functional History  Lives With: Alone  Type of Home: House(townhouse)  Home Layout: Two level  Home Access: Stairs to enter with rails  Entrance Stairs - Number of Steps: 1+13 steps  Bathroom Shower/Tub: Tub/Shower unit  Bathroom Equipment: Shower chair  Receives Help From: Friend(s), Family  ADL Assistance: Independent  Homemaking Assistance: Independent  Homemaking Responsibilities: Yes  Ambulation Assistance: Independent  Transfer Assistance: Independent  Active : No  Patient's  Info: nieces  Occupation: On disability  Type of occupation: worked at [employer]  Leisure & Hobbies: play on tablet    OBJECTIVE:   Vision: Impaired  Vision Exceptions: Wears glasses for reading  Hearing: Exceptions to [facility]  Hearing Exceptions: Hard of hearing/hearing concerns    Cognition:  Overall Orientation Status: Within Functional Limits  Follows Commands: Within Functional Limits    Observation/Palpation  Posture: Fair  Observation: forward head, rounded shoulders, increased T kyphosis, short stature, no acute distress on RA, pleasant and cooperative    ROM:  RLE AROM: WFL  LLE AROM : WFL    Strength:  Strength RLE  Comment: hip and knee 4-/5, ankle 4/5  Strength LLE  Comment: hip and knee 4-/5, ankle 4/5  Strength Other  Other: MMT peformed in sitting    Neuro:  Balance  Posture: Good  Sitting - Static: Good  Sitting - Dynamic: Good  Standing - Static: Good  Standing - Dynamic: Good;-(picking up object with supervision)     Tone RLE  RLE Tone: Normotonic  Tone LLE  LLE Tone: Normotonic  Motor Control  Gross Motor?: WFL  Sensation  Overall Sensation Status: WFL(pt denies numbness/tingling)    Bed mobility  Supine to Sit: Modified independent  Sit to Supine: Modified independent  Comment: HOB slightly elevated    Transfers  Sit to Stand: Independent  Stand to sit:  Independent    Ambulation  Ambulation?: Yes  Ambulation 1  Surface: level tile  Device: No Device  Assistance: Independent  Gait Deviations: Slow Taina  Distance: 50ft with turns  Comments: Pt able to ambulate without, pt demos ability to ambulate with and without handling IV pole in room environment with steady gait    Activity Tolerance  Activity Tolerance: Patient Tolerated treatment well      PT Education  PT Education: PT Role;Plan of Care  Patient Education: HH vs outpt PT    ASSESSMENT:   Body structures, Functions, Activity limitations: Decreased strength;Decreased endurance  Decision Making: Medium Complexity  History: med  Exam: low  Clinical Presentation: low  No Skilled PT: Safe to return home    Prognosis: Good  Patient Education: HH vs outpt PT  Barriers to Learning: none    DISCHARGE RECOMMENDATIONS:  No Skilled PT: Safe to return home    Assessment: Pt presents with recent chest pains and undergoing cardiac work up. Pt reports decrease in strength, endurance, and functional activity tolerance compared to her baseline 2 years ago. Pt would benefit from PT to initiate HEP and address strength and endurance deficits. Pt demonstrates indep with mobility in room environment and is mobilizing herself in room regularly. Pt encourged to perform progressive ambulation program to tolerance when cleared by cardiologist.  REQUIRES PT FOLLOW UP: No      PLAN OF CARE:  Plan  Times per week: eval only  Safety Devices  Type of devices: Left in bed, Call light within reach    Goals:  Patient goals : \"go home\"    [facility] (6 CLICK) [address] Raw Score : 23     Therapy Time:   Individual   Time In 8533   Time Out 1455   Minutes 10           Henry Sanchez, 04/17/21 at 3:05 PM         Definitions for assistance levels  Independent = pt does not require any physical supervision or assistance from another person for activity completion. Device may be needed.   Stand by assistance = pt requires verbal cues or instructions from another person, close to but not touching, to perform the activity  Minimal assistance= pt performs 75% or more of the activity; assistance is required to complete the activity  Moderate assistance= pt performs 50% of the activity; assistance is required to complete the activity  Maximal assistance = pt performs 25% of the activity; assistance is required to complete the activity  Dependent = pt requires total physical assistance to accomplish the task

## 2021-04-17 NOTE — FLOWSHEET NOTE
0810  AM assessment complete. VSS. Pt has no c/o chest pain or SOB, resp even and unlabored. No distress noted. Lungs clear bilat. No peripheral edema noted. Pulses palpable. Pt up in room with steady gait, A&Ox4 calm and cooperative. AM medications given without difficulty. No requests at this time, will continue to monitor. 1130  Pt up in chair to eat lunch. No requests/complaints at this time, will continue to monitor. 1530  Pt resting quietly in bed with eyes closed. No distress noted. Will continue to monitor. [address]  Pt watching ipad that niece brought in. No requests/complaints at this time, will continue to monitor.

## 2021-04-17 NOTE — PROGRESS NOTES
Spiritual Care Services     Summary of Visit:  Pt shared much of struggles in recent months. Most significantly, the sudden death of her sister. Prayer and communion today, carrying hope for her upcoming procedure. Spiritual Assessment/Intervention/Outcomes:    Encounter Summary  Services provided to[de-identified] Patient  [facility]  Support System: Family members(niece, nephew)  Place of Faith: none  Continue Visiting: Yes  Complexity of Encounter: Low  Length of Encounter: 15 minutes  Spiritual Assessment Completed: Yes  Routine  Assessment: Sleeping     Spiritual/Bahai  Type: Spiritual support  Assessment: Approachable(recent grief)  Intervention: Communion, Prayer, Nurtured hope, Active listening, Explored feelings, thoughts, concerns, Explored coping resources  Outcome: Coping, Connection/belonging, Comfort  Sacraments  Communion: Patient received communion     Advance Directives (For Healthcare)  Pre-existing DNR Comfort Care/DNR Arrest/DNI Order: No  Healthcare Directive: No, patient does not have an advance directive for healthcare treatment  Information on Healthcare Directives Requested: No  Patient Requests Assistance: No  Advance Directives: Pt. not interested at this time           Values / Beliefs  Do you have any ethnic, cultural, sacramental, or spiritual Quaker needs you would like us to be aware of while you are in the hospital?: No    Care Plan:    Ongoing prayer and support welcomed by pt. Spiritual Care Services   Electronically signed by Vonnie Owen on 4/17/21 at 1:54 PM EDT     To reach a  for emotional and spiritual support, place an [facility] consult request.   If a  is needed immediately, dial 0 and ask to page the on-call .

## 2021-04-17 NOTE — PROGRESS NOTES
 Highest education level: None   Occupational History    Occupation: disabled   Social Needs    Financial resource strain: Not very hard    Food insecurity     Worry: Never true     Inability: Never true    Transportation needs     Medical: Yes     Non-medical: Yes   Tobacco Use    Smoking status: Never Smoker    Smokeless tobacco: Never Used   Substance and Sexual Activity    Alcohol use: Not Currently     Alcohol/week: 28.0 standard drinks     Types: 28 Shots of liquor per week     Comment: quit drinking in November 2020    Drug use: No    Sexual activity: Not Currently   Lifestyle    Physical activity     Days per week: 0 days     Minutes per session: 0 min    Stress: Rather much   Relationships    Social connections     Talks on phone: Once a week     Gets together: Once a week     Attends Mosque service: Never     Active member of club or organization: No     Attends meetings of clubs or organizations: Never     Relationship status: Never     Intimate partner violence     Fear of current or ex partner: No     Emotionally abused: No     Physically abused: No     Forced sexual activity: No   Other Topics Concern    None   Social History Narrative    3/4/21 Compa Serrano lives in a private residence she had shared with her sister, her sister was the home owner, and she recently passed on 2/21, She is now needing to find a new home, as the house she is living in may be sold soon, Compa Serrano states she is independent with self care, does have some limitations but states able to function independently for most self care tasks. She does not drive related to history of seizures.  referral made for concerns with housing and transportation. Subjective/HPI no events overnight. No CP/Angina overnight. Eating and walking. AO3    EKG: SR 75        Review of Systems:   Review of Systems   Constitutional: Negative. Negative for diaphoresis and fatigue. HENT: Negative.     Eyes: 4.36 05/09/2012    HGB 10.2 04/16/2021    HCT 30.5 04/16/2021     04/16/2021    MCV 90.1 04/16/2021    MCH 30.2 04/16/2021    MCHC 33.5 04/16/2021    RDW 13.3 04/16/2021    LYMPHOPCT 19.0 04/15/2021    MONOPCT 10.1 04/15/2021    BASOPCT 0.4 04/15/2021    MONOSABS 0.5 04/15/2021    LYMPHSABS 1.0 04/15/2021    EOSABS 0.3 04/15/2021    BASOSABS 0.0 04/15/2021     CMP:    Lab Results   Component Value Date     04/17/2021    K 3.9 04/17/2021     04/17/2021    CO2 23 04/17/2021    BUN 38 04/17/2021    CREATININE 1.12 04/17/2021    GFRAA 59.3 04/17/2021    LABGLOM 49.0 04/17/2021    GLUCOSE 109 04/17/2021    GLUCOSE 113 11/30/2020    PROT 6.4 04/15/2021    LABALBU 4.0 04/15/2021    LABALBU 4.9 05/09/2012    CALCIUM 9.3 04/17/2021    BILITOT <0.2 04/15/2021    ALKPHOS 80 04/15/2021    AST 34 04/15/2021    ALT 25 04/15/2021     BMP:    Lab Results   Component Value Date     04/17/2021    K 3.9 04/17/2021     04/17/2021    CO2 23 04/17/2021    BUN 38 04/17/2021    LABALBU 4.0 04/15/2021    LABALBU 4.9 05/09/2012    CREATININE 1.12 04/17/2021    CALCIUM 9.3 04/17/2021    GFRAA 59.3 04/17/2021    LABGLOM 49.0 04/17/2021    GLUCOSE 109 04/17/2021    GLUCOSE 113 11/30/2020     Magnesium:    Lab Results   Component Value Date    MG 2.0 04/16/2021     Troponin:    Lab Results   Component Value Date    TROPONINI <0.010 04/16/2021    TROPONINI <0.010 04/16/2021        Active Hospital Problems    Diagnosis Date Noted    Unstable angina ([facility] [I20.0] 04/15/2021     Priority: Low        Assessment/Plan:  1. CP/Angina - scheduled for LHC/PCI with Dr. Roman Cody on Monday 4:30 PM  2. RBBB  3. LVEF normal   4. HTN stable  5. MS changes - resolved and felt to be related to meds.    6. SZ d/o- stable        Electronically signed by Jake Campbell MD on 4/17/2021 at 9:25 AM

## 2021-04-17 NOTE — PROGRESS NOTES
Uneventful night. No change in mental status. No chest pain or palpitation. No dysuria or hematuria. No headaches, loss of consciousness or seizure. ROS: 12 system review otherwise is negative for acute signs or symptoms over the last 24 hrs.      Exam: Awake, alert oriented, in no apparent distress  HEENT: Pink conjunctiva and buccal mucosa. Normal and throat and nose  Neck: Supple, no nuchal rigidity. Endo: No thyromegaly. Vascular: No JVD or carotid bruit. Chest: Clear to auscultation, no dullness to percussion. Heart: Regular rate and rhythm, no extra sounds. No murmur, no rub. Abdomen: Soft, no tenderness, no rebound, no rigidity. Clinically I could not exclude the possibility of intra-abdominal mass or organomegaly. LE: No cyanosis or clubbing, no varices or edema. Neuro: Awake, alert, oriented, normal speech, normal comprehension, normal extension, normal cranial nerves, symmetrical motor and tone. Mild cognitive loss. MS: No joint effusion or tenderness. Skin: No skin rash, itching, bruising or significant findings.     Assessment and plan:      *Reported altered mental status. Resolved. This could secondary to UTI.    *UTI. Requested urine culture and started the patient on Rocephin.     *Hypothyroidism, continue Synthroid.     *Depression and anxiety. Continue SSRI and BuSpar.     *Reported diabetes. Proceed with Accu-Chek. Check A1c.     *Cardiovascular issues addressed by cardiology team.     *Multiple other medical problems and not listed above.     Plan:  Continue current medical care. Cardiovascular issues addressed by cardiology team.  Continue to monitor her neurological status. PT OT eval and treatment. Accu-Chek and check a hemoglobin A1c.

## 2021-04-18 ENCOUNTER — APPOINTMENT (OUTPATIENT)
Dept: ULTRASOUND IMAGING | Age: 66
DRG: 287 | End: 2021-04-18
Payer: MEDICARE

## 2021-04-18 PROCEDURE — G0378 HOSPITAL OBSERVATION PER HR: HCPCS

## 2021-04-18 PROCEDURE — 96366 THER/PROPH/DIAG IV INF ADDON: CPT

## 2021-04-18 PROCEDURE — 2060000000 HC ICU INTERMEDIATE R&B

## 2021-04-18 PROCEDURE — 2580000003 HC RX 258: Performed by: EMERGENCY MEDICINE

## 2021-04-18 PROCEDURE — 6370000000 HC RX 637 (ALT 250 FOR IP): Performed by: PHYSICIAN ASSISTANT

## 2021-04-18 PROCEDURE — 96372 THER/PROPH/DIAG INJ SC/IM: CPT

## 2021-04-18 PROCEDURE — 6360000002 HC RX W HCPCS: Performed by: EMERGENCY MEDICINE

## 2021-04-18 PROCEDURE — 93970 EXTREMITY STUDY: CPT

## 2021-04-18 PROCEDURE — 6360000002 HC RX W HCPCS: Performed by: INTERNAL MEDICINE

## 2021-04-18 PROCEDURE — 96375 TX/PRO/DX INJ NEW DRUG ADDON: CPT

## 2021-04-18 PROCEDURE — 99232 SBSQ HOSP IP/OBS MODERATE 35: CPT | Performed by: INTERNAL MEDICINE

## 2021-04-18 PROCEDURE — 6370000000 HC RX 637 (ALT 250 FOR IP): Performed by: EMERGENCY MEDICINE

## 2021-04-18 PROCEDURE — 2580000003 HC RX 258: Performed by: INTERNAL MEDICINE

## 2021-04-18 RX ADMIN — PANTOPRAZOLE SODIUM 40 MG: 40 TABLET, DELAYED RELEASE ORAL at 09:03

## 2021-04-18 RX ADMIN — METOPROLOL TARTRATE 25 MG: 25 TABLET, FILM COATED ORAL at 09:04

## 2021-04-18 RX ADMIN — MAGNESIUM OXIDE TAB 400 MG (240 MG ELEMENTAL MG) 400 MG: 400 (240 MG) TAB at 14:32

## 2021-04-18 RX ADMIN — LEVOTHYROXINE SODIUM 50 MCG: 0.05 TABLET ORAL at 05:19

## 2021-04-18 RX ADMIN — BUSPIRONE HYDROCHLORIDE 5 MG: 5 TABLET ORAL at 14:32

## 2021-04-18 RX ADMIN — ACETAMINOPHEN 650 MG: 325 TABLET ORAL at 14:32

## 2021-04-18 RX ADMIN — ONDANSETRON 4 MG: 2 INJECTION INTRAMUSCULAR; INTRAVENOUS at 00:05

## 2021-04-18 RX ADMIN — Medication 10 ML: at 20:23

## 2021-04-18 RX ADMIN — ENOXAPARIN SODIUM 40 MG: 40 INJECTION SUBCUTANEOUS at 09:02

## 2021-04-18 RX ADMIN — LEVETIRACETAM 500 MG: 500 TABLET ORAL at 20:22

## 2021-04-18 RX ADMIN — BUSPIRONE HYDROCHLORIDE 5 MG: 5 TABLET ORAL at 09:04

## 2021-04-18 RX ADMIN — ATORVASTATIN CALCIUM 40 MG: 40 TABLET, FILM COATED ORAL at 20:22

## 2021-04-18 RX ADMIN — Medication 10 ML: at 09:04

## 2021-04-18 RX ADMIN — MAGNESIUM OXIDE TAB 400 MG (240 MG ELEMENTAL MG) 400 MG: 400 (240 MG) TAB at 20:22

## 2021-04-18 RX ADMIN — OYSTER SHELL CALCIUM WITH VITAMIN D 1 TABLET: 500; 200 TABLET, FILM COATED ORAL at 09:03

## 2021-04-18 RX ADMIN — HYDROXYZINE HYDROCHLORIDE 10 MG: 10 TABLET, FILM COATED ORAL at 00:08

## 2021-04-18 RX ADMIN — ROPINIROLE HYDROCHLORIDE 0.5 MG: 0.25 TABLET, FILM COATED ORAL at 20:22

## 2021-04-18 RX ADMIN — ASPIRIN 81 MG: 81 TABLET, CHEWABLE ORAL at 09:02

## 2021-04-18 RX ADMIN — METOPROLOL TARTRATE 25 MG: 25 TABLET, FILM COATED ORAL at 20:22

## 2021-04-18 RX ADMIN — OYSTER SHELL CALCIUM WITH VITAMIN D 1 TABLET: 500; 200 TABLET, FILM COATED ORAL at 20:25

## 2021-04-18 RX ADMIN — LEVETIRACETAM 500 MG: 500 TABLET ORAL at 09:03

## 2021-04-18 RX ADMIN — BUSPIRONE HYDROCHLORIDE 5 MG: 5 TABLET ORAL at 20:22

## 2021-04-18 RX ADMIN — ESCITALOPRAM OXALATE 20 MG: 20 TABLET ORAL at 09:03

## 2021-04-18 RX ADMIN — CEFTRIAXONE SODIUM 1000 MG: 1 INJECTION, POWDER, FOR SOLUTION INTRAMUSCULAR; INTRAVENOUS at 11:57

## 2021-04-18 RX ADMIN — MAGNESIUM OXIDE TAB 400 MG (240 MG ELEMENTAL MG) 400 MG: 400 (240 MG) TAB at 09:03

## 2021-04-18 RX ADMIN — FENOFIBRATE 160 MG: 160 TABLET ORAL at 09:02

## 2021-04-18 ASSESSMENT — ENCOUNTER SYMPTOMS
BLOOD IN STOOL: 0
STRIDOR: 0
CHEST TIGHTNESS: 0
SHORTNESS OF BREATH: 0
NAUSEA: 0
WHEEZING: 0
GASTROINTESTINAL NEGATIVE: 1
RESPIRATORY NEGATIVE: 1
EYES NEGATIVE: 1
COUGH: 0

## 2021-04-18 NOTE — PROGRESS NOTES
No new symptoms over the last 24 hours. ROS: 12 system review otherwise is negative for acute signs or symptoms over the last 24 hrs.      Exam: Awake, alert oriented, in no apparent distress  HEENT: Pink conjunctiva and buccal mucosa. Normal and throat and nose  Neck: Supple, no nuchal rigidity. Endo: No thyromegaly. Vascular: No JVD or carotid bruit. Chest: Clear to auscultation, no dullness to percussion. Heart: Regular rate and rhythm, no extra sounds. No murmur, no rub. Abdomen: Soft, no tenderness, no rebound, no rigidity. Clinically I could not exclude the possibility of intra-abdominal mass or organomegaly. LE: No cyanosis or clubbing, no varices or edema. Neuro: Awake, alert, oriented, normal speech, normal comprehension, normal extension, normal cranial nerves, symmetrical motor and tone. Mild cognitive loss. MS: No joint effusion or tenderness. Skin: No skin rash, itching, bruising or significant findings.     Assessment and plan:      *Reported altered mental status. Resolved. This could secondary to UTI.    *UTI. Requested urine culture and started the patient on Rocephin. Culture report is pending. *Positive D-dimer, venous duplex rule out DVT.     *Hypothyroidism, continue Synthroid.     *Depression and anxiety. Continue SSRI and BuSpar.     *Reported diabetes. Proceed with Accu-Chek. Check A1c.     *Cardiovascular issues addressed by cardiology team.     *Multiple other medical problems and not listed above. Continue current treatment plan. Venous study rule out DVT.    Plan:  Continue current medical care. Cardiovascular issues addressed by cardiology team.  Continue to monitor her neurological status. PT OT eval and treatment. Accu-Chek and check a hemoglobin A1c.

## 2021-04-18 NOTE — FLOWSHEET NOTE
Pt awake in bed. Alert and oriented. VSS. Tele SR. Respirations even and nonlabored. Pulse ox 98% on RA. Pt accepted AM meds without problem. Ate good for breakfast. Denies pain this morning. Call light in reach.

## 2021-04-18 NOTE — PROGRESS NOTES
 Highest education level: None   Occupational History    Occupation: disabled   Social Needs    Financial resource strain: Not very hard    Food insecurity     Worry: Never true     Inability: Never true    Transportation needs     Medical: Yes     Non-medical: Yes   Tobacco Use    Smoking status: Never Smoker    Smokeless tobacco: Never Used   Substance and Sexual Activity    Alcohol use: Not Currently     Alcohol/week: 28.0 standard drinks     Types: 28 Shots of liquor per week     Comment: quit drinking in November 2020    Drug use: No    Sexual activity: Not Currently   Lifestyle    Physical activity     Days per week: 0 days     Minutes per session: 0 min    Stress: Rather much   Relationships    Social connections     Talks on phone: Once a week     Gets together: Once a week     Attends Lutheran service: Never     Active member of club or organization: No     Attends meetings of clubs or organizations: Never     Relationship status: Never     Intimate partner violence     Fear of current or ex partner: No     Emotionally abused: No     Physically abused: No     Forced sexual activity: No   Other Topics Concern    None   Social History Narrative    3/4/21 Rojelio Alegria lives in a private residence she had shared with her sister, her sister was the home owner, and she recently passed on 2/21, She is now needing to find a new home, as the house she is living in may be sold soon, Rojelio Alegria states she is independent with self care, does have some limitations but states able to function independently for most self care tasks. She does not drive related to history of seizures.  referral made for concerns with housing and transportation. Subjective/HPI loose stools overnight. No diarrhea though. No abd pain but did not eat breakfast. No cp no sob wlaking in room. EKG: SR 68        Review of Systems:   Review of Systems   Constitutional: Negative.   Negative for diaphoresis and fatigue. HENT: Negative. Eyes: Negative. Respiratory: Negative. Negative for cough, chest tightness, shortness of breath, wheezing and stridor. Cardiovascular: Negative. Negative for chest pain, palpitations and leg swelling. Gastrointestinal: Negative. Negative for blood in stool and nausea. Genitourinary: Negative. Musculoskeletal: Negative. Skin: Negative. Neurological: Negative. Negative for dizziness, syncope, weakness and light-headedness. Hematological: Negative. Psychiatric/Behavioral: Negative. Physical Examination:    BP (!) 109/47   Pulse 57   Temp 97.3 °F (36.3 °C) (Oral)   Resp 16   Ht 4' 9\" (1.448 m)   Wt 84 lb 9.6 oz (38.4 kg)   LMP  (LMP Unknown)   SpO2 98%   BMI 18.31 kg/m²    Physical Exam   Constitutional: She appears healthy. No distress. HENT:   Normal cephalic and Atraumatic   Eyes: Pupils are equal, round, and reactive to light. Neck: Normal range of motion and thyroid normal. Neck supple. No JVD present. No neck adenopathy. No thyromegaly present. Cardiovascular: Normal rate, regular rhythm, normal heart sounds, intact distal pulses and normal pulses. Pulmonary/Chest: Effort normal and breath sounds normal. She has no wheezes. She has no rales. She exhibits no tenderness. Abdominal: Soft. Bowel sounds are normal. There is no abdominal tenderness. Musculoskeletal: Normal range of motion. General: No tenderness or edema. Neurological: She is alert and oriented to person, place, and time. Skin: Skin is warm. No cyanosis. Nails show no clubbing.        LABS:  CBC:   Lab Results   Component Value Date    WBC 6.9 04/16/2021    RBC 3.38 04/16/2021    RBC 4.36 05/09/2012    HGB 10.2 04/16/2021    HCT 30.5 04/16/2021    MCV 90.1 04/16/2021    MCH 30.2 04/16/2021    MCHC 33.5 04/16/2021    RDW 13.3 04/16/2021     04/16/2021    MPV 10.8 03/10/2014     CBC with Differential:    Lab Results   Component Value Date    WBC 6.9 04/16/2021    RBC 3.38 04/16/2021    RBC 4.36 05/09/2012    HGB 10.2 04/16/2021    HCT 30.5 04/16/2021     04/16/2021    MCV 90.1 04/16/2021    MCH 30.2 04/16/2021    MCHC 33.5 04/16/2021    RDW 13.3 04/16/2021    LYMPHOPCT 19.0 04/15/2021    MONOPCT 10.1 04/15/2021    BASOPCT 0.4 04/15/2021    MONOSABS 0.5 04/15/2021    LYMPHSABS 1.0 04/15/2021    EOSABS 0.3 04/15/2021    BASOSABS 0.0 04/15/2021     CMP:    Lab Results   Component Value Date     04/17/2021    K 3.9 04/17/2021     04/17/2021    CO2 23 04/17/2021    BUN 38 04/17/2021    CREATININE 1.12 04/17/2021    GFRAA 59.3 04/17/2021    LABGLOM 49.0 04/17/2021    GLUCOSE 109 04/17/2021    GLUCOSE 113 11/30/2020    PROT 6.4 04/15/2021    LABALBU 4.0 04/15/2021    LABALBU 4.9 05/09/2012    CALCIUM 9.3 04/17/2021    BILITOT <0.2 04/15/2021    ALKPHOS 80 04/15/2021    AST 34 04/15/2021    ALT 25 04/15/2021     BMP:    Lab Results   Component Value Date     04/17/2021    K 3.9 04/17/2021     04/17/2021    CO2 23 04/17/2021    BUN 38 04/17/2021    LABALBU 4.0 04/15/2021    LABALBU 4.9 05/09/2012    CREATININE 1.12 04/17/2021    CALCIUM 9.3 04/17/2021    GFRAA 59.3 04/17/2021    LABGLOM 49.0 04/17/2021    GLUCOSE 109 04/17/2021    GLUCOSE 113 11/30/2020     Magnesium:    Lab Results   Component Value Date    MG 2.0 04/16/2021     Troponin:    Lab Results   Component Value Date    TROPONINI <0.010 04/16/2021    TROPONINI <0.010 04/16/2021        Active Hospital Problems    Diagnosis Date Noted    Unstable angina ([facility] [address] [I20.0] 04/15/2021     Priority: Low        Assessment/Plan:  1. CP/Angina - scheduled for LHC/PCI with Dr. Donna Rodriguez on Monday 4:30 PM - continue CV protective meds. NPO post Breakfast tomorrow. 2. RBBB  3. LVEF normal   4. HTN stable  5. MS changes - resolved and felt to be related to meds.    6. SZ d/o- stable        Electronically signed by Nakita Arellano MD on 4/18/2021 at 9:31 AM

## 2021-04-19 ENCOUNTER — CARE COORDINATION (OUTPATIENT)
Dept: CARE COORDINATION | Age: 66
End: 2021-04-19

## 2021-04-19 ENCOUNTER — APPOINTMENT (OUTPATIENT)
Dept: CARDIAC CATH/INVASIVE PROCEDURES | Age: 66
DRG: 287 | End: 2021-04-19
Payer: MEDICARE

## 2021-04-19 VITALS
SYSTOLIC BLOOD PRESSURE: 107 MMHG | HEIGHT: 57 IN | RESPIRATION RATE: 18 BRPM | TEMPERATURE: 98.2 F | BODY MASS INDEX: 18.25 KG/M2 | DIASTOLIC BLOOD PRESSURE: 45 MMHG | OXYGEN SATURATION: 97 % | HEART RATE: 48 BPM | WEIGHT: 84.6 LBS

## 2021-04-19 LAB — URINE CULTURE, ROUTINE: NORMAL

## 2021-04-19 PROCEDURE — 6360000004 HC RX CONTRAST MEDICATION: Performed by: INTERNAL MEDICINE

## 2021-04-19 PROCEDURE — 6370000000 HC RX 637 (ALT 250 FOR IP): Performed by: EMERGENCY MEDICINE

## 2021-04-19 PROCEDURE — B2151ZZ FLUOROSCOPY OF LEFT HEART USING LOW OSMOLAR CONTRAST: ICD-10-PCS | Performed by: INTERNAL MEDICINE

## 2021-04-19 PROCEDURE — C1725 CATH, TRANSLUMIN NON-LASER: HCPCS

## 2021-04-19 PROCEDURE — C1894 INTRO/SHEATH, NON-LASER: HCPCS

## 2021-04-19 PROCEDURE — 6370000000 HC RX 637 (ALT 250 FOR IP): Performed by: PHYSICIAN ASSISTANT

## 2021-04-19 PROCEDURE — 93458 L HRT ARTERY/VENTRICLE ANGIO: CPT | Performed by: INTERNAL MEDICINE

## 2021-04-19 PROCEDURE — 6360000002 HC RX W HCPCS

## 2021-04-19 PROCEDURE — 4A023N7 MEASUREMENT OF CARDIAC SAMPLING AND PRESSURE, LEFT HEART, PERCUTANEOUS APPROACH: ICD-10-PCS | Performed by: INTERNAL MEDICINE

## 2021-04-19 PROCEDURE — 2580000003 HC RX 258

## 2021-04-19 PROCEDURE — 99024 POSTOP FOLLOW-UP VISIT: CPT | Performed by: INTERNAL MEDICINE

## 2021-04-19 PROCEDURE — B2111ZZ FLUOROSCOPY OF MULTIPLE CORONARY ARTERIES USING LOW OSMOLAR CONTRAST: ICD-10-PCS | Performed by: INTERNAL MEDICINE

## 2021-04-19 PROCEDURE — 2500000003 HC RX 250 WO HCPCS

## 2021-04-19 PROCEDURE — 2709999900 HC NON-CHARGEABLE SUPPLY

## 2021-04-19 PROCEDURE — 96376 TX/PRO/DX INJ SAME DRUG ADON: CPT

## 2021-04-19 PROCEDURE — C1769 GUIDE WIRE: HCPCS

## 2021-04-19 PROCEDURE — 6360000002 HC RX W HCPCS: Performed by: INTERNAL MEDICINE

## 2021-04-19 PROCEDURE — G0378 HOSPITAL OBSERVATION PER HR: HCPCS

## 2021-04-19 PROCEDURE — 2580000003 HC RX 258: Performed by: PHYSICIAN ASSISTANT

## 2021-04-19 PROCEDURE — 6370000000 HC RX 637 (ALT 250 FOR IP): Performed by: NURSE PRACTITIONER

## 2021-04-19 PROCEDURE — 2580000003 HC RX 258: Performed by: INTERNAL MEDICINE

## 2021-04-19 RX ORDER — SODIUM CHLORIDE 0.9 % (FLUSH) 0.9 %
5-40 SYRINGE (ML) INJECTION EVERY 12 HOURS SCHEDULED
Status: DISCONTINUED | OUTPATIENT
Start: 2021-04-19 | End: 2021-04-19 | Stop reason: HOSPADM

## 2021-04-19 RX ORDER — SODIUM CHLORIDE 0.9 % (FLUSH) 0.9 %
5-40 SYRINGE (ML) INJECTION PRN
Status: DISCONTINUED | OUTPATIENT
Start: 2021-04-19 | End: 2021-04-19 | Stop reason: HOSPADM

## 2021-04-19 RX ORDER — ACETAMINOPHEN 325 MG/1
650 TABLET ORAL EVERY 4 HOURS PRN
Status: DISCONTINUED | OUTPATIENT
Start: 2021-04-19 | End: 2021-04-19 | Stop reason: HOSPADM

## 2021-04-19 RX ORDER — SODIUM CHLORIDE 9 MG/ML
25 INJECTION, SOLUTION INTRAVENOUS PRN
Status: DISCONTINUED | OUTPATIENT
Start: 2021-04-19 | End: 2021-04-19 | Stop reason: HOSPADM

## 2021-04-19 RX ORDER — SODIUM CHLORIDE 9 MG/ML
INJECTION, SOLUTION INTRAVENOUS CONTINUOUS
Status: DISCONTINUED | OUTPATIENT
Start: 2021-04-19 | End: 2021-04-19 | Stop reason: HOSPADM

## 2021-04-19 RX ORDER — SODIUM CHLORIDE 9 MG/ML
INJECTION, SOLUTION INTRAVENOUS CONTINUOUS
Status: ACTIVE | OUTPATIENT
Start: 2021-04-19 | End: 2021-04-19

## 2021-04-19 RX ADMIN — MAGNESIUM OXIDE TAB 400 MG (240 MG ELEMENTAL MG) 400 MG: 400 (240 MG) TAB at 13:39

## 2021-04-19 RX ADMIN — LEVETIRACETAM 500 MG: 500 TABLET ORAL at 08:24

## 2021-04-19 RX ADMIN — METOPROLOL TARTRATE 25 MG: 25 TABLET, FILM COATED ORAL at 08:24

## 2021-04-19 RX ADMIN — PANTOPRAZOLE SODIUM 40 MG: 40 TABLET, DELAYED RELEASE ORAL at 08:24

## 2021-04-19 RX ADMIN — CEFTRIAXONE SODIUM 1000 MG: 1 INJECTION, POWDER, FOR SOLUTION INTRAMUSCULAR; INTRAVENOUS at 11:55

## 2021-04-19 RX ADMIN — FENOFIBRATE 160 MG: 160 TABLET ORAL at 08:26

## 2021-04-19 RX ADMIN — IOPAMIDOL 23 ML: 612 INJECTION, SOLUTION INTRAVENOUS at 09:15

## 2021-04-19 RX ADMIN — Medication 5 MG: at 02:13

## 2021-04-19 RX ADMIN — SODIUM CHLORIDE, PRESERVATIVE FREE 10 ML: 5 INJECTION INTRAVENOUS at 08:26

## 2021-04-19 RX ADMIN — BUSPIRONE HYDROCHLORIDE 5 MG: 5 TABLET ORAL at 08:24

## 2021-04-19 RX ADMIN — LEVOTHYROXINE SODIUM 50 MCG: 0.05 TABLET ORAL at 04:48

## 2021-04-19 RX ADMIN — ASPIRIN 81 MG: 81 TABLET, CHEWABLE ORAL at 08:24

## 2021-04-19 RX ADMIN — OYSTER SHELL CALCIUM WITH VITAMIN D 1 TABLET: 500; 200 TABLET, FILM COATED ORAL at 08:24

## 2021-04-19 RX ADMIN — ESCITALOPRAM OXALATE 20 MG: 20 TABLET ORAL at 08:24

## 2021-04-19 RX ADMIN — MAGNESIUM OXIDE TAB 400 MG (240 MG ELEMENTAL MG) 400 MG: 400 (240 MG) TAB at 08:24

## 2021-04-19 RX ADMIN — BUSPIRONE HYDROCHLORIDE 5 MG: 5 TABLET ORAL at 13:39

## 2021-04-19 RX ADMIN — ACETAMINOPHEN 650 MG: 325 TABLET ORAL at 04:45

## 2021-04-19 RX ADMIN — SODIUM CHLORIDE: 9 INJECTION, SOLUTION INTRAVENOUS at 08:24

## 2021-04-19 ASSESSMENT — PAIN SCALES - GENERAL
PAINLEVEL_OUTOF10: 5
PAINLEVEL_OUTOF10: 0

## 2021-04-19 ASSESSMENT — PAIN DESCRIPTION - LOCATION: LOCATION: HAND

## 2021-04-19 ASSESSMENT — PAIN DESCRIPTION - PAIN TYPE: TYPE: ACUTE PAIN

## 2021-04-19 NOTE — CARE COORDINATION
SPOKE WITH PATIENT AND SHE IS READY TO DISCHARGE TODAY. IMM REVIEWED AND VERBALIZED UNDERSTANDING. PT DENIES DISCHARGE NEEDS AND DECLINED NEED FOR HHC. PT STATES SHE HAS A CM THAT FOLLOWS WITH HER.

## 2021-04-19 NOTE — DISCHARGE SUMMARY
Discharge Summary    Date: 4/19/2021  Patient Name: Radha Bowie YOB: 1955 Age: 63 y.o. Admit Date: 4/15/2021  Discharge Date: 4/19/2021  Discharge Condition: Good    Admission Diagnosis  Unstable angina (HCC) (I20.0)     Discharge Diagnosis  Active Problems: Unstable angina (HCC)Resolved Problems: * No resolved hospital problems. [facility] Stay  Narrative of Hospital Course:  Patient presented with chest pain. Noted to have an abnormal EKG. Underwent cardiac catheterization that showed normal coronary arteries and normal LV function. She was discharged home in stable and satisfactory condition. Echo showed normal LV function. Consultants:  IP CONSULT TO CARDIOLOGYIP CONSULT TO HOSPITALISTIP CONSULT TO DIETITIAN    Surgeries/procedures Performed:       Treatments:    Cardiac Medications        Discharge Plan/Disposition:  Home    Hospital/Incidental Findings Requiring Follow Up:    Patient Instructions:    Diet: Cardiac Diet    Activity:Activity as Tolerated  For number of days (if applicable): Other Instructions:    Provider Follow-Up:   No follow-ups on file.      Significant Diagnostic Studies:    Recent Labs:  Admission on 04/15/2021WBC                                           Date: 04/15/2021Value: 5.4         Ref range: 4.8 - 10.8 K/uL    Status: FinalRBC                                           Date: 04/15/2021Value: 3.27*       Ref range: 4.20 - 5.40 M/uL   Status: FinalHemoglobin                                    Date: 04/15/2021Value: 9.8*        Ref range: 12.0 - 16.0 g/dL   Status: FinalHematocrit                                    Date: 04/15/2021Value: 29.6*       Ref range: 37.0 - 47.0 %      Status: FinalMCV                                           Date: 04/15/2021Value: 90.5        Ref range: 82.0 - 100.0 fL    Status: [address]                                           Date: 04/15/2021Value: 30.0        Ref range: 27.0 - 31.3 pg     Status: [facility] Date: 04/15/2021Value: 33.2        Ref range: 33.0 - 37.0 %      Status: FinalRDW                                           Date: 04/15/2021Value: 13.5        Ref range: 11.5 - 14.5 %      Status: FinalPlatelets                                     Date: 04/15/2021Value: 170         Ref range: 130 - 400 K/uL     Status: FinalNeutrophils %                                 Date: 04/15/2021Value: 65.5        Ref range: %                  Status: FinalLymphocytes %                                 Date: 04/15/2021Value: 19.0        Ref range: %                  Status: FinalMonocytes %                                   Date: 04/15/2021Value: 10.1        Ref range: %                  Status: FinalEosinophils %                                 Date: 04/15/2021Value: 5.0         Ref range: %                  Status: FinalBasophils %                                   Date: 04/15/2021Value: 0.4         Ref range: %                  Status: FinalNeutrophils Absolute                          Date: 04/15/2021Value: 3.5         Ref range: 1.4 - 6.5 K/uL     Status: FinalLymphocytes Absolute                          Date: 04/15/2021Value: 1.0         Ref range: 1.0 - 4.8 K/uL     Status: FinalMonocytes Absolute                            Date: 04/15/2021Value: 0.5         Ref range: 0.2 - 0.8 K/uL     Status: FinalEosinophils Absolute                          Date: 04/15/2021Value: 0.3         Ref range: 0.0 - 0.7 K/uL     Status: FinalBasophils Absolute                            Date: 04/15/2021Value: 0.0         Ref range: 0.0 - 0.2 K/uL     Status: FinalEthanol Lvl                                   Date: 04/15/2021Value: <10         Ref range: mg/dL              Status: FinalEthanol percent                               Date: 04/15/2021Value: Not indicated                   Ref range: G/dL               Status: FinalSodium                                        Date: 04/15/2021Value: 138 Ref range: 135 - 144 mEq/L    Status: FinalPotassium                                     Date: 04/15/2021Value: 4.0         Ref range: 3.4 - 4.9 mEq/L    Status: FinalChloride                                      Date: 04/15/2021Value: 101         Ref range: 95 - 107 mEq/L     Status: FinalCO2                                           Date: 04/15/2021Value: 28          Ref range: 20 - 31 mEq/L      Status: FinalAnion Gap                                     Date: 04/15/2021Value: 9           Ref range: 9 - 15 mEq/L       Status: FinalGlucose                                       Date: 04/15/2021Value: 89          Ref range: 70 - 99 mg/dL      Status: FinalBUN                                           Date: 04/15/2021Value: 34*         Ref range: 8 - 23 mg/dL       Status: FinalCREATININE                                    Date: 04/15/2021Value: 0.96*       Ref range: 0.50 - 0.90 mg/dL  Status: FinalGFR Non-                      Date: 04/15/2021Value: 58.5*       Ref range: >60                Status: Final              Comment: >60 mL/min/1.73m2 EGFR, calc. for ages 25 and older using theMDRD formula (not corrected for weight), is valid for stablerenal function. GFR                           Date: 04/15/2021Value: >60.0       Ref range: >60                Status: Final              Comment: >60 mL/min/1.73m2 EGFR, calc. for ages 25 and older using theMDRD formula (not corrected for weight), is valid for stablerenal function. Calcium                                       Date: 04/15/2021Value: 8.7         Ref range: 8.5 - 9.9 mg/dL    Status: FinalTotal Protein                                 Date: 04/15/2021Value: 6.4         Ref range: 6.3 - 8.0 g/dL     Status: FinalAlbumin                                       Date: 04/15/2021Value: 4.0         Ref range: 3.5 - 4.6 g/dL     Status: FinalTotal Bilirubin                               Date: 04/15/2021Value: <0.2        Ref range: 0.2 - 0.7 mg/dL    Status: FinalAlkaline Phosphatase                          Date: 04/15/2021Value: 80          Ref range: 40 - 130 U/L       Status: FinalALT                                           Date: 04/15/2021Value: 25          Ref range: 0 - 33 U/L         Status: FinalAST                                           Date: 04/15/2021Value: 34          Ref range: 0 - 35 U/L         Status: FinalGlobulin                                      Date: 04/15/2021Value: 2.4         Ref range: 2.3 - 3.5 g/dL     Status: FinalTroponin                                      Date: 04/15/2021Value: <0.010      Ref range: 0.000 - 0.010 ng*  Status: Final              Comment: Methodology by Troponin T. Protime                                       Date: 04/15/2021Value: 12.6        Ref range: 12.3 - 14.9 sec    Status: FinalINR                                           Date: 04/15/2021Value: 0.9           Status: FinalD-Dimer, Quant                                Date: 04/15/2021Value: 0.84*       Ref range: 0.00 - 0.50 mg/L*  Status: Final              Comment: VTE (DVT or PE) cut-off = 0.50 mg/L [id number], NAAT                              Date: 04/15/2021Value: Not Detected                   Ref range: Not Detected       Status: Final              Comment: Rapid NAAT:   Negative results should be treated as presumptive and,if inconsistent with clinical signs and symptoms or necessary forpatient management, should be tested with an alternative molecularassay. Negative results do not preclude SARS-CoV-2 infection andshould not be used as the sole basis for patient management decisions. This test has been authorized by the FDA under an Emergency UseAuthorization (EUA) for use by authorized laboratories. Fact sheet for [employer]nz sheet for Patients: [employer].dk: Isothermal Nucleic Acid AmplificationPCP Screen, Urine Date: 04/15/2021Value: Neg         Ref range: Negative <25 ng/*  Status: FinalBenzodiazepine Screen, Urine                  Date: 04/15/2021Value: Neg         Ref range: Negative <150 ng*  Status: Final              Comment: Effective:  7/16/18Methodology and/or Reference Range-Cutoff has changed. Cocaine Metabolite Screen, Urine              Date: 04/15/2021Value: Neg         Ref range: Negative <150 ng*  Status: Final              Comment: Effective:  7/16/18Methodology and/or Reference Range-Cutoff has changed. Amphetamine Screen, Urine                     Date: 04/15/2021Value: Neg         Ref range: Negative <500 ng*  Status: Final              Comment: Effective:  7/16/18Methodology and/or Reference Range-Cutoff has changed. Cannabinoid Scrn, Ur                          Date: 04/15/2021Value: Neg         Ref range: Negative <50 ng/*  Status: FinalOpiate Scrn, Ur                               Date: 04/15/2021Value: Neg         Ref range: Negative <100 ng*  Status: Final              Comment: Effective:  7/16/18Methodology and/or Reference Range-Cutoff has changed. Barbiturate Screen, Ur                        Date: 04/15/2021Value: Neg         Ref range: Negative <200 ng*  Status: Final              Comment: Effective:  7/16/18Methodology and/or Reference Range-Cutoff has changed. Methadone Screen, Urine                       Date: 04/15/2021Value: Neg         Ref range: Negative <200 ng*  Status: FinalPropoxyphene Screen, Urine                    Date: 04/15/2021Value: Neg         Ref range: Negative <300 ng*  Status: FinalUR Oxycodone Rapid Screen                     Date: 04/15/2021Value: Neg         Ref range: Negative <100 ng*  Status: FinalDrug Screen Comment:                          Date: 04/15/2021Value: see below     Status: Final              Comment: This method is a screening test to detect only these drugclasses as part of a medical workup.   Confirmatory testingby another method should be ordered if clinically indicated. Troponin                                      Date: 04/15/2021Value: <0.010      Ref range: 0.000 - 0.010 ng*  Status: Final              Comment: Methodology by Troponin T.Pro-BNP                                       Date: 04/15/2021Value: 632         Ref range: pg/mL              Status: Final              Comment: NT-pro BNP ACUTE Interpretive Guidelines:      Age        Cutoff for Heart Failure   Less than 50 yrs   450 pg/mL   50-75 yrs           900 pg/mL   Greater than 75 yrs  1800 pg/mLNT-pro BNP NON-ACUTE Interpretive Guidelines:    Age                 Reference Range  Less than 74 yrs   0-125 pg/mL  Greater than 74 yrs   0-450 pg/mLOther possible causes of an elevated NT-proBNP include:cardiac ischemia, acute coronary syndrome, COPD, pneumonia,atrial fibrillation, pulmonary emboli, pulmonary hypertension,pericarditisReference:VIC Laird, et al. NT-proBNP testing for diagnosis andshort-term prognosis in acute destabilized HF: an internationalpooled analysis of 1256 patients.   Heart Journal.2006;27:330-337Color, UA                                     Date: 04/16/2021Value: Yellow      Ref range: Straw/Yellow       Status: FinalClarity, UA                                   Date: 04/16/2021Value: Clear       Ref range: Clear              Status: FinalGlucose, Ur                                   Date: 04/16/2021Value: Negative    Ref range: Negative mg/dL     Status: FinalBilirubin Urine                               Date: 04/16/2021Value: Negative    Ref range: Negative           Status: FinalKetones, Urine                                Date: 04/16/2021Value: Negative    Ref range: Negative mg/dL     Status: FinalSpecific Gravity, UA                          Date: 04/16/2021Value: 1.021       Ref range: 1.005 - 1.030      Status: FinalBlood, Urine                                  Date: 04/16/2021Value: Negative    Ref range: Negative           Status: FinalpH, UA Date: 04/16/2021Value: 5.0         Ref range: 5.0 - 9.0          Status: FinalProtein, UA                                   Date: 04/16/2021Value: Negative    Ref range: Negative mg/dL     Status: FinalUrobilinogen, Urine                           Date: 04/16/2021Value: 0.2         Ref range: <2.0 E.U./dL       Status: FinalNitrite, Urine                                Date: 04/16/2021Value: Negative    Ref range: Negative           Status: FinalLeukocyte Esterase, Urine                     Date: 04/16/2021Value: MODERATE*   Ref range: Negative           Status: FinalVentricular Rate                              Date: 04/15/2021Value: 62          Ref range: BPM                Status: FinalAtrial Rate                                   Date: 04/15/2021Value: 62          Ref range: BPM                Status: FinalP-R Interval                                  Date: 04/15/2021Value: 220         Ref range: ms                 Status: FinalQRS Duration                                  Date: 04/15/2021Value: 128         Ref range: ms                 Status: FinalQ-T Interval                                  Date: 04/15/2021Value: 454         Ref range: ms                 Status: FinalQTc Calculation (Bazett)                      Date: 04/15/2021Value: 460         Ref range: ms                 Status: FinalP Axis                                        Date: 04/15/2021Value: 24          Ref range: degrees            Status: FinalR Axis                                        Date: 04/15/2021Value: -31         Ref range: degrees            Status: FinalT Axis                                        Date: 04/15/2021Value: -39         Ref range: degrees            Status: FinalVentricular Rate                              Date: 04/16/2021Value: 57          Ref range: BPM                Status: FinalAtrial Rate                                   Date: 04/16/2021Value: 57          Ref range: BPM Status: FinalP-R Interval                                  Date: 04/16/2021Value: 220         Ref range: ms                 Status: FinalQRS Duration                                  Date: 04/16/2021Value: 138         Ref range: ms                 Status: FinalQ-T Interval                                  Date: 04/16/2021Value: 494         Ref range: ms                 Status: FinalQTc Calculation (Bazett)                      Date: 04/16/2021Value: 480         Ref range: ms                 Status: FinalP Axis                                        Date: 04/16/2021Value: 27          Ref range: degrees            Status: FinalR Axis                                        Date: 04/16/2021Value: -32         Ref range: degrees            Status: FinalT Axis                                        Date: 04/16/2021Value: -43         Ref range: degrees            Status: FinalVit D, 25-Hydroxy                             Date: 04/16/2021Value: 31.8        Ref range: 30.0 - 100.0 ng/*  Status: Final              Comment: ( ng/mL)  Optimum LevelThis assay accurately quantifies the sum of vitamin D3, 25-Hydroxy andvitamin D2, 25-Hyroxy. Vitamin B-12                                  Date: 04/16/2021Value: 333         Ref range: 232 - 1245 pg/mL   Status: FinalFolate                                        Date: 04/16/2021Value: 19.7        Ref range: 7.3 - 26.1 ng/mL   Status: Final              Comment: As of 8/10/16, the methodology has changed. Results fromthis methodology should not be compared with results fromprevious methodology. TSH                                           Date: 04/16/2021Value: 0.991       Ref range: 0.440 - 3.860 uI*  Status: FinalTroponin                                      Date: 04/16/2021Value: <0.010      Ref range: 0.000 - 0.010 ng*  Status: Final              Comment: Methodology by Troponin T. Troponin                                      Date: 04/16/2021Value: <0.010      Ref range: 0.000 - 0.010 ng*  Status: Final              Comment: Methodology by Troponin T.Magnesium                                     Date: 04/16/2021Value: 2.0         Ref range: 1.7 - 2.4 mg/dL    Status: FinalCholesterol, Total                            Date: 04/16/2021Value: 108         Ref range: 0 - 199 mg/dL      Status: Final              Comment: ATP III Cholesterol classification is Desirable. Triglycerides                                 Date: 04/16/2021Value: 89          Ref range: 0 - 150 mg/dL      Status: Final              Comment: ATP III Triglycerides Classification is Normal.HDL                                           Date: 04/16/2021Value: 47          Ref range: 40 - 59 mg/dL      Status: Final              Comment: ATP III HDL Cholesterol Classification is Desirable. Expected Values:Males:    >55 = No Risk          35-55 = Moderate Risk          <35 = High RiskFemales:  >65 = No Risk          45-65 = Moderate Risk          <45 = High RiskNCEP Guidelines:   Third Report May 2001>59 = negative risk factor for CHD<40 = major risk factor for CHDLDL Calculated                                Date: 04/16/2021Value: 43          Ref range: 0 - 129 mg/dL      Status: Final              Comment: ATP III LDL Classification is Optimal.WBC                                           Date: 04/16/2021Value: 6.9         Ref range: 4.8 - 10.8 K/uL    Status: FinalRBC                                           Date: 04/16/2021Value: 3.38*       Ref range: 4.20 - 5.40 M/uL   Status: FinalHemoglobin                                    Date: 04/16/2021Value: 10.2*       Ref range: 12.0 - 16.0 g/dL   Status: FinalHematocrit                                    Date: 04/16/2021Value: 30.5*       Ref range: 37.0 - 47.0 %      Status: FinalMCV                                           Date: 04/16/2021Value: 90.1        Ref range: 82.0 - 100.0 fL    Status: [address] [facility]                                           Date: 04/16/2021Value: 30.2        Ref range: 27.0 - 31.3 pg     Status: [address]                                          Date: 04/16/2021Value: 33.5        Ref range: 33.0 - 37.0 %      Status: FinalRDW                                           Date: 04/16/2021Value: 13.3        Ref range: 11.5 - 14.5 %      Status: FinalPlatelets                                     Date: 04/16/2021Value: 176         Ref range: 130 - 400 K/uL     Status: FinalPOC Glucose                                   Date: 04/16/2021Value: 163*        Ref range: 60 - 115 mg/dl     Status: FinalPerformed on                                  Date: 04/16/2021Value: ACCU-CHEK     Status: FinalColor, UA                                     Date: 04/17/2021Value: Yellow      Ref range: Straw/Yellow       Status: FinalClarity, UA                                   Date: 04/17/2021Value: Clear       Ref range: Clear              Status: FinalGlucose, Ur                                   Date: 04/17/2021Value: Negative    Ref range: Negative mg/dL     Status: FinalBilirubin Urine                               Date: 04/17/2021Value: Negative    Ref range: Negative           Status: FinalKetones, Urine                                Date: 04/17/2021Value: Negative    Ref range: Negative mg/dL     Status: FinalSpecific Gravity, UA                          Date: 04/17/2021Value: 1.009       Ref range: 1.005 - 1.030      Status: FinalBlood, Urine                                  Date: 04/17/2021Value: Negative    Ref range: Negative           Status: FinalpH, UA                                        Date: 04/17/2021Value: 6.5         Ref range: 5.0 - 9.0          Status: FinalProtein, UA                                   Date: 04/17/2021Value: Negative    Ref range: Negative mg/dL     Status: FinalUrobilinogen, Urine                           Date: 04/17/2021Value: 0.2         Ref range: <2.0 E.U./dL       Status: FinalNitrite, Urine Date: 04/17/2021Value: Negative    Ref range: Negative           Status: FinalLeukocyte Esterase, Urine                     Date: 04/17/2021Value: SMALL*      Ref range: Negative           Status: FinalPOC Glucose                                   Date: 04/16/2021Value: 107         Ref range: 60 - 115 mg/dl     Status: FinalPerformed on                                  Date: 04/16/2021Value: ACCU-CHEK     Status: FinalSodium                                        Date: 04/17/2021Value: 139         Ref range: 135 - 144 mEq/L    Status: FinalPotassium                                     Date: 04/17/2021Value: 3.9         Ref range: 3.4 - 4.9 mEq/L    Status: FinalChloride                                      Date: 04/17/2021Value: 102         Ref range: 95 - 107 mEq/L     Status: FinalCO2                                           Date: 04/17/2021Value: 23          Ref range: 20 - 31 mEq/L      Status: FinalAnion Gap                                     Date: 04/17/2021Value: 14          Ref range: 9 - 15 mEq/L       Status: FinalGlucose                                       Date: 04/17/2021Value: 109*        Ref range: 70 - 99 mg/dL      Status: FinalBUN                                           Date: 04/17/2021Value: 45*         Ref range: 8 - 23 mg/dL       Status: FinalCREATININE                                    Date: 04/17/2021Value: 1.12*       Ref range: 0.50 - 0.90 mg/dL  Status: FinalGFR Non-                      Date: 04/17/2021Value: 49.0*       Ref range: >60                Status: Final              Comment: >60 mL/min/1.73m2 EGFR, calc. for ages 25 and older using theMDRD formula (not corrected for weight), is valid for stablerenal function. GFR                           Date: 04/17/2021Value: 59.3*       Ref range: >60                Status: Final              Comment: >60 mL/min/1.73m2 EGFR, calc. for ages 25 and older using theMDRD formula (not corrected for weight), is valid for stablerenal function. Calcium                                       Date: 04/17/2021Value: 9.3         Ref range: 8.5 - 9.9 mg/dL    Status: FinalBacteria, UA                                  Date: 04/16/2021Value: Negative    Ref range: Negative /HPF      Status: FinalHyaline Casts, UA                             Date: 04/16/2021Value: 0-1         Ref range: 0 - 5 /HPF         Status: [address] [facility], [place]                                       Date: 04/16/2021Value: 20-50*      Ref range: 0 - 5 /HPF         Status: FinalRBC, UA                                       Date: 04/16/2021Value: 3-5*        Ref range: 0 - 5 /HPF         Status: FinalEpithelial Cells, UA                          Date: 04/16/2021Value: 0-2         Ref range: 0 - 5 /HPF         Status: FinalPOC Glucose                                   Date: 04/17/2021Value: 122*        Ref range: 60 - 115 mg/dl     Status: FinalPerformed on                                  Date: 04/17/2021Value: ACCU-CHEK     Status: FinalUrine Culture, Routine                        Date: 04/17/2021Value: No growth 24 hours                     Status: FinalBacteria, UA                                  Date: 04/17/2021Value: Negative    Ref range: Negative /HPF      Status: FinalHyaline Casts, UA                             Date: 04/17/2021Value: 0-1         Ref range: 0 - 5 /HPF         Status: [address] [facility], UA                                       Date: 04/17/2021Value: 3-5         Ref range: 0 - 5 /HPF         Status: FinalRBC, UA                                       Date: 04/17/2021Value: 0-2         Ref range: 0 - 5 /HPF         Status: FinalEpithelial Cells, UA                          Date: 04/17/2021Value: 0-2         Ref range: 0 - 5 /HPF         Status: Final------------    Radiology last 7 days:  Ct Head Wo ContrastResult Date: 4/16/2021NO ACUTE INTRACRANIAL PATHOLOGY. Cta Chest W Wo  (pe Study)Result Date: 4/15/2021No CT evidence pulmonary embolism. Cholecystectomy. All CT scans at this facility use dose modulation, iterative reconstruction, and/or weight based dosing when appropriate to reduce radiation dose to as low as reasonably achievable. Xr Chest PortableResult Date: 4/15/2021No radiographic evidence of acute intrathoracic process. Us Dup Lower Extremities Bilateral VenousResult Date: 4/18/2021NO SONOGRAPHIC EVIDENCE, DEEP VEIN THROMBOSIS, BILATERAL LOWER EXTREMITIES.      [unfilled]    Discharge Medications    Current Discharge Medication List    Current Discharge Medication List    Current Discharge Medication ListCONTINUE these medications which have NOT CHANGEDbusPIRone (BUSPAR) 5 MG tabletTake 1 tablet by mouth 3 times dailyQty: 90 tablet Refills: 2Associated Diagnoses:AnxietyhydrOXYzine (ATARAX) 10 MG tabletTAKE 1 TABLET BY MOUTH  EVERY 8 HOURS AS NEEDED FOR ITCHINGQty: 90 tablet Refills: 0Associated Diagnoses:Psoriasis; Chronic pruritusfluocinonide (LIDEX) 0.05 % ointmentAPPLY TWICE DAILYQty: 180 g Refills: 1Associated Diagnoses:Psoriasisfenofibrate (TRIGLIDE) 160 MG tabletTake 1 tablet by mouth dailyQty: 90 tablet Refills: 4Associated Diagnoses:Mixed hyperlipidemiaMagnesium 400 MG CAPSTake 1 capsule by mouth 3 times dailyQty: 270 capsule Refills: 4Associated Diagnoses:Hypomagnesemiaescitalopram (LEXAPRO) 20 MG tabletTake 1 tablet by mouth dailyQty: 1 tablet Refills: 0Associated Diagnoses:Mood disorder (HCC)polyethylene glycol (MIRALAX) 17 GM/SCOOP powderTake 17 g by mouth dailyQty: 850 g Refills: 5Associated Diagnoses:Constipation, unspecified constipation typeondansetron (ZOFRAN) 4 MG tabletTake 1 tablet by mouth 3 times daily as needed for Nausea or VomitingQty: 30 tablet Refills: 0Associated Diagnoses:Non-intractable vomiting with nausea, unspecified vomiting typeNutritional Supplements (ENSURE COMPLETE) LIQDTake 1 Bottle by mouth 2 times dailyQty: 60 Bottle Refills: 5Associated Diagnoses:Weight loss, unintentionallevETIRAcetam (KEPPRA) 500 MG tabletTake 1

## 2021-04-19 NOTE — BRIEF OP NOTE
Section of Cardiology  Adult Brief Cardiac Cath Procedure Note        Procedure(s):  LHC, b/l coronary angio    Pre-operative Diagnosis:  CP    H&P Status: Completed and reviewed.      Post-operative Diagnosis:      Normal coronaries  Normal LVF/EDP    Findings:  See full report    Complications:  none    Primary Proceduralist:   Dr.Wes Baez DO      Full procedure note to follow

## 2021-04-19 NOTE — DISCHARGE INSTR - DIET

## 2021-04-19 NOTE — PROGRESS NOTES
Arrived to pre/post cath from [address] [place] [address], alert and oriented. VSS. Verified consent for procedure is signed. Prepping pt for procedure.

## 2021-04-19 NOTE — PROGRESS NOTES
Arrived to pre/post from [place] cath lab and [place] [facility] Right radial quikclot no bleeding or hematoma. Attached to monitor and vitals are stable.   Will continue to monitor

## 2021-04-20 ENCOUNTER — CARE COORDINATION (OUTPATIENT)
Dept: CASE MANAGEMENT | Age: 66
End: 2021-04-20

## 2021-04-20 NOTE — CARE COORDINATION
[facility] Transitions Initial Follow Up Call    Call within 2 business days of discharge: Yes    Patient: Leslie Casiano Patient : 1955   MRN: [id number]  Reason for Admission: 4/15/2021 - 2021 Unstable angina, s/p cardiac cath. Discharge Date: 21 RARS: Readmission Risk Score: 22  CT    Last Discharge [facility]       Complaint Diagnosis Description Type Department Provider    4/15/21 Chest Pain Unstable angina [facility]) ED to Hosp-Admission (Discharged) (ADMITTED) MLOZ1W Damon Baez DO; Maria M Vega. .. Initial CT outreach. Left Hippa compliant VM.         Care Transitions 24 Hour Call    Do you have all of your prescriptions and are they filled?: Yes (Comment: 3/3/21 USES [employer] [facility] MAIL AWAY)  Patient DME: [place] chair  Do you have support at home?: Roommate/Housemate  Are you an active caregiver in your home?: No  Care Transitions Interventions         Follow Up  Future Appointments   Date Time Provider Cami Ledesma   2021  8:45 AM Valery Gordon, [address]   2021  9:00 AM Abeba Cuevas, [address] [place] [address]   2021  8:00 AM Jeannie Rivera MD [address] [place] [address]

## 2021-04-21 ENCOUNTER — CARE COORDINATION (OUTPATIENT)
Dept: CARE COORDINATION | Age: 66
End: 2021-04-21

## 2021-04-21 ENCOUNTER — CARE COORDINATION (OUTPATIENT)
Dept: CASE MANAGEMENT | Age: 66
End: 2021-04-21

## 2021-04-21 NOTE — CARE COORDINATION
[facility] Transitions Initial Follow Up Call    Call within 2 business days of discharge: Yes    Patient: Anthony Ny Patient : 1955   MRN: [id number]  Reason for Admission: 4/15/2021 - 2021 Unstable angina, s/p cardiac cath. Discharge Date: 21 RARS: Readmission Risk Score: 22  CT    Last Discharge [address] [address]       Complaint Diagnosis Description Type Department Provider    4/15/21 Chest Pain Unstable angina [facility]) ED to Hosp-Admission (Discharged) (ADMITTED) MLOZ1W Damon Baez DO; Maria M Vega. .. Second initial CT outreach. Left Hippa compliant VM requesting call back to P: [phone]. CTN s/o. ACM following.         Beryle Muck, APRN - CNP on 2021 8:45AM. Neuro 2021 5:00PM.      Care Transitions 24 Hour Call    Do you have all of your prescriptions and are they filled?: Yes (Comment: 3/3/21 USES [employer] [facility] MAIL AWAY)  Patient DME: [place] [place] chair  Do you have support at home?: Roommate/Housemate  Are you an active caregiver in your home?: No  Care Transitions Interventions         Follow Up  Future Appointments   Date Time Provider Cami Ledesma   2021  8:45 AM [address], [address]   2021  9:00 AM [facility]   2021  8:00 AM Anup Sosa MD [address] [place] [address]

## 2021-04-21 NOTE — CARE COORDINATION
Faxed completed PASSPORT referral to [employer]. Received confirmation that fax was sent successfully.

## 2021-04-26 ENCOUNTER — CARE COORDINATION (OUTPATIENT)
Dept: CARE COORDINATION | Age: 66
End: 2021-04-26

## 2021-04-26 NOTE — CARE COORDINATION
Ambulatory Care Coordination Note  4/26/2021  CM Risk Score: 4  Charlson 10 Year Mortality Risk Score: 10%     ACC: Alisha Middleton, CHLOE    Summary Note: I spoke with Oli Clayton, she was recently discharged from Inpatient at [facility], she states she is\"feeling ok\", denies any current episodes of chest pain, she does state she feels fatigued at times, she has 14 steps to go up for her bedroom and for toileting, I will inquire with Dr. Tatum Alvarez if [facility] would help her on days when she feels to tired to go up the stairs several times a day, I suggested outpatient PT for strengthening and she declined, Oli Clayton states she is eating ok, and she has all her medications, reviewed medication change  in Keppra dose to 500mg po bid, Oli Clayton is being followed by MSW- Nora Graham to determine is she has eligibility for Passport, Oli Clayton  continues to decline dietician or pharmacy follow up. She will be seeing Gala Cartagena for hospital follow up on 4/28/21. Care Coordination Plan of Care: This nurse Care Coordinator judson follow up on     1.check on chest pan?? Fatigue? ?     2 is weight stable?     3.does  she have all her medications? 4. Was BSC ordered     5. MSW follow up-??passport        Care Coordination Interventions    Program Enrollment: Rising Risk  Referral from Primary Care Provider: Yes  Suggested Interventions and Community Resources  Grief Counselor: Declined (Comment: 3/4/21 recent loss of sister- declined [facility] support services)  Occupational Therapy: Declined  Pharmacist: Teresita Phlegm (Comment: 3/4/21 declined -will offer again in a few weeks)  Physical Therapy: Declined  Registered Dietician:  (Comment: 3/4/21 referral made)  Social Work: Completed (Comment: 3/4/21 sw referal made for transportation, housing)  [employer]: Completed (Comment: 3/4/21 ready clinic information mailed)         Goals Addressed    None         Prior to Admission medications    Medication Sig Start Date End Date Taking?  Authorizing Provider   busPIRone (BUSPAR) 5 MG tablet Take 1 tablet by mouth 3 times daily 4/15/21 5/15/21  Maura Sewell MD   hydrOXYzine (ATARAX) 10 MG tablet TAKE 1 TABLET BY MOUTH  EVERY 8 HOURS AS NEEDED FOR ITCHING 4/6/21   Maura Sewell MD   fluocinonide (LIDEX) 0.05 % ointment APPLY TWICE DAILY 4/6/21   Maura Sewell MD   fenofibrate (TRIGLIDE) 160 MG tablet Take 1 tablet by mouth daily 3/11/21   Maura Sewell MD   Magnesium 400 MG CAPS Take 1 capsule by mouth 3 times daily 3/11/21   Maura Sewell MD   escitalopram (LEXAPRO) 20 MG tablet Take 1 tablet by mouth daily 2/25/21   Maura Sewell MD   polyethylene glycol [facility]) 17 GM/SCOOP powder Take 17 g by mouth daily 1/4/21   Maura Sewell MD   ondansetron [facility]) 4 MG tablet Take 1 tablet by mouth 3 times daily as needed for Nausea or Vomiting 12/18/20   Maura Sewell MD   Nutritional Supplements (ENSURE COMPLETE) LIQD Take 1 Bottle by mouth 2 times daily 12/18/20   Maura Sewell MD   levETIRAcetam (KEPPRA) 500 MG tablet Take 1 tablet by mouth 2 times daily  Patient taking differently: Take 500 mg by mouth 2 times daily Take 1/2 (250mg) tablet in the morning and 1 tablet (500mg) if the afternoon.  12/2/20 4/15/21  Antonio Coe MD   triamcinolone (KENALOG) 0.025 % cream Apply topically every 4 hours as needed Apply Topically    Historical Provider, MD   levothyroxine (SYNTHROID) 50 MCG tablet TAKE 1 TABLET BY MOUTH  DAILY 9/30/20   Maura Sewell MD   pantoprazole (PROTONIX) 40 MG tablet TAKE 1 TABLET BY MOUTH  DAILY 9/30/20   Maura Sewell MD   atorvastatin (LIPITOR) 40 MG tablet TAKE 1 TABLET BY MOUTH ONCE DAILY 9/30/20   Maura Sewell MD   rOPINIRole (REQUIP) 0.5 MG tablet TAKE 1 TABLET BY MOUTH AT  NIGHT 9/30/20   Maura Sewell MD   alendronate (FOSAMAX) 35 MG tablet TAKE 1 TABLET BY MOUTH  EVERY 7 DAYS 9/21/20   Betito Contreras MD   calcium carbonate-vitamin D (CALTRATE) 600-400 MG-UNIT TABS per tab Take 1 tablet by mouth 2 times daily 1/13/20   Betito Contreras MD   ACETAMINOPHEN EXTRA STRENGTH 500 MG tablet Take 1 tablet by mouth 2 times daily as needed for Pain 6/19/19 4/15/21  Betito Contreras MD       Future Appointments   Date Time Provider [facility]   4/28/2021  8:15 AM ALLEN Alanis - CNP [facility]   5/6/2021  9:00 AM Dali Cesar APRN - [facility]   5/14/2021  8:00 AM Betito Contreras MD [address] [place] [address]

## 2021-04-28 ENCOUNTER — OFFICE VISIT (OUTPATIENT)
Dept: FAMILY MEDICINE CLINIC | Age: 66
End: 2021-04-28
Payer: MEDICARE

## 2021-04-28 VITALS
OXYGEN SATURATION: 97 % | SYSTOLIC BLOOD PRESSURE: 137 MMHG | HEIGHT: 57 IN | WEIGHT: 101.2 LBS | DIASTOLIC BLOOD PRESSURE: 81 MMHG | BODY MASS INDEX: 21.83 KG/M2 | TEMPERATURE: 96.5 F | HEART RATE: 60 BPM

## 2021-04-28 DIAGNOSIS — M79.604 BILATERAL LEG PAIN: Chronic | ICD-10-CM

## 2021-04-28 DIAGNOSIS — E83.52 HYPERCALCEMIA: ICD-10-CM

## 2021-04-28 DIAGNOSIS — M25.50 ARTHRALGIA OF MULTIPLE JOINTS: ICD-10-CM

## 2021-04-28 DIAGNOSIS — M79.605 BILATERAL LEG PAIN: Chronic | ICD-10-CM

## 2021-04-28 DIAGNOSIS — I20.0 UNSTABLE ANGINA (HCC): Primary | ICD-10-CM

## 2021-04-28 DIAGNOSIS — Z00.00 ROUTINE GENERAL MEDICAL EXAMINATION AT A HEALTH CARE FACILITY: Primary | ICD-10-CM

## 2021-04-28 DIAGNOSIS — E83.42 HYPOMAGNESEMIA: ICD-10-CM

## 2021-04-28 DIAGNOSIS — E87.6 HYPOKALEMIA: Chronic | ICD-10-CM

## 2021-04-28 LAB
ANION GAP SERPL CALCULATED.3IONS-SCNC: 11 MEQ/L (ref 9–15)
BUN BLDV-MCNC: 29 MG/DL (ref 8–23)
CALCIUM SERPL-MCNC: 8.8 MG/DL (ref 8.5–9.9)
CHLORIDE BLD-SCNC: 103 MEQ/L (ref 95–107)
CO2: 27 MEQ/L (ref 20–31)
CREAT SERPL-MCNC: 1.08 MG/DL (ref 0.5–0.9)
GFR AFRICAN AMERICAN: >60
GFR NON-AFRICAN AMERICAN: 51.1
GLUCOSE BLD-MCNC: 105 MG/DL (ref 70–99)
MAGNESIUM: 1.8 MG/DL (ref 1.7–2.4)
POTASSIUM SERPL-SCNC: 4.5 MEQ/L (ref 3.4–4.9)
SODIUM BLD-SCNC: 141 MEQ/L (ref 135–144)

## 2021-04-28 PROCEDURE — 99495 TRANSJ CARE MGMT MOD F2F 14D: CPT | Performed by: NURSE PRACTITIONER

## 2021-04-28 PROCEDURE — 3017F COLORECTAL CA SCREEN DOC REV: CPT | Performed by: NURSE PRACTITIONER

## 2021-04-28 PROCEDURE — 1111F DSCHRG MED/CURRENT MED MERGE: CPT | Performed by: NURSE PRACTITIONER

## 2021-04-28 PROCEDURE — G0402 INITIAL PREVENTIVE EXAM: HCPCS | Performed by: NURSE PRACTITIONER

## 2021-04-28 RX ORDER — ASPIRIN 81 MG/1
81 TABLET ORAL DAILY
Qty: 30 TABLET | Refills: 5 | Status: CANCELLED | OUTPATIENT
Start: 2021-04-28

## 2021-04-28 RX ORDER — MELOXICAM 7.5 MG/1
7.5 TABLET ORAL DAILY
Qty: 30 TABLET | Refills: 3 | Status: SHIPPED | OUTPATIENT
Start: 2021-04-28 | End: 2021-05-14

## 2021-04-28 RX ORDER — ACETAMINOPHEN 500 MG
500 TABLET ORAL 2 TIMES DAILY PRN
Qty: 60 TABLET | Refills: 5 | Status: SHIPPED | OUTPATIENT
Start: 2021-04-28 | End: 2021-06-23 | Stop reason: SDUPTHER

## 2021-04-28 RX ORDER — ASPIRIN 81 MG/1
81 TABLET ORAL DAILY
Qty: 30 TABLET | Refills: 5 | Status: SHIPPED | OUTPATIENT
Start: 2021-04-28 | End: 2021-06-23 | Stop reason: SDUPTHER

## 2021-04-28 ASSESSMENT — LIFESTYLE VARIABLES
HOW OFTEN DURING THE LAST YEAR HAVE YOU HAD A FEELING OF GUILT OR REMORSE AFTER DRINKING: 0
HOW OFTEN DURING THE LAST YEAR HAVE YOU BEEN UNABLE TO REMEMBER WHAT HAPPENED THE NIGHT BEFORE BECAUSE YOU HAD BEEN DRINKING: 0
HAS A RELATIVE, FRIEND, DOCTOR, OR ANOTHER HEALTH PROFESSIONAL EXPRESSED CONCERN ABOUT YOUR DRINKING OR SUGGESTED YOU CUT DOWN: 0
HOW OFTEN DURING THE LAST YEAR HAVE YOU FOUND THAT YOU WERE NOT ABLE TO STOP DRINKING ONCE YOU HAD STARTED: 0
HOW OFTEN DURING THE LAST YEAR HAVE YOU NEEDED AN ALCOHOLIC DRINK FIRST THING IN THE MORNING TO GET YOURSELF GOING AFTER A NIGHT OF HEAVY DRINKING: 0
AUDIT TOTAL SCORE: 2
HOW MANY STANDARD DRINKS CONTAINING ALCOHOL DO YOU HAVE ON A TYPICAL DAY: 1

## 2021-04-28 ASSESSMENT — ENCOUNTER SYMPTOMS
WHEEZING: 0
COUGH: 0
SINUS PRESSURE: 0
SHORTNESS OF BREATH: 0

## 2021-04-28 ASSESSMENT — PATIENT HEALTH QUESTIONNAIRE - PHQ9
SUM OF ALL RESPONSES TO PHQ QUESTIONS 1-9: 2
1. LITTLE INTEREST OR PLEASURE IN DOING THINGS: 1

## 2021-04-28 NOTE — PATIENT INSTRUCTIONS
Patient Education        Musculoskeletal Pain: Care Instructions  Your Care Instructions     Different problems with the bones, muscles, nerves, ligaments, and tendons in the body can cause pain. One or more areas of your body may ache or burn. Or they may feel tired, stiff, or sore. The medical term for this type of pain is musculoskeletal pain. It can have many different causes. Sometimes the pain is caused by an injury such as a strain or sprain. Or you might have pain from using one part of your body in the same way over and over again. This is called overuse. In some cases, the cause of the pain is another health problem such as arthritis or fibromyalgia. The doctor will examine you and ask you questions about your health to help find the cause of your pain. Blood tests or imaging tests like an X-ray may also be helpful. But sometimes doctors can't find a cause of the pain. Treatment depends on your symptoms and the cause of the pain, if known. The doctor has checked you carefully, but problems can develop later. If you notice any problems or new symptoms, get medical treatment right away. Follow-up care is a key part of your treatment and safety. Be sure to make and go to all appointments, and call your doctor if you are having problems. It's also a good idea to know your test results and keep a list of the medicines you take. How can you care for yourself at home? · Rest until you feel better. · Do not do anything that makes the pain worse. Return to exercise gradually if you feel better and your doctor says it's okay. · Be safe with medicines. Read and follow all instructions on the label. ? If the doctor gave you a prescription medicine for pain, take it as prescribed. ? If you are not taking a prescription pain medicine, ask your doctor if you can take an over-the-counter medicine. · Put ice or a cold pack on the area for 10 to 20 minutes at a time to ease pain.  Put a thin cloth between the ice and your skin. When should you call for help? Call your doctor now or seek immediate medical care if:    · You have new pain, or your pain gets worse.     · You have new symptoms such as a fever, a rash, or chills. Watch closely for changes in your health, and be sure to contact your doctor if:    · You do not get better as expected. Where can you learn more? Go to https://[email or web address] SoftwarepeImagistxeweb.[email or web address]. org and sign in to your [employer] account. Enter I814 in the [employer] box to learn more about \"Musculoskeletal Pain: Care Instructions. \"     If you do not have an account, please click on the \"Sign Up Now\" link. Current as of: August 4, 2020               Content Version: 12.8  © 5954-7246 Healthwise, Incorporated. Care instructions adapted under license by [facility] ([facility]). If you have questions about a medical condition or this instruction, always ask your healthcare professional. [address] [place] [address] any warranty or liability for your use of this information.

## 2021-04-28 NOTE — PATIENT INSTRUCTIONS
Personalized Preventive Plan for Tone Mac - 4/28/2021  Medicare offers a range of preventive health benefits. Some of the tests and screenings are paid in full while other may be subject to a deductible, co-insurance, and/or copay. Some of these benefits include a comprehensive review of your medical history including lifestyle, illnesses that may run in your family, and various assessments and screenings as appropriate. After reviewing your medical record and screening and assessments performed today your provider may have ordered immunizations, labs, imaging, and/or referrals for you. A list of these orders (if applicable) as well as your Preventive Care list are included within your After Visit Summary for your review. Other Preventive Recommendations:    · A preventive eye exam performed by an eye specialist is recommended every 1-2 years to screen for glaucoma; cataracts, macular degeneration, and other eye disorders. · A preventive dental visit is recommended every 6 months. · Try to get at least 150 minutes of exercise per week or 10,000 steps per day on a pedometer . · Order or download the FREE \"Exercise & Physical Activity: Your Everyday Guide\" from [employer]. Call 0-504.903.9364 or search [employer] online. · You need 4187-3614 mg of calcium and 3818-4701 IU of vitamin D per day. It is possible to meet your calcium requirement with diet alone, but a vitamin D supplement is usually necessary to meet this goal.  · When exposed to the sun, use a sunscreen that protects against both UVA and UVB radiation with an SPF of 30 or greater. Reapply every 2 to 3 hours or after sweating, drying off with a towel, or swimming. · Always wear a seat belt when traveling in a car. Always wear a helmet when riding a bicycle or motorcycle.

## 2021-04-28 NOTE — PROGRESS NOTES
Post-Discharge Transitional Care Management Services or Hospital Follow Up      Hawa Damico   YOB: 1955    Date of Office Visit:  4/28/2021  Date of Hospital Admission: 4/15/21  Date of Hospital Discharge: 4/19/21  Readmission Risk Score(high >=14%.  Medium >=10%):Readmission Risk Score: 22      Care management risk score Rising risk (score 2-5) and Complex Care (Scores >=6): 4     Non face to face  following discharge, date last encounter closed (first attempt may have been earlier): 4/21/2021  9:24 AM 4/21/2021  9:24 AM    Call initiated 2 business days of discharge: Yes     Patient Active Problem List   Diagnosis    Mixed hyperlipidemia    Abnormal glucose    Gastroesophageal reflux disease without esophagitis    Alcoholism ([address]    Psoriasis    Vitamin D deficiency    Restless leg syndrome    Bilateral leg pain    Mood disorder (HCC)    Hypothyroidism    Osteopenia of multiple sites    Facet arthropathy, multilevel    Chronic pain of right knee    Abnormal EKG    BARRIENTOS (dyspnea on exertion)    Anemia    Weight loss    Seizure ([address]    Hypomagnesemia    Hypokalemia    Hypercalcemia    Polyneuropathy associated with underlying disease ([address]    Generalized idiopathic epilepsy and epileptic syndromes, intractable, with status epilepticus ([address]    Lymphadenopathy, axillary    Constipation    Fatigue    Epigastric pain    Gastric polyp    Altered bowel habits    Cognitive impairment    Anxiety    Grief reaction    Unstable angina (HCC)       Allergies   Allergen Reactions    Morphine Swelling       Medications listed as ordered at the time of discharge from hospital   Roise Schilder A   Home Medication Instructions NOLVIA:    Printed on:04/28/21 1237   Medication Information                      acetaminophen (ACETAMINOPHEN EXTRA STRENGTH) 500 MG tablet  Take 1 tablet by mouth 2 times daily as needed for Pain             alendronate (FOSAMAX) 35 MG tablet  TAKE Quality [employer]: Preventive Care And Screening: Influenza Immunization: Influenza Immunization previously received during influenza season 1 TABLET BY MOUTH  EVERY 7 DAYS             aspirin EC 81 MG EC tablet  Take 1 tablet by mouth daily             atorvastatin (LIPITOR) 40 MG tablet  TAKE 1 TABLET BY MOUTH ONCE DAILY             busPIRone (BUSPAR) 5 MG tablet  Take 1 tablet by mouth 3 times daily             calcium carbonate-vitamin D (CALTRATE) 600-400 MG-UNIT TABS per tab  Take 1 tablet by mouth 2 times daily             escitalopram (LEXAPRO) 20 MG tablet  Take 1 tablet by mouth daily             fenofibrate (TRIGLIDE) 160 MG tablet  Take 1 tablet by mouth daily             fluocinonide (LIDEX) 0.05 % ointment  APPLY TWICE DAILY             hydrOXYzine (ATARAX) 10 MG tablet  TAKE 1 TABLET BY MOUTH  EVERY 8 HOURS AS NEEDED FOR ITCHING             levETIRAcetam (KEPPRA) 500 MG tablet  Take 1 tablet by mouth 2 times daily             levothyroxine (SYNTHROID) 50 MCG tablet  TAKE 1 TABLET BY MOUTH  DAILY             Magnesium 400 MG CAPS  Take 1 capsule by mouth 3 times daily             meloxicam (MOBIC) 7.5 MG tablet  Take 1 tablet by mouth daily             Nutritional Supplements (ENSURE COMPLETE) LIQD  Take 1 Bottle by mouth 2 times daily             pantoprazole (PROTONIX) 40 MG tablet  TAKE 1 TABLET BY MOUTH  DAILY             rOPINIRole (REQUIP) 0.5 MG tablet  TAKE 1 TABLET BY MOUTH AT  NIGHT             triamcinolone (KENALOG) 0.025 % cream  Apply topically every 4 hours as needed Apply Topically                   Medications marked \"taking\" at this time  Outpatient Medications Marked as Taking for the 4/28/21 encounter (Office Visit) with ALLEN Caicedo - CNP   Medication Sig Dispense Refill    meloxicam (MOBIC) 7.5 MG tablet Take 1 tablet by mouth daily 30 tablet 3    acetaminophen (ACETAMINOPHEN EXTRA STRENGTH) 500 MG tablet Take 1 tablet by mouth 2 times daily as needed for Pain 60 tablet 5    aspirin EC 81 MG EC tablet Take 1 tablet by mouth daily 30 tablet 5    busPIRone (BUSPAR) 5 MG tablet Take 1 tablet by mouth 3 Detail Level: Detailed times daily 90 tablet 2    hydrOXYzine (ATARAX) 10 MG tablet TAKE 1 TABLET BY MOUTH  EVERY 8 HOURS AS NEEDED FOR ITCHING 90 tablet 0    fluocinonide (LIDEX) 0.05 % ointment APPLY TWICE DAILY 180 g 1    fenofibrate (TRIGLIDE) 160 MG tablet Take 1 tablet by mouth daily 90 tablet 4    Magnesium 400 MG CAPS Take 1 capsule by mouth 3 times daily 270 capsule 4    escitalopram (LEXAPRO) 20 MG tablet Take 1 tablet by mouth daily 1 tablet 0    Nutritional Supplements (ENSURE COMPLETE) LIQD Take 1 Bottle by mouth 2 times daily 60 Bottle 5    triamcinolone (KENALOG) 0.025 % cream Apply topically every 4 hours as needed Apply Topically      levothyroxine (SYNTHROID) 50 MCG tablet TAKE 1 TABLET BY MOUTH  DAILY 90 tablet 3    pantoprazole (PROTONIX) 40 MG tablet TAKE 1 TABLET BY MOUTH  DAILY 90 tablet 3    atorvastatin (LIPITOR) 40 MG tablet TAKE 1 TABLET BY MOUTH ONCE DAILY 90 tablet 3    rOPINIRole (REQUIP) 0.5 MG tablet TAKE 1 TABLET BY MOUTH AT  NIGHT 90 tablet 3    alendronate (FOSAMAX) 35 MG tablet TAKE 1 TABLET BY MOUTH  EVERY 7 DAYS 12 tablet 3    calcium carbonate-vitamin D (CALTRATE) 600-400 MG-UNIT TABS per tab Take 1 tablet by mouth 2 times daily 30 tablet 12        Medications patient taking as of now reconciled against medications ordered at time of hospital discharge: Yes    Chief Complaint   Patient presents with    Follow-Up from Hospital     Patient presents today to follow up from hospital d/c [facility] EMERGENCY [facility] AT [place] 04/19/21 for angina. Patient states she is not having any pain now. Pt in today for f/u from recent hospital stay. She was admitted for cp and sob. She had a cardiac cath done that was negative. She denies any hx of stent. She reports that she was moving boxes around her house prior to symptom onset. She reports that since being home she has noticed she still intermittently gets the pain. She denies any current cp. She reports it usually starts if she's up moving around.  She reports that she was not discharged on the metoprolol or asa she was given in the hospital.     She does report joint pain. It is worse in her hands. She is not sure if it is OA, RA, or psoriatic arthritis. She will be seeing the dermatologist soon. She reports that she has taken aleve with some relief. Inpatient course: Discharge summary reviewed- see chart. Interval history/Current status: Stable    Review of Systems   Constitutional: Negative for chills, fatigue and fever. HENT: Negative for congestion, ear pain and sinus pressure. Respiratory: Negative for cough, shortness of breath and wheezing. Cardiovascular: Positive for chest pain. Negative for palpitations and leg swelling. Musculoskeletal: Positive for arthralgias. Neurological: Negative for dizziness and headaches. Vitals:    04/28/21 0843   BP: 137/81   Site: Left Upper Arm   Position: Sitting   Cuff Size: Small Adult   Pulse: 60   Temp: 96.5 °F (35.8 °C)   TempSrc: Temporal   SpO2: 97%   Weight: 101 lb 3.2 oz (45.9 kg)   Height: 4' 9\" (1.448 m)     Body mass index is 21.9 kg/m². Wt Readings from Last 3 Encounters:   04/28/21 101 lb 3.2 oz (45.9 kg)   04/15/21 84 lb 9.6 oz (38.4 kg)   04/15/21 96 lb 9.6 oz (43.8 kg)     BP Readings from Last 3 Encounters:   04/28/21 137/81   04/19/21 (!) 107/45   04/15/21 118/70       Physical Exam  Constitutional:       Appearance: She is well-developed. HENT:      Head: Normocephalic. Right Ear: Tympanic membrane, ear canal and external ear normal.      Left Ear: Tympanic membrane, ear canal and external ear normal.      Nose: Nose normal.      Mouth/Throat:      Mouth: Mucous membranes are moist.      Pharynx: Oropharynx is clear. Uvula midline. Eyes:      General:         Right eye: No discharge. Left eye: No discharge. Conjunctiva/sclera: Conjunctivae normal.   Neck:      Musculoskeletal: Normal range of motion.    Cardiovascular:      Rate and Rhythm: Normal rate and regular rhythm. Heart sounds: Normal heart sounds. Pulmonary:      Effort: Pulmonary effort is normal. No respiratory distress. Breath sounds: Normal breath sounds. Chest:      Chest wall: No lacerations, deformity, swelling, tenderness, crepitus or edema. Lymphadenopathy:      Cervical: No cervical adenopathy. Skin:     General: Skin is warm and dry. Neurological:      Mental Status: She is alert and oriented to person, place, and time. Psychiatric:         Mood and Affect: Mood normal.         Behavior: Behavior normal.             Assessment/Plan:  1. Unstable angina (HCC)  Stay off metoprolol for now due to low HR. F/u with cardiology for further evaluation.  - TX DISCHARGE MEDS RECONCILED W/ CURRENT OUTPATIENT MED LIST  - [id number] Damon Casey DO, Invasive Cardiology, [place]  - aspirin EC 81 MG EC tablet; Take 1 tablet by mouth daily  Dispense: 30 tablet; Refill: 5    2. Bilateral leg pain    - acetaminophen (ACETAMINOPHEN EXTRA STRENGTH) 500 MG tablet; Take 1 tablet by mouth 2 times daily as needed for Pain  Dispense: 60 tablet; Refill: 5    3. Arthralgia of multiple joints    - meloxicam (MOBIC) 7.5 MG tablet; Take 1 tablet by mouth daily  Dispense: 30 tablet; Refill: 3        Medical Decision Making: moderate complexity        Return in about 2 weeks (around 5/12/2021) for evaluation with Dr. Adal Hough. Reviewed with the patient: current clinicalstatus, medications, activities and diet. Side effects, adverse effects of the medication prescribedtoday, as well as treatment plan/ rationale and result expectations have been discussedwith the patient who expresses understanding and desires to proceed. Close follow upto evaluate treatment results and for coordination of care. I have reviewedthe patient's medical history in detail and updated the computerized patient record.     Delmar Hilton, ALLEN - CNP

## 2021-04-28 NOTE — PROGRESS NOTES
Medicare Annual Wellness Visit  Name: Maya Bassett Date: 2021   MRN: [id number] Sex: Female   Age: 63 y.o. Ethnicity: Non-/Non    : 1955 Race: Eliud Grijalva is here for Medicare AWV (Patient presents today for medicare annual wellness exam.)    Screenings for behavioral, psychosocial and functional/safety risks, and cognitive dysfunction are all negative except as indicated below. These results, as well as other patient data from the [address] form, are documented in Flowsheets linked to this Encounter. Allergies   Allergen Reactions    Morphine Swelling       Prior to Visit Medications    Medication Sig Taking?  Authorizing Provider   busPIRone (BUSPAR) 5 MG tablet Take 1 tablet by mouth 3 times daily Yes Alicia Garcia MD   hydrOXYzine (ATARAX) 10 MG tablet TAKE 1 TABLET BY MOUTH  EVERY 8 HOURS AS NEEDED FOR ITCHING Yes Alicia Garcia MD   fluocinonide (LIDEX) 0.05 % ointment APPLY TWICE DAILY Yes Alicia Garcia MD   fenofibrate (TRIGLIDE) 160 MG tablet Take 1 tablet by mouth daily Yes Alicia Garcia MD   Magnesium 400 MG CAPS Take 1 capsule by mouth 3 times daily Yes Alicia Garcia MD   escitalopram (LEXAPRO) 20 MG tablet Take 1 tablet by mouth daily Yes Alicia Garcia MD   Nutritional Supplements (ENSURE COMPLETE) LIQD Take 1 Bottle by mouth 2 times daily Yes Alicia Garcia MD   triamcinolone (KENALOG) 0.025 % cream Apply topically every 4 hours as needed Apply Topically Yes Historical Provider, MD   levothyroxine (SYNTHROID) 50 MCG tablet TAKE 1 TABLET BY MOUTH  DAILY Yes Alicia Garcia MD   pantoprazole (PROTONIX) 40 MG tablet TAKE 1 TABLET BY MOUTH  DAILY Yes Alicia Garcia MD   atorvastatin (LIPITOR) 40 MG tablet TAKE 1 TABLET BY MOUTH ONCE DAILY Yes Alicia Garcia MD   rOPINIRole (REQUIP) 0.5 MG tablet TAKE 1 TABLET BY MOUTH AT  NIGHT Yes Jhonathan Zhang MD   alendronate (FOSAMAX) 35 MG tablet TAKE 1 TABLET BY MOUTH  EVERY 7 DAYS Yes Jhonathan Zhang MD   calcium carbonate-vitamin D (CALTRATE) 600-400 MG-UNIT TABS per tab Take 1 tablet by mouth 2 times daily Yes Jhonathan Zhang MD   levETIRAcetam (KEPPRA) 500 MG tablet Take 1 tablet by mouth 2 times daily  Patient taking differently: Take 500 mg by mouth 2 times daily Take 1/2 (250mg) tablet in the morning and 1 tablet (500mg) if the afternoon. Divya Baker MD   ACETAMINOPHEN EXTRA STRENGTH 500 MG tablet Take 1 tablet by mouth 2 times daily as needed for Pain  Jhonathan Zhang MD       Past Medical History:   Diagnosis Date    Alcoholism [facility])     Arthritis     Chronic lung disease     Cognitive impairment 4/15/2021    Depression     Diabetes mellitus [address]     Diarrhea 1/4/2021    Possibly related to Metformin, Keppra, constipation, colitis. Stop Metformin. Treat constipation. Consider adjustment to Keppra given side effects.     Encephalopathy 12/10/2020    Grief reaction 4/15/2021    Hyperlipidemia     Hypertension     Hypokalemia 11/6/2020    Hypomagnesemia 11/6/2020    Hypothyroidism     Lymphadenopathy, axillary 1/4/2021    Second hand smoke exposure     Seizure ([facility]     Syncope and collapse 12/10/2020       Past Surgical History:   Procedure Laterality Date    APPENDECTOMY      BIOPSY MOUTH LESION Bilateral 12/19/2016    REMOVAL  OF BILATERAL LINGUAL MACIE performed by Rubina Li DDS at [facility] COLONOSCOPY N/A 1/21/2021    COLONOSCOPY DIAGNOSTIC performed by Isaias Pritchard MD at [address] [place] [address] ENDOSCOPY N/A 1/21/2021    EGD with polypectomy performed by Isaias Pritchard MD at [facility]       Family History   Problem Relation Age of Onset    Heart Disease Mother     Hypertension Mother    [address] [facility] Yair slowly mood is improving. Health Habits/Nutrition:  Health Habits/Nutrition  Do you exercise for at least 20 minutes 2-3 times per week?: (!) No  Have you lost any weight without trying in the past 3 months?: No  Do you eat only one meal per day?: No  Have you seen the dentist within the past year?: (!) No     Health Habits/Nutrition Interventions:  · Inadequate physical activity:  patient is not ready to increase his/her physical activity level at this time  · Dental exam overdue:  patient encouraged to make appointment with his/her dentist    Hearing/Vision:  No exam data present  Hearing/Vision  Do you or your family notice any trouble with your hearing that hasn't been managed with hearing aids?: (!) Yes  Do you have difficulty driving, watching TV, or doing any of your daily activities because of your eyesight?: (!) Yes  Have you had an eye exam within the past year?: (!) No  Hearing/Vision Interventions:  · Hearing concerns:  patient declines any further evaluation/treatment for hearing issues  · Vision concerns:  patient encouraged to make appointment with his/her eye specialist     ADL:  ADLs  In the past 7 days, did you need help from others to perform any of the following everyday activities? Eating, dressing, grooming, bathing, toileting, or walking/balance?: None  In the past 7 days, did you need help from others to take care of any of the following? Laundry, housekeeping, banking/finances, shopping, telephone use, food preparation, transportation, or taking medications?: (!) Transportation  ADL Interventions:  · Patient declines any further evaluation/treatment for this issue  Reports her nieces have been giving her rides and she has a friend who helps. If she really needs it her insurance will also provide transportation, and she does know how to get a hold of them.      Personalized Preventive Plan   Current Health Maintenance Status  Immunization History   Administered Date(s) Administered    Influenza Virus Vaccine 11/05/2017    Influenza, MDCK Quadv, IM, PF (Flucelvax 4 yrs and older) 10/17/2019    Influenza, MDCK, Preservative free 10/09/2015    Influenza, Quadv, IM, (6 mo and older Fluzone, Flulaval, Fluarix and 3 yrs and older Afluria) 11/05/2018    Influenza, Quadv, IM, PF (6 mo and older Fluzone, Flulaval, Fluarix, and 3 yrs and older Afluria) 10/06/2020    Influenza, Triv, 3 Years and older, IM, PF (Afluria 5yrs and older) 10/04/2016    Pneumococcal Polysaccharide ([id number]) 05/09/2019        Health Maintenance   Topic Date Due    COVID-19 Vaccine (1) Never done    DTaP/Tdap/Td vaccine (1 - Tdap) Never done    Cervical cancer screen  Never done    Shingles Vaccine (1 of 2) Never done   [employer] Visit (AWV)  Never done    A1C test (Diabetic or Prediabetic)  03/12/2022    Lipid screen  04/16/2022    TSH testing  04/16/2022    Breast cancer screen  02/03/2023    Colon cancer screen colonoscopy  01/21/2031    Flu vaccine  Completed    Pneumococcal 0-64 years Vaccine  Completed    Hepatitis C screen  Completed    HIV screen  Completed    Hepatitis A vaccine  Aged Out    Hepatitis B vaccine  Aged Out    Hib vaccine  Aged Out    Meningococcal (ACWY) vaccine  Aged Out     Recommendations for [employer] Due: see orders and patient instructions/AVS.  . Recommended screening schedule for the next 5-10 years is provided to the patient in written form: see Patient Instructions/AVS.    There are no diagnoses linked to this encounter. Return for Medicare Annual Wellness Visit in 1 year. Reviewed with the patient: current clinicalstatus, medications, activities and diet. Side effects, adverse effects of the medication prescribedtoday, as well as treatment plan/ rationale and result expectations have been discussedwith the patient who expresses understanding and desires to proceed.     Close follow upto evaluate treatment results and for coordination of care.  I have reviewedthe patient's medical history in detail and updated the computerized patient record.     Ayesha Crowell, ALLEN - CNP

## 2021-04-29 PROBLEM — F32.9 MAJOR DEPRESSIVE DISORDER: Status: ACTIVE | Noted: 2021-04-29

## 2021-05-03 ENCOUNTER — CARE COORDINATION (OUTPATIENT)
Dept: CARE COORDINATION | Age: 66
End: 2021-05-03

## 2021-05-03 NOTE — CARE COORDINATION
Telephone call to patient.   Relayed contact information for [facility].

## 2021-05-03 NOTE — CARE COORDINATION
Telephone call to patient. Discussed if she has heard from [employer] [facility]. Patient feels she is still trying to manage at home,especially her care and household.   Discussed that would check on referral for [facility]

## 2021-05-03 NOTE — CARE COORDINATION
Telephone call to [employer] [facility]. .  Left voicemail in resource department requesting a return phone call. Call back number was provided.

## 2021-05-04 DIAGNOSIS — L40.9 PSORIASIS: Chronic | ICD-10-CM

## 2021-05-04 DIAGNOSIS — L29.9 CHRONIC PRURITUS: ICD-10-CM

## 2021-05-04 NOTE — TELEPHONE ENCOUNTER
Please clarify with Ayesha whether or not she is taking Atarax 3 times a day. I do not think she is. This is only supposed to be taken as needed for itching. She should not need refills at this time.

## 2021-05-04 NOTE — TELEPHONE ENCOUNTER
Patient is requesting a 90 days supplies. Please advise and thank you!     Rx request   Requested Prescriptions     Pending Prescriptions Disp Refills    hydrOXYzine (ATARAX) 10 MG tablet 90 tablet 0     Sig: Take 1 tablet by mouth every 8 hours as needed for Itching     LOV 4/15/2021  Next Visit Date:  Future Appointments   Date Time Provider Cami Ledesma   5/6/2021  9:00 AM Amanda Blanco, [address]   5/14/2021  8:00 AM Itzel Page MD [facility]   6/18/2021  8:45 AM Damon Pineda [facility]

## 2021-05-06 RX ORDER — HYDROXYZINE HYDROCHLORIDE 10 MG/1
10 TABLET, FILM COATED ORAL EVERY 8 HOURS PRN
Qty: 90 TABLET | Refills: 0 | Status: SHIPPED | OUTPATIENT
Start: 2021-05-06 | End: 2021-06-16

## 2021-05-06 NOTE — TELEPHONE ENCOUNTER
Pt states her psoriasis is really bothering her and she is itching a lot. She is taking this medication at least 3 times a day or more. Approximately every 3-4 hours, especially at night. She has an apt with the dermatologist on May 13th. She is asking for a refill. Please advise and thank you.     Pharmacy: [facility]

## 2021-05-12 ENCOUNTER — CARE COORDINATION (OUTPATIENT)
Dept: CARE COORDINATION | Age: 66
End: 2021-05-12

## 2021-05-13 LAB
CHOLESTEROL, TOTAL: 134 MG/DL (ref 0–199)
HDLC SERPL-MCNC: 64 MG/DL (ref 40–59)
LDL CHOLESTEROL CALCULATED: 51 MG/DL (ref 0–129)
TRIGL SERPL-MCNC: 95 MG/DL (ref 0–150)

## 2021-05-14 ENCOUNTER — VIRTUAL VISIT (OUTPATIENT)
Dept: FAMILY MEDICINE CLINIC | Age: 66
End: 2021-05-14
Payer: MEDICARE

## 2021-05-14 DIAGNOSIS — N28.9 KIDNEY FUNCTION ABNORMAL: Primary | ICD-10-CM

## 2021-05-14 PROCEDURE — 99213 OFFICE O/P EST LOW 20 MIN: CPT | Performed by: NURSE PRACTITIONER

## 2021-05-14 PROCEDURE — G8427 DOCREV CUR MEDS BY ELIG CLIN: HCPCS | Performed by: NURSE PRACTITIONER

## 2021-05-14 PROCEDURE — 1111F DSCHRG MED/CURRENT MED MERGE: CPT | Performed by: NURSE PRACTITIONER

## 2021-05-14 PROCEDURE — 3017F COLORECTAL CA SCREEN DOC REV: CPT | Performed by: NURSE PRACTITIONER

## 2021-05-14 ASSESSMENT — ENCOUNTER SYMPTOMS
GASTROINTESTINAL NEGATIVE: 1
RESPIRATORY NEGATIVE: 1
ALLERGIC/IMMUNOLOGIC NEGATIVE: 1
EYES NEGATIVE: 1

## 2021-05-14 NOTE — PROGRESS NOTES
Diagnosis Date    Alcoholism ([facility]     Arthritis     Chronic lung disease     Cognitive impairment 4/15/2021    Depression     Diabetes mellitus ([facility]     Diarrhea 1/4/2021    Possibly related to Metformin, Keppra, constipation, colitis. Stop Metformin. Treat constipation. Consider adjustment to Keppra given side effects.     Encephalopathy 12/10/2020    Grief reaction 4/15/2021    Hyperlipidemia     Hypertension     Hypokalemia 11/6/2020    Hypomagnesemia 11/6/2020    Hypothyroidism     Lymphadenopathy, axillary 1/4/2021    Second hand smoke exposure     Seizure ([facility]     Syncope and collapse 12/10/2020     Past Surgical History:   Procedure Laterality Date    APPENDECTOMY      BIOPSY MOUTH LESION Bilateral 12/19/2016    REMOVAL  OF BILATERAL LINGUAL MACIE performed by Nisha Henderson DDS at [facility] COLONOSCOPY N/A 1/21/2021    COLONOSCOPY DIAGNOSTIC performed by Keila Jimenez MD at [address] [place] [address] ENDOSCOPY N/A 1/21/2021    EGD with polypectomy performed by Keila Jimenez MD at [facility] Marital status: Single     Spouse name: Not on file    Number of children: Not on file    Years of education: 15    Highest education level: Not on file   Occupational History    Occupation: disabled   Social Needs    Financial resource strain: Not very hard    Food insecurity     Worry: Never true     Inability: Never true    Transportation needs     Medical: Yes     Non-medical: Yes   Tobacco Use    Smoking status: Never Smoker    Smokeless tobacco: Never Used   Substance and Sexual Activity    Alcohol use: Not Currently     Alcohol/week: 28.0 standard drinks     Types: 28 Shots of liquor per week     Comment: quit drinking in November 2020    Drug use: No    Sexual activity: Not Currently   Lifestyle    Physical activity     Days per week: 0 days sentences.    LMP  (LMP Unknown)     Vitals signs: pt does not have the proper equipment to take all VS.    The physical is not performed due to the visit being a telephone counter as indicated due to the restrictions of the COVID-19 pandemic    Recent Results (from the past 2016 hour(s))   TSH Without Reflex    Collection Time: 03/12/21 11:21 AM   Result Value Ref Range    TSH 2.290 0.440 - 3.860 uIU/mL   Magnesium    Collection Time: 03/12/21 11:21 AM   Result Value Ref Range    Magnesium 2.0 1.7 - 2.4 mg/dL   Hemoglobin A1C    Collection Time: 03/12/21 11:21 AM   Result Value Ref Range    Hemoglobin A1C 4.5 (L) 4.8 - 5.9 %   CBC With Auto Differential    Collection Time: 03/12/21 11:21 AM   Result Value Ref Range    WBC 5.5 4.8 - 10.8 K/uL    RBC 3.29 (L) 4.20 - 5.40 M/uL    Hemoglobin 10.2 (L) 12.0 - 16.0 g/dL    Hematocrit 31.3 (L) 37.0 - 47.0 %    MCV 95.3 82.0 - 100.0 fL    MCH 30.9 27.0 - 31.3 pg    MCHC 32.5 (L) 33.0 - 37.0 %    RDW 13.4 11.5 - 14.5 %    Platelets 919 291 - 917 K/uL    Neutrophils % 70.6 %    Lymphocytes % 16.4 %    Monocytes % 6.6 %    Eosinophils % 5.8 %    Basophils % 0.6 %    Neutrophils Absolute 3.9 1.4 - 6.5 K/uL    Lymphocytes Absolute 0.9 (L) 1.0 - 4.8 K/uL    Monocytes Absolute 0.4 0.2 - 0.8 K/uL    Eosinophils Absolute 0.3 0.0 - 0.7 K/uL    Basophils Absolute 0.0 0.0 - 0.2 K/uL   Comprehensive Metabolic Panel    Collection Time: 03/12/21 11:21 AM   Result Value Ref Range    Sodium 143 135 - 144 mEq/L    Potassium 3.9 3.4 - 4.9 mEq/L    Chloride 98 95 - 107 mEq/L    CO2 27 20 - 31 mEq/L    Anion Gap 18 (H) 9 - 15 mEq/L    Glucose 99 70 - 99 mg/dL    BUN 30 (H) 8 - 23 mg/dL    CREATININE 1.01 (H) 0.50 - 0.90 mg/dL    GFR Non-African American 55.2 (L) >60    GFR  >60.0 >60    Calcium 9.2 8.5 - 9.9 mg/dL    Total Protein 6.8 6.3 - 8.0 g/dL    Albumin 4.4 3.5 - 4.6 g/dL    Total Bilirubin 0.3 0.2 - 0.7 mg/dL    Alkaline Phosphatase 84 40 - 130 U/L    ALT 23 0 - 33 U/L AST 39 (H) 0 - 35 U/L    Globulin 2.4 2.3 - 3.5 g/dL   EKG 12 Lead    Collection Time: 04/15/21 12:13 PM   Result Value Ref Range    Ventricular Rate 62 BPM    Atrial Rate 62 BPM    P-R Interval 220 ms    QRS Duration 128 ms    Q-T Interval 454 ms    QTc Calculation (Bazett) 460 ms    P Axis 24 degrees    R Axis -31 degrees    T Axis -39 degrees   CBC Auto Differential    Collection Time: 04/15/21 12:30 PM   Result Value Ref Range    WBC 5.4 4.8 - 10.8 K/uL    RBC 3.27 (L) 4.20 - 5.40 M/uL    Hemoglobin 9.8 (L) 12.0 - 16.0 g/dL    Hematocrit 29.6 (L) 37.0 - 47.0 %    MCV 90.5 82.0 - 100.0 fL    MCH 30.0 27.0 - 31.3 pg    MCHC 33.2 33.0 - 37.0 %    RDW 13.5 11.5 - 14.5 %    Platelets 417 874 - 828 K/uL    Neutrophils % 65.5 %    Lymphocytes % 19.0 %    Monocytes % 10.1 %    Eosinophils % 5.0 %    Basophils % 0.4 %    Neutrophils Absolute 3.5 1.4 - 6.5 K/uL    Lymphocytes Absolute 1.0 1.0 - 4.8 K/uL    Monocytes Absolute 0.5 0.2 - 0.8 K/uL    Eosinophils Absolute 0.3 0.0 - 0.7 K/uL    Basophils Absolute 0.0 0.0 - 0.2 K/uL   Comprehensive Metabolic Panel    Collection Time: 04/15/21 12:30 PM   Result Value Ref Range    Sodium 138 135 - 144 mEq/L    Potassium 4.0 3.4 - 4.9 mEq/L    Chloride 101 95 - 107 mEq/L    CO2 28 20 - 31 mEq/L    Anion Gap 9 9 - 15 mEq/L    Glucose 89 70 - 99 mg/dL    BUN 34 (H) 8 - 23 mg/dL    CREATININE 0.96 (H) 0.50 - 0.90 mg/dL    GFR Non-African American 58.5 (L) >60    GFR  >60.0 >60    Calcium 8.7 8.5 - 9.9 mg/dL    Total Protein 6.4 6.3 - 8.0 g/dL    Albumin 4.0 3.5 - 4.6 g/dL    Total Bilirubin <0.2 0.2 - 0.7 mg/dL    Alkaline Phosphatase 80 40 - 130 U/L    ALT 25 0 - 33 U/L    AST 34 0 - 35 U/L    Globulin 2.4 2.3 - 3.5 g/dL   Troponin    Collection Time: 04/15/21 12:30 PM   Result Value Ref Range    Troponin <0.010 0.000 - 0.010 ng/mL   Protime-INR    Collection Time: 04/15/21 12:30 PM   Result Value Ref Range    Protime 12.6 12.3 - 14.9 sec    INR 0.9    D-Dimer, Anion Gap 11 9 - 15 mEq/L    Glucose 105 (H) 70 - 99 mg/dL    BUN 29 (H) 8 - 23 mg/dL    CREATININE 1.08 (H) 0.50 - 0.90 mg/dL    GFR Non- 51.1 (L) >60    GFR  >60.0 >60    Calcium 8.8 8.5 - 9.9 mg/dL   Magnesium    Collection Time: 04/28/21  8:29 AM   Result Value Ref Range    Magnesium 1.8 1.7 - 2.4 mg/dL   Lipid panel - fasting    Collection Time: 05/13/21 12:36 PM   Result Value Ref Range    Cholesterol, Total 134 0 - 199 mg/dL    Triglycerides 95 0 - 150 mg/dL    HDL 64 (H) 40 - 59 mg/dL    LDL Calculated 51 0 - 129 mg/dL   CBC Auto Differential    Collection Time: 05/13/21 12:36 PM   Result Value Ref Range    WBC 5.2 4.8 - 10.8 K/uL    RBC 3.50 (L) 4.20 - 5.40 M/uL    Hemoglobin 10.2 (L) 12.0 - 16.0 g/dL    Hematocrit 31.4 (L) 37.0 - 47.0 %    MCV 89.8 82.0 - 100.0 fL    MCH 29.2 27.0 - 31.3 pg    MCHC 32.5 (L) 33.0 - 37.0 %    RDW 14.1 11.5 - 14.5 %    Platelets 624 908 - 179 K/uL    Neutrophils % 63.2 %    Lymphocytes % 16.4 %    Monocytes % 9.9 %    Eosinophils % 10.2 %    Basophils % 0.3 %    Neutrophils Absolute 3.3 1.4 - 6.5 K/uL    Lymphocytes Absolute 0.8 (L) 1.0 - 4.8 K/uL    Monocytes Absolute 0.5 0.2 - 0.8 K/uL    Eosinophils Absolute 0.5 0.0 - 0.7 K/uL    Basophils Absolute 0.0 0.0 - 0.2 K/uL   Basic Metabolic Panel    Collection Time: 05/13/21 12:36 PM   Result Value Ref Range    Sodium 143 135 - 144 mEq/L    Potassium 4.6 3.4 - 4.9 mEq/L    Chloride 104 95 - 107 mEq/L    CO2 27 20 - 31 mEq/L    Anion Gap 12 9 - 15 mEq/L    Glucose 96 70 - 99 mg/dL    BUN 46 (H) 8 - 23 mg/dL    CREATININE 1.26 (H) 0.50 - 0.90 mg/dL    GFR Non-African American 42.8 (L) >60    GFR  51.7 (L) >60    Calcium 9.4 8.5 - 9.9 mg/dL   Hepatic Function Panel    Collection Time: 05/13/21 12:36 PM   Result Value Ref Range    Total Protein 6.4 6.3 - 8.0 g/dL    Albumin 4.0 3.5 - 4.6 g/dL    Alkaline Phosphatase 75 40 - 130 U/L    ALT 20 0 - 33 U/L    AST 28 0 - 35 U/L    Total Bilirubin <0.2 0.2 - 0.7 mg/dL    Bilirubin, Direct <0.2 0.0 - 0.4 mg/dL    Bilirubin, Indirect see below 0.0 - 0.6 mg/dL           Assessment:       Diagnosis Orders   1. Kidney function abnormal  Basic Metabolic Panel         Orders Placed This Encounter   Procedures    Basic Metabolic Panel     Standing Status:   Future     Standing Expiration Date:   5/14/2022     Plan:   63year old female patient status post [facility] in April for CP. [facility] with normal coronaries. She was seen by Primary care after her discharge from [address] [facility]. Patient with ongoing complaints of CP and generalized arthralgia. She was placed on meloxicam and Tylenol with great response. Patient without any complaints of CP or body aches. She was encouraged to keep appointment with cardiology in June, to discuss Primary care holding beta blocker due to bradycardia, and adding ASA. Noted gradual decline in renal function, as detailed in HPI. Recommend she discontinue meloxicam, and avoid NSAIDs. Increased water intake during the day, and repeat BMP in 2 weeks. Recommend she see Dr. Lino Werner in 2 weeks to discuss repeat BMP.   - Keep F/U with derm. Return in about 2 weeks (around 5/28/2021),  With PCP to review repeat BMP. There are no Patient Instructions on file for this visit.     This visit ended at 3295    Electronically signed by ALLEN Sahni CNP

## 2021-05-14 NOTE — PROGRESS NOTES
[facility] Encounter  CHIEF COMPLAINT       Chief Complaint   Patient presents with    Other     Follow up      [address] [place]   Paul Wheatley is a 63 y.o. female who presents with:  HPI  REVIEW OF SYSTEMS     Review of Systems  PAST MEDICAL HISTORY         Diagnosis Date    [facility]ism ([facility]     Arthritis     Chronic lung disease     Cognitive impairment 4/15/2021    Depression     Diabetes mellitus ([facility]     Diarrhea 1/4/2021    Possibly related to Metformin, Keppra, constipation, colitis. Stop Metformin. Treat constipation. Consider adjustment to Keppra given side effects.  Encephalopathy 12/10/2020    Grief reaction 4/15/2021    Hyperlipidemia     Hypertension     Hypokalemia 11/6/2020    Hypomagnesemia 11/6/2020    Hypothyroidism     Lymphadenopathy, axillary 1/4/2021    Second hand smoke exposure     Se[facility]ure ([facility]     Syncope and collapse 12/10/2020     SURGICAL HISTORY     Patient  has a past surgical history that includes Hysterectomy; Appendectomy; Cholecystectomy; Biopsy mouth lesion (Bilateral, 12/19/2016); Upper gastrointestinal endoscopy (N/A, 1/21/2021); and Colonoscopy (N/A, 1/21/2021).   CURRENT MEDICATIONS       Previous Medications    ACETAMINOPHEN (ACETAMINOPHEN EXTRA STRENGTH) 500 MG TABLET    Take 1 tablet by mouth 2 times daily as needed for Pain    ALENDRONATE (FOSAMAX) 35 MG TABLET    TAKE 1 TABLET BY MOUTH  EVERY 7 DAYS    ASPIRIN EC 81 MG EC TABLET    Take 1 tablet by mouth daily    ATORVASTATIN (LIPITOR) 40 MG TABLET    TAKE 1 TABLET BY MOUTH ONCE DAILY    BUSPIRONE (BUSPAR) 5 MG TABLET    Take 1 tablet by mouth 3 times daily    CALCIUM CARBONATE-VITAMIN D (CALTRATE) 600-400 MG-UNIT TABS PER TAB    Take 1 tablet by mouth 2 times daily    ESCITALOPRAM (LEXAPRO) 20 MG TABLET    Take 1 tablet by mouth daily    FENOFIBRATE (TRIGLIDE) 160 MG TABLET    Take 1 tablet by mouth daily    FLUOCINONIDE (LIDEX) 0.05 % OINTMENT    APPLY TWICE DAILY    HYDROXYZINE (ATARAX) 10 MG TABLET    Take 1 tablet by mouth every 8 hours as needed for Itching    LEVETIRACETAM (KEPPRA) 500 MG TABLET    Take 1 tablet by mouth 2 times daily    LEVOTHYROXINE (SYNTHROID) 50 MCG TABLET    TAKE 1 TABLET BY MOUTH  DAILY    MAGNESIUM 400 MG CAPS    Take 1 capsule by mouth 3 times daily    NUTRITIONAL SUPPLEMENTS (ENSURE COMPLETE) LIQD    Take 1 Bottle by mouth 2 times daily    PANTOPRAZOLE (PROTONIX) 40 MG TABLET    TAKE 1 TABLET BY MOUTH  DAILY    ROPINIROLE (REQUIP) 0.5 MG TABLET    TAKE 1 TABLET BY MOUTH AT  NIGHT    TRIAMCINOLONE (KENALOG) 0.025 % CREAM    Apply topically every 4 hours as needed Apply Topically     ALLERGIES     Patient is is allergic to morphine. FAMILY HISTORY     Patient'sfamily history includes Cancer in her maternal grandmother; Diabetes in her brother, maternal grandfather, and mother; Heart Disease in her brother, father, mother, and sister; Hypertension in her brother, father, mother, and sister; Stroke in her mother. HISTORY     Patient  reports that she has never smoked. She has never used smokeless tobacco. She reports previous alcohol use of about 28.0 standard drinks of alcohol per week. She reports that she does not use drugs. PHYSICAL EXAM     VITALS   ,  ,  ,  ,    Physical Exam  READY CARE COURSE   Labs:  No results found for this visit on 05/14/21. IMAGING:  No orders to display     Scheduled Meds:  Continuous Infusions:  PRN Meds:. PROCEDURES:  FINAL IMPRESSION      1. Kidney function abnormal      DISPOSITION/PLAN     PATIENT REFERRED TO:  Return in about 2 weeks (around 5/28/2021), or With PCP. DISCHARGE MEDICATIONS:  New Prescriptions    No medications on file     Cannot display discharge medications since this is not an admission.        Nikki Lomeli, APRN - CNP

## 2021-05-19 ENCOUNTER — CARE COORDINATION (OUTPATIENT)
Dept: CARE COORDINATION | Age: 66
End: 2021-05-19

## 2021-05-19 NOTE — CARE COORDINATION
Telephone call with patient. She indicated that she did hear from [facility]. She had a phone number with [address] yesterday. She was told that she will qualify for services. Next step they will be sending her out a packet of information to complete a return.

## 2021-05-24 ENCOUNTER — TELEPHONE (OUTPATIENT)
Dept: FAMILY MEDICINE CLINIC | Age: 66
End: 2021-05-24

## 2021-05-24 NOTE — TELEPHONE ENCOUNTER
Pt states her lab work came back and showed she was severely anemic. Until this is treated or under control her   dermatologist, Sarahi Diana will not treat her Psoriasis. Pt is requesting that pcp speak with Tu Ordoñez. Please advise and thank you.      Sarahi Diana ph# [phone]

## 2021-05-25 NOTE — TELEPHONE ENCOUNTER
Left a detailed voicemail at dermatologistPranay office per [facility] information    AMM,MA

## 2021-05-25 NOTE — TELEPHONE ENCOUNTER
I have attempted to call Dr. Kemp Or at her [facility] and her [place] office. Mort Dance has a history of alcohol dependence and, recently, was hospitalized related to that condition. She remains somewhat debilitated from that illness and hospitalization. While her hepatic function profile has been normal, I suspect she has limited reserve. If there are other choices that may not be hepatotoxic, I would recommend considering them instead. Her anemia is not genetic. It is the result of her recent illness and chronic disease. Recent colonoscopy this year was normal.  I very much appreciate the doctor's reaching out to me in order to collaborate on Lauren's care.

## 2021-05-27 ENCOUNTER — TELEPHONE (OUTPATIENT)
Dept: FAMILY MEDICINE CLINIC | Age: 66
End: 2021-05-27

## 2021-05-27 NOTE — TELEPHONE ENCOUNTER
Patient called in and is asking what kind of illness does she have that is stopping her from taking her Psoriasis medication. She said she would like to start taking this medication because she is itching and would like to tell her dermatologist more info as to why she cannot start this.     Please advise and thank you

## 2021-05-27 NOTE — TELEPHONE ENCOUNTER
Patient was called and notified that [facility] is not trying to stop her from care with Dermatologist and that several attempts have been made to call them to discuss questions that they have. We have left detailed voicemail's with no response. Giorgi Mcknight aware of the situation and is going to call her dermatologist to see what is going on.      AMM,MA

## 2021-05-27 NOTE — TELEPHONE ENCOUNTER
I have not prevented her dermatologist from starting any medication. She has anemia related to her chronic illness, her past alcohol use and her recent severe illness. And I am concerned her liver may not tolerate the methotrexate which was recommended. Ultimately, her dermatologist needs to help her decide which medication to use and how they will monitor it. I hope this information helps.

## 2021-06-01 ENCOUNTER — CARE COORDINATION (OUTPATIENT)
Dept: CARE COORDINATION | Age: 66
End: 2021-06-01

## 2021-06-01 ENCOUNTER — TELEPHONE (OUTPATIENT)
Dept: FAMILY MEDICINE CLINIC | Age: 66
End: 2021-06-01

## 2021-06-01 NOTE — CARE COORDINATION
Telephone call with patient. She indicated that she got a survey from [employer] on [address] She is still waiting on packet for the [facility].  Patient stated she is all moved into new place. No other concerns or needs were expressed at time of phone call.

## 2021-06-01 NOTE — TELEPHONE ENCOUNTER
ΣΑΡΑΝΤΙ called from [facility] and is made aware patient anemia is not genetic and will relate this message over to the dermatologist.    Thank you

## 2021-06-04 ENCOUNTER — VIRTUAL VISIT (OUTPATIENT)
Dept: FAMILY MEDICINE CLINIC | Age: 66
End: 2021-06-04
Payer: MEDICARE

## 2021-06-04 DIAGNOSIS — D63.1 ANEMIA DUE TO STAGE 3A CHRONIC KIDNEY DISEASE (HCC): Primary | ICD-10-CM

## 2021-06-04 DIAGNOSIS — L40.9 PSORIASIS: ICD-10-CM

## 2021-06-04 DIAGNOSIS — N18.31 STAGE 3A CHRONIC KIDNEY DISEASE (HCC): ICD-10-CM

## 2021-06-04 DIAGNOSIS — N18.31 ANEMIA DUE TO STAGE 3A CHRONIC KIDNEY DISEASE (HCC): Primary | ICD-10-CM

## 2021-06-04 PROBLEM — I20.0 UNSTABLE ANGINA (HCC): Status: RESOLVED | Noted: 2021-04-15 | Resolved: 2021-06-04

## 2021-06-04 PROCEDURE — 3017F COLORECTAL CA SCREEN DOC REV: CPT | Performed by: FAMILY MEDICINE

## 2021-06-04 PROCEDURE — 99214 OFFICE O/P EST MOD 30 MIN: CPT | Performed by: FAMILY MEDICINE

## 2021-06-04 PROCEDURE — G8427 DOCREV CUR MEDS BY ELIG CLIN: HCPCS | Performed by: FAMILY MEDICINE

## 2021-06-04 PROCEDURE — G8420 CALC BMI NORM PARAMETERS: HCPCS | Performed by: FAMILY MEDICINE

## 2021-06-04 PROCEDURE — 1036F TOBACCO NON-USER: CPT | Performed by: FAMILY MEDICINE

## 2021-06-04 NOTE — PROGRESS NOTES
Esvin Gunn is a 61 y.o. female evaluated via telephone on 2021. Consent:  She and/or health care decision maker is aware that that she may receive a bill for this telephone service, depending on her insurance coverage, and has provided verbal consent to proceed: Yes      I affirm this is a Patient Initiated Episode with an Established Patient who has not had a related appointment within my department in the past 7 days or scheduled within the next 24 hours. Total Time:     Note: not billable if this call serves to triage the patient into an appointment for the relevant concern      Ras Gruber MD     2021    TELEHEALTH EVALUATION -- Audio ([facility])    Chief Complaint   Patient presents with    Other       HPI     Esvin Gunn (:  1955) has requested an audio evaluation for the following concern(s):    Depression - \"I have my good days and my bad days. \" Death of sister and psoriasis is getting really bad and is very itchy. It is on arms, legs, neck, back, buttocks, foot. Takes itching medicine and applying cream,. \"It's kind of driving me insane. \" Her dermatologist won't prescribe medication because \"there is something wrong with my liver. \" It is suspected that she has psoriatic arthritis. She is trying to find a dermatologist.     Recent onset of CKD 3a. Creatinine and GFR have been fluctuating since serious, acute illness and hospitalization. Her H&H are approx 10/30 with normal colonoscopy recently and normal MCV. Prior to Visit Medications    Medication Sig Taking?  Authorizing Provider   acetaminophen (ACETAMINOPHEN EXTRA STRENGTH) 500 MG tablet Take 1 tablet by mouth 2 times daily as needed for Pain  Eleazar Coker MD   aspirin EC 81 MG EC tablet Take 1 tablet by mouth daily  Eleazar Coker MD   hydrOXYzine (ATARAX) 10 MG tablet TAKE 1 TABLET BY MOUTH  EVERY 8 HOURS AS NEEDED FOR ITCHING Yanet Freeman MD   rOPINIRole (REQUIP) 0.5 MG tablet TAKE 1 TABLET BY MOUTH AT  NIGHT  Yanet Freeman MD   alendronate (FOSAMAX) 35 MG tablet TAKE 1 TABLET BY MOUTH  EVERY 7 DAYS  Yanet Freeman MD   fluocinonide (LIDEX) 0.05 % ointment APPLY TWICE DAILY  Yanet Freeman MD   busPIRone (BUSPAR) 5 MG tablet Take 5 mg by mouth 2 times daily  Historical Provider, MD   fenofibrate (TRIGLIDE) 160 MG tablet Take 1 tablet by mouth daily  Yanet Freeman MD   Magnesium 400 MG CAPS Take 1 capsule by mouth 3 times daily  Yanet Freeman MD   escitalopram (LEXAPRO) 20 MG tablet Take 1 tablet by mouth daily  Yanet Freeman MD   Nutritional Supplements (ENSURE COMPLETE) LIQD Take 1 Bottle by mouth 2 times daily  Yanet Freeman MD   levETIRAcetam (KEPPRA) 500 MG tablet Take 1 tablet by mouth 2 times daily  Patient taking differently: Take 500 mg by mouth 2 times daily Take 1/2 (250mg) tablet in the morning and 1 tablet (500mg) if the afternoon. Miguelito Johnson MD   triamcinolone (KENALOG) 0.025 % cream Apply topically every 4 hours as needed Apply Topically  Historical Provider, MD   levothyroxine (SYNTHROID) 50 MCG tablet TAKE 1 TABLET BY MOUTH  DAILY  Yanet Freeman MD   pantoprazole (PROTONIX) 40 MG tablet TAKE 1 TABLET BY MOUTH  DAILY  Yanet Freeman MD   atorvastatin (LIPITOR) 40 MG tablet TAKE 1 TABLET BY MOUTH ONCE DAILY  Yanet Freeman MD   calcium carbonate-vitamin D (CALTRATE) 600-400 MG-UNIT TABS per tab Take 1 tablet by mouth 2 times daily  Yanet Freeman MD             PHYSICAL EXAMINATION:    Patient-Reported Vitals 5/14/2021   Patient-Reported Weight 101 lbs   Patient-Reported Height 4'9\"          Patient appears to be alert and oriented to person, place, time, situation and is in no acute distress.   Respiratory effort appears normal. Mood appears stable and speech and thought are grossly normal.    Lab Results   Component Value Date    TSH 0.991 04/16/2021     Lipid Profile: No components found for: CHLPL  Lab Results   Component Value Date    TRIG 95 05/13/2021    TRIG 89 04/16/2021    TRIG 114 11/02/2020     Lab Results   Component Value Date    HDL 64 (H) 05/13/2021    HDL 47 04/16/2021    HDL 66 (H) 11/02/2020     Lab Results   Component Value Date    LDLCALC 51 05/13/2021    LDLCALC 43 04/16/2021    LDLCALC 69 11/02/2020     No results found for: LABVLDL  Hemoglobin A1C:   Lab Results   Component Value Date    LABA1C 4.5 (L) 03/12/2021       ASSESSMENT/PLAN:  Aries Lopez was seen today for other. Diagnoses and all orders for this visit:    Anemia due to stage 3a chronic kidney disease (HCC)  -     CBC With Auto Differential; Future  -     Amb External Referral To Nephrology    Stage 3a chronic kidney disease ([facility]  -     Basic Metabolic Panel; Future  -     Amb External Referral To Nephrology    Psoriasis  -     AFL - Nada Ahumada, MD, [facility]          No follow-ups on file. Adelita Mares is a 61 y.o. female being evaluated by a Virtual Visit (telephonic visit) encounter to address concerns as mentioned above. A caregiver was present when appropriate. Due to this being a TeleHealth encounter (During [place] [address] public health emergency), evaluation of the following organ systems was limited: Vitals/Constitutional/EENT/Resp/CV/GI//MS/Neuro/Skin/Heme-Lymph-Imm. Pursuant to the emergency declaration under the [address] waiver authority and the [email or web address] and [employer] Act, this Virtual Visit was conducted with patient's (and/or legal guardian's) consent, to reduce the patient's risk of exposure to COVID-19 and provide necessary medical care.   The patient (and/or legal guardian) has also been advised to contact this office for worsening conditions or

## 2021-06-07 ENCOUNTER — TELEPHONE (OUTPATIENT)
Dept: FAMILY MEDICINE CLINIC | Age: 66
End: 2021-06-07

## 2021-06-07 ENCOUNTER — VIRTUAL VISIT (OUTPATIENT)
Dept: NEUROLOGY | Age: 66
End: 2021-06-07
Payer: MEDICARE

## 2021-06-07 DIAGNOSIS — F10.20 ALCOHOLISM (HCC): ICD-10-CM

## 2021-06-07 DIAGNOSIS — E83.42 HYPOMAGNESEMIA: ICD-10-CM

## 2021-06-07 DIAGNOSIS — R63.4 WEIGHT LOSS: Chronic | ICD-10-CM

## 2021-06-07 DIAGNOSIS — R56.9 SEIZURE (HCC): Primary | ICD-10-CM

## 2021-06-07 PROCEDURE — 3017F COLORECTAL CA SCREEN DOC REV: CPT | Performed by: NURSE PRACTITIONER

## 2021-06-07 PROCEDURE — G8427 DOCREV CUR MEDS BY ELIG CLIN: HCPCS | Performed by: NURSE PRACTITIONER

## 2021-06-07 PROCEDURE — 1036F TOBACCO NON-USER: CPT | Performed by: NURSE PRACTITIONER

## 2021-06-07 PROCEDURE — G8420 CALC BMI NORM PARAMETERS: HCPCS | Performed by: NURSE PRACTITIONER

## 2021-06-07 PROCEDURE — 99213 OFFICE O/P EST LOW 20 MIN: CPT | Performed by: NURSE PRACTITIONER

## 2021-06-07 RX ORDER — BUSPIRONE HYDROCHLORIDE 5 MG/1
5 TABLET ORAL 2 TIMES DAILY
COMMUNITY
Start: 2021-05-27 | End: 2021-08-03 | Stop reason: SDUPTHER

## 2021-06-07 ASSESSMENT — ENCOUNTER SYMPTOMS
TROUBLE SWALLOWING: 0
COLOR CHANGE: 0
WHEEZING: 0
ABDOMINAL DISTENTION: 0
SHORTNESS OF BREATH: 0
ABDOMINAL PAIN: 0
VOMITING: 0
COUGH: 0
CHEST TIGHTNESS: 0
CONSTIPATION: 0
DIARRHEA: 0
NAUSEA: 0

## 2021-06-07 NOTE — TELEPHONE ENCOUNTER
Patient called to give you the name of the nephrologist that she wants you to send the referral to. Dr. Valery Rader  [address], [address]     Office: [phone]  Fax:  [phone]      Please advise and thanks!

## 2021-06-07 NOTE — PROGRESS NOTES
Subjective:      Patient ID: Christian Oconnor is a 61 y.o. female who presents today for:  Chief Complaint   Patient presents with    Follow-up     Pt states that she has not had any seizures since out of hosp. No concerns a this time. HPI  Pt contacted via telephone for virtual visit for follow-up for seizures    6/7/2021:  Patient contacted via telephone for virtual visit for follow-up for seizures. Patient last seen on 2/3/2021. She is alert and oriented x3. Patient was at home and I was in my office. When last seen on 2/3/2020 when she was complaining of some fatigue therefore her Keppra dose was decreased to 250 mg in the a.m. and 500 mg in the p.m. She reports that she has since resumed taking the 500 mg twice daily and is tolerating this well. Still with some generalized fatigue but nothing that has increased. No recurrent seizures since last seen. No agitation or mood changes. She is still not drinking any alcohol. Appetite has improved and weight has gone up 8 pounds since last visit and she currently weighs 109 pounds. She states that overall she is doing well. She voices no new or acute concerns or complaints. Still dealing with some minor depression due to her lost of her sister in February 2021. No suicidal ideation. She is following with primary care for this. Patient with intermittent BERTHA and has been referred to nephrology. Has history of hypomagnesemia. Last magnesium level on 4/28/2021 was 1.8. Patient continues on magnesium supplementation. Due to COVID 19 outbreak, patient's office visit was converted to a virtual visit. Patient was contacted and agreed to proceed with a virtual visit via Telephone Visit  The risks and benefits of converting to a virtual visit were discussed in light of the current infectious disease epidemic. Patient also understood that insurance coverage and co-pays are up to their individual insurance plans.       2/3/2021:  Pt seen and examined in the office for hospital follow up. Pt was admitted to [facility] from 11/2/2020 to 11/3/2020 for seizure. Hospital records reviewed. Patient presented to [facility] emergency room on 11/2/2020 for generalized seizure. Patient does have history of seizures in the past and was seen last in 2016 by Dr. Felix Goss. At that time she was on Topamax. Per Dr. Jermaine Cruz notes seizures are typically secondary to metabolic disturbance such as hypoglycemia or alcohol use/withdrawal.  Patient has long history of alcohol abuse. Dr. Taina Baker, neurology, saw patient on 11/3/2020 while in the hospital and his notes were reviewed. He noted patient with generalized seizure secondary to low threshold for seizures with alcohol use. Of note patient had significantly low magnesium level of 0.6 on admission. This was replaced. She was initiated on Keppra 500 mg twice a day. EEG was obtained which was normal.  MRI of the brain was done which did not show anything significant. S VID was noted. 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 Patient is accompanied to today's appointment by her niece who is her caregiver. Patient lives at home with her sister currently. Patient is alert and oriented x3, no acute distress, cooperative. She reports daily compliance with Keppra. She did have an episode a couple months ago of possible syncope secondary to nausea vomiting and vasovagal episode. She was taken to [facility] according to her niece. She states they did not do much of anything for the patient and discharged her home. Patient has other to have chronically low magnesium level. Last magnesium level done in December 2020 was 1.4. This is being followed by primary care. She is on magnesium 400 mg 3 times a day and reports compliance with this. Patient with significant weight loss over the last several months.   She has had intermittent nausea vomiting and diarrhea and is undergone work-up with GI. She had EGD and colonoscopy done. P.o. intake has been poor. Appetite poor. Patient does report some session of depression. No suicidal ideation. She is currently on Lexapro. She reports daily fatigue. Past Medical History:   Diagnosis Date    [facility]ism ([facility]     Arthritis     Chronic lung disease     Cognitive impairment 4/15/2021    Depression     Diabetes mellitus ([facility]     Diarrhea 1/4/2021    Possibly related to Metformin, Keppra, constipation, colitis. Stop Metformin. Treat constipation. Consider adjustment to Keppra given side effects.  Encephalopathy 12/10/2020    Grief reaction 4/15/2021    Hyperlipidemia     Hypertension     Hypokalemia 11/6/2020    Hypomagnesemia 11/6/2020    Hypothyroidism     Lymphadenopathy, axillary 1/4/2021    Second hand smoke exposure     Seizure ([facility]     Syncope and collapse 12/10/2020     Past Surgical History:   Procedure Laterality Date    APPENDECTOMY      BIOPSY MOUTH LESION Bilateral 12/19/2016    REMOVAL  OF BILATERAL LINGUAL MACIE performed by Kei Tijerina DDS at [facility] COLONOSCOPY N/A 1/21/2021    COLONOSCOPY DIAGNOSTIC performed by Bette Walker MD at [address] [place] [address] ENDOSCOPY N/A 1/21/2021    EGD with polypectomy performed by Btete Walker MD at [facility] Marital status: Single     Spouse name: Not on file    Number of children: Not on file    Years of education: 15    Highest education level: Not on file   Occupational History    Occupation: disabled   Tobacco Use    Smoking status: Never Smoker    Smokeless tobacco: Never Used   Vaping Use    Vaping Use: Never used   Substance and Sexual Activity    Alcohol use: Not Currently     Alcohol/week: 28.0 standard drinks     Types: 28 Shots of liquor per week     Comment: quit drinking in November 2020    Drug use:  No  Sexual activity: Not Currently   Other Topics Concern    Not on file   Social History Narrative    3/4/21 Cristiana Small lives in a private residence she had shared with her sister, her sister was the home owner, and she recently passed on 2/21, She is now needing to find a new home, as the house she is living in may be sold soon, Cristiana Small states she is independent with self care, does have some limitations but states able to function independently for most self care tasks. She does not drive related to history of seizures.  referral made for concerns with housing and transportation. Social Determinants of Health     Financial Resource Strain: Low Risk     Difficulty of Paying Living Expenses: Not very hard   Food Insecurity: No Food Insecurity    Worried About Running Out of Food in the Last Year: Never true    Katherine of Food in the Last Year: Never true   Transportation Needs: Unmet Transportation Needs    Lack of Transportation (Medical):  Yes    Lack of Transportation (Non-Medical): Yes   Physical Activity: Inactive    Days of Exercise per Week: 0 days    Minutes of Exercise per Session: 0 min   Stress: Stress Concern Present    Feeling of Stress : Rather much   Social Connections: Socially Isolated    Frequency of Communication with Friends and Family: Once a week    Frequency of Social Gatherings with Friends and Family: Once a week    Attends Voodoo Services: Never    Active Member of Clubs or Organizations: No    Attends Club or Organization Meetings: Never    Marital Status: Never    Intimate Partner Violence: Not At Risk    Fear of Current or Ex-Partner: No    Emotionally Abused: No    Physically Abused: No    Sexually Abused: No     Family History   Problem Relation Age of Onset    Heart Disease Mother     Hypertension Mother     Diabetes Mother     Stroke Mother     Heart Disease Father     Hypertension Father     Heart Disease Sister     Hypertension afternoon.) 180 tablet 2     No current facility-administered medications on file prior to visit. Review of Systems   Constitutional: Positive for fatigue. Negative for appetite change, chills, fever and unexpected weight change. HENT: Negative for hearing loss and trouble swallowing. Eyes: Negative for visual disturbance. Respiratory: Negative for cough, chest tightness, shortness of breath and wheezing. Cardiovascular: Negative for chest pain, palpitations and leg swelling. Gastrointestinal: Negative for abdominal distention, abdominal pain, constipation, diarrhea, nausea and vomiting. Musculoskeletal: Negative for gait problem. Skin: Negative for color change and rash. Neurological: Negative for dizziness, tremors, seizures, syncope, facial asymmetry, speech difficulty, weakness, light-headedness, numbness and headaches. Psychiatric/Behavioral: Positive for dysphoric mood. Negative for agitation, confusion and hallucinations. The patient is not nervous/anxious.         Objective:   LMP  (LMP Unknown)     Physical Exam  Deferred due to telephone encounter      ECHO Complete 2D W Doppler W Color    Result Date: 4/16/2021  Transthoracic Echocardiography Report (TTE)  Demographics   Patient Name  [facility]          Gender             Female                [address]   Patient       [id number]         Race                 Number                                  Ethnicity   Visit Number  [id number]        Room Number        W163   Corporate ID                   Date of Study      04/16/2021   Accession     [id number]       Referring          Christopher Quant  Number                         Physician   Date of Birth 1955       Lindy Hendricks LPN   Age           63 year(s)       Interpreting       [facility]) Cardiology                                 Physician          Darryl Matias,   Procedure Type with normal cusp separation and excursion. Mild aortic regurgitation is noted. No evidence of aortic valve stenosis. Pulmonic Valve Normal pulmonic valve structure and function. Pericardial Effusion No evidence of pericardial effusion. Pleural Effusion No evidence of pleural effusion. Aorta \ Miscellaneous Miscellaneous normal findings were found. M-Mode Measurements (cm)   LVIDd: 4.24 cm                         LVIDs: 2.18 cm  IVSd: 0.81 cm                          IVSs: 1.07 cm  LVPWd: 1.15 cm                         LVPWs: 1.07 cm  Rt. Vent.  Dimension: 1.45 cm           AO Root Dimension: 3.41 cm                                         ACS: 1.74 cm                                         LA: 2.76 cm                                         LVOT: 1.95 cm  Doppler Measurements:   AV Velocity:0.02 m/s                   MV Peak E-Wave: 0.86 m/s  AV Peak Gradient: 6.93 mmHg            MV Peak A-Wave: 0.88 m/s  AV Mean Gradient: 3.07 mmHg  AV Area (Continuity):2.16 cm^2         MV P1/2t: 70 msec  TR Velocity:2.42 m/s                   MVA by PHT3.14 cm^2  TR Gradient:23.49 mmHg                 Estimated RAP:3 mmHg                                         RVSP:26 mmHg  Valves  Mitral Valve   Peak E-Wave: 0.86 m/s           Peak A-Wave: 0.88 m/s  P1/2t: 70 msec                  E/A Ratio: 0.98                                  Peak Gradient: 2.98 mmHg  Area (PHT): 3.14 cm^2           Deceleration Time: 273.2 msec   Aortic Valve   Peak Velocity: 1.32 m/s                Mean Velocity: 0.78 m/s  Peak Gradient: 6.93 mmHg               Mean Gradient: 3.07 mmHg  Area (continuity): 2.16 cm^2  AV VTI: 26.57 cm   Cusp Separation: 1.74 cm   Tricuspid Valve   Estimated RVSP: 26 mmHg              Estimated RAP: 3 mmHg  TR Velocity: 2.42 m/s                TR Gradient: 23.49 mmHg   Pulmonic Valve   Peak Velocity: 1 m/s           Peak Gradient: 4.01 mmHg                                 Estimated PASP: 26.49 mmHg   LVOT   Peak Velocity: 0.89 m/s              Mean Velocity: 0.56 m/s  Peak Gradient: 2.92 mmHg             Mean Gradient: 1.51 mmHg  LVOT Diameter: 1.95 cm               LVOT VTI: 19.22 cm  Structures  Left Atrium   LA Dimension: 2.76 cm                        LA Area: 10.23 cm^2  LA/Aorta: 0.81  LA Volume/Index: 23.03 ml /19 m^2   Left Ventricle   Diastolic Dimension: 0.30 cm         Systolic Dimension: 7.98 cm  Septum Diastolic: 0.56 cm            Septum Systolic: 4.65 cm  PW Diastolic: 9.40 cm                PW Systolic: 6.30 cm                                       FS: 48.6 %  LV EDV/LV EDV Index: 80.47 ml/65 m^2 LV ESV/LV ESV Index: 15.82 ml/13 m^2  EF Calculated: 80.3 %   LVOT Diameter: 1.95 cm   Right Ventricle   Diastolic Dimension: 3.70 cm                                    RV Systolic Pressure: 50.83 mmHg  Aorta/ Miscellaneous Aorta   Aortic Root: 3.41 cm  LVOT Diameter: 1.95 cm      CT HEAD WO CONTRAST    Result Date: 4/16/2021  EXAMINATION: CT HEAD WO CONTRAST  DATE AND TIME:4/15/2021 9:21 PM CLINICAL HISTORY: Severe headache. ams  COMPARISON: November 2, 2020 TECHNIQUE:Axial CT from skull base to vertex without  contrast..  All CT scans at this facility use dose modulation, iterative reconstruction, and/or weight based dosing when appropriate to reduce radiation dose to as low as reasonably achievable. FINDINGS CSF spaces:  CSF spaces are age-appropriate. Ventricles midline in position                      Brain parenchyma: No  parenchymal mass,  mass effect,  signs of acute infarct, or extra-axial fluid. There are mild patchy nonspecific white matter hypodensities that may be related to microvascular ischemic change or  normal aging. Hemorrhage:No acute intracranial hemorrhage. Skull base: The bony calvarium and skull base are normal.  Retention cyst or polyp in the right maxillary sinus again noted     NO ACUTE INTRACRANIAL PATHOLOGY.      CTA Chest W WO  (PE study)    Result Date: RDW 14.1 05/13/2021     05/13/2021    MPV 10.8 03/10/2014     Lab Results   Component Value Date     05/13/2021    K 4.6 05/13/2021     05/13/2021    CO2 27 05/13/2021    BUN 46 05/13/2021    CREATININE 1.26 05/13/2021    GFRAA 51.7 05/13/2021    LABGLOM 42.8 05/13/2021    GLUCOSE 96 05/13/2021    GLUCOSE 113 11/30/2020    PROT 6.4 05/13/2021    LABALBU 4.0 05/13/2021    LABALBU 4.9 05/09/2012    CALCIUM 9.4 05/13/2021    BILITOT <0.2 05/13/2021    ALKPHOS 75 05/13/2021    AST 28 05/13/2021    ALT 20 05/13/2021     Lab Results   Component Value Date    PROTIME 12.6 04/15/2021    INR 0.9 04/15/2021     Lab Results   Component Value Date    TSH 0.991 04/16/2021    LOLALJPI53 333 04/16/2021    FOLATE 19.7 04/16/2021     Lab Results   Component Value Date    TRIG 95 05/13/2021    HDL 64 05/13/2021    LDLCALC 51 05/13/2021     Lab Results   Component Value Date    LABAMPH Neg 04/15/2021    BARBSCNU Neg 04/15/2021    LABBENZ Neg 04/15/2021    LABMETH Neg 04/15/2021    OPIATESCREENURINE Neg 04/15/2021    PHENCYCLIDINESCREENURINE Neg 04/15/2021    ETOH <10 04/15/2021     No results found for: LITHIUM, DILFRTOT, VALPROATE    Assessment and Plan:      1. Seizure [facility])  - Patient with history of generalized seizure since 2016. These are typically secondary to alcohol use or withdrawal or metabolic disturbance such as hypoglycemia or significant hypomagnesemia.   -Last seizure was in November 2020.  -Continue Keppra 500 mg twice daily. Patient is tolerating this without agitation or mood changes  -Magnesium levels last checked in April 2021 were within normal limits.   Patient continues on supplementation.  -Patient has stopped drinking alcohol  - Notify neurologist when starting new medications as anti-seizure meds can interact with prescription and nonprescription medicines which may cause increased side effects or decreased efficacy  - Avoid alcohol or illicit drug use as these can lower seizure threshold  - Take seizure medicine exactly as prescribed  - Inform us of any side effects of medications  - Do not stop seizure medication or change dose unless directed to by health care professional  - Call with any questions or concerns    2. Alcoholism ([facility] [address]  -No alcohol use since November 2020. Encouraged ongoing alcohol cessation    3. Hypomagnesemia  -Magnesium levels normal in April 2021. Patient continues on supplementation. 4. Weight loss  -Improved. Weight up 8 pounds since last visit    Total time spent on telephone encounter 20 minutes  Return in about 6 months (around 12/7/2021), or if symptoms worsen or fail to improve.     ALLEN Skinner - [facility]     Collaborating Physician Dr Rosa Woods

## 2021-06-08 ENCOUNTER — CARE COORDINATION (OUTPATIENT)
Dept: CARE COORDINATION | Age: 66
End: 2021-06-08

## 2021-06-08 NOTE — CARE COORDINATION
Ambulatory Care Coordination Note  6/8/2021  CM Risk Score: 4  Charlson 10 Year Mortality Risk Score: 10%     ACC: Sally Chirinos, RN    Summary Note: I called Salvador Dinh to follow up on care needs, She states she is \"doing ok\", but is having a terrible time with this itching related to psoriasis, she needs assistance in getting appointment scheduling with Dermatology- Dr. Mari Ward. I called office to schedule- office states they will not accept Medicaid insurance even if it is a secondary insurance, I called the office of Dr. Malorie Joseph in attempts to schedule appointment. The earliest appointment zulay have is October 4 at [address] [place] [address] [place] [facility] [place] Ja[place] Smart, I scheduled this and will verify with Salvador Dinh that she is in agreement,. She will be following with Dr. Darrel Ty for nephrology referral-  Dr Debi Stephen  office to forward referral to Dr. Jon Limon office. Salvador Dinh denies any chest pain, still has some anxiety and depression at times, which is being managed by Lexapro and Buspar. Weight stable- up to 109 pounds, [place] has all her medications- states her Passport application was approved- she talked with [address] on Aging. She is not sure when they will start-MSW -Rachelle Garcia will follow as needed. Salvador Dinh still has some fatigue-she has trouble going up and down stairs -she states she  Takes her  time, paces herself and avoids extra trips up and down stairs. Care Coordination Plan of Care: This nurse Care Coordinator will follow up on    1 Fatigue? ?how is she managing- is passport starting?     2  Is weight stable?     3. Does she have all her medications? 4. MSW follow up-??passport  start date     5. Nephrology referral/ dermatology referral ---are appointment scheduled? ?      Care Coordination Interventions    Program Enrollment: Rising Risk  Referral from Primary Care Provider: Yes  Suggested Interventions and Community Resources  Grief Counselor: Declined (Comment: 3/4/21 recent loss of sister- declined [facility] support services)  Occupational Therapy: Declined  Pharmacist: Declined (Comment: 3/4/21 declined -will offer again in a few weeks)  Physical Therapy: Declined  Registered Dietician:  (Comment: 3/4/21 referral made)  Social Work: Completed (Comment: 3/4/21 [place] referal made for transportation, housing)  [employer] Management Tools: Completed (Comment: 3/4/21 ready clinic information mailed)         Goals Addressed    None         Prior to Admission medications    Medication Sig Start Date End Date Taking? Authorizing Provider   busPIRone (BUSPAR) 5 MG tablet Take 5 mg by mouth 2 times daily 5/27/21   Historical Provider, MD   hydrOXYzine (ATARAX) 10 MG tablet Take 1 tablet by mouth every 8 hours as needed for Itching 5/6/21   Patrick Dill MD   acetaminophen (ACETAMINOPHEN EXTRA STRENGTH) 500 MG tablet Take 1 tablet by mouth 2 times daily as needed for Pain 4/28/21 5/28/21  ALLEN Zaman CNP   aspirin EC 81 MG EC tablet Take 1 tablet by mouth daily 4/28/21   ALLEN Zaman CNP   fluocinonide (LIDEX) 0.05 % ointment APPLY TWICE DAILY 4/6/21   Patrick Dill MD   fenofibrate (TRIGLIDE) 160 MG tablet Take 1 tablet by mouth daily 3/11/21   Patrick Dill MD   Magnesium 400 MG CAPS Take 1 capsule by mouth 3 times daily 3/11/21   Patrick Dill MD   escitalopram (LEXAPRO) 20 MG tablet Take 1 tablet by mouth daily 2/25/21   Patrick Dill MD   Nutritional Supplements (ENSURE COMPLETE) LIQD Take 1 Bottle by mouth 2 times daily 12/18/20   Patrick Dill MD   levETIRAcetam (KEPPRA) 500 MG tablet Take 1 tablet by mouth 2 times daily  Patient taking differently: Take 500 mg by mouth 2 times daily Take 1/2 (250mg) tablet in the morning and 1 tablet (500mg) if the afternoon.  12/2/20 4/15/21  Jennifer Fong MD   triamcinolone (KENALOG) 0.025 % cream Apply topically every 4 hours as needed Apply Topically Historical Provider, MD   levothyroxine (SYNTHROID) 50 MCG tablet TAKE 1 TABLET BY MOUTH  DAILY 9/30/20   Pradeep Tobar MD   pantoprazole (PROTONIX) 40 MG tablet TAKE 1 TABLET BY MOUTH  DAILY 9/30/20   Pradeep Tobar MD   atorvastatin (LIPITOR) 40 MG tablet TAKE 1 TABLET BY MOUTH ONCE DAILY 9/30/20   Pradeep Tobar MD   rOPINIRole (REQUIP) 0.5 MG tablet TAKE 1 TABLET BY MOUTH AT  NIGHT 9/30/20   Pradeep Tobar MD   alendronate (FOSAMAX) 35 MG tablet TAKE 1 TABLET BY MOUTH  EVERY 7 DAYS 9/21/20   Pradeep Tobar MD   calcium carbonate-vitamin D (CALTRATE) 600-400 MG-UNIT TABS per tab Take 1 tablet by mouth 2 times daily 1/13/20   Pradeep Tobar MD       Future Appointments   Date Time Provider Cami Ledesma   6/18/2021  8:45 AM Damon Flores [facility]

## 2021-06-15 ENCOUNTER — CARE COORDINATION (OUTPATIENT)
Dept: CARE COORDINATION | Age: 66
End: 2021-06-15

## 2021-06-15 NOTE — CARE COORDINATION
Ambulatory Care Coordination Note  6/15/2021  CM Risk Score: 4  Charlson 10 Year Mortality Risk Score: 10%     ACC: Jennifer Valdovinos, RN    Summary Note: I called Vanita Triana she states she  called Dr. Hawa Simpson office and they are is working on getting some clinical information from the office of Dr. Jessica Salmeron and they will call her back to schedule office visit. I made Vanita Triana aware that the earliest I could get an appointment with Dermatology was Oct 4 at 7am with Dr. Maay Velazco in [facility], she states she will keep that appointment but wants me to inquire on any other resources for Dermatology in this area that may be able to get her in sooner, she states she is still itching really bad, to the point where she starts bleeding at times, Will check with Dr. Jessica Salmeron if she has any other options to help Ayesha with her itching, Vanita Triana has not heard back form [employer] with this, whe will follow up at her next encounter. I called [employer] Clinical Support nurse-Mary Jo Parson at [phone] to inquire about dermatology resources in our area, left message, waiting for call back. Care Coordination Plan of Care: This nurse Care Coordinator will follow up on    1. Fatigue? ?how is she managing- is passport starting?     2  Is weight stable?     3. Does she have all her medications?      4.. Nephrology referral/is appointment scheduled??     5. dermatology referral -10/4/21 are there any other dermatologist in area- will check with insurance          Care Coordination Interventions    Program Enrollment: Rising Risk  Referral from Primary Care Provider: Yes  Suggested Interventions and Community Resources  Grief Counselor: Declined (Comment: 3/4/21 recent loss of sister- declined [facility] support services)  Occupational Therapy: Declined  Pharmacist: Declined (Comment: 3/4/21 declined -will offer again in a few weeks)  Physical Therapy: Declined  Registered Dietician:  (Comment: 3/4/21 referral made)  Social Work: Completed (Comment: 3/4/21  referal made for transportation, housing)  Zone Management Tools: Completed (Comment: 3/4/21 ready clinic information mailed)         Goals Addressed    None         Prior to Admission medications    Medication Sig Start Date End Date Taking? Authorizing Provider   busPIRone (BUSPAR) 5 MG tablet Take 5 mg by mouth 2 times daily 5/27/21   Historical Provider, MD   hydrOXYzine (ATARAX) 10 MG tablet Take 1 tablet by mouth every 8 hours as needed for Itching 5/6/21   Sammie Silver MD   acetaminophen (ACETAMINOPHEN EXTRA STRENGTH) 500 MG tablet Take 1 tablet by mouth 2 times daily as needed for Pain 4/28/21 5/28/21  ALLEN Mtz CNP   aspirin EC 81 MG EC tablet Take 1 tablet by mouth daily 4/28/21   ALLEN Mtz CNP   fluocinonide (LIDEX) 0.05 % ointment APPLY TWICE DAILY 4/6/21   Sammie Silver MD   fenofibrate (TRIGLIDE) 160 MG tablet Take 1 tablet by mouth daily 3/11/21   Sammie Silver MD   Magnesium 400 MG CAPS Take 1 capsule by mouth 3 times daily 3/11/21   Sammie Silver MD   escitalopram (LEXAPRO) 20 MG tablet Take 1 tablet by mouth daily 2/25/21   Sammie Silver MD   Nutritional Supplements (ENSURE COMPLETE) LIQD Take 1 Bottle by mouth 2 times daily 12/18/20   Sammie Silver MD   levETIRAcetam (KEPPRA) 500 MG tablet Take 1 tablet by mouth 2 times daily  Patient taking differently: Take 500 mg by mouth 2 times daily Take 1/2 (250mg) tablet in the morning and 1 tablet (500mg) if the afternoon.  12/2/20 4/15/21  Mitchell Gonzales MD   triamcinolone (KENALOG) 0.025 % cream Apply topically every 4 hours as needed Apply Topically    Historical Provider, MD   levothyroxine (SYNTHROID) 50 MCG tablet TAKE 1 TABLET BY MOUTH  DAILY 9/30/20   Sammie Silver MD   pantoprazole (PROTONIX) 40 MG tablet TAKE 1 TABLET BY MOUTH  DAILY 9/30/20   Manju Ortiz

## 2021-06-16 ENCOUNTER — CARE COORDINATION (OUTPATIENT)
Dept: CARE COORDINATION | Age: 66
End: 2021-06-16

## 2021-06-16 DIAGNOSIS — L29.9 CHRONIC PRURITUS: ICD-10-CM

## 2021-06-16 DIAGNOSIS — L40.9 PSORIASIS: Chronic | ICD-10-CM

## 2021-06-16 DIAGNOSIS — M85.89 OSTEOPENIA OF MULTIPLE SITES: Chronic | ICD-10-CM

## 2021-06-16 DIAGNOSIS — G25.81 RESTLESS LEG SYNDROME: Chronic | ICD-10-CM

## 2021-06-16 RX ORDER — FLUOCINONIDE 0.5 MG/G
OINTMENT TOPICAL
Qty: 180 G | Refills: 1 | Status: SHIPPED | OUTPATIENT
Start: 2021-06-16 | End: 2022-10-25

## 2021-06-16 RX ORDER — ALENDRONATE SODIUM 35 MG/1
35 TABLET ORAL
Qty: 12 TABLET | Refills: 3 | Status: SHIPPED | OUTPATIENT
Start: 2021-06-16 | End: 2021-11-16 | Stop reason: SDUPTHER

## 2021-06-16 RX ORDER — ROPINIROLE 0.5 MG/1
TABLET, FILM COATED ORAL
Qty: 90 TABLET | Refills: 3 | Status: SHIPPED | OUTPATIENT
Start: 2021-06-16 | End: 2021-11-16 | Stop reason: SDUPTHER

## 2021-06-16 RX ORDER — HYDROXYZINE HYDROCHLORIDE 10 MG/1
10 TABLET, FILM COATED ORAL EVERY 8 HOURS PRN
Qty: 90 TABLET | Refills: 0 | Status: SHIPPED | OUTPATIENT
Start: 2021-06-16 | End: 2021-07-28 | Stop reason: SDUPTHER

## 2021-06-16 NOTE — TELEPHONE ENCOUNTER
Pharmacy requesting medication refill.  Please approve or deny this request.    Rx requested:  Requested Prescriptions     Pending Prescriptions Disp Refills    hydrOXYzine (ATARAX) 10 MG tablet [Pharmacy Med Name: HYDROXYZINE HYDROCHLORIDE  10MG  TAB] 90 tablet 0     Sig: TAKE 1 TABLET BY MOUTH  EVERY 8 HOURS AS NEEDED FOR ITCHING    rOPINIRole (REQUIP) 0.5 MG tablet [Pharmacy Med Name: ROPINIROLE  0.5MG  TAB] 90 tablet 3     Sig: TAKE 1 TABLET BY MOUTH AT  NIGHT    alendronate (FOSAMAX) 35 MG tablet [Pharmacy Med Name: ALENDRONATE  35MG  TAB] 12 tablet 3     Sig: TAKE 1 TABLET BY MOUTH  EVERY 7 DAYS    fluocinonide (LIDEX) 0.05 % ointment [Pharmacy Med Name: FLUOCINONIDE  0.05%  OIN] 180 g 1     Sig: APPLY TWICE DAILY         Last Office Visit:   6/4/2021      Next Visit Date:  Future Appointments   Date Time Provider Cami Ledesma   6/18/2021  8:45 AM Damon Marie [facility]

## 2021-06-16 NOTE — CARE COORDINATION
Ambulatory Care Coordination Note  6/16/2021  CM Risk Score: 4  Charlson 10 Year Mortality Risk Score: 10%     ACC: Janna Dorman, RN    Summary Note: follow up for derm consult, I spoke to Tiesha [phone] and sent message to Dr. Cassie Mercado to see if I can help clarify concerns Dr. Ryley Lazcano office has related to liver and starting Methotrexate. Will follow as needed. Care Coordination Interventions    Program Enrollment: Rising Risk  Referral from Primary Care Provider: Yes  Suggested Interventions and Community Resources  Grief Counselor: Declined (Comment: 3/4/21 recent loss of sister- declined [facility] support services)  Occupational Therapy: Declined  Pharmacist: Fabricio Grijalva (Comment: 3/4/21 declined -will offer again in a few weeks)  Physical Therapy: Declined  Registered Dietician:  (Comment: 3/4/21 referral made)  Social Work: Completed (Comment: 3/4/21 sw referal made for transportation, housing)  Zone Management Tools: Completed (Comment: 3/4/21 ready clinic information mailed)         Goals Addressed    None         Prior to Admission medications    Medication Sig Start Date End Date Taking?  Authorizing Provider   busPIRone (BUSPAR) 5 MG tablet Take 5 mg by mouth 2 times daily 5/27/21   Historical Provider, MD   hydrOXYzine (ATARAX) 10 MG tablet Take 1 tablet by mouth every 8 hours as needed for Itching 5/6/21   Jhonathan Zhang MD   acetaminophen (ACETAMINOPHEN EXTRA STRENGTH) 500 MG tablet Take 1 tablet by mouth 2 times daily as needed for Pain 4/28/21 5/28/21  ALLEN Peres Ala, CNP   aspirin EC 81 MG EC tablet Take 1 tablet by mouth daily 4/28/21   ALLEN Peres Ala, CNP   fluocinonide (LIDEX) 0.05 % ointment APPLY TWICE DAILY 4/6/21   Jhonathan Zhang MD   fenofibrate (TRIGLIDE) 160 MG tablet Take 1 tablet by mouth daily 3/11/21   Jhonathan Zhang MD   Magnesium 400 MG CAPS Take 1 capsule by mouth 3 times daily 3/11/21   Jhonathan Zhang MD

## 2021-06-17 ENCOUNTER — CARE COORDINATION (OUTPATIENT)
Dept: CARE COORDINATION | Age: 66
End: 2021-06-17

## 2021-06-17 NOTE — CARE COORDINATION
Telephone call with patient.   She indicated that she has not heard from [employer] [facility] regarding her PASSPORT Referral.  Discussed plan to contact Miko Liao.

## 2021-06-17 NOTE — CARE COORDINATION
Telephone call to [employer] [facility]. Left voicemail for PASSPORT Program to return phone call regarding status of patient.

## 2021-06-18 ENCOUNTER — VIRTUAL VISIT (OUTPATIENT)
Dept: CARDIOLOGY CLINIC | Age: 66
End: 2021-06-18
Payer: MEDICARE

## 2021-06-18 DIAGNOSIS — R06.09 DOE (DYSPNEA ON EXERTION): Chronic | ICD-10-CM

## 2021-06-18 DIAGNOSIS — E78.2 MIXED HYPERLIPIDEMIA: Primary | Chronic | ICD-10-CM

## 2021-06-18 DIAGNOSIS — R53.83 FATIGUE, UNSPECIFIED TYPE: ICD-10-CM

## 2021-06-18 DIAGNOSIS — R94.31 ABNORMAL EKG: Chronic | ICD-10-CM

## 2021-06-18 PROCEDURE — 99442 PR PHYS/QHP TELEPHONE EVALUATION 11-20 MIN: CPT | Performed by: INTERNAL MEDICINE

## 2021-06-18 NOTE — PROGRESS NOTES
Abnormal EKG    BARRIENTOS (dyspnea on exertion)    Anemia    Weight loss    Seizure (HCC)    Hypomagnesemia    Hypokalemia    Hypercalcemia    Polyneuropathy associated with underlying disease ([address]    Generalized idiopathic epilepsy and epileptic syndromes, intractable, with status epilepticus ([address]    Lymphadenopathy, axillary    Constipation    Fatigue    Epigastric pain    Gastric polyp    Altered bowel habits    Cognitive impairment    Anxiety    Grief reaction    Major depressive disorder       Past Surgical History:   Procedure Laterality Date    APPENDECTOMY      BIOPSY MOUTH LESION Bilateral 12/19/2016    REMOVAL  OF BILATERAL LINGUAL MACIE performed by Jeny Betts DDS at [facility] COLONOSCOPY N/A 1/21/2021    COLONOSCOPY DIAGNOSTIC performed by Debbie Forrester MD at [facility] ENDOSCOPY N/A 1/21/2021    EGD with polypectomy performed by Debbie Forrester MD at [facility] Marital status: Single     Spouse name: Not on file    Number of children: Not on file    Years of education: 15    Highest education level: Not on file   Occupational History    Occupation: disabled   Tobacco Use    Smoking status: Never Smoker    Smokeless tobacco: Never Used   Vaping Use    Vaping Use: Never used   Substance and Sexual Activity    Alcohol use: Not Currently     Alcohol/week: 28.0 standard drinks     Types: 28 Shots of liquor per week     Comment: quit drinking in November 2020    Drug use: No    Sexual activity: Not Currently   Other Topics Concern    Not on file   Social History Narrative    3/4/21 C[place] lives in a private residence she had shared with her sister, her sister was the home owner, and she recently passed on 2/21, She is now needing to find a new home, as the house she is living in may be sold soon, C[place] states she is independent with self care, does have some limitations but states able to function independently for most self care tasks. She does not drive related to history of seizures.  referral made for concerns with housing and transportation. Social Determinants of Health     Financial Resource Strain: Low Risk     Difficulty of Paying Living Expenses: Not very hard   Food Insecurity: No Food Insecurity    Worried About Running Out of Food in the Last Year: Never true    Katherine of Food in the Last Year: Never true   Transportation Needs: Unmet Transportation Needs    Lack of Transportation (Medical):  Yes    Lack of Transportation (Non-Medical): Yes   Physical Activity: Inactive    Days of Exercise per Week: 0 days    Minutes of Exercise per Session: 0 min   Stress: Stress Concern Present    Feeling of Stress : Rather much   Social Connections: Socially Isolated    Frequency of Communication with Friends and Family: Once a week    Frequency of Social Gatherings with Friends and Family: Once a week    Attends Hindu Services: Never    Active Member of Clubs or Organizations: No    Attends Club or Organization Meetings: Never    Marital Status: Never    Intimate Partner Violence: Not At Risk    Fear of Current or Ex-Partner: No    Emotionally Abused: No    Physically Abused: No    Sexually Abused: No       Family History   Problem Relation Age of Onset    Heart Disease Mother     Hypertension Mother     Diabetes Mother     Stroke Mother     Heart Disease Father     Hypertension Father     Heart Disease Sister     Hypertension Sister     Heart Disease Brother     Hypertension Brother     Diabetes Brother     Cancer Maternal Grandmother     Diabetes Maternal Grandfather        Current Outpatient Medications   Medication Sig Dispense Refill    hydrOXYzine (ATARAX) 10 MG tablet TAKE 1 TABLET BY MOUTH  EVERY 8 HOURS AS NEEDED FOR ITCHING 90 tablet 0    rOPINIRole (REQUIP) 0.5 MG

## 2021-06-21 ENCOUNTER — CARE COORDINATION (OUTPATIENT)
Dept: CARE COORDINATION | Age: 66
End: 2021-06-21

## 2021-06-21 DIAGNOSIS — I20.0 UNSTABLE ANGINA (HCC): ICD-10-CM

## 2021-06-21 DIAGNOSIS — M79.604 BILATERAL LEG PAIN: Chronic | ICD-10-CM

## 2021-06-21 DIAGNOSIS — M79.605 BILATERAL LEG PAIN: Chronic | ICD-10-CM

## 2021-06-22 RX ORDER — ACETAMINOPHEN 500 MG
500 TABLET ORAL 2 TIMES DAILY PRN
Qty: 60 TABLET | Refills: 5 | OUTPATIENT
Start: 2021-06-22 | End: 2021-07-22

## 2021-06-22 RX ORDER — ASPIRIN 81 MG/1
81 TABLET ORAL DAILY
Qty: 30 TABLET | Refills: 5 | OUTPATIENT
Start: 2021-06-22

## 2021-06-23 DIAGNOSIS — D63.1 ANEMIA DUE TO STAGE 3A CHRONIC KIDNEY DISEASE (HCC): ICD-10-CM

## 2021-06-23 DIAGNOSIS — M79.605 BILATERAL LEG PAIN: Chronic | ICD-10-CM

## 2021-06-23 DIAGNOSIS — N18.31 ANEMIA DUE TO STAGE 3A CHRONIC KIDNEY DISEASE (HCC): ICD-10-CM

## 2021-06-23 DIAGNOSIS — M79.604 BILATERAL LEG PAIN: Chronic | ICD-10-CM

## 2021-06-23 DIAGNOSIS — N18.31 STAGE 3A CHRONIC KIDNEY DISEASE (HCC): ICD-10-CM

## 2021-06-23 DIAGNOSIS — I20.0 UNSTABLE ANGINA (HCC): ICD-10-CM

## 2021-06-23 LAB
ANION GAP SERPL CALCULATED.3IONS-SCNC: 14 MEQ/L (ref 9–15)
BASOPHILS ABSOLUTE: 0 K/UL (ref 0–0.2)
BASOPHILS RELATIVE PERCENT: 0.6 %
BUN BLDV-MCNC: 32 MG/DL (ref 8–23)
CALCIUM SERPL-MCNC: 9.7 MG/DL (ref 8.5–9.9)
CHLORIDE BLD-SCNC: 103 MEQ/L (ref 95–107)
CO2: 26 MEQ/L (ref 20–31)
CREAT SERPL-MCNC: 1.26 MG/DL (ref 0.5–0.9)
EOSINOPHILS ABSOLUTE: 0.5 K/UL (ref 0–0.7)
EOSINOPHILS RELATIVE PERCENT: 7.7 %
GFR AFRICAN AMERICAN: 51.7
GFR NON-AFRICAN AMERICAN: 42.7
GLUCOSE BLD-MCNC: 108 MG/DL (ref 70–99)
HCT VFR BLD CALC: 33.9 % (ref 37–47)
HEMOGLOBIN: 11.1 G/DL (ref 12–16)
LYMPHOCYTES ABSOLUTE: 1.3 K/UL (ref 1–4.8)
LYMPHOCYTES RELATIVE PERCENT: 21.5 %
MCH RBC QN AUTO: 29.4 PG (ref 27–31.3)
MCHC RBC AUTO-ENTMCNC: 32.8 % (ref 33–37)
MCV RBC AUTO: 89.6 FL (ref 82–100)
MONOCYTES ABSOLUTE: 0.4 K/UL (ref 0.2–0.8)
MONOCYTES RELATIVE PERCENT: 7.2 %
NEUTROPHILS ABSOLUTE: 3.8 K/UL (ref 1.4–6.5)
NEUTROPHILS RELATIVE PERCENT: 63 %
PDW BLD-RTO: 15.1 % (ref 11.5–14.5)
PLATELET # BLD: 197 K/UL (ref 130–400)
POTASSIUM SERPL-SCNC: 4.5 MEQ/L (ref 3.4–4.9)
RBC # BLD: 3.78 M/UL (ref 4.2–5.4)
SODIUM BLD-SCNC: 143 MEQ/L (ref 135–144)
WBC # BLD: 6.1 K/UL (ref 4.8–10.8)

## 2021-06-23 RX ORDER — ASPIRIN 81 MG/1
81 TABLET ORAL DAILY
Qty: 30 TABLET | Refills: 5 | Status: SHIPPED | OUTPATIENT
Start: 2021-06-23 | End: 2021-11-10 | Stop reason: SDUPTHER

## 2021-06-23 RX ORDER — ACETAMINOPHEN 500 MG
500 TABLET ORAL 2 TIMES DAILY PRN
Qty: 60 TABLET | Refills: 5 | Status: SHIPPED | OUTPATIENT
Start: 2021-06-23 | End: 2021-11-24 | Stop reason: SDUPTHER

## 2021-06-23 NOTE — TELEPHONE ENCOUNTER
Requesting medication refill. Please approve or deny this request.    Rx requested:  Requested Prescriptions     Pending Prescriptions Disp Refills    acetaminophen (ACETAMINOPHEN EXTRA STRENGTH) 500 MG tablet 60 tablet 5     Sig: Take 1 tablet by mouth 2 times daily as needed for Pain    aspirin EC 81 MG EC tablet 30 tablet 5     Sig: Take 1 tablet by mouth daily       Last Office Visit:   6/4/2021    Last Filled:      Last Labs:      Next Visit Date:  No future appointments.

## 2021-06-23 NOTE — TELEPHONE ENCOUNTER
Rx request   Requested Prescriptions     Pending Prescriptions Disp Refills    busPIRone (BUSPAR) 5 MG tablet       Sig: Take 1 tablet by mouth 2 times daily     Signed Prescriptions Disp Refills    acetaminophen (ACETAMINOPHEN EXTRA STRENGTH) 500 MG tablet 60 tablet 5     Sig: Take 1 tablet by mouth 2 times daily as needed for Pain     Authorizing Provider: Theo Garcia aspirin EC 81 MG EC tablet 30 tablet 5     Sig: Take 1 tablet by mouth daily     Authorizing Provider: KOURTNEY Dougherty 6/4/2021  Next Visit Date:  No future appointments.

## 2021-06-24 ENCOUNTER — TELEPHONE (OUTPATIENT)
Dept: FAMILY MEDICINE CLINIC | Age: 66
End: 2021-06-24

## 2021-06-28 ENCOUNTER — HOSPITAL ENCOUNTER (EMERGENCY)
Age: 66
Discharge: HOME | End: 2021-06-28
Payer: MEDICARE

## 2021-06-28 VITALS
HEIGHT: 58 IN | OXYGEN SATURATION: 97 % | BODY MASS INDEX: 22.88 KG/M2 | WEIGHT: 109 LBS | HEART RATE: 84 BPM | DIASTOLIC BLOOD PRESSURE: 72 MMHG | SYSTOLIC BLOOD PRESSURE: 116 MMHG | TEMPERATURE: 99.1 F | RESPIRATION RATE: 18 BRPM

## 2021-06-28 DIAGNOSIS — S00.83XA CONTUSION OF FACE, INITIAL ENCOUNTER: Primary | ICD-10-CM

## 2021-06-28 DIAGNOSIS — S01.511A LIP LACERATION, INITIAL ENCOUNTER: ICD-10-CM

## 2021-06-28 DIAGNOSIS — Z87.898 HISTORY OF SEIZURE: ICD-10-CM

## 2021-06-28 LAB
ALBUMIN SERPL-MCNC: 4.1 G/DL (ref 3.5–4.6)
ALP BLD-CCNC: 61 U/L (ref 40–130)
ALT SERPL-CCNC: 15 U/L (ref 0–33)
ANION GAP SERPL CALCULATED.3IONS-SCNC: 12 MEQ/L (ref 9–15)
AST SERPL-CCNC: 25 U/L (ref 0–35)
BASOPHILS ABSOLUTE: 0 K/UL (ref 0–0.2)
BASOPHILS RELATIVE PERCENT: 0.5 %
BILIRUB SERPL-MCNC: <0.2 MG/DL (ref 0.2–0.7)
BUN BLDV-MCNC: 34 MG/DL (ref 8–23)
CALCIUM SERPL-MCNC: 9.5 MG/DL (ref 8.5–9.9)
CHLORIDE BLD-SCNC: 104 MEQ/L (ref 95–107)
CO2: 25 MEQ/L (ref 20–31)
CREAT SERPL-MCNC: 1.23 MG/DL (ref 0.5–0.9)
EOSINOPHILS ABSOLUTE: 0.3 K/UL (ref 0–0.7)
EOSINOPHILS RELATIVE PERCENT: 4.2 %
GFR AFRICAN AMERICAN: 53.2
GFR NON-AFRICAN AMERICAN: 43.9
GLOBULIN: 2.5 G/DL (ref 2.3–3.5)
GLUCOSE BLD-MCNC: 127 MG/DL (ref 70–99)
HCT VFR BLD CALC: 29.5 % (ref 37–47)
HEMOGLOBIN: 10 G/DL (ref 12–16)
LYMPHOCYTES ABSOLUTE: 1.1 K/UL (ref 1–4.8)
LYMPHOCYTES RELATIVE PERCENT: 15.8 %
MAGNESIUM: 1.7 MG/DL (ref 1.7–2.4)
MCH RBC QN AUTO: 29.4 PG (ref 27–31.3)
MCHC RBC AUTO-ENTMCNC: 33.7 % (ref 33–37)
MCV RBC AUTO: 87 FL (ref 82–100)
MONOCYTES ABSOLUTE: 0.6 K/UL (ref 0.2–0.8)
MONOCYTES RELATIVE PERCENT: 7.8 %
NEUTROPHILS ABSOLUTE: 5.1 K/UL (ref 1.4–6.5)
NEUTROPHILS RELATIVE PERCENT: 71.7 %
PDW BLD-RTO: 15 % (ref 11.5–14.5)
PLATELET # BLD: 156 K/UL (ref 130–400)
POTASSIUM SERPL-SCNC: 4.3 MEQ/L (ref 3.4–4.9)
RBC # BLD: 3.39 M/UL (ref 4.2–5.4)
SODIUM BLD-SCNC: 141 MEQ/L (ref 135–144)
TOTAL PROTEIN: 6.6 G/DL (ref 6.3–8)
WBC # BLD: 7.1 K/UL (ref 4.8–10.8)

## 2021-06-28 PROCEDURE — 80053 COMPREHEN METABOLIC PANEL: CPT

## 2021-06-28 PROCEDURE — 85025 COMPLETE CBC W/AUTO DIFF WBC: CPT

## 2021-06-28 PROCEDURE — 83735 ASSAY OF MAGNESIUM: CPT

## 2021-06-28 PROCEDURE — 36415 COLL VENOUS BLD VENIPUNCTURE: CPT

## 2021-06-28 PROCEDURE — 2500000003 HC RX 250 WO HCPCS: Performed by: PHYSICIAN ASSISTANT

## 2021-06-28 PROCEDURE — 99283 EMERGENCY DEPT VISIT LOW MDM: CPT

## 2021-06-28 RX ORDER — LIDOCAINE HYDROCHLORIDE 10 MG/ML
5 INJECTION, SOLUTION EPIDURAL; INFILTRATION; INTRACAUDAL; PERINEURAL ONCE
Status: COMPLETED | OUTPATIENT
Start: 2021-06-28 | End: 2021-06-28

## 2021-06-28 RX ADMIN — LIDOCAINE HYDROCHLORIDE 5 ML: 10 INJECTION, SOLUTION EPIDURAL; INFILTRATION; INTRACAUDAL; PERINEURAL at 17:25

## 2021-06-28 ASSESSMENT — ENCOUNTER SYMPTOMS
BACK PAIN: 0
PHOTOPHOBIA: 0
VOICE CHANGE: 0
SHORTNESS OF BREATH: 0
COUGH: 0
VOMITING: 0
ANAL BLEEDING: 0
ABDOMINAL DISTENTION: 0
COLOR CHANGE: 1
APNEA: 0
EYE DISCHARGE: 0
NAUSEA: 0

## 2021-06-28 NOTE — ED PROVIDER NOTES
[facility] ED  eMERGENCY dEPARTMENT eNCOUnter      Pt Name: Cindy Bridges  MRN: [id number]  [place] 1955  Date of evaluation: 6/28/2021  Provider: Sylvia Ervin PA-C    [address] [place] [address]       Chief Complaint   Patient presents with    Seizures     this am    Laceration     lower lip         HISTORY OF PRESENT ILLNESS   (Location/Symptom, Timing/Onset,Context/Setting, Quality, Duration, Modifying Factors, Severity)  Note limiting factors. Cindy Bridges is a 61 y.o. female who presents to the emergency department patient had possible seizure this morning she is sleeping on her couch last night woke up on the floor positive inner and outer lip laceration small bruise on right cheek. Patient denies headache denies doubly seeing hearing speaking denies nausea vomiting denies additional seizures denies neck pain back pain chest pain abdominal pain leg pain arm pain moving all extremities. Patient does have history of seizures states she is taking her Keppra twice a day as prescribed she has not had a seizure since November. Symptoms mild to moderate severity nothing improves or worsens her symptoms. HPI    NursingNotes were reviewed. REVIEW OF SYSTEMS    (2-9 systems for level 4, 10 or more for level 5)     Review of Systems   Constitutional: Negative for activity change, appetite change, chills, fever and unexpected weight change. HENT: Negative for ear discharge, nosebleeds and voice change. Eyes: Negative for photophobia and discharge. Respiratory: Negative for apnea, cough and shortness of breath. Cardiovascular: Negative for chest pain. Gastrointestinal: Negative for abdominal distention, anal bleeding, nausea and vomiting. Endocrine: Negative for cold intolerance, heat intolerance and polyphagia. Genitourinary: Negative for genital sores. Musculoskeletal: Negative for arthralgias, back pain, joint swelling, neck pain and neck stiffness.    Skin: Positive for color change, rash and wound. Negative for pallor. Allergic/Immunologic: Negative for immunocompromised state. Neurological: Positive for seizures. Negative for dizziness, tremors, facial asymmetry, speech difficulty, weakness, light-headedness, numbness and headaches. Hematological: Does not bruise/bleed easily. Psychiatric/Behavioral: Negative for behavioral problems, self-injury and sleep disturbance. All other systems reviewed and are negative. Except as noted above the remainder of the review of systems was reviewed and negative. PAST MEDICAL HISTORY     Past Medical History:   Diagnosis Date    Alcoholism ([facility] [address]     Arthritis     Chronic lung disease     Cognitive impairment 4/15/2021    Depression     Diabetes mellitus ([facility]     Diarrhea 1/4/2021    Possibly related to Metformin, Keppra, constipation, colitis. Stop Metformin. Treat constipation. Consider adjustment to Keppra given side effects.     Encephalopathy 12/10/2020    Grief reaction 4/15/2021    Hyperlipidemia     Hypertension     Hypokalemia 11/6/2020    Hypomagnesemia 11/6/2020    Hypothyroidism     Lymphadenopathy, axillary 1/4/2021    Second hand smoke exposure     Seizure ([facility]     Syncope and collapse 12/10/2020         SURGICALHISTORY       Past Surgical History:   Procedure Laterality Date    APPENDECTOMY      BIOPSY MOUTH LESION Bilateral 12/19/2016    REMOVAL  OF BILATERAL LINGUAL MACIE performed by Tisha Keane DDS at [facility] N/A 1/21/2021    COLONOSCOPY DIAGNOSTIC performed by Leonardo Damico MD at [address] [place] [address] N/A 1/21/2021    EGD with polypectomy performed by Leonardo Damico MD at [address] [place] [address]       Previous Medications    ACETAMINOPHEN (ACETAMINOPHEN EXTRA STRENGTH) 500 MG TABLET    Take 1 tablet by mouth 2 times daily as needed for Pain ALENDRONATE (FOSAMAX) 35 MG TABLET    TAKE 1 TABLET BY MOUTH  EVERY 7 DAYS    ASPIRIN EC 81 MG EC TABLET    Take 1 tablet by mouth daily    ATORVASTATIN (LIPITOR) 40 MG TABLET    TAKE 1 TABLET BY MOUTH ONCE DAILY    BUSPIRONE (BUSPAR) 5 MG TABLET    Take 5 mg by mouth 2 times daily    CALCIUM CARBONATE-VITAMIN D (CALTRATE) 600-400 MG-UNIT TABS PER TAB    Take 1 tablet by mouth 2 times daily    ESCITALOPRAM (LEXAPRO) 20 MG TABLET    Take 1 tablet by mouth daily    FENOFIBRATE (TRIGLIDE) 160 MG TABLET    Take 1 tablet by mouth daily    FLUOCINONIDE (LIDEX) 0.05 % OINTMENT    APPLY TWICE DAILY    HYDROXYZINE (ATARAX) 10 MG TABLET    TAKE 1 TABLET BY MOUTH  EVERY 8 HOURS AS NEEDED FOR ITCHING    LEVETIRACETAM (KEPPRA) 500 MG TABLET    Take 1 tablet by mouth 2 times daily    LEVOTHYROXINE (SYNTHROID) 50 MCG TABLET    TAKE 1 TABLET BY MOUTH  DAILY    MAGNESIUM 400 MG CAPS    Take 1 capsule by mouth 3 times daily    NUTRITIONAL SUPPLEMENTS (ENSURE COMPLETE) LIQD    Take 1 Bottle by mouth 2 times daily    PANTOPRAZOLE (PROTONIX) 40 MG TABLET    TAKE 1 TABLET BY MOUTH  DAILY    ROPINIROLE (REQUIP) 0.5 MG TABLET    TAKE 1 TABLET BY MOUTH AT  NIGHT    TRIAMCINOLONE (KENALOG) 0.025 % CREAM    Apply topically every 4 hours as needed Apply Topically       ALLERGIES     Morphine    FAMILY HISTORY       Family History   Problem Relation Age of Onset    Heart Disease Mother     Hypertension Mother     Diabetes Mother     Stroke Mother     Heart Disease Father     Hypertension Father     Heart Disease Sister     Hypertension Sister     Heart Disease Brother     Hypertension Brother     Diabetes Brother     Cancer Maternal Grandmother     Diabetes Maternal Grandfather           SOCIAL HISTORY       Social History     Socioeconomic History    Marital status: Single     Spouse name: Not on file    Number of children: Not on file    Years of education: 12    Highest education level: Not on file   Occupational History    Occupation: disabled   Tobacco Use    Smoking status: Never Smoker    Smokeless tobacco: Never Used   Vaping Use    Vaping Use: Never used   Substance and Sexual Activity    Alcohol use: Not Currently     Alcohol/week: 28.0 standard drinks     Types: 28 Shots of liquor per week     Comment: quit drinking in November 2020    Drug use: No    Sexual activity: Not Currently   Other Topics Concern    Not on file   Social History Narrative    3/4/21 Kd Martines lives in a private residence she had shared with her sister, her sister was the home owner, and she recently passed on 2/21, She is now needing to find a new home, as the house she is living in may be sold soon, Kd Martines states she is independent with self care, does have some limitations but states able to function independently for most self care tasks. She does not drive related to history of seizures.  referral made for concerns with housing and transportation. Social Determinants of Health     Financial Resource Strain: Low Risk     Difficulty of Paying Living Expenses: Not very hard   Food Insecurity: No Food Insecurity    Worried About Running Out of Food in the Last Year: Never true    Katherine of Food in the Last Year: Never true   Transportation Needs: Unmet Transportation Needs    Lack of Transportation (Medical):  Yes    Lack of Transportation (Non-Medical): Yes   Physical Activity: Inactive    Days of Exercise per Week: 0 days    Minutes of Exercise per Session: 0 min   Stress: Stress Concern Present    Feeling of Stress : Rather much   Social Connections: Socially Isolated    Frequency of Communication with Friends and Family: Once a week    Frequency of Social Gatherings with Friends and Family: Once a week    Attends Christian Services: Never    Active Member of Clubs or Organizations: No    Attends Club or Organization Meetings: Never    Marital Status: Never    Intimate Partner Violence: Not At Risk    Fear of Current or Ex-Partner: No    Emotionally Abused: No    Physically Abused: No    Sexually Abused: No       SCREENINGS      @FLOW([id number])@      PHYSICAL EXAM    (up to 7 for level 4, 8 or more for level 5)     ED Triage Vitals [06/28/21 1333]   BP Temp Temp Source Pulse Resp SpO2 Height Weight   116/72 99.1 °F (37.3 °C) Oral 84 18 97 % 4' 10\" (1.473 m) 109 lb (49.4 kg)       Physical Exam  Vitals and nursing note reviewed. Constitutional:       General: She is not in acute distress. Appearance: She is well-developed. She is not ill-appearing. HENT:      Head: Normocephalic. Comments: Negative posterior contusion. Right Ear: Tympanic membrane, ear canal and external ear normal.      Left Ear: Tympanic membrane, ear canal and external ear normal.      Nose: Nose normal.      Comments: Negative hemotympanum bilaterally  Negative septal ecchymosis bilaterally     Mouth/Throat:      Mouth: Mucous membranes are moist.      Comments: 8 mm internal lip laceration with some gap external superficial laceration of lip negative gapping. 2 cm contusion overlying right mandible negative tenderness negative step deformity  Eyes:      General:         Right eye: No discharge. Left eye: No discharge. Extraocular Movements: Extraocular movements intact. Pupils: Pupils are equal, round, and reactive to light. Neck:      Comments: Negative tenderness to cervical and paracervical spine this includes rotation right and left in flexion. Cardiovascular:      Rate and Rhythm: Normal rate and regular rhythm. Heart sounds: Normal heart sounds. Pulmonary:      Effort: Pulmonary effort is normal. No respiratory distress. Breath sounds: Normal breath sounds. No stridor. Abdominal:      General: Bowel sounds are normal. There is no distension. Palpations: Abdomen is soft. Tenderness: There is no abdominal tenderness.  There is no right CVA tenderness, left CVA tenderness or dictation. EMERGENCY DEPARTMENT COURSE and DIFFERENTIAL DIAGNOSIS/MDM:   Vitals:    Vitals:    06/28/21 1333   BP: 116/72   Pulse: 84   Resp: 18   Temp: 99.1 °F (37.3 °C)   TempSrc: Oral   SpO2: 97%   Weight: 109 lb (49.4 kg)   Height: 4' 10\" (1.473 m)            MDM  Number of Diagnoses or Management Options     Amount and/or Complexity of Data Reviewed  Clinical lab tests: ordered        CRITICAL CARE TIME          CONSULTS:  None    PROCEDURES:  Unless otherwise noted below, none     Procedures    FINAL IMPRESSION      1. Contusion of face, initial encounter    2. Lip laceration, initial encounter    3.  History of seizure          DISPOSITION/PLAN   DISPOSITION Decision To Discharge 06/28/2021 04:57:16 PM      PATIENT REFERRED TO:  Shorty Andino MD  [address]  [phone]    Call in 1 day      Bouchra Reyna MD  [address] [address] [address]  [facility]  [phone]    Call in 1 day      [facility]) ED  [address] [address]  [phone]    If symptoms worsen      DISCHARGE MEDICATIONS:  New Prescriptions    No medications on file          (Please note that portions of this note were completed with a voice recognition program.  Efforts were made to edit the dictations but occasionally words are mis-transcribed.)    Sylvia Ervin PA-C (electronically signed)  Attending Emergency Physician       Sylvia Ervin PA-C  06/28/21 1700

## 2021-06-28 NOTE — ED TRIAGE NOTES
Pt a/o x 3 skin pink w/d resp non labored. Pt and niece reports possible seizure while sleeping and bit through lip. O/e 1 cm inner lip lac noted.

## 2021-06-28 NOTE — TELEPHONE ENCOUNTER
I actually would like her to be seen by kidney specialist because her kidney function, while stable, is still much lower than it had been before she got sick. Roosevelt Woodall

## 2021-06-29 ENCOUNTER — TELEPHONE (OUTPATIENT)
Dept: NEUROLOGY | Age: 66
End: 2021-06-29

## 2021-06-29 ENCOUNTER — CARE COORDINATION (OUTPATIENT)
Dept: CARE COORDINATION | Age: 66
End: 2021-06-29

## 2021-06-29 DIAGNOSIS — R56.9 SEIZURE (HCC): Primary | ICD-10-CM

## 2021-06-29 NOTE — CARE COORDINATION
Telephone call to patient. Relayed results of phone calls with, [facility], Dr. Javier Calhoun and GENARO/Norma Crane.

## 2021-06-29 NOTE — CARE COORDINATION
Telephone call with MEDICAL [facility] HOSPITAL Program. Requested that new medical certificate be faxed to [facility] Office.

## 2021-06-29 NOTE — TELEPHONE ENCOUNTER
Spoke with patient. Advised her to come in and have levetiracetam level drawn. She states she does not know when she will be able to do this if she does not have transportation readily available. I also advised her that she does not have follow-up appointment scheduled and we will need to schedule a follow-up appointment. She states she will call and let us know what days she can be seen once again due to her transportation issues.   Patient will need to be seen in person at next appointment as her last visit was a virtual.

## 2021-06-29 NOTE — CARE COORDINATION
Telephone call with patient. She indicated that she was in the Emergency Room yesterday and had a seizure. Patient was wondering whether her insurance would be cover someone to stay nights with her. Patient requested that this writer contact [facility] to see if insurance would cover assistance at night. Also, reviewed that for her PASSPORT Program awaiting on medical certificate with Dr. Whitney Trevino. Patient requested that this writer contact Dr. Whitney Trevino. Explained to patient that [facility] would not provide nighttime caregiving.

## 2021-06-29 NOTE — CARE COORDINATION
Ambulatory Care Coordination Note  6/29/2021  CM Risk Score: 4  Charlson 10 Year Mortality Risk Score: 10%     ACC: Tiara Stanford, RN    Summary Note: I spoke to [facility] regarding ER Visit, she states she thinks had another seizure and she states she can not explain what triggered it, states she is taking her Keppra as ordered, ER recommended f/u with PCP and neuro as soon as possible, Dr. Matt Sears office is working with [facility] to get her scheduled, I spoke to the office of Dr. Sha Figueroa and they will inform DANISHA- Oc Ruano , and will reach out to [facility] with recommendations. [facility] also needs follow up with Dr. Chantelle Jefferson to evaluate her for renal issues, I spoke to the office of Dr. Chantelle Jefferson , they will call her to schedule, but they also requested some labs and xrays be sent to them, I sent message to Jazmyn Enrique at Dr. aMtt Sears office and requested they send the medical records requested, she states office staff will send those records over, [facility] states she is frustrated with her itching, dermatology can not see her until October, advised she discuss with Dr. Tiana Fitzgerald at follow up appointment. She states she has applied for Section 8 and hopes to be moving soon, she does not do well living in a apartment with 2 floors, BEVERLEY Mallory assisting with Passport referral. [facility] verified appt with Dr. Chantelle Jefferson on 7/22 at 4:15pm, she was made aware the Dr. Naresh Marcano office will be reaching out to her, and Chrsitine states she is waiting for Dr. Matt Sears office to regis her back to confirm appt. Care Coordination Plan of Care: This nurse Care Coordinator will follow up on  1. Fatigue? ?how is she managing- is passport starting?     2  Is weight stable?     3. Does she have all her medications?     4.. Nephrology referral/is appointment scheduled??     5. dermatology referral -10/4/21 are there any other dermatologist in area- will check with insurance             Care Coordination Interventions    Program daily 12/18/20   Anup Sosa MD   levETIRAcetam (KEPPRA) 500 MG tablet Take 1 tablet by mouth 2 times daily  Patient taking differently: Take 500 mg by mouth 2 times daily Take 1/2 (250mg) tablet in the morning and 1 tablet (500mg) if the afternoon. 12/2/20 4/15/21  Aliyah Sue MD   triamcinolone (KENALOG) 0.025 % cream Apply topically every 4 hours as needed Apply Topically    Historical Provider, MD   levothyroxine (SYNTHROID) 50 MCG tablet TAKE 1 TABLET BY MOUTH  DAILY 9/30/20   Anup Sosa MD   pantoprazole (PROTONIX) 40 MG tablet TAKE 1 TABLET BY MOUTH  DAILY 9/30/20   Anup Sosa MD   atorvastatin (LIPITOR) 40 MG tablet TAKE 1 TABLET BY MOUTH ONCE DAILY 9/30/20   Anup Sosa MD   calcium carbonate-vitamin D (CALTRATE) 600-400 MG-UNIT TABS per tab Take 1 tablet by mouth 2 times daily 1/13/20   Anup Sosa MD       No future appointments.

## 2021-06-29 NOTE — TELEPHONE ENCOUNTER
Patient was in ER for break through seizure. She currently is on keppra 500 bid. I dont see that the ER pulled keppra level.     Please advise    brian    Call patient

## 2021-07-01 DIAGNOSIS — R56.9 SEIZURE (HCC): ICD-10-CM

## 2021-07-03 LAB — KEPPRA: 40 UG/ML (ref 12–46)

## 2021-07-08 ENCOUNTER — CARE COORDINATION (OUTPATIENT)
Dept: CARE COORDINATION | Age: 66
End: 2021-07-08

## 2021-07-08 NOTE — CARE COORDINATION
Returning voicemail received from [facility].  Telephone call to [place], left voicemail explaining returning phone call. Explained would not be available tomorrow but will reach out to her again on 7/12/2021.

## 2021-07-09 ENCOUNTER — TELEPHONE (OUTPATIENT)
Dept: NEUROLOGY | Age: 66
End: 2021-07-09

## 2021-07-09 ENCOUNTER — CARE COORDINATION (OUTPATIENT)
Dept: CARE COORDINATION | Age: 66
End: 2021-07-09

## 2021-07-09 NOTE — TELEPHONE ENCOUNTER
Levetiracetam level noted to be 40 which is on the high end of normal.  Patient with recent breakthrough seizure. Call to discuss possibility of increasing Keppra or adding another medication. No answer and voicemail left advising patient to call office to discuss.

## 2021-07-09 NOTE — TELEPHONE ENCOUNTER
Patient called back and we discussed that her Keppra level was within normal limits. It is unclear if patient's last episode was a breakthrough seizure or if she fell off the couch while sleeping and hit her face. Patient did not have any loss of bowel or bladder and was not postictal.  At this time she does not want to increase her Keppra dose or add a new antielliptic. Advised her that if she has another suspicious episode that we will need to increase her medication or add another medication and she was agreeable.

## 2021-07-09 NOTE — CARE COORDINATION
Emilee Jones MD   busPIRone (BUSPAR) 5 MG tablet Take 5 mg by mouth 2 times daily 5/27/21   Historical Provider, MD   fenofibrate (TRIGLIDE) 160 MG tablet Take 1 tablet by mouth daily 3/11/21   Arianna Celis MD   Magnesium 400 MG CAPS Take 1 capsule by mouth 3 times daily 3/11/21   Arianna Celis MD   escitalopram (LEXAPRO) 20 MG tablet Take 1 tablet by mouth daily 2/25/21   Arianna Celis MD   Nutritional Supplements (ENSURE COMPLETE) LIQD Take 1 Bottle by mouth 2 times daily 12/18/20   Arianna Celis MD   levETIRAcetam (KEPPRA) 500 MG tablet Take 1 tablet by mouth 2 times daily  Patient taking differently: Take 500 mg by mouth 2 times daily Take 1/2 (250mg) tablet in the morning and 1 tablet (500mg) if the afternoon. 12/2/20 4/15/21  Karel Monge MD   triamcinolone (KENALOG) 0.025 % cream Apply topically every 4 hours as needed Apply Topically    Historical Provider, MD   levothyroxine (SYNTHROID) 50 MCG tablet TAKE 1 TABLET BY MOUTH  DAILY 9/30/20   Arianna Celis MD   pantoprazole (PROTONIX) 40 MG tablet TAKE 1 TABLET BY MOUTH  DAILY 9/30/20   Arianna Celis MD   atorvastatin (LIPITOR) 40 MG tablet TAKE 1 TABLET BY MOUTH ONCE DAILY 9/30/20   Arianna Celis MD   calcium carbonate-vitamin D (CALTRATE) 600-400 MG-UNIT TABS per tab Take 1 tablet by mouth 2 times daily 1/13/20   Arianna Celis MD       No future appointments.

## 2021-07-12 ENCOUNTER — CARE COORDINATION (OUTPATIENT)
Dept: CARE COORDINATION | Age: 66
End: 2021-07-12

## 2021-07-12 ENCOUNTER — TELEPHONE (OUTPATIENT)
Dept: FAMILY MEDICINE CLINIC | Age: 66
End: 2021-07-12

## 2021-07-12 NOTE — TELEPHONE ENCOUNTER
[facility]  called in and asked about an MD certification for this patient. She stated the passport program has faxed this over on May 20 and June 29. Tashia Hagan said she spoke with Bennet Jeans regarding this. I do not see this scanned in patients chart.     Please advise and thank you

## 2021-07-12 NOTE — CARE COORDINATION
Telephone call to Dr. Camille Perez. Relayed need for MD Certification Letter to be faxed to [address]. expressed plan to contact [facility] MA about this request and get back to this writer.

## 2021-07-12 NOTE — CARE COORDINATION
Telephone call with [employer] [facility].  She indicated that she faxed MD Certification letter on 5/20 and 6/29 to Dr. Maya Stover and has not received. She is asking for assistance in contacting physician office about this issue. ,

## 2021-07-12 NOTE — CARE COORDINATION
Telephone call to patient. Left voicemail explaining phone calls made to today. Requested return phone call. Call back number was provided.

## 2021-07-12 NOTE — CARE COORDINATION
Returning voicemail message from [facility].  Left voicemail of nature of call with request for return phone call. Call back number was provided.

## 2021-07-12 NOTE — CARE COORDINATION
Telephone call to Drew Proctor as requested by Dearl HANDY Leblanc. Telephone call with Kimberlee Madrid she indicated that she is with Medicaid. Shahana stated she could not speak to this writer as this writer is not an authorized representative and could not speak to this writer. Shahana to send release of information to her home.   Explained that this writer is waiting to hear back from [employer] about patient's [employer].

## 2021-07-15 ENCOUNTER — CARE COORDINATION (OUTPATIENT)
Dept: CARE COORDINATION | Age: 66
End: 2021-07-15

## 2021-07-15 NOTE — LETTER
7/15/2021           Jeffrey Walsh  [address] [place] [id number]      Jeffrey Walsh     Congratulations on the progress you have made improving and taking charge of your health! I know you will continue to use the knowledge and tools you have gained to continue down a healthy path. As you have demonstrated that you are able to successfully manage your health and wellness, I will no longer contact you on a regular basis. Again, congratulations and please know if there are any changes or you have a need for my a care coordinator in the future, you may contact your PCP office and they will refer you to Care Coordination Services.       In good health,       Esteban Davidson RN

## 2021-07-15 NOTE — CARE COORDINATION
Ambulatory Care Coordination Note  7/15/2021  CM Risk Score: 4  Charlson 10 Year Mortality Risk Score: 10%     ACC: Prem Hammond, RN    Summary Note: I called Ava Fernandez to discuss graduation and remind her of upcoming follow up appointments. I have asked AGA Marte to assist in scheduling 6 month follow up with Dr. Jose Zuniga and 6 month follow up with Neurology- Zaid Diaz will be seeing Dr. Lyndsay Red for new pt visit on 7/22/1 at 4:15pm and she will be seeing new Dermatologist in October. Ava Fernandez has had a steady increase in her weight, she is at 111, and is attending all follow up appointments, she is doing well managing her self care and knows to call clinicians and report symptoms promptly, Ava Fernandez reports she continues to abstain form drinking alcohol and reports she has all her medications. MSW- Ezra Turciosaminata will continue to follow as she is working on getting Ava Fernandez established with [employer] services. I will be graduating patient from [address], she is aware that she can contact PCP office if she has care needs that require coordination. I will close episode at next encounter. Care Coordination Interventions    Program Enrollment: Rising Risk  Referral from Primary Care Provider: Yes  Suggested Interventions and Community Resources  Grief Counselor: Declined (Comment: 3/4/21 recent loss of sister- declined [facility] support services)  Occupational Therapy: Declined  Pharmacist: Celeste Tomas (Comment: 3/4/21 declined -will offer again in a few weeks)  Physical Therapy: Declined  Registered Dietician:  (Comment: 3/4/21 referral made)  Social Work: Completed (Comment: 3/4/21 sw referal made for transportation, housing)  [employer]: Completed (Comment: 3/4/21 ready clinic information mailed)         Goals Addressed                 This Visit's Progress     Conditions and Symptoms        I will schedule office visits, as directed by my provider.   I will keep my appointment or reschedule if I 2/25/21   Carey Parnell MD   Nutritional Supplements (ENSURE COMPLETE) LIQD Take 1 Bottle by mouth 2 times daily 12/18/20   Carey Parnell MD   levETIRAcetam (KEPPRA) 500 MG tablet Take 1 tablet by mouth 2 times daily  Patient taking differently: Take 500 mg by mouth 2 times daily Take 1/2 (250mg) tablet in the morning and 1 tablet (500mg) if the afternoon. 12/2/20 4/15/21  Jada Marquez MD   triamcinolone (KENALOG) 0.025 % cream Apply topically every 4 hours as needed Apply Topically    Historical Provider, MD   levothyroxine (SYNTHROID) 50 MCG tablet TAKE 1 TABLET BY MOUTH  DAILY 9/30/20   Carey Parnell MD   pantoprazole (PROTONIX) 40 MG tablet TAKE 1 TABLET BY MOUTH  DAILY 9/30/20   Carey Parnell MD   atorvastatin (LIPITOR) 40 MG tablet TAKE 1 TABLET BY MOUTH ONCE DAILY 9/30/20   Carey Parnell MD   calcium carbonate-vitamin D (CALTRATE) 600-400 MG-UNIT TABS per tab Take 1 tablet by mouth 2 times daily 1/13/20   Carey Parnell MD       No future appointments.

## 2021-07-20 ENCOUNTER — CARE COORDINATION (OUTPATIENT)
Dept: CARE COORDINATION | Age: 66
End: 2021-07-20

## 2021-07-20 NOTE — CARE COORDINATION
Telephone call to Adult Protective Services/Jannette. Explained unable to reach worker Tino Chadwick by calling main [facility] and [employer] Number. Chris Wang offered to send Shahana an email to contact this writer.

## 2021-07-20 NOTE — CARE COORDINATION
Telephone call with patient. Explained unable to reach Carlos Guerra by calling [employer]. Relayed called Adult Protective Services and they were going to email Carlos Guerra to contact this writer.

## 2021-07-20 NOTE — LETTER
Jocelin Sterling  [address] [place] [address]      Dear Brooklny Tamez,    I hope this letter finds you doing well! I am sending you resource information on [address]  I hope you find the information helpful. Please look them over and let me know if you have any questions or need further assistance. You can contact me at any of the numbers listed below. Sincerely,    BEVERLEY Cheema  , [facility]  Cell Phone: [phone]  Email: [email or web address]

## 2021-07-20 NOTE — CARE COORDINATION
Telephone call with patient,  She indicated that she recently received Section 8 Voucher. She stated where she is living will take Section 8 but she wants to move to a different place. She does not like the area where she lives. Discussed could send her out list of Affordable Housing in [facility] and patient was in agreement. Discussed PASSPORT Program and that Dr. Arlen Gottron sent what was needed documentation a week ago. Discussed she should be hearing something from [facility] in near future  Patient also stated a /Shahana Paul wants to speak with this writer at [employer] and [employer]. Discussed plan to reach out to Shahana.

## 2021-07-21 ENCOUNTER — CARE COORDINATION (OUTPATIENT)
Dept: CARE COORDINATION | Age: 66
End: 2021-07-21

## 2021-07-21 NOTE — CARE COORDINATION
Telephone call with [address] [place] [address] [facility]. She indicated that patient's PASSPORT Program was approved. She is not sure why patient wanted this writer to talk to her. Shahana could not talk to this writer without being a authorized representative. She indicated to have patient call her with any questions and can be reached at [phone].

## 2021-07-27 ENCOUNTER — TELEPHONE (OUTPATIENT)
Dept: FAMILY MEDICINE CLINIC | Age: 66
End: 2021-07-27

## 2021-07-27 NOTE — TELEPHONE ENCOUNTER
[facility] called and stated there are no refills on either of these medications.     Please advise and thank you  Rx request   Requested Prescriptions      No prescriptions requested or ordered in this encounter     LOV 6/4/2021  Next Visit Date:  Future Appointments   Date Time Provider Cami Ledesma   8/20/2021 11:00 AM [facility]   12/2/2021  9:40 AM Cali Mccullough, [facility]

## 2021-07-28 ENCOUNTER — CARE COORDINATION (OUTPATIENT)
Dept: CARE COORDINATION | Age: 66
End: 2021-07-28

## 2021-07-28 DIAGNOSIS — K21.9 GASTROESOPHAGEAL REFLUX DISEASE WITHOUT ESOPHAGITIS: Chronic | ICD-10-CM

## 2021-07-28 DIAGNOSIS — E03.9 ACQUIRED HYPOTHYROIDISM: Chronic | ICD-10-CM

## 2021-07-28 DIAGNOSIS — L40.9 PSORIASIS: Chronic | ICD-10-CM

## 2021-07-28 DIAGNOSIS — E78.2 MIXED HYPERLIPIDEMIA: Chronic | ICD-10-CM

## 2021-07-28 DIAGNOSIS — L29.9 CHRONIC PRURITUS: ICD-10-CM

## 2021-07-28 RX ORDER — PANTOPRAZOLE SODIUM 40 MG/1
40 TABLET, DELAYED RELEASE ORAL DAILY
Qty: 90 TABLET | Refills: 1 | Status: SHIPPED | OUTPATIENT
Start: 2021-07-28 | End: 2021-11-16 | Stop reason: SDUPTHER

## 2021-07-28 RX ORDER — HYDROXYZINE HYDROCHLORIDE 10 MG/1
10 TABLET, FILM COATED ORAL EVERY 8 HOURS PRN
Qty: 90 TABLET | Refills: 0 | Status: SHIPPED | OUTPATIENT
Start: 2021-07-28 | End: 2021-11-16 | Stop reason: SDUPTHER

## 2021-07-28 RX ORDER — ATORVASTATIN CALCIUM 40 MG/1
TABLET, FILM COATED ORAL
Qty: 90 TABLET | Refills: 3 | Status: SHIPPED | OUTPATIENT
Start: 2021-07-28 | End: 2021-11-16 | Stop reason: SDUPTHER

## 2021-07-28 RX ORDER — LEVOTHYROXINE SODIUM 0.05 MG/1
50 TABLET ORAL DAILY
Qty: 90 TABLET | Refills: 3 | Status: SHIPPED | OUTPATIENT
Start: 2021-07-28 | End: 2021-11-16 | Stop reason: SDUPTHER

## 2021-07-28 RX ORDER — LEVETIRACETAM 500 MG/1
500 TABLET ORAL 2 TIMES DAILY
Qty: 180 TABLET | Refills: 2 | Status: CANCELLED | OUTPATIENT
Start: 2021-07-28 | End: 2021-10-26

## 2021-07-28 NOTE — TELEPHONE ENCOUNTER
Pharmacy requesting medication refill.  Please approve or deny this request.    Rx requested:  Requested Prescriptions     Pending Prescriptions Disp Refills    atorvastatin (LIPITOR) 40 MG tablet 90 tablet 3    hydrOXYzine (ATARAX) 10 MG tablet 90 tablet 0     Sig: Take 1 tablet by mouth every 8 hours as needed for Itching    levETIRAcetam (KEPPRA) 500 MG tablet 180 tablet 2     Sig: Take 1 tablet by mouth 2 times daily    levothyroxine (SYNTHROID) 50 MCG tablet 90 tablet 3     Sig: Take 1 tablet by mouth Daily    pantoprazole (PROTONIX) 40 MG tablet 90 tablet 3     Sig: Take 1 tablet by mouth daily         Last Office Visit:   6/4/2021      Next Visit Date:  Future Appointments   Date Time Provider Cami Ledesma   8/20/2021 11:00 AM Beulah Saab MD [facility] Xiang   12/2/2021  9:40 AM ALLEN Araiza - DANISHA Arenas

## 2021-07-28 NOTE — CARE COORDINATION
Telephone call with patient. She indicated that she spoke with a Farrah/ she believes is from [facility].  Provided patient with number for [employer] on [employer].  Patient explained that Lor Rodriguez indicated that she was approved for services. However, was told due to staffing issues it maybe a few months before they can find someone to staff her case. Patient indicated that they care going to provide a Lifeline Service for her at this time.

## 2021-07-28 NOTE — CARE COORDINATION
Returning patient's phone call. Patient wanting phone number of Farrah/. Telephone call to patient. Left voicemail with contact information for [email or web address] on [employer]. Relayed this is possibly where Betito Magallanes is from. Explained did not have a phone number for Farrah/.

## 2021-07-28 NOTE — TELEPHONE ENCOUNTER
Called [facility] and they notified me that the following medications have 0 refills.     Keppra, Hydrozine, Lipitor, Protonix, Levothroxine     AMM,MA

## 2021-07-30 RX ORDER — LEVETIRACETAM 500 MG/1
500 TABLET ORAL 2 TIMES DAILY
Qty: 180 TABLET | Refills: 2 | Status: SHIPPED | OUTPATIENT
Start: 2021-07-30 | End: 2021-12-10 | Stop reason: SDUPTHER

## 2021-08-02 ENCOUNTER — TELEPHONE (OUTPATIENT)
Dept: FAMILY MEDICINE CLINIC | Age: 66
End: 2021-08-02

## 2021-08-02 NOTE — TELEPHONE ENCOUNTER
Patient called in and is requesting something for her arthritis that she has in her legs/arms.     Please Advise and thank you

## 2021-08-03 RX ORDER — BUSPIRONE HYDROCHLORIDE 5 MG/1
5 TABLET ORAL 2 TIMES DAILY
OUTPATIENT
Start: 2021-08-03

## 2021-08-03 NOTE — TELEPHONE ENCOUNTER
I am sorry she is having pain. I would really like to have a conversation with her about the pain that she is experiencing. I am concerned that something else might be going on though I am not sure what that might be at this time. She has an appointment coming up on the 20th. If she cannot wait until then, please schedule an overbook virtual visit.

## 2021-08-04 ENCOUNTER — CARE COORDINATION (OUTPATIENT)
Dept: CARE COORDINATION | Age: 66
End: 2021-08-04

## 2021-08-04 RX ORDER — BUSPIRONE HYDROCHLORIDE 5 MG/1
5 TABLET ORAL 2 TIMES DAILY
Qty: 180 TABLET | Refills: 1 | Status: SHIPPED | OUTPATIENT
Start: 2021-08-04 | End: 2021-12-14

## 2021-08-04 NOTE — CARE COORDINATION
Telephone call with patient. She indicated that she has spoken with Farrah/ and has her phone number. Patient stated they are still trying to staff her HHA. She is going to have her lifeline installed today. She is not interested in meals-on-wheels at this time. Discussed housing situation and patient has decided to stay where she is at for the next year since she got Section 8 approval.  Patient did receive list of Affordable Housing that this writer sent to her home.

## 2021-08-12 ENCOUNTER — OFFICE VISIT (OUTPATIENT)
Dept: FAMILY MEDICINE CLINIC | Age: 66
End: 2021-08-12
Payer: MEDICARE

## 2021-08-12 VITALS
DIASTOLIC BLOOD PRESSURE: 58 MMHG | HEIGHT: 58 IN | SYSTOLIC BLOOD PRESSURE: 90 MMHG | TEMPERATURE: 96 F | OXYGEN SATURATION: 98 % | HEART RATE: 77 BPM | RESPIRATION RATE: 17 BRPM | BODY MASS INDEX: 24.35 KG/M2 | WEIGHT: 116 LBS

## 2021-08-12 DIAGNOSIS — L40.50 PSORIASIS WITH ARTHROPATHY (HCC): ICD-10-CM

## 2021-08-12 DIAGNOSIS — R20.2 NUMBNESS AND TINGLING OF UPPER AND LOWER EXTREMITIES OF BOTH SIDES: Chronic | ICD-10-CM

## 2021-08-12 DIAGNOSIS — M79.605 BILATERAL LEG AND FOOT PAIN: Chronic | ICD-10-CM

## 2021-08-12 DIAGNOSIS — N18.32 STAGE 3B CHRONIC KIDNEY DISEASE (HCC): Chronic | ICD-10-CM

## 2021-08-12 DIAGNOSIS — M54.50 CHRONIC BILATERAL LOW BACK PAIN WITHOUT SCIATICA: ICD-10-CM

## 2021-08-12 DIAGNOSIS — R20.0 NUMBNESS AND TINGLING OF UPPER AND LOWER EXTREMITIES OF BOTH SIDES: Chronic | ICD-10-CM

## 2021-08-12 DIAGNOSIS — E87.6 HYPOKALEMIA: Chronic | ICD-10-CM

## 2021-08-12 DIAGNOSIS — D64.9 ANEMIA, UNSPECIFIED TYPE: ICD-10-CM

## 2021-08-12 DIAGNOSIS — M79.672 BILATERAL LEG AND FOOT PAIN: Chronic | ICD-10-CM

## 2021-08-12 DIAGNOSIS — G40.311 GENERALIZED IDIOPATHIC EPILEPSY AND EPILEPTIC SYNDROMES, INTRACTABLE, WITH STATUS EPILEPTICUS (HCC): Primary | ICD-10-CM

## 2021-08-12 DIAGNOSIS — E83.52 HYPERCALCEMIA: ICD-10-CM

## 2021-08-12 DIAGNOSIS — M75.01 ADHESIVE CAPSULITIS OF RIGHT SHOULDER: Chronic | ICD-10-CM

## 2021-08-12 DIAGNOSIS — G63 POLYNEUROPATHY ASSOCIATED WITH UNDERLYING DISEASE (HCC): ICD-10-CM

## 2021-08-12 DIAGNOSIS — G89.29 CHRONIC BILATERAL LOW BACK PAIN WITHOUT SCIATICA: ICD-10-CM

## 2021-08-12 DIAGNOSIS — M79.671 BILATERAL LEG AND FOOT PAIN: Chronic | ICD-10-CM

## 2021-08-12 DIAGNOSIS — E83.42 HYPOMAGNESEMIA: ICD-10-CM

## 2021-08-12 DIAGNOSIS — M25.531 RIGHT WRIST PAIN: ICD-10-CM

## 2021-08-12 DIAGNOSIS — M79.604 BILATERAL LEG AND FOOT PAIN: Chronic | ICD-10-CM

## 2021-08-12 DIAGNOSIS — F10.20 ALCOHOLISM (HCC): ICD-10-CM

## 2021-08-12 PROBLEM — K59.00 CONSTIPATION: Status: RESOLVED | Noted: 2021-01-04 | Resolved: 2021-08-12

## 2021-08-12 PROCEDURE — 1036F TOBACCO NON-USER: CPT | Performed by: FAMILY MEDICINE

## 2021-08-12 PROCEDURE — G8427 DOCREV CUR MEDS BY ELIG CLIN: HCPCS | Performed by: FAMILY MEDICINE

## 2021-08-12 PROCEDURE — 3017F COLORECTAL CA SCREEN DOC REV: CPT | Performed by: FAMILY MEDICINE

## 2021-08-12 PROCEDURE — G8420 CALC BMI NORM PARAMETERS: HCPCS | Performed by: FAMILY MEDICINE

## 2021-08-12 PROCEDURE — 99215 OFFICE O/P EST HI 40 MIN: CPT | Performed by: FAMILY MEDICINE

## 2021-08-12 RX ORDER — TRIAMCINOLONE ACETONIDE 1 MG/G
CREAM TOPICAL
Qty: 453.6 G | Refills: 2 | Status: SHIPPED | OUTPATIENT
Start: 2021-08-12 | End: 2021-11-16 | Stop reason: SDUPTHER

## 2021-08-12 RX ORDER — FOLIC ACID 1 MG/1
1 TABLET ORAL DAILY
Qty: 30 TABLET | Refills: 11 | Status: SHIPPED | OUTPATIENT
Start: 2021-08-12 | End: 2021-12-30 | Stop reason: SDUPTHER

## 2021-08-12 RX ORDER — TRAMADOL HYDROCHLORIDE 50 MG/1
50 TABLET ORAL EVERY 8 HOURS PRN
Qty: 60 TABLET | Refills: 0 | Status: SHIPPED | OUTPATIENT
Start: 2021-08-12 | End: 2021-09-11

## 2021-08-12 NOTE — PROGRESS NOTES
Subjective  Yajaira Chavez, 61 y.o. female presents today with:  Chief Complaint   Patient presents with    Psoriasis     f/u; red itchy and pain patches of skin on legs, arms, etc; naproxen has been helping the pt           HPI    Diffuse patches of itchy psoriasis; diffuse large and small joint pain; some nail abnormalities, diffuse aching leg pain limiting ability to ascend stairs (chronic x years); worsening RLS symptoms. Right shoulder with moderate to severe pain x months with significant reduction in ROM. Right wrist with new onset pain and swelling. Symptoms are extremely distressing. Seizure. No seizures in over 6 months. No ETOH consumption in >6 months. . No new deficits. No facial droop. No speech impairment. No unilateral weakness. No other questions and or concerns for today's visit      Review of Systems  No fevers. Past Medical History:   Diagnosis Date    Adhesive capsulitis of right shoulder 8/12/2021    Alcoholism ([address]     Arthritis     Bilateral leg and foot pain 8/12/2021    Chronic lung disease     Cognitive impairment 4/15/2021    Depression     Diabetes mellitus ([address]     Diarrhea 1/4/2021    Possibly related to Metformin, Keppra, constipation, colitis. Stop Metformin. Treat constipation. Consider adjustment to Keppra given side effects.     Encephalopathy 12/10/2020    Grief reaction 4/15/2021    Hyperlipidemia     Hypertension     Hypokalemia 11/6/2020    Hypomagnesemia 11/6/2020    Hypothyroidism     Lymphadenopathy, axillary 1/4/2021    Numbness and tingling of upper and lower extremities of both sides 8/12/2021    Second hand smoke exposure     Seizure ([address]     Stage 3b chronic kidney disease ([address] 8/12/2021    Syncope and collapse 12/10/2020     Past Surgical History:   Procedure Laterality Date    APPENDECTOMY      BIOPSY MOUTH LESION Bilateral 12/19/2016    REMOVAL  OF BILATERAL LINGUAL MACIE performed by Bertrand Olivo DDS at [facility]    CHOLECYSTECTOMY      COLONOSCOPY N/A 1/21/2021    COLONOSCOPY DIAGNOSTIC performed by Emiliana Pond MD at [address] [place] [address] ENDOSCOPY N/A 1/21/2021    EGD with polypectomy performed by [facility] Pond [facility] at [facility] Marital status: Single     Spouse name: Not on file    Number of children: Not on file    Years of education: 15    Highest education level: Not on file   Occupational History    Occupation: disabled   Tobacco Use    Smoking status: Never Smoker    Smokeless tobacco: Never Used   Vaping Use    Vaping Use: Never used   Substance and Sexual Activity    Alcohol use: Not Currently     Alcohol/week: 28.0 standard drinks     Types: 28 Shots of liquor per week     Comment: quit drinking in November 2020    Drug use: No    Sexual activity: Not Currently   Other Topics Concern    Not on file   Social History Narrative    3/4/21 Susanne Mae lives in a private residence she had shared with her sister, her sister was the home owner, and she recently passed on 2/21, She is now needing to find a new home, as the house she is living in may be sold soon, Susanne Mae states she is independent with self care, does have some limitations but states able to function independently for most self care tasks. She does not drive related to history of seizures.  referral made for concerns with housing and transportation. Social Determinants of Health     Financial Resource Strain: Low Risk     Difficulty of Paying Living Expenses: Not very hard   Food Insecurity: No Food Insecurity    Worried About Running Out of Food in the Last Year: Never true    Katherine of Food in the Last Year: Never true   Transportation Needs: Unmet Transportation Needs    Lack of Transportation (Medical):  Yes    Lack of Transportation (Non-Medical): Yes   Physical Activity: Inactive    Days of Exercise per Week: 0 days    Minutes of Exercise per Session: 0 min   Stress: Stress Concern Present    Feeling of Stress : Rather much   Social Connections: Socially Isolated    Frequency of Communication with Friends and Family: Once a week    Frequency of Social Gatherings with Friends and Family: Once a week    Attends Quaker Services: Never    Active Member of Clubs or Organizations: No    Attends Club or Organization Meetings: Never    Marital Status: Never    Intimate Partner Violence: Not At Risk    Fear of Current or Ex-Partner: No    Emotionally Abused: No    Physically Abused: No    Sexually Abused: No     Family History   Problem Relation Age of Onset    Heart Disease Mother     Hypertension Mother     Diabetes Mother     Stroke Mother     Heart Disease Father     Hypertension Father     Heart Disease Sister     Hypertension Sister     Heart Disease Brother     Hypertension Brother     Diabetes Brother     Cancer Maternal Grandmother     Diabetes Maternal Grandfather      Allergies   Allergen Reactions    Morphine Swelling     Current Outpatient Medications   Medication Sig Dispense Refill    methotrexate (RHEUMATREX) 2.5 MG chemo tablet Take 3 tablets by mouth once a week for 7 days, THEN 4 tablets once a week for 7 days, THEN 5 tablets once a week for 7 days, THEN 6 tablets once a week for 7 days, THEN 7 tablets once a week for 7 days, THEN 8 tablets once a week for 7 days. 33 tablet 0    folic acid (FOLVITE) 1 MG tablet Take 1 tablet by mouth daily Do not take on the day you take the methotrexate. 30 tablet 11    traMADol (ULTRAM) 50 MG tablet Take 1 tablet by mouth every 8 hours as needed for Pain for up to 30 days. Intended supply: 7 days. Take lowest dose possible to manage pain 60 tablet 0    triamcinolone (KENALOG) 0.1 % cream Apply topically 2 times daily.  453.6 g 2    busPIRone (BUSPAR) 5 MG tablet Take 1 tablet by mouth 2 times daily 180 tablet 1    levETIRAcetam (KEPPRA) 500 MG tablet Take 1 tablet by mouth 2 times daily 180 tablet 2    atorvastatin (LIPITOR) 40 MG tablet TAKE 1 TABLET BY MOUTH ONCE DAILY 90 tablet 3    hydrOXYzine (ATARAX) 10 MG tablet Take 1 tablet by mouth every 8 hours as needed for Itching 90 tablet 0    levothyroxine (SYNTHROID) 50 MCG tablet Take 1 tablet by mouth Daily 90 tablet 3    pantoprazole (PROTONIX) 40 MG tablet Take 1 tablet by mouth daily TAKE 1 TABLET BY MOUTH  DAILY 90 tablet 1    acetaminophen (ACETAMINOPHEN EXTRA STRENGTH) 500 MG tablet Take 1 tablet by mouth 2 times daily as needed for Pain 60 tablet 5    aspirin EC 81 MG EC tablet Take 1 tablet by mouth daily 30 tablet 5    rOPINIRole (REQUIP) 0.5 MG tablet TAKE 1 TABLET BY MOUTH AT  NIGHT 90 tablet 3    alendronate (FOSAMAX) 35 MG tablet TAKE 1 TABLET BY MOUTH  EVERY 7 DAYS 12 tablet 3    fluocinonide (LIDEX) 0.05 % ointment APPLY TWICE DAILY 180 g 1    fenofibrate (TRIGLIDE) 160 MG tablet Take 1 tablet by mouth daily 90 tablet 4    Magnesium 400 MG CAPS Take 1 capsule by mouth 3 times daily 270 capsule 4    Nutritional Supplements (ENSURE COMPLETE) LIQD Take 1 Bottle by mouth 2 times daily 60 Bottle 5    calcium carbonate-vitamin D (CALTRATE) 600-400 MG-UNIT TABS per tab Take 1 tablet by mouth 2 times daily 30 tablet 12    escitalopram (LEXAPRO) 20 MG tablet Take 1 tablet by mouth daily (Patient not taking: Reported on 8/12/2021) 1 tablet 0     No current facility-administered medications for this visit. PMH, Surgical Hx, Family Hx, and Social Hxreviewed and updated. Health Maintenance reviewed.     Objective    Vitals:    08/12/21 1149   BP: (!) 90/58   Pulse: 77   Resp: 17   Temp: 96 °F (35.6 °C)   TempSrc: Temporal   SpO2: 98%   Weight: 116 lb (52.6 kg)   Height: 4' 10\" (1.473 m)        Physical Exam      Lab Results   Component Value Date    LABA1C 4.5 (L) 03/12/2021    LABA1C 5.2 11/02/2020    LABA1C 5.4 01/22/2020     Lab Results   Component Value Date CREATININE 1.23 (H) 06/28/2021     Lab Results   Component Value Date    ALT 15 06/28/2021    AST 25 06/28/2021     Lab Results   Component Value Date    CHOL 134 05/13/2021    TRIG 95 05/13/2021    HDL 64 (H) 05/13/2021    LDLCALC 51 05/13/2021        Assessment & Plan   Visit Diagnoses and Associated Orders     Generalized idiopathic epilepsy and epileptic syndromes, intractable, with status epilepticus ([place] [address].)    -  Primary    Stable and well-controlled on current meds.          Polyneuropathy associated with underlying disease ([facility]        worse, fair control; assess nature and severity of condition with EMG    Rheumatoid Factor [[id number] Custom]   - Future Order    C-Reactive Protein [[id number] Custom]   - Future Order    Cyclic Citrul Peptide Antibody, IgG [[id number] Custom]   - Future Order    Vitamin B12 & Folate [58412 Custom]   - Future Order         Psoriasis with arthropathy ([place] [address]        Rheumatoid Factor [71604 Custom]   - Future Order    C-Reactive Protein [28699 Custom]   - Future Order    CBC With Auto Differential [[id number] Custom]   - Future Order    MACHO Screen with Reflex [[id number] Custom]   - Future Order    Cyclic Citrul Peptide Antibody, IgG [21797 Custom]   - Future Order    methotrexate (RHEUMATREX) 2.5 MG chemo tablet [0423]      folic acid (FOLVITE) 1 MG tablet [3233]      Hepatic Function Panel [[id number] Custom]   - Standing Order    Radha 2 - Anti Ssa & Anti Ssb [17748 Custom]   - Future Order    traMADol (ULTRAM) 50 MG tablet [03103]      triamcinolone (KENALOG) 0.1 % cream [3084]           Alcoholism ([place] [address].)        improving, fair control; suspect sig contributor to neuropathy         Hypomagnesemia        Comprehensive Metabolic Panel [[id number] Custom]   - Future Order    Magnesium [08190 Custom]   - Future Order         Hypokalemia        Comprehensive Metabolic Panel [04140 Custom]   - Future Order         Hypercalcemia        Comprehensive Metabolic Panel [75254 Custom]   - Future Order    Protein and lower extremities of both sides        Rheumatoid Factor [47284 Custom]   - Future Order    C-Reactive Protein [49718 Custom]   - Future Order    MACHO Screen with Reflex [[id number] Custom]   - Future Order    Cyclic Citrul Peptide Antibody, IgG [98240 Custom]   - Future Order    EMG [65381 Custom]   - Future Order    AFL Ashley Forbes MD, Orthopaedic Surgery [[id number] Custom]      Radha 2 - Anti Ssa & Anti Ssb [76913 Custom]   - Future Order         Anemia, unspecified type        Ferritin [43907 Custom]   - Future Order    Iron And Tibc [20123 Custom]   - Future Order             WIll start MTX and moitor until est with rheum. Tramadol for pain control. Follow closely. Time spent on appt 51 minutes. Reviewed with the patient: all disease processes, current clinical status, medications, activities and diet.      Side effects, adverse effects of the medication prescribed today, as well as treatment plan/ rationale and result expectations have been discussed with the patient who expresses understanding and desires to proceed.     Close follow up to evaluate treatment results and for coordination of care. I have reviewed the patient's medical history in detail and updated the computerized patient record. More than 50% of the appointment was spent in face-to-face counseling, education and care coordination. Please note this report has been partially produced using speech recognition software and may contain mistakes related to that system including errors in grammar, punctuation and spelling as well as words and phrases that may seem inappropriate. If there are questions or concerns, please feel free to contact me to clarify.     Orders Placed This Encounter   Procedures    XR LUMBAR SPINE (MIN 4 VIEWS)     Standing Status:   Future     Standing Expiration Date:   8/12/2022     Order Specific Question:   Reason for exam:     Answer:   chronic low back pain, probable PSA    XR SHOULDER RIGHT (MIN 2 VIEWS) Standing Status:   Future     Standing Expiration Date:   8/12/2022     Order Specific Question:   Reason for exam:     Answer:   right shoulder pain with extreme restriction h/o PSA    XR WRIST RIGHT (MIN 3 VIEWS)     Standing Status:   Future     Standing Expiration Date:   8/12/2022     Order Specific Question:   Reason for exam:     Answer:   chronic right wrist pain and swelling in setting of PSA    Rheumatoid Factor     Standing Status:   Future     Standing Expiration Date:   8/12/2022    C-Reactive Protein     Standing Status:   Future     Standing Expiration Date:   8/12/2022    CBC With Auto Differential     Standing Status:   Future     Standing Expiration Date:   8/12/2022    MACHO Screen with Reflex     Standing Status:   Future     Standing Expiration Date:   8/36/2887    Cyclic Citrul Peptide Antibody, IgG     Standing Status:   Future     Standing Expiration Date:   8/12/2022    Vitamin B12 & Folate     Standing Status:   Future     Standing Expiration Date:   8/12/2022    Ferritin     Standing Status:   Future     Standing Expiration Date:   8/12/2022    Iron And Tibc     Standing Status:   Future     Standing Expiration Date:   8/12/2022     Order Specific Question:   Is Patient Fasting? Answer:   0     Order Specific Question:   No of Hours?      Answer:   0    Comprehensive Metabolic Panel     Standing Status:   Future     Standing Expiration Date:   11/12/2021    Phosphorus     Standing Status:   Future     Standing Expiration Date:   8/12/2022    PTH, Intact     Standing Status:   Future     Standing Expiration Date:   8/12/2022    Magnesium     Standing Status:   Future     Standing Expiration Date:   8/12/2022    Protein Electrophoresis, Serum     Standing Status:   Future     Standing Expiration Date:   8/12/2022    Hepatic Function Panel     Standing Status:   Standing     Number of Occurrences:   12     Standing Expiration Date:   8/12/2022    Rdaha 2 - Anti Ssa & Anti Ssb Standing Status:   Future     Standing Expiration Date:   8/12/2022    FRANCISCO - Driss aFng MD, Orthopaedic Surgery     Referral Priority:   Routine     Referral Type:   Eval and Treat     Referral Reason:   Specialty Services Required     Referred to Provider:   Bill So MD     Requested Specialty:   Orthopedic Surgery     Number of Visits Requested:   1    EMG     Standing Status:   Future     Standing Expiration Date:   10/11/2021     Order Specific Question:   Which body part? Answer:   BUE and BLE     Orders Placed This Encounter   Medications    methotrexate (RHEUMATREX) 2.5 MG chemo tablet     Sig: Take 3 tablets by mouth once a week for 7 days, THEN 4 tablets once a week for 7 days, THEN 5 tablets once a week for 7 days, THEN 6 tablets once a week for 7 days, THEN 7 tablets once a week for 7 days, THEN 8 tablets once a week for 7 days. Dispense:  33 tablet     Refill:  0    folic acid (FOLVITE) 1 MG tablet     Sig: Take 1 tablet by mouth daily Do not take on the day you take the methotrexate. Dispense:  30 tablet     Refill:  11    traMADol (ULTRAM) 50 MG tablet     Sig: Take 1 tablet by mouth every 8 hours as needed for Pain for up to 30 days. Intended supply: 7 days. Take lowest dose possible to manage pain     Dispense:  60 tablet     Refill:  0     Reduce doses taken as pain becomes manageable    triamcinolone (KENALOG) 0.1 % cream     Sig: Apply topically 2 times daily. Dispense:  453.6 g     Refill:  2     Medications Discontinued During This Encounter   Medication Reason    triamcinolone (KENALOG) 0.025 % cream LIST CLEANUP     Return in about 4 weeks (around 9/9/2021) for multiple. Controlled Substance Monitoring:    Acute and Chronic Pain Monitoring:   RX Monitoring 8/12/2021   Attestation -   Periodic Controlled Substance Monitoring Possible medication side effects, risk of tolerance/dependence & alternative treatments discussed.            Elicia Tijerina

## 2021-08-16 ENCOUNTER — TELEPHONE (OUTPATIENT)
Dept: FAMILY MEDICINE CLINIC | Age: 66
End: 2021-08-16

## 2021-08-16 NOTE — TELEPHONE ENCOUNTER
Patient calling in stating that she received her medication Methotrexate in the mail today instructed to to take 3 tablets she woud like to know when she should start . Today ? Tomorrow ? Morning or night ?      Please advise

## 2021-08-16 NOTE — TELEPHONE ENCOUNTER
The answers to hers questions are up to her. Three tablets at once, morning or evening. I would think it would be easy to remember Monday is the day to take the meds but if that is not the case, she can select a different date to start.

## 2021-08-17 ENCOUNTER — CARE COORDINATION (OUTPATIENT)
Dept: CARE COORDINATION | Age: 66
End: 2021-08-17

## 2021-08-17 NOTE — CARE COORDINATION
Telephone call to Farrah/. Left voicemail of nature of call with request for return phone call. Call back number was provided.

## 2021-08-17 NOTE — CARE COORDINATION
Telephone call with [facility]. She indicated that they still are trying to staff patient's HHA. She anticipates it may take up to seven to eight months to staff her case. In general they are having a hard time staffing HHA cases in [facility].

## 2021-08-18 NOTE — TELEPHONE ENCOUNTER
Patient states that she is going to get her blood work done August 25th & going to complete her x-rays possibly too the same day. Patient states that she would like more information about the test about  needles in hands & feet?     Please, advise & thank you

## 2021-08-19 NOTE — TELEPHONE ENCOUNTER
The electromyelogram is designed to tell us if her pain is being caused by muscles nd bones or by the nerves in her back. TReatment/pain mgt would be different depending on results.

## 2021-08-20 ENCOUNTER — TELEPHONE (OUTPATIENT)
Dept: FAMILY MEDICINE CLINIC | Age: 66
End: 2021-08-20

## 2021-08-20 NOTE — TELEPHONE ENCOUNTER
Aretha Aguila from [facility] called and needs clarification on the quantity and directions for patiens tramaol. Patient was at the pharmacy but they are going to let her know we have to wait on your response.     Please advise and thank you

## 2021-08-24 ENCOUNTER — CARE COORDINATION (OUTPATIENT)
Dept: CARE COORDINATION | Age: 66
End: 2021-08-24

## 2021-08-24 NOTE — CARE COORDINATION
Telephone call with patient. She is going to see apartment at [facility]. She wants to move. Discussed housekeeping program with [facility]. Explained they have a waiting list.  Patient wants to see if the apartment before deciding on housekeeping program.  Patient to call this writer with her decision.

## 2021-08-25 ENCOUNTER — HOSPITAL ENCOUNTER (OUTPATIENT)
Dept: ULTRASOUND IMAGING | Age: 66
Discharge: HOME | End: 2021-08-27
Payer: MEDICARE

## 2021-08-25 ENCOUNTER — HOSPITAL ENCOUNTER (OUTPATIENT)
Dept: GENERAL RADIOLOGY | Age: 66
Discharge: HOME | End: 2021-08-27
Payer: MEDICARE

## 2021-08-25 DIAGNOSIS — Z13.89 ENCOUNTER FOR ROUTINE SCREENING FOR MALFORMATION USING ULTRASONICS: ICD-10-CM

## 2021-08-25 DIAGNOSIS — N18.32 CHRONIC KIDNEY DISEASE (CKD) STAGE G3B/A1, MODERATELY DECREASED GLOMERULAR FILTRATION RATE (GFR) BETWEEN 30-44 ML/MIN/1.73 SQUARE METER AND ALBUMINURIA CREATININE RATIO LESS THAN 30 MG/G (HCC): ICD-10-CM

## 2021-08-25 DIAGNOSIS — M75.01 ADHESIVE CAPSULITIS OF RIGHT SHOULDER: Chronic | ICD-10-CM

## 2021-08-25 DIAGNOSIS — M25.531 RIGHT WRIST PAIN: ICD-10-CM

## 2021-08-25 DIAGNOSIS — M54.50 CHRONIC BILATERAL LOW BACK PAIN WITHOUT SCIATICA: ICD-10-CM

## 2021-08-25 DIAGNOSIS — G89.29 CHRONIC BILATERAL LOW BACK PAIN WITHOUT SCIATICA: ICD-10-CM

## 2021-08-25 PROCEDURE — 73030 X-RAY EXAM OF SHOULDER: CPT

## 2021-08-25 PROCEDURE — 93976 VASCULAR STUDY: CPT

## 2021-08-25 PROCEDURE — 73110 X-RAY EXAM OF WRIST: CPT

## 2021-08-25 PROCEDURE — 72110 X-RAY EXAM L-2 SPINE 4/>VWS: CPT

## 2021-08-25 PROCEDURE — 76775 US EXAM ABDO BACK WALL LIM: CPT

## 2021-08-26 NOTE — RESULT ENCOUNTER NOTE
Please call patient. X-rays show changes related to arthritis in shoulder, wrist, spine. Please confirm she is taking methotrexate and has appointment with rheumatology.

## 2021-08-30 ENCOUNTER — TELEPHONE (OUTPATIENT)
Dept: FAMILY MEDICINE CLINIC | Age: 66
End: 2021-08-30

## 2021-08-30 ENCOUNTER — CARE COORDINATION (OUTPATIENT)
Dept: CARE COORDINATION | Age: 66
End: 2021-08-30

## 2021-08-30 DIAGNOSIS — L40.50 PSORIASIS WITH ARTHROPATHY (HCC): ICD-10-CM

## 2021-08-30 NOTE — TELEPHONE ENCOUNTER
Rx request   Requested Prescriptions     Pending Prescriptions Disp Refills    methotrexate (RHEUMATREX) 2.5 MG chemo tablet 33 tablet 0     Sig: Take 3 tablets by mouth once a week for 7 days, THEN 4 tablets once a week for 7 days, THEN 5 tablets once a week for 7 days, THEN 6 tablets once a week for 7 days, THEN 7 tablets once a week for 7 days, THEN 8 tablets once a week for 7 days.      LOV 8/12/2021  Next Visit Date:  Future Appointments   Date Time Provider Cami Ledesma   9/14/2021 10:15 AM Crystal English MD [place] [facility]   9/28/2021  1:00 PM Silver Farmer MD [address] [facility]   12/2/2021  9:40 AM Aguilar Abdi, [address] [place] [address]

## 2021-08-30 NOTE — TELEPHONE ENCOUNTER
Patient calling in stating at her last appointment it was discussed that she has a cough that will not go away she states she has had it x 3 weeks it is not getting worse or better she states she would like an RX for the cough     Please advise

## 2021-08-30 NOTE — CARE COORDINATION
Telephone call with patient. She indicated that she went on a tour of [facility] [place] last week. She is hopeful that she will get this apartment. She relayed she should know in a week or so if she gets the apartment. She was told her move in would be in October.

## 2021-08-30 NOTE — TELEPHONE ENCOUNTER
I know we talked about a lot of things. However, I do not recall talking abut a cough. We will need a doxy or OV to discuss because this is basically a new problem.

## 2021-09-08 ENCOUNTER — CARE COORDINATION (OUTPATIENT)
Dept: CARE COORDINATION | Age: 66
End: 2021-09-08

## 2021-09-09 ENCOUNTER — CARE COORDINATION (OUTPATIENT)
Dept: CARE COORDINATION | Age: 66
End: 2021-09-09

## 2021-09-09 NOTE — CARE COORDINATION
Telephone call with patient. She indicated that she is moving to [facility] this Saturday. She is going to call with new address. She is interested in getting her name on waiting list for housekeeping services with [facility].   Discussed plan to contact [facility] for this referral.

## 2021-09-09 NOTE — CARE COORDINATION
Telephone call to [facility]. Left voicemail requesting return phone call regarding housekeeping referral.  Call back number was provided.

## 2021-09-09 NOTE — CARE COORDINATION
Telephone call with [facility]/housekeeping. Placed patient's name of waiting list for housekeeping services.

## 2021-09-14 ENCOUNTER — OFFICE VISIT (OUTPATIENT)
Dept: FAMILY MEDICINE CLINIC | Age: 66
End: 2021-09-14
Payer: MEDICARE

## 2021-09-14 VITALS
SYSTOLIC BLOOD PRESSURE: 122 MMHG | TEMPERATURE: 96.2 F | BODY MASS INDEX: 22.25 KG/M2 | WEIGHT: 106 LBS | OXYGEN SATURATION: 98 % | HEART RATE: 93 BPM | DIASTOLIC BLOOD PRESSURE: 70 MMHG | RESPIRATION RATE: 18 BRPM | HEIGHT: 58 IN

## 2021-09-14 DIAGNOSIS — L40.50 PSORIASIS WITH ARTHROPATHY (HCC): ICD-10-CM

## 2021-09-14 DIAGNOSIS — K30 STOMACH UPSET: ICD-10-CM

## 2021-09-14 DIAGNOSIS — L40.50 PSORIASIS WITH ARTHROPATHY (HCC): Primary | ICD-10-CM

## 2021-09-14 DIAGNOSIS — M79.671 BILATERAL LEG AND FOOT PAIN: Chronic | ICD-10-CM

## 2021-09-14 DIAGNOSIS — M79.605 BILATERAL LEG AND FOOT PAIN: Chronic | ICD-10-CM

## 2021-09-14 DIAGNOSIS — M79.672 BILATERAL LEG AND FOOT PAIN: Chronic | ICD-10-CM

## 2021-09-14 DIAGNOSIS — M79.604 BILATERAL LEG AND FOOT PAIN: Chronic | ICD-10-CM

## 2021-09-14 DIAGNOSIS — M75.01 ADHESIVE CAPSULITIS OF RIGHT SHOULDER: Chronic | ICD-10-CM

## 2021-09-14 DIAGNOSIS — D64.9 NORMOCYTIC ANEMIA, NOT DUE TO BLOOD LOSS: ICD-10-CM

## 2021-09-14 DIAGNOSIS — Z51.81 THERAPEUTIC DRUG MONITORING: Chronic | ICD-10-CM

## 2021-09-14 PROCEDURE — 3017F COLORECTAL CA SCREEN DOC REV: CPT | Performed by: FAMILY MEDICINE

## 2021-09-14 PROCEDURE — G8427 DOCREV CUR MEDS BY ELIG CLIN: HCPCS | Performed by: FAMILY MEDICINE

## 2021-09-14 PROCEDURE — 99214 OFFICE O/P EST MOD 30 MIN: CPT | Performed by: FAMILY MEDICINE

## 2021-09-14 PROCEDURE — G8420 CALC BMI NORM PARAMETERS: HCPCS | Performed by: FAMILY MEDICINE

## 2021-09-14 PROCEDURE — 1036F TOBACCO NON-USER: CPT | Performed by: FAMILY MEDICINE

## 2021-09-14 RX ORDER — TRAMADOL HYDROCHLORIDE 50 MG/1
50 TABLET ORAL EVERY 8 HOURS PRN
Qty: 60 TABLET | Refills: 0 | OUTPATIENT
Start: 2021-09-14 | End: 2021-10-14

## 2021-09-14 RX ORDER — TRAMADOL HYDROCHLORIDE 50 MG/1
50 TABLET ORAL EVERY 8 HOURS PRN
Qty: 30 TABLET | Refills: 0 | Status: SHIPPED | OUTPATIENT
Start: 2021-09-14 | End: 2021-10-13 | Stop reason: SDUPTHER

## 2021-09-14 NOTE — PROGRESS NOTES
Subjective  Valentine Belcher, 61 y.o. female presents today with:  Chief Complaint   Patient presents with    Gastroesophageal Reflux     4 week f/u    Hypothyroidism     4 week f/u     Anxiety     stress and anxiety has not been eating well     Psoriasis     is getting better            HPI    Hypothyroidism. Compliant with levothyroxine which is taken correctly. No diarrhea, constipation, palpitations, dry skin, depression, difficulty sleeping or fatigue. No weight gain or loss. GERD. Well-controlled with PPI, caffeine restriction, diet restriction. No weight loss. No abdominal pain. No bloody or black tarry stools. PsA - psoriasis is significantly improved on MTX 17.5. No apparent SEs    Stress - moving again. Early satiety started when she new she was going to moving. Moving now. Nephrology - for CKD3. Nephrologist recommends she go to hematologist according to patient. No other questions and or concerns for today's visit    Review of Systems  See above. Past Medical History:   Diagnosis Date    Adhesive capsulitis of right shoulder 8/12/2021    Alcoholism ([address]     Arthritis     Bilateral leg and foot pain 8/12/2021    Chronic lung disease     Cognitive impairment 4/15/2021    Depression     Diabetes mellitus ([address]     Diarrhea 1/4/2021    Possibly related to Metformin, Keppra, constipation, colitis. Stop Metformin. Treat constipation. Consider adjustment to Keppra given side effects.     Encephalopathy 12/10/2020    Grief reaction 4/15/2021    Hyperlipidemia     Hypertension     Hypokalemia 11/6/2020    Hypomagnesemia 11/6/2020    Hypothyroidism     Lymphadenopathy, axillary 1/4/2021    Numbness and tingling of upper and lower extremities of both sides 8/12/2021    Second hand smoke exposure     Seizure ([address]     Stage 3b chronic kidney disease ([address] 8/12/2021    Syncope and collapse 12/10/2020     Past Surgical History:   Procedure Laterality Date    APPENDECTOMY      BIOPSY MOUTH LESION Bilateral 12/19/2016    REMOVAL  OF BILATERAL LINGUAL MACIE performed by Nichole Ko DDS at [facility] COLONOSCOPY N/A 1/21/2021    COLONOSCOPY DIAGNOSTIC performed by Adelita Young MD at [address] [place] [address] ENDOSCOPY N/A 1/21/2021    EGD with polypectomy performed by Adelita [facility] MD at [facility] Marital status: Single     Spouse name: Not on file    Number of children: Not on file    Years of education: 15    Highest education level: Not on file   Occupational History    Occupation: disabled   Tobacco Use    Smoking status: Never Smoker    Smokeless tobacco: Never Used   Vaping Use    Vaping Use: Never used   Substance and Sexual Activity    Alcohol use: Not Currently     Alcohol/week: 28.0 standard drinks     Types: 28 Shots of liquor per week     Comment: quit drinking in November 2020    Drug use: No    Sexual activity: Not Currently   Other Topics Concern    Not on file   Social History Narrative    3/4/21 Harry De La Fuente lives in a private residence she had shared with her sister, her sister was the home owner, and she recently passed on 2/21, She is now needing to find a new home, as the house she is living in may be sold soon, Harry De La Fuente states she is independent with self care, does have some limitations but states able to function independently for most self care tasks. She does not drive related to history of seizures.  referral made for concerns with housing and transportation.      Social Determinants of Health     Financial Resource Strain: Low Risk     Difficulty of Paying Living Expenses: Not very hard   Food Insecurity: No Food Insecurity    Worried About Running Out of Food in the Last Year: Never true    Katherine of Food in the Last Year: Never true   Transportation Needs: Unmet Transportation Needs    Lack of Transportation (Medical): Yes    Lack of Transportation (Non-Medical): Yes   Physical Activity: Inactive    Days of Exercise per Week: 0 days    Minutes of Exercise per Session: 0 min   Stress: Stress Concern Present    Feeling of Stress : Rather much   Social Connections: Socially Isolated    Frequency of Communication with Friends and Family: Once a week    Frequency of Social Gatherings with Friends and Family: Once a week    Attends Rastafari Services: Never    Active Member of Clubs or Organizations: No    Attends Club or Organization Meetings: Never    Marital Status: Never    Intimate Partner Violence: Not At Risk    Fear of Current or Ex-Partner: No    Emotionally Abused: No    Physically Abused: No    Sexually Abused: No     Family History   Problem Relation Age of Onset    Heart Disease Mother     Hypertension Mother     Diabetes Mother     Stroke Mother     Heart Disease Father     Hypertension Father     Heart Disease Sister     Hypertension Sister     Heart Disease Brother     Hypertension Brother     Diabetes Brother     Cancer Maternal Grandmother     Diabetes Maternal Grandfather      Allergies   Allergen Reactions    Morphine Swelling     Current Outpatient Medications   Medication Sig Dispense Refill    methotrexate (RHEUMATREX) 2.5 MG chemo tablet Take 6 tablets by mouth once a week 72 tablet 0    traMADol (ULTRAM) 50 MG tablet Take 1 tablet by mouth every 8 hours as needed for Pain for up to 30 days. Intended supply: 30 days. Take lowest dose possible to manage pain 30 tablet 0    folic acid (FOLVITE) 1 MG tablet Take 1 tablet by mouth daily Do not take on the day you take the methotrexate. 30 tablet 11    triamcinolone (KENALOG) 0.1 % cream Apply topically 2 times daily.  453.6 g 2    busPIRone (BUSPAR) 5 MG tablet Take 1 tablet by mouth 2 times daily 180 tablet 1    levETIRAcetam (KEPPRA) 500 MG tablet Take 1 tablet by mouth 2 times daily 180 tablet 2    atorvastatin (LIPITOR) 40 MG tablet TAKE 1 TABLET BY MOUTH ONCE DAILY 90 tablet 3    hydrOXYzine (ATARAX) 10 MG tablet Take 1 tablet by mouth every 8 hours as needed for Itching 90 tablet 0    levothyroxine (SYNTHROID) 50 MCG tablet Take 1 tablet by mouth Daily 90 tablet 3    pantoprazole (PROTONIX) 40 MG tablet Take 1 tablet by mouth daily TAKE 1 TABLET BY MOUTH  DAILY 90 tablet 1    acetaminophen (ACETAMINOPHEN EXTRA STRENGTH) 500 MG tablet Take 1 tablet by mouth 2 times daily as needed for Pain 60 tablet 5    aspirin EC 81 MG EC tablet Take 1 tablet by mouth daily 30 tablet 5    rOPINIRole (REQUIP) 0.5 MG tablet TAKE 1 TABLET BY MOUTH AT  NIGHT 90 tablet 3    alendronate (FOSAMAX) 35 MG tablet TAKE 1 TABLET BY MOUTH  EVERY 7 DAYS 12 tablet 3    fluocinonide (LIDEX) 0.05 % ointment APPLY TWICE DAILY 180 g 1    fenofibrate (TRIGLIDE) 160 MG tablet Take 1 tablet by mouth daily 90 tablet 4    Magnesium 400 MG CAPS Take 1 capsule by mouth 3 times daily 270 capsule 4    Nutritional Supplements (ENSURE COMPLETE) LIQD Take 1 Bottle by mouth 2 times daily 60 Bottle 5    calcium carbonate-vitamin D (CALTRATE) 600-400 MG-UNIT TABS per tab Take 1 tablet by mouth 2 times daily 30 tablet 12    escitalopram (LEXAPRO) 20 MG tablet Take 1 tablet by mouth daily (Patient not taking: Reported on 9/14/2021) 1 tablet 0     No current facility-administered medications for this visit. PMH, Surgical Hx, Family Hx, and Social Hxreviewed and updated. Health Maintenance reviewed. Objective    Vitals:    09/14/21 1018   BP: 122/70   Pulse: 93   Resp: 18   Temp: 96.2 °F (35.7 °C)   TempSrc: Temporal   SpO2: 98%   Weight: 106 lb (48.1 kg)   Height: 4' 10\" (1.473 m)        Physical Exam  Constitutional:       Appearance: She is well-developed. HENT:      Head: Normocephalic.    Eyes:      Conjunctiva/sclera: Conjunctivae normal.   Pulmonary:      Effort: Pulmonary effort is normal.   Skin:     Comments: Psoriasis dramatically improved   Neurological:      Mental Status: She is alert and oriented to person, place, and time. Psychiatric:         Behavior: Behavior normal.         Thought Content: Thought content normal.         Judgment: Judgment normal.           Lab Results   Component Value Date    LABA1C 6.6 (H) 08/25/2021    LABA1C 4.5 (L) 03/12/2021    LABA1C 5.2 11/02/2020     Lab Results   Component Value Date    CREATININE 1.18 (H) 08/25/2021     Lab Results   Component Value Date    ALT 31 08/25/2021    AST 42 (H) 08/25/2021     Lab Results   Component Value Date    CHOL 134 05/13/2021    TRIG 95 05/13/2021    HDL 64 (H) 05/13/2021    LDLCALC 51 05/13/2021        Assessment & Plan   Visit Diagnoses and Associated Orders     Psoriasis with arthropathy ([facility]    -  Primary    improving, fair control; reduce dose to 15 mg weekly; monitor LFTs; keep rheum appt    methotrexate (RHEUMATREX) 2.5 MG chemo tablet [[id number]]      traMADol (ULTRAM) 50 MG tablet [[id number]]           Normocytic anemia, not due to blood loss        AFL Jose Raul Thomson MD, [facility] [[id number] Custom]           Therapeutic drug monitoring        Hepatic Function Panel [[id number] Custom]   - Future Order         Stomach upset        suspect related to MTX. Reduce dose to 15 mg weekly             Reviewed with the patient: all disease processes, current clinical status, medications, activities and diet.      Side effects, adverse effects of the medication prescribed today, as well as treatment plan/ rationale and result expectations have been discussed with the patient who expresses understanding and desires to proceed.     Close follow up to evaluate treatment results and for coordination of care. I have reviewed the patient's medical history in detail and updated the computerized patient record. More than 50% of the appointment was spent in face-to-face counseling, education and care coordination.         Orders Placed This Encounter Procedures    Hepatic Function Panel     Standing Status:   Future     Standing Expiration Date:   9/14/2022    AFL - Estiven Quiroga MD, Shawn Clement     Referral Priority:   Routine     Referral Type:   Eval and Treat     Referral Reason:   Specialty Services Required     Referred to Provider:   Silvia Hairston MD     Requested Specialty:   Hematology and Oncology     Number of Visits Requested:   1     Orders Placed This Encounter   Medications    methotrexate (RHEUMATREX) 2.5 MG chemo tablet     Sig: Take 6 tablets by mouth once a week     Dispense:  72 tablet     Refill:  0    traMADol (ULTRAM) 50 MG tablet     Sig: Take 1 tablet by mouth every 8 hours as needed for Pain for up to 30 days. Intended supply: 30 days. Take lowest dose possible to manage pain     Dispense:  30 tablet     Refill:  0     Reduce doses taken as pain becomes manageable     Medications Discontinued During This Encounter   Medication Reason    methotrexate (RHEUMATREX) 2.5 MG chemo tablet DOSE ADJUSTMENT     Return in about 3 months (around 12/14/2021) for chronic care - OV or doxy. Controlled Substance Monitoring:    Acute and Chronic Pain Monitoring:   RX Monitoring 9/14/2021   Attestation -   Periodic Controlled Substance Monitoring Possible medication side effects, risk of tolerance/dependence & alternative treatments discussed.            Ivet Partida MD

## 2021-09-14 NOTE — TELEPHONE ENCOUNTER
Rx request   Requested Prescriptions     Pending Prescriptions Disp Refills    methotrexate (RHEUMATREX) 2.5 MG chemo tablet 33 tablet 0     Sig: Take 3 tablets by mouth once a week for 7 days, THEN 4 tablets once a week for 7 days, THEN 5 tablets once a week for 7 days, THEN 6 tablets once a week for 7 days, THEN 7 tablets once a week for 7 days, THEN 8 tablets once a week for 7 days.  traMADol (ULTRAM) 50 MG tablet 60 tablet 0     Sig: Take 1 tablet by mouth every 8 hours as needed for Pain for up to 30 days. Intended supply: 7 days.  Take lowest dose possible to manage pain     LOV 8/12/2021  Next Visit Date:  Future Appointments   Date Time Provider [facility]   9/14/2021 10:15 AM Cecil Mohr MD [address]   9/28/2021  1:00 PM Milena Pendleton MD [address] [place] [address]   12/2/2021  9:40 AM Veronica Ontiveros, [address] [place] [address]

## 2021-09-16 ENCOUNTER — CARE COORDINATION (OUTPATIENT)
Dept: CARE COORDINATION | Age: 66
End: 2021-09-16

## 2021-09-16 NOTE — CARE COORDINATION
Telephone call with patient. She indicated that she did move last weekend. Reviewed that patient's new address is in [facility]. Discussed that her name is on waiting list at [facility] on Aging/house keeping service. Discussed meals-on-wheels and patient is not interested at this time.

## 2021-09-23 ENCOUNTER — CARE COORDINATION (OUTPATIENT)
Dept: CARE COORDINATION | Age: 66
End: 2021-09-23

## 2021-09-29 ENCOUNTER — CARE COORDINATION (OUTPATIENT)
Dept: CARE COORDINATION | Age: 66
End: 2021-09-29

## 2021-09-29 NOTE — CARE COORDINATION
Telephone call with patient. She likes were she is living. She has a call button in her apartment. She is not interested meals-on-wheels. Discussed transportation to medical appointment and she has family/friends helping. She indicated that she has transportation benefit with insurance but did not have a good experience. Discussed that this writer made a housekeeping referral and was put on a waiting list. Also, reviewed that [facility] is still trying to staff her case.

## 2021-10-06 ENCOUNTER — CARE COORDINATION (OUTPATIENT)
Dept: CARE COORDINATION | Age: 66
End: 2021-10-06

## 2021-10-06 NOTE — CARE COORDINATION
Telephone call with patient. She indicated that she was getting ready for her physician appointment today. Niece is taking her to her apartment. At time of call she feels things are going well. Discussed that [employer] is still working on staffing her case. Also, discussed that her name is on waiting with [facility] on Aging for housekeeping services. Patient expressed no new community resource needs at this time.

## 2021-10-13 DIAGNOSIS — L40.50 PSORIASIS WITH ARTHROPATHY (HCC): ICD-10-CM

## 2021-10-13 RX ORDER — TRAMADOL HYDROCHLORIDE 50 MG/1
50 TABLET ORAL EVERY 8 HOURS PRN
Qty: 30 TABLET | Refills: 0 | Status: SHIPPED | OUTPATIENT
Start: 2021-10-13 | End: 2021-11-12

## 2021-10-13 RX ORDER — TRAMADOL HYDROCHLORIDE 50 MG/1
50 TABLET ORAL EVERY 8 HOURS PRN
Qty: 30 TABLET | Refills: 0 | Status: SHIPPED | OUTPATIENT
Start: 2021-10-13 | End: 2021-10-13 | Stop reason: SDUPTHER

## 2021-10-13 NOTE — TELEPHONE ENCOUNTER
Patient is requesting medication refill. She is asking if this can be printed and mailed to her so she can take it to the pharmacy? Please approve or deny this request.    Rx requested:  Requested Prescriptions     Pending Prescriptions Disp Refills    traMADol (ULTRAM) 50 MG tablet 30 tablet 0     Sig: Take 1 tablet by mouth every 8 hours as needed for Pain for up to 30 days. Intended supply: 30 days.  Take lowest dose possible to manage pain         Last Office Visit:   9/14/2021      Next Visit Date:  Future Appointments   Date Time Provider Cami Ledesma   12/2/2021  9:40 AM César Lund, APRN - [facility]   12/14/2021 11:00 AM Stacia Burnette MD [address] [place] [address]

## 2021-10-14 ENCOUNTER — CARE COORDINATION (OUTPATIENT)
Dept: CARE COORDINATION | Age: 66
End: 2021-10-14

## 2021-10-15 ENCOUNTER — CARE COORDINATION (OUTPATIENT)
Dept: CARE COORDINATION | Age: 66
End: 2021-10-15

## 2021-10-25 ENCOUNTER — CARE COORDINATION (OUTPATIENT)
Dept: CARE COORDINATION | Age: 66
End: 2021-10-25

## 2021-10-25 NOTE — CARE COORDINATION
Telephone call to patient. Explained that Juliann/Case Management would be in contact with her for assistance in the home.

## 2021-10-25 NOTE — CARE COORDINATION
Telephone call to [facility].  Message indicated that number is not a working phone number.

## 2021-10-25 NOTE — CARE COORDINATION
Telephone call to Fabienne Myers. Spoke to provider services who transferred writer to case management. Completed referral for case management services and they will reach out to patient and myself. REF# [id number].

## 2021-10-25 NOTE — CARE COORDINATION
Telephone call to [employer] on Aging. Left message in resource center to return phone call. Provided call back number.

## 2021-10-25 NOTE — CARE COORDINATION
Telephone call with patient. She indicated that her insurance has changed from [employer] to [employer] starting a month ago. She received new cards in the mail and was told [facility] . Patient discussed plan to contact Dali Villeda about housekeeping assistance. Discussed plan to contact Farrah/GENARO/ ([phone]) about services now that she has changed insurances. Gladis Welch

## 2021-10-26 ENCOUNTER — CARE COORDINATION (OUTPATIENT)
Dept: CARE COORDINATION | Age: 66
End: 2021-10-26

## 2021-10-26 NOTE — CARE COORDINATION
Telephone call with [employer] on Aging. Chris Rosado indicated that patient converted to [employer] after getting on Medicaid and she does not have a secondary insurance. [employer] will now be managing patient's Waiver Services. Patient's  for [employer] is Mandi Doan/[phone] and her supervisor is Melissa Stanley

## 2021-10-26 NOTE — CARE COORDINATION
Telephone call to patient. Left voicemail of nature of call with  summary of phone call with [address] on Aging and phone call to [facility]. Call back number was provided in voicemail message.

## 2021-10-26 NOTE — CARE COORDINATION
Telephone call to [facility]. Left voicemail of nature of call with request for return phone call. Call back number was provided.

## 2021-10-27 ENCOUNTER — CARE COORDINATION (OUTPATIENT)
Dept: CARE COORDINATION | Age: 66
End: 2021-10-27

## 2021-10-27 NOTE — CARE COORDINATION
Telephone call with patient. Relayed results of phone call yesterday with [address] [place] [address] on Aging. Also discussed that phone call was made to Mandi Doan/Juliann . Provided patient with contact information for Mandi.

## 2021-10-28 ENCOUNTER — CARE COORDINATION (OUTPATIENT)
Dept: CARE COORDINATION | Age: 66
End: 2021-10-28

## 2021-10-28 NOTE — CARE COORDINATION
Returning patient's phone call. Explained have not heard from [facility]. Patient has called her twice and not heard anything. Patient expressed plan  to contact [employer] directly in the next few days.

## 2021-11-03 ENCOUNTER — CARE COORDINATION (OUTPATIENT)
Dept: CARE COORDINATION | Age: 66
End: 2021-11-03

## 2021-11-03 NOTE — CARE COORDINATION
Telephone call to Traci/Care Coordinator. Left message of nature of call with request for return phone call. Call back number was provided.

## 2021-11-03 NOTE — CARE COORDINATION
Telephone call with Lacey Leon. She indicated they do not have an agency available to staff patient's case at this time. They have not been able to staff cases in [facility]. Arpit Castillo expressed plan to reach out to patient.

## 2021-11-03 NOTE — CARE COORDINATION
Telephone call to patient. She indicated that she not heard from [address] [place] [address]. She received name of Uma Mcclain [phone])/Care Coordinator/Juliann. Discussed plan to reach out to [facility]. Patient indicated that she reached out to [facility] but has not received a return phone call.

## 2021-11-10 ENCOUNTER — CARE COORDINATION (OUTPATIENT)
Dept: CARE COORDINATION | Age: 66
End: 2021-11-10

## 2021-11-10 DIAGNOSIS — I20.0 UNSTABLE ANGINA (HCC): ICD-10-CM

## 2021-11-10 RX ORDER — ASPIRIN 81 MG/1
81 TABLET ORAL DAILY
Qty: 30 TABLET | Refills: 5 | Status: SHIPPED | OUTPATIENT
Start: 2021-11-10 | End: 2021-11-24 | Stop reason: SDUPTHER

## 2021-11-10 NOTE — TELEPHONE ENCOUNTER
Requesting medication refill.  Please approve or deny this request.    Rx requested:  Requested Prescriptions     Pending Prescriptions Disp Refills    aspirin EC 81 MG EC tablet 30 tablet 5     Sig: Take 1 tablet by mouth daily       Last Office Visit:   9/14/2021    Last Filled:      Last Labs:      Next Visit Date:  Future Appointments   Date Time Provider Cami Ledesma   12/2/2021  9:40 AM MD Deepa Blue   12/14/2021 11:00 AM Lexie Romo MD [address] [place] [address]

## 2021-11-10 NOTE — CARE COORDINATION
Telephone call with patient. Reviewed that [facility] has not been able to staff her case at this time. Discussed that she has her lifeline in place and may need to change due to change in insurance to Fabienne Myers. Reviewed that she is on the waiting list with [facility] on Aging for housekeeping services. Patient is going to outpatient physicial therapy today and niece is taking her. Patient is going to ask about home physical therapy.

## 2021-11-16 DIAGNOSIS — L40.9 PSORIASIS: Chronic | ICD-10-CM

## 2021-11-16 DIAGNOSIS — L40.50 PSORIASIS WITH ARTHROPATHY (HCC): ICD-10-CM

## 2021-11-16 DIAGNOSIS — G25.81 RESTLESS LEG SYNDROME: Chronic | ICD-10-CM

## 2021-11-16 DIAGNOSIS — K21.9 GASTROESOPHAGEAL REFLUX DISEASE WITHOUT ESOPHAGITIS: Chronic | ICD-10-CM

## 2021-11-16 DIAGNOSIS — L29.9 CHRONIC PRURITUS: ICD-10-CM

## 2021-11-16 DIAGNOSIS — E03.9 ACQUIRED HYPOTHYROIDISM: Chronic | ICD-10-CM

## 2021-11-16 DIAGNOSIS — E78.2 MIXED HYPERLIPIDEMIA: Chronic | ICD-10-CM

## 2021-11-16 DIAGNOSIS — M85.89 OSTEOPENIA OF MULTIPLE SITES: Chronic | ICD-10-CM

## 2021-11-16 RX ORDER — TRIAMCINOLONE ACETONIDE 1 MG/G
CREAM TOPICAL
Qty: 453.6 G | Refills: 2 | Status: SHIPPED | OUTPATIENT
Start: 2021-11-16 | End: 2021-11-18 | Stop reason: SDUPTHER

## 2021-11-16 RX ORDER — ALENDRONATE SODIUM 35 MG/1
35 TABLET ORAL
Qty: 12 TABLET | Refills: 3 | Status: SHIPPED | OUTPATIENT
Start: 2021-11-16 | End: 2021-11-18 | Stop reason: SDUPTHER

## 2021-11-16 RX ORDER — FENOFIBRATE 160 MG/1
160 TABLET ORAL DAILY
Qty: 90 TABLET | Refills: 4 | Status: SHIPPED | OUTPATIENT
Start: 2021-11-16 | End: 2021-11-18 | Stop reason: SDUPTHER

## 2021-11-16 RX ORDER — ROPINIROLE 0.5 MG/1
TABLET, FILM COATED ORAL
Qty: 90 TABLET | Refills: 3 | Status: SHIPPED | OUTPATIENT
Start: 2021-11-16 | End: 2021-11-18 | Stop reason: SDUPTHER

## 2021-11-16 RX ORDER — ATORVASTATIN CALCIUM 40 MG/1
TABLET, FILM COATED ORAL
Qty: 90 TABLET | Refills: 3 | Status: SHIPPED | OUTPATIENT
Start: 2021-11-16 | End: 2021-11-18 | Stop reason: SDUPTHER

## 2021-11-16 RX ORDER — LEVETIRACETAM 500 MG/1
500 TABLET ORAL 2 TIMES DAILY
Qty: 180 TABLET | Refills: 0 | OUTPATIENT
Start: 2021-11-16 | End: 2022-02-14

## 2021-11-16 RX ORDER — PANTOPRAZOLE SODIUM 40 MG/1
40 TABLET, DELAYED RELEASE ORAL DAILY
Qty: 90 TABLET | Refills: 1 | Status: SHIPPED | OUTPATIENT
Start: 2021-11-16 | End: 2021-11-18 | Stop reason: SDUPTHER

## 2021-11-16 RX ORDER — LEVOTHYROXINE SODIUM 0.05 MG/1
50 TABLET ORAL DAILY
Qty: 90 TABLET | Refills: 3 | Status: SHIPPED | OUTPATIENT
Start: 2021-11-16 | End: 2021-11-18 | Stop reason: SDUPTHER

## 2021-11-16 RX ORDER — HYDROXYZINE HYDROCHLORIDE 10 MG/1
10 TABLET, FILM COATED ORAL EVERY 8 HOURS PRN
Qty: 90 TABLET | Refills: 0 | Status: SHIPPED | OUTPATIENT
Start: 2021-11-16 | End: 2021-11-18 | Stop reason: SDUPTHER

## 2021-11-16 NOTE — TELEPHONE ENCOUNTER
Last refill fenofibrate 3/11/21  Last refill Hydroxyzine 7/28/21  Last refill kenalog cream 8/12/21  Last refill keppra 7/30/21  Last refill synthroid 7/28/21  Last refill methotrexate 9/14/21  Last refill pantoprazole 7/28/21  Last refill requip 6/16/21  Last refill calcium 1/13/21  Last refill alendronate 6/16/21  Last refill atorvastatin 7/28/21    Last visit 9/14/21

## 2021-11-17 ENCOUNTER — CARE COORDINATION (OUTPATIENT)
Dept: CARE COORDINATION | Age: 66
End: 2021-11-17

## 2021-11-17 NOTE — CARE COORDINATION
Telephone call with patient. She stated that she is waiting to hear from [facility]. She has called her twice. Patient indicated that Naomi Parra told her that she could help her get free physical therapy at home. .  Patient wants to talk to Naomi Parra about this again,  Discussed that only physical therapy that comes to the home that this writer knows about is home care/physicial therapy which needs to be ordered by physician. Discussed meals-on-wheels and patient feels she is not ready for this as she still likes to cook.

## 2021-11-18 DIAGNOSIS — L40.50 PSORIASIS WITH ARTHROPATHY (HCC): ICD-10-CM

## 2021-11-18 DIAGNOSIS — L40.9 PSORIASIS: Chronic | ICD-10-CM

## 2021-11-18 DIAGNOSIS — E03.9 ACQUIRED HYPOTHYROIDISM: Chronic | ICD-10-CM

## 2021-11-18 DIAGNOSIS — K21.9 GASTROESOPHAGEAL REFLUX DISEASE WITHOUT ESOPHAGITIS: Chronic | ICD-10-CM

## 2021-11-18 DIAGNOSIS — E78.2 MIXED HYPERLIPIDEMIA: Chronic | ICD-10-CM

## 2021-11-18 DIAGNOSIS — M85.89 OSTEOPENIA OF MULTIPLE SITES: Chronic | ICD-10-CM

## 2021-11-18 DIAGNOSIS — G25.81 RESTLESS LEG SYNDROME: Chronic | ICD-10-CM

## 2021-11-18 DIAGNOSIS — L29.9 CHRONIC PRURITUS: ICD-10-CM

## 2021-11-18 RX ORDER — FENOFIBRATE 160 MG/1
160 TABLET ORAL DAILY
Qty: 90 TABLET | Refills: 4 | Status: SHIPPED | OUTPATIENT
Start: 2021-11-18 | End: 2022-10-25 | Stop reason: SDUPTHER

## 2021-11-18 RX ORDER — LEVOTHYROXINE SODIUM 0.05 MG/1
50 TABLET ORAL DAILY
Qty: 90 TABLET | Refills: 3 | Status: SHIPPED | OUTPATIENT
Start: 2021-11-18 | End: 2022-09-01 | Stop reason: SDUPTHER

## 2021-11-18 RX ORDER — TRIAMCINOLONE ACETONIDE 1 MG/G
CREAM TOPICAL
Qty: 453.6 G | Refills: 2 | Status: SHIPPED | OUTPATIENT
Start: 2021-11-18 | End: 2022-02-01

## 2021-11-18 RX ORDER — ATORVASTATIN CALCIUM 40 MG/1
TABLET, FILM COATED ORAL
Qty: 90 TABLET | Refills: 3 | Status: SHIPPED | OUTPATIENT
Start: 2021-11-18 | End: 2022-10-25 | Stop reason: SDUPTHER

## 2021-11-18 RX ORDER — PANTOPRAZOLE SODIUM 40 MG/1
40 TABLET, DELAYED RELEASE ORAL DAILY
Qty: 90 TABLET | Refills: 1 | Status: SHIPPED | OUTPATIENT
Start: 2021-11-18 | End: 2022-03-26

## 2021-11-18 RX ORDER — ROPINIROLE 0.5 MG/1
TABLET, FILM COATED ORAL
Qty: 90 TABLET | Refills: 3 | Status: SHIPPED | OUTPATIENT
Start: 2021-11-18 | End: 2022-02-24 | Stop reason: SDUPTHER

## 2021-11-18 RX ORDER — HYDROXYZINE HYDROCHLORIDE 10 MG/1
10 TABLET, FILM COATED ORAL EVERY 8 HOURS PRN
Qty: 90 TABLET | Refills: 0 | Status: SHIPPED | OUTPATIENT
Start: 2021-11-18 | End: 2021-12-02 | Stop reason: SDUPTHER

## 2021-11-18 RX ORDER — ALENDRONATE SODIUM 35 MG/1
35 TABLET ORAL
Qty: 12 TABLET | Refills: 3 | Status: SHIPPED | OUTPATIENT
Start: 2021-11-18 | End: 2022-10-25 | Stop reason: ALTCHOICE

## 2021-11-18 NOTE — TELEPHONE ENCOUNTER
Patients medications previously sent to [facility], but patient is requesting all scripts be sent to [facility]. Please approve or deny this request.    Rx requested:  Requested Prescriptions     Pending Prescriptions Disp Refills    alendronate (FOSAMAX) 35 MG tablet 12 tablet 3     Sig: Take 1 tablet by mouth every 7 days    atorvastatin (LIPITOR) 40 MG tablet 90 tablet 3     Sig: TAKE 1 TABLET BY MOUTH ONCE DAILY    fenofibrate (TRIGLIDE) 160 MG tablet 90 tablet 4     Sig: Take 1 tablet by mouth daily    hydrOXYzine (ATARAX) 10 MG tablet 90 tablet 0     Sig: Take 1 tablet by mouth every 8 hours as needed for Itching    levothyroxine (SYNTHROID) 50 MCG tablet 90 tablet 3     Sig: Take 1 tablet by mouth Daily    methotrexate (RHEUMATREX) 2.5 MG chemo tablet 72 tablet 0     Sig: Take 6 tablets by mouth once a week    triamcinolone (KENALOG) 0.1 % cream 453.6 g 2     Sig: Apply topically 2 times daily.     pantoprazole (PROTONIX) 40 MG tablet 90 tablet 1     Sig: Take 1 tablet by mouth daily TAKE 1 TABLET BY MOUTH  DAILY    rOPINIRole (REQUIP) 0.5 MG tablet 90 tablet 3     Sig: TAKE 1 TABLET BY MOUTH AT  NIGHT    calcium carbonate-vitamin D (CALTRATE) 600-400 MG-UNIT TABS per tab 90 tablet 3     Sig: Take 1 tablet by mouth 2 times daily         Last Office Visit:   9/14/2021      Next Visit Date:  Future Appointments   Date Time Provider Cami Ledesma   12/2/2021  9:40 AM Yani Motley MD [facility]   12/14/2021 11:00 AM Cari Betts MD [address] [place] [address]

## 2021-11-24 DIAGNOSIS — M79.605 BILATERAL LEG PAIN: Chronic | ICD-10-CM

## 2021-11-24 DIAGNOSIS — M79.604 BILATERAL LEG PAIN: Chronic | ICD-10-CM

## 2021-11-24 DIAGNOSIS — I20.0 UNSTABLE ANGINA (HCC): ICD-10-CM

## 2021-11-24 RX ORDER — ASPIRIN 81 MG/1
81 TABLET ORAL DAILY
Qty: 30 TABLET | Refills: 5 | Status: SHIPPED | OUTPATIENT
Start: 2021-11-24 | End: 2022-02-24 | Stop reason: SDUPTHER

## 2021-11-24 RX ORDER — ACETAMINOPHEN 500 MG
500 TABLET ORAL 2 TIMES DAILY PRN
Qty: 60 TABLET | Refills: 5 | Status: SHIPPED | OUTPATIENT
Start: 2021-11-24 | End: 2022-10-25

## 2021-11-24 NOTE — TELEPHONE ENCOUNTER
[facility]. from [place] [address] called office requesting medication refill.      Rx requested:  Requested Prescriptions     Pending Prescriptions Disp Refills    acetaminophen (ACETAMINOPHEN EXTRA STRENGTH) 500 MG tablet 60 tablet 5     Sig: Take 1 tablet by mouth 2 times daily as needed for Pain    aspirin EC 81 MG EC tablet 30 tablet 5     Sig: Take 1 tablet by mouth daily       Last Office Visit:   9/14/2021    Next Visit Date:  Future Appointments   Date Time Provider Cami Ledesma   12/2/2021  9:40 AM Laureen Hugo PA-C [facility]   12/14/2021 11:00 AM Perfecto Drake MD [address] [place] [address]

## 2021-11-29 ENCOUNTER — CARE COORDINATION (OUTPATIENT)
Dept: CARE COORDINATION | Age: 66
End: 2021-11-29

## 2021-11-29 NOTE — CARE COORDINATION
Telephone call with patient. She indicated that she did speak with Traci . Patient stated their is a Mandi working on [employer]. For her. Tomorrow she has a physical therapy evaluation being done at [facility]. She is hoping to transfer physical therapy to home. Patient stated that her niece is going to take her to this appointment. Patient is looking into getting dentures after the first of the year. She has been in contact with health insurance .

## 2021-11-30 PROBLEM — R56.9 SEIZURE (HCC): Status: ACTIVE | Noted: 2021-11-30

## 2021-12-01 DIAGNOSIS — L40.50 PSORIASIS WITH ARTHROPATHY (HCC): ICD-10-CM

## 2021-12-01 DIAGNOSIS — E83.42 HYPOMAGNESEMIA: ICD-10-CM

## 2021-12-01 RX ORDER — FOLIC ACID 1 MG/1
1 TABLET ORAL DAILY
Qty: 30 TABLET | Refills: 11 | OUTPATIENT
Start: 2021-12-01

## 2021-12-01 RX ORDER — TRAMADOL HYDROCHLORIDE 50 MG/1
50 TABLET ORAL EVERY 8 HOURS PRN
Qty: 30 TABLET | Refills: 0 | OUTPATIENT
Start: 2021-12-01 | End: 2021-12-31

## 2021-12-01 RX ORDER — LEVETIRACETAM 500 MG/1
500 TABLET ORAL 2 TIMES DAILY
Qty: 180 TABLET | Refills: 2 | OUTPATIENT
Start: 2021-12-01 | End: 2022-03-01

## 2021-12-01 NOTE — TELEPHONE ENCOUNTER
Patient  requesting medication refill. Please approve or deny this request.    Rx requested:  Requested Prescriptions     Pending Prescriptions Disp Refills    Magnesium 400 MG CAPS 270 capsule 4     Sig: Take 1 capsule by mouth 3 times daily    levETIRAcetam (KEPPRA) 500 MG tablet 180 tablet 2     Sig: Take 1 tablet by mouth 2 times daily    folic acid (FOLVITE) 1 MG tablet 30 tablet 11     Sig: Take 1 tablet by mouth daily Do not take on the day you take the methotrexate.  traMADol (ULTRAM) 50 MG tablet 30 tablet 0     Sig: Take 1 tablet by mouth every 8 hours as needed for Pain for up to 30 days. Intended supply: 30 days.  Take lowest dose possible to manage pain         Last Office Visit:   9/14/2021      Next Visit Date:  Future Appointments   Date Time Provider Cami Ledesma   12/2/2021  9:40 AM MARVIN Denney   12/14/2021 11:00 AM Rosetta Cordova MD [address] [place] [address]

## 2021-12-02 DIAGNOSIS — L40.9 PSORIASIS: Chronic | ICD-10-CM

## 2021-12-02 DIAGNOSIS — L29.9 CHRONIC PRURITUS: ICD-10-CM

## 2021-12-02 RX ORDER — HYDROXYZINE HYDROCHLORIDE 10 MG/1
10 TABLET, FILM COATED ORAL EVERY 8 HOURS PRN
Qty: 90 TABLET | Refills: 0 | Status: SHIPPED | OUTPATIENT
Start: 2021-12-02 | End: 2021-12-29 | Stop reason: SDUPTHER

## 2021-12-06 ENCOUNTER — CARE COORDINATION (OUTPATIENT)
Dept: CARE COORDINATION | Age: 66
End: 2021-12-06

## 2021-12-07 ENCOUNTER — CARE COORDINATION (OUTPATIENT)
Dept: CARE COORDINATION | Age: 66
End: 2021-12-07

## 2021-12-07 ENCOUNTER — TELEPHONE (OUTPATIENT)
Dept: FAMILY MEDICINE CLINIC | Age: 66
End: 2021-12-07

## 2021-12-07 NOTE — CARE COORDINATION
Telephone call with patient. She indicated that she has been in contact with Mandi[facility]. She has been told that they are working on getting her help in the home. She  Spoke of getting physical therapy in the home.

## 2021-12-07 NOTE — TELEPHONE ENCOUNTER
Patient is calling stating she is all out of her magnesium and almost out of Keppra and tramadol. Please advise I advised her they were requested to soon.

## 2021-12-07 NOTE — TELEPHONE ENCOUNTER
Her prescription for Keppra is prescribed by neurology. And her tramadol will be discussed at her upcoming appointment with me. I am unable to prescribe it without a visit.

## 2021-12-14 ENCOUNTER — VIRTUAL VISIT (OUTPATIENT)
Dept: FAMILY MEDICINE CLINIC | Age: 66
End: 2021-12-14
Payer: COMMERCIAL

## 2021-12-14 ENCOUNTER — CARE COORDINATION (OUTPATIENT)
Dept: CARE COORDINATION | Age: 66
End: 2021-12-14

## 2021-12-14 DIAGNOSIS — F41.9 ANXIETY: Chronic | ICD-10-CM

## 2021-12-14 DIAGNOSIS — M79.605 BILATERAL LEG AND FOOT PAIN: Chronic | ICD-10-CM

## 2021-12-14 DIAGNOSIS — R73.09 ABNORMAL GLUCOSE: Chronic | ICD-10-CM

## 2021-12-14 DIAGNOSIS — R63.4 WEIGHT LOSS: Chronic | ICD-10-CM

## 2021-12-14 DIAGNOSIS — M75.01 ADHESIVE CAPSULITIS OF RIGHT SHOULDER: Chronic | ICD-10-CM

## 2021-12-14 DIAGNOSIS — E83.52 HYPERCALCEMIA: ICD-10-CM

## 2021-12-14 DIAGNOSIS — D63.8 ANEMIA IN OTHER CHRONIC DISEASES CLASSIFIED ELSEWHERE: Chronic | ICD-10-CM

## 2021-12-14 DIAGNOSIS — L40.50 PSORIASIS WITH ARTHROPATHY (HCC): ICD-10-CM

## 2021-12-14 DIAGNOSIS — F43.21 GRIEF REACTION: Chronic | ICD-10-CM

## 2021-12-14 DIAGNOSIS — M79.672 BILATERAL LEG AND FOOT PAIN: Chronic | ICD-10-CM

## 2021-12-14 DIAGNOSIS — F10.20 ALCOHOLISM (HCC): ICD-10-CM

## 2021-12-14 DIAGNOSIS — E83.42 HYPOMAGNESEMIA: ICD-10-CM

## 2021-12-14 DIAGNOSIS — M79.604 BILATERAL LEG AND FOOT PAIN: Chronic | ICD-10-CM

## 2021-12-14 DIAGNOSIS — E03.9 ACQUIRED HYPOTHYROIDISM: Chronic | ICD-10-CM

## 2021-12-14 DIAGNOSIS — N18.32 STAGE 3B CHRONIC KIDNEY DISEASE (HCC): Primary | Chronic | ICD-10-CM

## 2021-12-14 DIAGNOSIS — M79.671 BILATERAL LEG AND FOOT PAIN: Chronic | ICD-10-CM

## 2021-12-14 DIAGNOSIS — E55.9 VITAMIN D DEFICIENCY: Chronic | ICD-10-CM

## 2021-12-14 DIAGNOSIS — E78.2 MIXED HYPERLIPIDEMIA: Chronic | ICD-10-CM

## 2021-12-14 PROCEDURE — 1036F TOBACCO NON-USER: CPT | Performed by: FAMILY MEDICINE

## 2021-12-14 PROCEDURE — G8484 FLU IMMUNIZE NO ADMIN: HCPCS | Performed by: FAMILY MEDICINE

## 2021-12-14 PROCEDURE — 99215 OFFICE O/P EST HI 40 MIN: CPT | Performed by: FAMILY MEDICINE

## 2021-12-14 PROCEDURE — G8427 DOCREV CUR MEDS BY ELIG CLIN: HCPCS | Performed by: FAMILY MEDICINE

## 2021-12-14 PROCEDURE — G8420 CALC BMI NORM PARAMETERS: HCPCS | Performed by: FAMILY MEDICINE

## 2021-12-14 PROCEDURE — 3017F COLORECTAL CA SCREEN DOC REV: CPT | Performed by: FAMILY MEDICINE

## 2021-12-14 RX ORDER — TRAMADOL HYDROCHLORIDE 50 MG/1
50 TABLET ORAL EVERY 4 HOURS PRN
Qty: 42 TABLET | Refills: 0 | Status: SHIPPED | OUTPATIENT
Start: 2021-12-14 | End: 2022-03-14 | Stop reason: SDUPTHER

## 2021-12-14 RX ORDER — LEVETIRACETAM 500 MG/1
500 TABLET ORAL 2 TIMES DAILY
Qty: 180 TABLET | Refills: 0 | Status: SHIPPED | OUTPATIENT
Start: 2021-12-14 | End: 2022-01-31

## 2021-12-14 NOTE — PROGRESS NOTES
2021    TELEHEALTH EVALUATION -- Audio/Visual (During [id number] public health emergency)    HPI:    Severino Nava (:  1955) has requested an audio/video evaluation for the following concern(s):    3 Month Follow-Up    2021:Hypothyroidism. Compliant with levothyroxine which is taken correctly. No diarrhea, constipation, palpitations, dry skin, depression, difficulty sleeping or fatigue. No weight gain or loss.     GERD. Well-controlled with PPI, caffeine restriction, diet restriction. No weight loss. No abdominal pain. No bloody or black tarry stools.     PsA - psoriasis is significantly improved on MTX 17.5. No apparent SEs     Stress - moving again. Early satiety started when she new she was going to moving. Moving now.     Nephrology - for CKD3. Nephrologist recommends she go to hematologist according to patient.     2021: At last visit, MTX was decreased to 15 mg weekly d/t GI upset. She was seen by heme-onc for normocytic anemia -consider iron deficiency, myelosuppression from renal insufficiency, early myelodysplastic syndrome. Work-up then revealed likely temporary myelosuppression due to possible viral exposure. Also likely from chronic renal insufficiency. She was also seen by Dr. Delphine Spear at [facility] clinic for rheumatologic evaluation. She was diagnosed with multiple conditions of right shoulder including adhesive capsulitis, chronic pain, rotator cuff disorder, primary OA, calcific tendinitis. It was determined that there is no clinical evidence for psoriatic arthritis, inflammatory arthropathy or inflammatory spondyloarthritides. However, it was noted by rheum,  methotrexate treatment could be masking findings of psoriatic arthritis. She was referred to orthopedics. Ortho referred her to orthopedics for shoulder. PsA - starting to help a lot with skin and a lot with leg pain which is worse with moving around a lot and with cold weather. APAP ineffective. Tramadol. Depression and anxiety. She took herself off of the pills because she has been doing well. Unintentional weight loss: Is now 92 pounds - down 14 pounds since September. Feels completely worn out. She does not have any strength left. Feels like she can't even use her hands any more. Walking up and down the halls at her new place takes everything she has. Review of Systems see above    Prior to Visit Medications    Medication Sig Taking? Authorizing Provider   levETIRAcetam (KEPPRA) 500 MG tablet Take 1 tablet by mouth 2 times daily Yes Krystyna Trevino PA-C   Handicap Placard MISC by Does not apply route Permanent: NO EXPIRATION Yes Erendira Hernandez MD   traMADol (ULTRAM) 50 MG tablet Take 1 tablet by mouth every 4 hours as needed for Pain for up to 30 days. Intended supply: 30  days. Take lowest dose possible to manage pain Yes Erendira Hernandez MD   hydrOXYzine (ATARAX) 10 MG tablet Take 1 tablet by mouth every 8 hours as needed for Itching Yes Erendira Hernandez MD   acetaminophen (ACETAMINOPHEN EXTRA STRENGTH) 500 MG tablet Take 1 tablet by mouth 2 times daily as needed for Pain Yes Erendira Hernandez MD   aspirin EC 81 MG EC tablet Take 1 tablet by mouth daily Yes Erendira Hernandez MD   alendronate (FOSAMAX) 35 MG tablet Take 1 tablet by mouth every 7 days Yes Erendira Hernandez MD   atorvastatin (LIPITOR) 40 MG tablet TAKE 1 TABLET BY MOUTH ONCE DAILY Yes Erendira Hernandez MD   fenofibrate (TRIGLIDE) 160 MG tablet Take 1 tablet by mouth daily Yes Erendira Hernandez MD   levothyroxine (SYNTHROID) 50 MCG tablet Take 1 tablet by mouth Daily Yes Erendira Hernandez MD   methotrexate (RHEUMATREX) 2.5 MG chemo tablet Take 6 tablets by mouth once a week Yes Erendira Hernandez MD   triamcinolone (KENALOG) 0.1 % cream Apply topically 2 times daily.  Yes Erendira Hernandez MD   pantoprazole (PROTONIX) 40 MG tablet Take 1 tablet by mouth daily TAKE 1 TABLET BY MOUTH  DAILY Yes Sofía Lind MD   rOPINIRole (REQUIP) 0.5 MG tablet TAKE 1 TABLET BY MOUTH AT  NIGHT Yes Sofía Lind MD   calcium carbonate-vitamin D (CALTRATE) 600-400 MG-UNIT TABS per tab Take 1 tablet by mouth 2 times daily Yes Sofía Lind MD   folic acid (FOLVITE) 1 MG tablet Take 1 tablet by mouth daily Do not take on the day you take the methotrexate. Yes Sofía Lind MD   fluocinonide (LIDEX) 0.05 % ointment APPLY TWICE DAILY Yes Sofía Lind MD   Magnesium 400 MG CAPS Take 1 capsule by mouth 3 times daily Yes Sofía Lind MD   Nutritional Supplements (ENSURE COMPLETE) LIQD Take 1 Bottle by mouth 2 times daily Yes Sofía Lind MD       Social History     Tobacco Use    Smoking status: Never Smoker    Smokeless tobacco: Never Used   Vaping Use    Vaping Use: Never used   Substance Use Topics    Alcohol use: Not Currently     Alcohol/week: 28.0 standard drinks     Types: 28 Shots of liquor per week     Comment: quit drinking in November 2020    Drug use: No            PHYSICAL EXAMINATION:  [ INSTRUCTIONS:  \"[x]\" Indicates a positive item  \"[]\" Indicates a negative item  -- DELETE ALL ITEMS NOT EXAMINED]  Vital Signs: (As obtained by patient/caregiver or practitioner observation)    Blood pressure-  Heart rate-    Respiratory rate-    Temperature-  Pulse oximetry-     Constitutional: [] Appears well-developed and well-nourished [] No apparent distress      [] Abnormal-   Mental status  [] Alert and awake  [] Oriented to person/place/time []Able to follow commands      Eyes:  EOM    []  Normal  [] Abnormal-  Sclera  []  Normal  [] Abnormal -         Discharge []  None visible  [] Abnormal -    HENT:   [] Normocephalic, atraumatic.   [] Abnormal   [] Mouth/Throat: Mucous membranes are moist.     External Ears [] Normal  [] Abnormal- Neck: [] No visualized mass     Pulmonary/Chest: [] Respiratory effort normal.  [] No visualized signs of difficulty breathing or respiratory distress        [] Abnormal-      Musculoskeletal:   [] Normal gait with no signs of ataxia         [] Normal range of motion of neck        [] Abnormal-       Neurological:        [] No Facial Asymmetry (Cranial nerve 7 motor function) (limited exam to video visit)          [] No gaze palsy        [] Abnormal-         Skin:        [] No significant exanthematous lesions or discoloration noted on facial skin         [] Abnormal-            Psychiatric:       [] Normal Affect [] No Hallucinations        [] Abnormal-     Other pertinent observable physical exam findings- Patient appears to be alert and oriented to person, place, time, situation and is in no acute distress. Mood appears stable and speech and thought are grossly normal.    Lab Results   Component Value Date    WBC 6.5 08/25/2021    HGB 10.9 (L) 08/25/2021    HCT 33.6 (L) 08/25/2021     (L) 08/25/2021    CHOL 134 05/13/2021    TRIG 95 05/13/2021    HDL 64 (H) 05/13/2021    ALT 24 10/01/2021    AST 35 10/01/2021     08/25/2021    K 3.9 08/25/2021     08/25/2021    CREATININE 1.18 (H) 08/25/2021    BUN 25 (H) 08/25/2021    CO2 25 08/25/2021    TSH 0.991 04/16/2021    INR 0.9 04/15/2021    GLUF 90 11/06/2017    LABA1C 6.6 (H) 08/25/2021     US DUP ABD PEL RETRO SCROT LIMITED  Narrative: EXAMINATION:  US DUP ABD PEL RETRO SCROT LIMITED, US RETROPERITONEAL LIMITED    CLINICAL HISTORY: N18.32 Chronic kidney disease (CKD) stage G3b/A1, moderately decreased glomerular filtration rate (GFR) between 30-44 mL/min/1.73 square meter and albuminuria creatinine ratio less than 30 mg/g ([facility] [address] ICD10. COMPARISONS:  NONE AVAILABLE    TECHNIQUE: Transabdominal ultrasound of the kidneys. FINDINGS:  The right kidney measures 8.5 x 4.4 x 4.7 in the long, AP and transverse dimension.   No surrounding perinephric fluid collections. No hydronephrosis. No calculi. No masses. The left kidney measures 9.0 x 3.7 x 4.1 in the long, AP and transverse dimension. No surrounding perinephric fluid collection. No hydronephrosis. No calculi. No masses. Impression: THE RIGHT KIDNEY IS ATROPHIC AND LEFT KIDNEYS AT THE LOWER LIMITS OF NORMAL. EXAMINATION: RENAL VASCULAR ULTRASOUND    CLINICAL DATA: CHRONIC KIDNEY DISEASE. COMPARISON: NONE. TECHNIQUE: Grayscale as well as duplex color ultrasound and spectral doppler ultrasound of the renal arteries was performed. FINDINGS: Peak systolic velocity of the aorta is 73 cm/s. Direct measurements of the right renal artery at its origin, proximal, mid and distal portions did not show any significantly elevated peak systolic velocities. Direct measurements of the left renal artery at its origin, proximal, mid and distal extent did not show any significantly elevated peak systolic velocities. Renal artery to aortic ratio on the right is 2.5 and on the left is 1.6. There are no indirect measurements at the superior, mid and inferior poles of both left and right kidneys and did not show any significantly elevated resistive indices. IMPRESSION: NO SONOGRAPHIC FINDINGS FOR RENAL ARTERY STENOSIS. US RETROPERITONEAL LIMITED  Narrative: EXAMINATION:  US DUP ABD PEL RETRO SCROT LIMITED, US RETROPERITONEAL LIMITED    CLINICAL HISTORY: N18.32 Chronic kidney disease (CKD) stage G3b/A1, moderately decreased glomerular filtration rate (GFR) between 30-44 mL/min/1.73 square meter and albuminuria creatinine ratio less than 30 mg/g ([facility]) ICD10. COMPARISONS:  NONE AVAILABLE    TECHNIQUE: Transabdominal ultrasound of the kidneys. FINDINGS:  The right kidney measures 8.5 x 4.4 x 4.7 in the long, AP and transverse dimension. No surrounding perinephric fluid collections. No hydronephrosis. No calculi. No masses.     The left kidney measures 9.0 x 3.7 x 4.1 in the long, AP and transverse dimension. No surrounding perinephric fluid collection. No hydronephrosis. No calculi. No masses. Impression: THE RIGHT KIDNEY IS ATROPHIC AND LEFT KIDNEYS AT THE LOWER LIMITS OF NORMAL. EXAMINATION: RENAL VASCULAR ULTRASOUND    CLINICAL DATA: CHRONIC KIDNEY DISEASE. COMPARISON: NONE. TECHNIQUE: Grayscale as well as duplex color ultrasound and spectral doppler ultrasound of the renal arteries was performed. FINDINGS: Peak systolic velocity of the aorta is 73 cm/s. Direct measurements of the right renal artery at its origin, proximal, mid and distal portions did not show any significantly elevated peak systolic velocities. Direct measurements of the left renal artery at its origin, proximal, mid and distal extent did not show any significantly elevated peak systolic velocities. Renal artery to aortic ratio on the right is 2.5 and on the left is 1.6. There are no indirect measurements at the superior, mid and inferior poles of both left and right kidneys and did not show any significantly elevated resistive indices. IMPRESSION: NO SONOGRAPHIC FINDINGS FOR RENAL ARTERY STENOSIS. ASSESSMENT/PLAN:  Cherylene Blunt was seen today for 3 month follow-up. Diagnoses and all orders for this visit:    Stage 3b chronic kidney disease ([address]  Comments:  stable and fair control on current meds; has nephrology care; follow labs. Orders:  -     Comprehensive Metabolic Panel; Future    Psoriasis with arthropathy (HCC)  Comments:  improving with fair control on MTX. Monitor labs. Derm and Rheum have not assumed care of this condition  Orders:  -     C-Reactive Protein; Future  -     Handicap Placard MISC; by Does not apply route Permanent: NO EXPIRATION  -     traMADol (ULTRAM) 50 MG tablet; Take 1 tablet by mouth every 4 hours as needed for Pain for up to 30 days. Intended supply: 30  days.  Take lowest dose possible to manage pain    Adhesive capsulitis of right shoulder  Comments:  has been referred to ortho    Acquired hypothyroidism  Comments:  Stable and well-controlled on current meds. Orders:  -     TSH Without Reflex; Future  -     T4, Free; Future    Anemia in other chronic diseases classified elsewhere  Comments:  Early myelosuppressive disease versus viral myelosuppression. Followed by hematology. Mixed hyperlipidemia  Comments:  Stable and well-controlled on current medications  Orders:  -     Lipid, Fasting; Future    Weight loss  Comments:  Unclear etiology. Need to rule out occult malignancy. Referred to GI. Orders:  -     Comprehensive Metabolic Panel; Future  -     TSH Without Reflex; Future  -     T4, Free; Future  -     Liz Dumont MD, Gastroenterology, [place]    Grief reaction  Comments:  Somewhat improved. Has family support. Has stable housing and stable, fixed income. Bilateral leg and foot pain  Comments:  Improving with tramadol which she needs to take less than once a day. Alcoholism ([address]  Comments:  Stable, well controlled. In remission for nearly 1 year. Hypomagnesemia  Comments:  Stable and well-controlled. Follow labs. Orders:  -     Magnesium; Future    Hypercalcemia  Comments:  Resolved   Orders:  -     Comprehensive Metabolic Panel; Future    Vitamin D deficiency  Comments:  Continue vitamin D supplementation and monitor labs. Orders:  -     Vitamin D 25 Hydroxy; Future    Abnormal glucose  Comments:  Increasing. Follow labs. A1c is over the last year been 5.2, 4.5 and, 3 months ago, 6.6  Orders:  -     Hemoglobin A1C; Future    Anxiety  Comments:  Appears to be improving. Return in about 4 weeks (around 1/11/2022) for unintentional weight loss - OV. Hiwot Amaya, was evaluated through a synchronous (real-time) audio-video encounter. The patient (or guardian if applicable) is aware that this is a billable service. Verbal consent to proceed has been obtained within the past 12 months. The visit was conducted pursuant to the emergency declaration under the [facility], [address] [place] [address] authority and the [employer] General Act. Patient identification was verified, and a caregiver was present when appropriate. The patient was located in a state where the provider was credentialed to provide care. Total time spent on this encounter: 50 minutes    --Jessica Luo MD on 12/20/2021 at 1:54 PM    An electronic signature was used to authenticate this note.

## 2021-12-14 NOTE — CARE COORDINATION
Telephone call to patient. She is still waiting on services in her home. She is waiting to see what happens after the holidays. Reviewed that patient's name is on waiting list for housekeeping. Patient is managing with support of family.

## 2021-12-15 NOTE — PROGRESS NOTES
[facility] NEUROLOGY  [address]  [phone]     Date of Visit:  12/15/2021  Patient Name: Olya Mason   Patient :  1955     CHIEF COMPLAINT:     Olya Mason is a 61 y.o. female who presents today for an general visit to be evaluated for the following condition(s):  No chief complaint on file. HISTORY OF PRESENT ILLNESS     HPI  21  Patient seen for history of generalized seizure since  currently on Keppra 500 mg twice daily. Patient was previously in the care of Collette Osler, CNP and this is my first time meeting patient. According to previous records, seizures are typically secondary to alcohol use or withdrawal or metabolic disturbance such as hypoglycemia or hypomagnesemia. Patient denies that she has had any seizure activity since last visit. Upon later chart review after visit was completed, I saw the patient went to the ER on 2021 for possible seizure after waking up on the floor with inner and outer lip laceration and bruise on her cheek. This magnesium was drawn at that time and was within normal limits. CBC and CMP were largely unremarkable other than creatinine of 1.23 and BUN of 34. Prior to this, patient had not had a seizure since 2020. Collette Osler, CNP was notified that patient had breakthrough seizure activity and obtained a Keppra level on 2021 which was in therapeutic range. At last visit, patient was instructed to reduce dose of Keppra from 500 mg twice daily to 250 mg in the morning and 500 mg in the evening because of side effect of fatigue. Patient reports that she is continued on Keppra 500 mg twice daily and has not initiated the reduced dose. Patient does continue on tramadol for joint pain, and I did discuss with her that this can lower the seizure threshold. Advised her to discuss with physician prescribing this.     Patient reports that she stopped drinking alcohol about a year ago. Patient reports unintentional weight loss of 14 pounds which is being worked up further by primary care physician. Patient reports that primary care is considering a colonoscopy/endoscopy to determine the cause. Patient reports that she has decreased appetite and denies abdominal pain, nausea, vomiting, night sweats, fevers, headaches, visual field deficit, trouble swallowing, limb weakness, or headaches. Patient reports that her sister passed away in February of last year who she was very close to. Denies feelings of sadness or depression and is no longer on antidepressants. Due to COVID 19 outbreak, patient's office visit was converted to a virtual visit. Patient was contacted and agreed to proceed with a virtual visit via Telephone Visit  The risks and benefits of converting to a virtual visit were discussed in light of the current infectious disease epidemic. Patient also understood that insurance coverage and co-pays are up to their individual insurance plans. 6/7/2021:  Patient contacted via telephone for virtual visit for follow-up for seizures. Patient last seen on 2/3/2021. She is alert and oriented x3. Patient was at home and I was in my office. When last seen on 2/3/2020 when she was complaining of some fatigue therefore her Keppra dose was decreased to 250 mg in the a.m. and 500 mg in the p.m. She reports that she has since resumed taking the 500 mg twice daily and is tolerating this well. Still with some generalized fatigue but nothing that has increased. No recurrent seizures since last seen. No agitation or mood changes. She is still not drinking any alcohol. Appetite has improved and weight has gone up 8 pounds since last visit and she currently weighs 109 pounds. She states that overall she is doing well. She voices no new or acute concerns or complaints.   Still dealing with some minor depression due to her lost of her sister in February 2021. No suicidal ideation. She is following with primary care for this. Patient with intermittent BERTHA and has been referred to nephrology. Has history of hypomagnesemia. Last magnesium level on 4/28/2021 was 1.8. Patient continues on magnesium supplementation.     Last MRI performed on 11/2/2020 which showed chronic microangiopathy. Unintentional weight loss     2/3/2021:  Pt seen and examined in the office for hospital follow up. Pt was admitted to [facility] from 11/2/2020 to 11/3/2020 for seizure. Hospital records reviewed.  Patient presented to [facility] emergency room on 11/2/2020 for generalized seizure. Michael Rader does have history of seizures in the past and was seen last in 2016 by Dr. Noemi Grewal that time she was on Topamax.  Per Dr. Rocky Newton notes seizures are typically secondary to metabolic disturbance such as hypoglycemia or alcohol use/withdrawal. Michael Rader has long history of alcohol abuse.  Dr. Agatha Guillory, neurology, saw patient on 11/3/2020 while in the hospital and his notes were reviewed. [facility] patient with generalized seizure secondary to low threshold for seizures with alcohol use.  Of note patient had significantly low magnesium level of 0.6 on admission.  This was replaced.  She was initiated on Keppra 500 mg twice a day.  EEG was obtained which was normal.  MRI of the brain was done which did not show anything significant.  SVID was noted.  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   Patient is accompanied to today's appointment by her niece who is her caregiver. Michael Rader lives at home with her sister currently. Michael Rader is alert and oriented x3, no acute distress, cooperative. Francis Rainey reports daily compliance with Keppra.  She did have an episode a couple months ago of possible syncope secondary to nausea vomiting and vasovagal episode. Francis Rainey was taken to [facility] according to her niece. Francis Rainey states they did not do much of anything for the patient and discharged her home. Edel Moss has other to have chronically low magnesium level.  Last magnesium level done in December 2020 was 1.4.  This is being followed by primary care. Aida Doan is on magnesium 400 mg 3 times a day and reports compliance with this.  Patient with significant weight loss over the last several months.  She has had intermittent nausea vomiting and diarrhea and is undergone work-up with GI.  She had EGD and colonoscopy done.  P.o. intake has been poor.  Appetite poor.  Patient does report some session of depression.  No suicidal ideation. Aida Doan is currently on Lexapro.  She reports daily fatigue. REVIEW OF SYSTEM      Review of Systems   Constitutional: Positive for fatigue and unexpected weight change. Negative for chills and fever. HENT: Negative for congestion and trouble swallowing. Eyes: Negative for photophobia and visual disturbance. Respiratory: Negative for chest tightness and shortness of breath. Cardiovascular: Negative for chest pain. Gastrointestinal: Negative for diarrhea, nausea and vomiting. Endocrine: Negative. Genitourinary: Negative. Musculoskeletal: Negative for gait problem. Allergic/Immunologic: Negative. Neurological: Negative for dizziness, tremors, seizures, syncope, facial asymmetry, speech difficulty, weakness, light-headedness, numbness and headaches. Hematological: Negative. Psychiatric/Behavioral: Negative for hallucinations and self-injury.        REVIEWED INFORMATION      Allergies   Allergen Reactions    Morphine Swelling       Patient Active Problem List   Diagnosis    Mixed hyperlipidemia    Abnormal glucose    Gastroesophageal reflux disease without esophagitis    Alcoholism ([address]    Psoriasis with arthropathy ([facility] [address]    Vitamin D deficiency    Restless leg syndrome    Hypothyroidism    Osteopenia of multiple sites    Facet arthropathy, multilevel    Chronic pain of right knee    Abnormal EKG    BARRIENTOS (dyspnea on GASTROINTESTINAL ENDOSCOPY N/A 1/21/2021    EGD with polypectomy performed by Vicente Gore MD at [address] Marital status: Single     Spouse name: Not on file    Number of children: Not on file    Years of education: 15    Highest education level: Not on file   Occupational History    Occupation: disabled   Tobacco Use    Smoking status: Never Smoker    Smokeless tobacco: Never Used   Vaping Use    Vaping Use: Never used   Substance and Sexual Activity    Alcohol use: Not Currently     Alcohol/week: 28.0 standard drinks     Types: 28 Shots of liquor per week     Comment: quit drinking in November 2020    Drug use: No    Sexual activity: Not Currently   Other Topics Concern    Not on file   Social History Narrative    3/4/21 Lillie Cote lives in a private residence she had shared with her sister, her sister was the home owner, and she recently passed on 2/21, She is now needing to find a new home, as the house she is living in may be sold soon, Lillie Cote states she is independent with self care, does have some limitations but states able to function independently for most self care tasks. She does not drive related to history of seizures.  referral made for concerns with housing and transportation. Social Determinants of Health     Financial Resource Strain: Low Risk     Difficulty of Paying Living Expenses: Not very hard   Food Insecurity: No Food Insecurity    Worried About Running Out of Food in the Last Year: Never true    Katherine of Food in the Last Year: Never true   Transportation Needs: Unmet Transportation Needs    Lack of Transportation (Medical):  Yes    Lack of Transportation (Non-Medical): Yes   Physical Activity: Inactive    Days of Exercise per Week: 0 days    Minutes of Exercise per Session: 0 min   Stress: Stress Concern Present    Feeling of Stress : Rather much   Social Connections: Socially Isolated  Frequency of Communication with Friends and Family: Once a week    Frequency of Social Gatherings with Friends and Family: Once a week    Attends [employer] Services: Never    Active Member of Clubs or Organizations: No    Attends Club or Organization Meetings: Never    Marital Status: Never    Intimate Partner Violence: Not At Risk    Fear of Current or Ex-Partner: No    Emotionally Abused: No    Physically Abused: No    Sexually Abused: No   Housing Stability:     Unable to Pay for Housing in the Last Year: Not on file    Number of [place] in the Last Year: Not on file    Unstable Housing in the Last Year: Not on file        PHYSICAL EXAM     Deferred due to virtual visit    ASSESSMENT/PLAN   Patient seen for neurology follow-up for generalized seizures since 2016. These seizures are typically secondary to alcohol use or withdrawal or metabolic disturbance such as hypoglycemia or hypomagnesemia. Patient continues on Keppra 500 mg BID. Patient was previously in the care of Skip Castro CNP, and this is my first time seeing patient. Patient initially stated that seizure activity had not occurred since last visit, but after visit, upon further chart review, it appears patient may have had a breakthrough seizure in June. Keppra level at that time was 40. I had initially recommended decreasing the Keppra from 500 mg BID to 250 mg in the morning and 500 in the evening given side effect of fatigue. However, given this new information, will continue on Keppra 500 mg BID and obtain EEG. Patient's seizures appeared to be provoked based on previous notes, but most recent episode is suggestive of possible seizure and electrolytes WNL. Patient states that she has not used alcohol in over a year. Given these circumstances, most recent episode more suggestive of epilepsy. Patient called to be updated regarding the plan.  Can discuss other seizure options in greater detail at next visit given patient's side effect of fatigue. Seizure [facility])  - Patient with history of generalized seizure since 2016.  These are typically secondary to alcohol use or withdrawal or metabolic disturbance such as hypoglycemia or significant hypomagnesemia.   -Last seizure was in June 2021  -Continue Keppra 500 mg twice daily. Patient is tolerating this without agitation or mood changes  -Magnesium levels (8/25/21) 1.9  -Patient has stopped drinking alcohol  - Notify neurologist when starting new medications as anti-seizure meds can interact with prescription and nonprescription medicines which may cause increased side effects or decreased efficacy  - Avoid alcohol or illicit drug use as these can lower seizure threshold  - Take seizure medicine exactly as prescribed  - Inform us of any side effects of medications  - Do not stop seizure medication or change dose unless directed to by health care professional  - Call with any questions or concerns     2. Alcoholism ([facility] [address]  -No alcohol use since November 2020. Encouraged ongoing alcohol cessation     3. Hypomagnesemia  -Magnesium levels normal on 8/25/21. Patient continues on supplementation.     4. Weight loss  -Worsened since last visit. Patient has lost 14 pounds. Being followed by PCP. To follow up in 3 months or sooner if new or worsening symptoms.        COMMUNICATION:       Electronically signed by Laureen Hugo PA-C, PA-C on 12/15/2021 at 5:15 PM

## 2021-12-16 ENCOUNTER — OFFICE VISIT (OUTPATIENT)
Dept: NEUROLOGY | Age: 66
End: 2021-12-16
Payer: COMMERCIAL

## 2021-12-16 DIAGNOSIS — R56.9 SEIZURE (HCC): Primary | ICD-10-CM

## 2021-12-16 DIAGNOSIS — R63.4 WEIGHT LOSS: ICD-10-CM

## 2021-12-16 DIAGNOSIS — E83.42 HYPOMAGNESEMIA: ICD-10-CM

## 2021-12-16 PROCEDURE — 99214 OFFICE O/P EST MOD 30 MIN: CPT | Performed by: STUDENT IN AN ORGANIZED HEALTH CARE EDUCATION/TRAINING PROGRAM

## 2021-12-16 RX ORDER — LEVETIRACETAM 250 MG/1
TABLET ORAL
Refills: 2 | Status: CANCELLED | OUTPATIENT
Start: 2021-12-16

## 2021-12-16 RX ORDER — LEVETIRACETAM 500 MG/1
500 TABLET ORAL 2 TIMES DAILY
Qty: 180 TABLET | Refills: 2 | Status: CANCELLED | OUTPATIENT
Start: 2021-12-16 | End: 2022-03-16

## 2021-12-16 ASSESSMENT — ENCOUNTER SYMPTOMS
VOMITING: 0
ALLERGIC/IMMUNOLOGIC NEGATIVE: 1
DIARRHEA: 0
TROUBLE SWALLOWING: 0
SHORTNESS OF BREATH: 0
CHEST TIGHTNESS: 0
NAUSEA: 0
PHOTOPHOBIA: 0

## 2021-12-21 ENCOUNTER — CARE COORDINATION (OUTPATIENT)
Dept: CARE COORDINATION | Age: 66
End: 2021-12-21

## 2021-12-21 ENCOUNTER — TELEPHONE (OUTPATIENT)
Dept: NEUROLOGY | Age: 66
End: 2021-12-21

## 2021-12-21 DIAGNOSIS — E78.2 MIXED HYPERLIPIDEMIA: Chronic | ICD-10-CM

## 2021-12-21 DIAGNOSIS — E55.9 VITAMIN D DEFICIENCY: Chronic | ICD-10-CM

## 2021-12-21 DIAGNOSIS — E03.9 ACQUIRED HYPOTHYROIDISM: Chronic | ICD-10-CM

## 2021-12-21 DIAGNOSIS — L40.50 PSORIASIS WITH ARTHROPATHY (HCC): ICD-10-CM

## 2021-12-21 DIAGNOSIS — N18.32 STAGE 3B CHRONIC KIDNEY DISEASE (HCC): Chronic | ICD-10-CM

## 2021-12-21 DIAGNOSIS — R73.09 ABNORMAL GLUCOSE: Chronic | ICD-10-CM

## 2021-12-21 DIAGNOSIS — R63.4 WEIGHT LOSS: Chronic | ICD-10-CM

## 2021-12-21 DIAGNOSIS — E83.52 HYPERCALCEMIA: ICD-10-CM

## 2021-12-21 DIAGNOSIS — E83.42 HYPOMAGNESEMIA: ICD-10-CM

## 2021-12-21 LAB
ALBUMIN SERPL-MCNC: 4.2 G/DL (ref 3.5–4.6)
ALP BLD-CCNC: 41 U/L (ref 40–130)
ALT SERPL-CCNC: 14 U/L (ref 0–33)
ANION GAP SERPL CALCULATED.3IONS-SCNC: 14 MEQ/L (ref 9–15)
AST SERPL-CCNC: 27 U/L (ref 0–35)
BILIRUB SERPL-MCNC: 0.5 MG/DL (ref 0.2–0.7)
BUN BLDV-MCNC: 20 MG/DL (ref 8–23)
C-REACTIVE PROTEIN: 4.1 MG/L (ref 0–5)
CALCIUM SERPL-MCNC: 9.4 MG/DL (ref 8.5–9.9)
CHLORIDE BLD-SCNC: 102 MEQ/L (ref 95–107)
CHOLESTEROL, FASTING: 132 MG/DL (ref 0–199)
CO2: 24 MEQ/L (ref 20–31)
CREAT SERPL-MCNC: 1.11 MG/DL (ref 0.5–0.9)
GFR AFRICAN AMERICAN: 59.8
GFR NON-AFRICAN AMERICAN: 49.4
GLOBULIN: 2.8 G/DL (ref 2.3–3.5)
GLUCOSE BLD-MCNC: 87 MG/DL (ref 70–99)
HBA1C MFR BLD: 5.5 % (ref 4.8–5.9)
HDLC SERPL-MCNC: 39 MG/DL (ref 40–59)
LDL CHOLESTEROL CALCULATED: 64 MG/DL (ref 0–129)
MAGNESIUM: 1.9 MG/DL (ref 1.7–2.4)
POTASSIUM SERPL-SCNC: 3.9 MEQ/L (ref 3.4–4.9)
SODIUM BLD-SCNC: 140 MEQ/L (ref 135–144)
T4 FREE: 1.86 NG/DL (ref 0.84–1.68)
TOTAL PROTEIN: 7 G/DL (ref 6.3–8)
TRIGLYCERIDE, FASTING: 144 MG/DL (ref 0–150)
TSH SERPL DL<=0.05 MIU/L-ACNC: 0.9 UIU/ML (ref 0.44–3.86)

## 2021-12-21 NOTE — CARE COORDINATION
Telephone call to patient. She answered the phone and indicated she was on her way to the hospital to have blood work done.   Patient to return phone call later in the day,

## 2021-12-21 NOTE — TELEPHONE ENCOUNTER
Called Lauren and left message to call back. Wanted to discuss that after our visit, I was reviewing her chart further and saw that she did have a seizure since last visit with Shakila Mahan. I therefore, no longer want to decrease the Keppra as we had discussed, as this was something to consider only if she had not had a seizure. I also want to make sure she gets a repeat EEG. I would be happy to discuss further if needed.

## 2021-12-21 NOTE — CARE COORDINATION
Telephone call with patient. She indicated that she has not heard from [place] MARC CHAVARRIA or Angie/. with Juliann. She is managing about doing things slowly and pacing herself. She likes the apartment where she lives.

## 2021-12-22 LAB — VITAMIN D 25-HYDROXY: 37.9 NG/ML (ref 30–100)

## 2021-12-28 DIAGNOSIS — L29.9 CHRONIC PRURITUS: ICD-10-CM

## 2021-12-28 DIAGNOSIS — L40.9 PSORIASIS: Chronic | ICD-10-CM

## 2021-12-29 RX ORDER — HYDROXYZINE HYDROCHLORIDE 10 MG/1
10 TABLET, FILM COATED ORAL EVERY 8 HOURS PRN
Qty: 90 TABLET | Refills: 0 | Status: SHIPPED | OUTPATIENT
Start: 2021-12-29 | End: 2022-01-11

## 2021-12-30 ENCOUNTER — TELEPHONE (OUTPATIENT)
Dept: NEUROLOGY | Age: 66
End: 2021-12-30

## 2021-12-30 ENCOUNTER — CARE COORDINATION (OUTPATIENT)
Dept: CARE COORDINATION | Age: 66
End: 2021-12-30

## 2021-12-30 DIAGNOSIS — L40.50 PSORIASIS WITH ARTHROPATHY (HCC): ICD-10-CM

## 2021-12-30 RX ORDER — FOLIC ACID 1 MG/1
1 TABLET ORAL DAILY
Qty: 30 TABLET | Refills: 11 | Status: SHIPPED | OUTPATIENT
Start: 2021-12-30 | End: 2022-02-24 | Stop reason: SDUPTHER

## 2021-12-30 NOTE — CARE COORDINATION
Telephone call with patient. She has not heard from [employer] or [employer] with [place]. She is going to reach out to them the first of the year. Offered assistance with reaching out to Mandi or Luis Boyer if she does not have success in reaching them.

## 2021-12-30 NOTE — TELEPHONE ENCOUNTER
Patient called regarding Keppra and that she was to take 500 mg 1 QAM and 2 QHS. She stated that this was making her very sick and tired and she is just taking 1 BID. She wanted to discuss with you if this is ok. Please advise  Thanks  Guillermo Irene patient and clarify dosing. Patient was taking 1 500 mg dose in the morning and to 500 mg doses at night which is not correct. Discussed that she should be taking 1 500 mg dose in the morning and 1 500 mg dose 12 hours later. Patient states that she heard from scheduling and plans to have the EEG done in the new year. Discussed at previous dosing we had discussed during appointment involves a lower dose which was 250 mg during the day and 500 mg for total of 750/day, but this was changed after further chart review when I saw that she had had a possible breakthrough seizure in June. Discussed with patient that she should never be taking more than at 1000 mg/day of the Keppra. Patient stated understanding as far as clarification was able to repeat back the instructions clearly.

## 2021-12-30 NOTE — TELEPHONE ENCOUNTER
Rx request   Requested Prescriptions     Pending Prescriptions Disp Refills    folic acid (FOLVITE) 1 MG tablet 30 tablet 11     Sig: Take 1 tablet by mouth daily Do not take on the day you take the methotrexate.      LOV 12/14/2021  Next Visit Date:  Future Appointments   Date Time Provider Cami Ledesma   1/25/2022  9:00 AM Naa Stinson MD [address] [place] [address]

## 2022-01-06 ENCOUNTER — CARE COORDINATION (OUTPATIENT)
Dept: CARE COORDINATION | Age: 67
End: 2022-01-06

## 2022-01-06 NOTE — CARE COORDINATION
Telephone call to patient. Left voicemail of purpose of call with request for return phone call. Call back number was provided.

## 2022-01-06 NOTE — CARE COORDINATION
Telephone call with patient. She indicated that she did reach out to Located within [facility]:   Nerissa Steve [phone]) and Evelia bryson [phone]). She has not heard anything about when her services my start. Discussed plan to reach out to [facility].

## 2022-01-06 NOTE — CARE COORDINATION
Telephone call to Lacey Skelton/Sandra/Juliann. Left voicemail of purpose of phone call with request for return phone call. Call back number was provided.

## 2022-01-07 ENCOUNTER — CARE COORDINATION (OUTPATIENT)
Dept: CARE COORDINATION | Age: 67
End: 2022-01-07

## 2022-01-07 NOTE — CARE COORDINATION
Telephone call with Lucero Elder/Juliann . She indicated that patient is enrolled in [facility] [place].  She spoke of their not being any HHA Services available for [facility]. She expressed plan also to reach out to patient to explain this again. Lucero Bryant does not for see any HHA services being available in the near future. Lucero Bryant also expressed plan to reach out to patient.

## 2022-01-07 NOTE — CARE COORDINATION
Telephone call to patient. Left voicemail with summary of voicemail received from [facility]. Provided call back number.

## 2022-01-07 NOTE — CARE COORDINATION
Received voicemail from [address] [place] [address]. She indicated that the only Waiver Services patient has at this time is a [address]. She indicated that there is currently no aides available in [facility].

## 2022-01-09 NOTE — CARE COORDINATION
Case referred to this writer by HANDY Paulino to assist patient with housing and transportation needs. Telephone call to patient. Left voicemail of nature of call with request for return phone call. Call back number was provided. severe

## 2022-01-14 ENCOUNTER — CARE COORDINATION (OUTPATIENT)
Dept: CARE COORDINATION | Age: 67
End: 2022-01-14

## 2022-01-14 ENCOUNTER — HOSPITAL ENCOUNTER (OUTPATIENT)
Dept: NEUROLOGY | Age: 67
Discharge: HOME | End: 2022-01-14
Payer: COMMERCIAL

## 2022-01-14 DIAGNOSIS — R56.9 SEIZURE (HCC): ICD-10-CM

## 2022-01-14 PROCEDURE — 95816 EEG AWAKE AND DROWSY: CPT

## 2022-01-14 NOTE — CARE COORDINATION
Telephone call with patient. She indicated that a person at her high rise told her that [facility] home care has staffing. Discussed calling Angie Skelton/[place] / to see if [facility] could contract with [address] [place] [address]. Patient expressed plan to call Nando Alvarez.

## 2022-01-16 NOTE — PROCEDURES
[address]                      [address], [address] [facility]                          ELECTROENCEPHALOGRAM REPORT    PATIENT NAME: Bret Oliva                :        1955  MED REC NO:   [id number]                            ROOM:  ACCOUNT NO:   [de-identified]                           ADMIT DATE: 2022  PROVIDER:     Zahira Galvan MD    DATE OF EE2022    EEG FINDINGS:  This is a spontaneous 21-channel EEG recording for this  patient with questionable seizures. The background rhythm of this EEG  shows 8-9 Hz posterior dominant rhythm of alpha. This is symmetrical  and attenuates with eye opening. EKG is monitored, this is regular. Photic stimulation is performed without any photo responses. There is  no photo responsive seizures. Hyperventilation is performed with good  effort. The record remains symmetrical throughout without any  epileptiform discharge or other asymmetries. IMPRESSION:  This is a normal well-regulated EEG recording. Clinical  correlation is recommended.         Theresa Lora MD    D: 2022 13:03:44       T: 2022 13:49:07     [id number]  Job#: [id number]     Doc#: [id number]    CC:

## 2022-01-20 NOTE — CARE COORDINATION
Ambulatory Care Coordination Note  7/20/2021  CM Risk Score: 4  Charlson 10 Year Mortality Risk Score: 10%     ACC: Sonia Delgado, RN    Summary Note: I called Michaela Almeida to update her on follow up appointments that were made for her, she has a follow up with Dr. Ben Hurt on August 20, at [place], and with DANISHA- Yoshi Aleman on 12/2 at 9:40. Bryan Francois is in agreement with appointment dates/times. Graduation letter was sent. MSWMaría Borrego will follow for ongoing needs with housing and Denise Klinefelter requested a call back from Gris Joya sent Chris Borrego a message requesting she contact Yanet at her convenience. Care Coordination Interventions    Program Enrollment: Rising Risk  Referral from Primary Care Provider: Yes  Suggested Interventions and Community Resources  Grief Counselor: Declined (Comment: 3/4/21 recent loss of sister- declined [facility] support services)  Occupational Therapy: Declined  Pharmacist: [address] (Comment: 3/4/21 declined -will offer again in a few weeks)  Physical Therapy: Declined  Registered Dietician:  (Comment: 3/4/21 referral made)  Social Work: Completed (Comment: 3/4/21 sw referal made for transportation, housing)  [employer] Management Tools: Completed (Comment: 3/4/21 ready clinic information mailed)         Goals Addressed                 This Visit's Progress     COMPLETED: Conditions and Symptoms        I will schedule office visits, as directed by my provider. I will keep my appointment or reschedule if I have to cancel. I will notify my provider of any barriers to my plan of care. I will notify my provider of any symptoms that indicate a worsening of my condition. 3/4/21 dietary referral for weight loss  3/10 follow up scheduled with neuro and pcp  6/8 attending all follow ups- she needs appointment scheduled with Dermatology  Weight is improved. 7/15/21 Ayesha is attending all follow ups and reporting symptoms as needed. Care Coordination goals met, BEVERLEY Borrego will follow as Please call pt to schedule EGD/Colonoscopy  with Dr. Aguilar.      Schedule Procedure:   Please Schedule Routine (next available or patient preference)  Procedure: Colonoscopy (69747) with NuLytely (Split Dose) and EGD (28246)  Diagnosis: Diarrhea R19.7 and Gastroesophageal Reflux Disease without Esophagitis K21.9  Is patient:    Diabetic? No   ANTIPLATELET / ANTICOAGULATION: MEDICATION:  None  Latex allergy: No  Sleep apnea: No  Location: Patient Preference  Special Instructions:   MAC Anesthesia  For MAC/GA patients if applicable, please verify to hold medications prior to procedure: Selegiline patches x 10 days  Sildenafil, Verdenafil, Tadalafil x 48 hours   Monoamine oxidase inhibitor (MAOIs), angiotensin receptor blockers, renin inhibitors, ACE inhibitors, diuretics (unless in combination with beta blocker) day of procedure     needed. 7/20/21 Elias Lee has done well in managing her care, she attends follow up appointments as scheduled and is aware of symptoms to monitor and report. Barriers: stress and lack of education  Plan for overcoming my barriers: I will work with my ACM and healthcare team to increase my knowledge and self management skills  Confidence: 9/10  Anticipated Goal Completion Date: 6/4 21              Prior to Admission medications    Medication Sig Start Date End Date Taking?  Authorizing Provider   acetaminophen (ACETAMINOPHEN EXTRA STRENGTH) 500 MG tablet Take 1 tablet by mouth 2 times daily as needed for Pain 6/23/21 7/23/21  Ross Post MD   aspirin EC 81 MG EC tablet Take 1 tablet by mouth daily 6/23/21   Ross Post MD   hydrOXYzine (ATARAX) 10 MG tablet TAKE 1 TABLET BY MOUTH  EVERY 8 HOURS AS NEEDED FOR ITCHING 6/16/21   Ross Post MD   rOPINIRole (REQUIP) 0.5 MG tablet TAKE 1 TABLET BY MOUTH AT  NIGHT 6/16/21   Ross Post MD   alendronate (FOSAMAX) 35 MG tablet TAKE 1 TABLET BY MOUTH  EVERY 7 DAYS 6/16/21   Ross Post MD   fluocinonide (LIDEX) 0.05 % ointment APPLY TWICE DAILY 6/16/21   Ross Post MD   busPIRone (BUSPAR) 5 MG tablet Take 5 mg by mouth 2 times daily 5/27/21   Historical Provider, MD   fenofibrate (TRIGLIDE) 160 MG tablet Take 1 tablet by mouth daily 3/11/21   Ross Post MD   Magnesium 400 MG CAPS Take 1 capsule by mouth 3 times daily 3/11/21   Ross Post MD   escitalopram (LEXAPRO) 20 MG tablet Take 1 tablet by mouth daily 2/25/21   Ross Post MD   Nutritional Supplements (ENSURE COMPLETE) LIQD Take 1 Bottle by mouth 2 times daily 12/18/20   Ross Post MD   levETIRAcetam (KEPPRA) 500 MG tablet Take 1 tablet by mouth 2 times daily  Patient taking differently: Take 500 mg by mouth 2 times daily Take 1/2 (250mg) tablet in the morning and 1 tablet (500mg) if the afternoon.  12/2/20 4/15/21  Prosper Antonio MD   triamcinolone (KENALOG) 0.025 % cream Apply topically every 4 hours as needed Apply Topically    Historical Provider, MD   levothyroxine (SYNTHROID) 50 MCG tablet TAKE 1 TABLET BY MOUTH  DAILY 9/30/20   Sallie Parson MD   pantoprazole (PROTONIX) 40 MG tablet TAKE 1 TABLET BY MOUTH  DAILY 9/30/20   Sallie Parson MD   atorvastatin (LIPITOR) 40 MG tablet TAKE 1 TABLET BY MOUTH ONCE DAILY 9/30/20   Sallie Parson MD   calcium carbonate-vitamin D (CALTRATE) 600-400 MG-UNIT TABS per tab Take 1 tablet by mouth 2 times daily 1/13/20   Sallie Parson MD       Future Appointments   Date Time Provider Cami Ledesma   8/20/2021 11:00 AM Sallie Parson MD [facility]   12/2/2021  9:40 AM ALLEN Padilla - CNP [facility]

## 2022-01-24 ENCOUNTER — CARE COORDINATION (OUTPATIENT)
Dept: CARE COORDINATION | Age: 67
End: 2022-01-24

## 2022-01-24 ENCOUNTER — TELEPHONE (OUTPATIENT)
Dept: FAMILY MEDICINE CLINIC | Age: 67
End: 2022-01-24

## 2022-01-24 NOTE — TELEPHONE ENCOUNTER
Patient is asking is she can stop taking her calcium magnesium since her blood work is good?  Please advise

## 2022-01-24 NOTE — TELEPHONE ENCOUNTER
I understand her wanting to minimize the numbers of medications and supplements she is taking. However it is not advisable that she stop either one of them. The reason why her magnesium is normal, it is because she is taking magnesium. And the reason why she is taking calcium is because she is taking alendronate for her osteopenia. That medication will not work to strengthen bones without calcium.

## 2022-01-24 NOTE — CARE COORDINATION
Telephone call to patient. She stated that she has been in contact with Mandi Doan/Juliann/ and she was to reach out to [facility]. Patient is waiting to hear back from [address].

## 2022-01-26 PROCEDURE — 95816 EEG AWAKE AND DROWSY: CPT | Performed by: PSYCHIATRY & NEUROLOGY

## 2022-01-31 ENCOUNTER — CARE COORDINATION (OUTPATIENT)
Dept: CARE COORDINATION | Age: 67
End: 2022-01-31

## 2022-01-31 DIAGNOSIS — L40.50 PSORIASIS WITH ARTHROPATHY (HCC): ICD-10-CM

## 2022-01-31 RX ORDER — LEVETIRACETAM 500 MG/1
TABLET ORAL
Qty: 60 TABLET | Refills: 0 | Status: SHIPPED | OUTPATIENT
Start: 2022-01-31 | End: 2022-02-23 | Stop reason: SDUPTHER

## 2022-01-31 NOTE — CARE COORDINATION
Telephone call with patient who is requesting that this writer contact Mandi Doan/Juliann/. Patient stated that she called her and never received return phone call.

## 2022-01-31 NOTE — CARE COORDINATION
Telephone call to [facility]. Left voicemail of nature of call with request for return phone call. Call back number was provided.

## 2022-02-01 RX ORDER — TRIAMCINOLONE ACETONIDE 1 MG/G
CREAM TOPICAL
Qty: 454 G | Refills: 2 | Status: SHIPPED | OUTPATIENT
Start: 2022-02-01

## 2022-02-02 ENCOUNTER — TELEPHONE (OUTPATIENT)
Dept: NEUROLOGY | Age: 67
End: 2022-02-02

## 2022-02-02 NOTE — TELEPHONE ENCOUNTER
Patient called for results of EEG, advised normal. Would like to speak with you to discuss her medications being changed. She states she takes keppra now.  Thanks, [place]

## 2022-02-04 ENCOUNTER — TELEPHONE (OUTPATIENT)
Dept: NEUROLOGY | Age: 67
End: 2022-02-04

## 2022-02-07 ENCOUNTER — CARE COORDINATION (OUTPATIENT)
Dept: CARE COORDINATION | Age: 67
End: 2022-02-07

## 2022-02-07 NOTE — CARE COORDINATION
Telephone call with patient. She indicated that she spoke with someone in her elevator that works for [facility]. Patient is wondering if she needed to contact [facility] or Mandi Doan/Juliann/ . Explained that Mandi would need to be contacted as they need to be contracted with [facility] in order to obtain A assistance. Patient expressed plan to reach out to Mandi. Explained that this writer did reach out to [facility] [[email or web address]] but never heard back.

## 2022-02-14 ENCOUNTER — CARE COORDINATION (OUTPATIENT)
Dept: CARE COORDINATION | Age: 67
End: 2022-02-14

## 2022-02-15 ENCOUNTER — CARE COORDINATION (OUTPATIENT)
Dept: CARE COORDINATION | Age: 67
End: 2022-02-15

## 2022-02-15 NOTE — CARE COORDINATION
Telephone call with [facility]. She confirmed that patient's name is on waiting list.  She indicated they are just starting to hire people again for the program.

## 2022-02-15 NOTE — CARE COORDINATION
Telephone call with patient. Relayed results of phone call with [facility].

## 2022-02-15 NOTE — CARE COORDINATION
Telephone call with patient. She indicated that she has not heard from [facility]. She is getting frustrated about her not returning phone calls. She is considering changing health insurance. Bashir Echols She would like this writer check on status of housekeeping services with [facility].

## 2022-02-23 ENCOUNTER — CARE COORDINATION (OUTPATIENT)
Dept: CARE COORDINATION | Age: 67
End: 2022-02-23

## 2022-02-23 DIAGNOSIS — G25.81 RESTLESS LEG SYNDROME: Chronic | ICD-10-CM

## 2022-02-23 DIAGNOSIS — L40.50 PSORIASIS WITH ARTHROPATHY (HCC): ICD-10-CM

## 2022-02-23 DIAGNOSIS — I20.0 UNSTABLE ANGINA (HCC): ICD-10-CM

## 2022-02-23 NOTE — CARE COORDINATION
Telephone call to patient. Left voicemail of results of phone call with Janet Juárez Manager/Juliann.

## 2022-02-23 NOTE — CARE COORDINATION
Telephone call with Lucian Zaldivar. She indicated they aren't able to find an agency to staff patient's case. Discussed that patient had communication with [facility] and they could possibly have the staffing. Dana Paz expressed plan to reach out to [facility] about staffing.

## 2022-02-23 NOTE — TELEPHONE ENCOUNTER
Patient requesting medication refill. Please approve or deny this request.    Patient states pharmacy needs updated prescriptions     Please advise     Rx requested:  Requested Prescriptions     Pending Prescriptions Disp Refills    traMADol (ULTRAM) 50 MG tablet 42 tablet 0     Sig: Take 1 tablet by mouth every 4 hours as needed for Pain for up to 30 days. Intended supply: 30  days. Take lowest dose possible to manage pain    aspirin EC 81 MG EC tablet 30 tablet 5     Sig: Take 1 tablet by mouth daily    methotrexate (RHEUMATREX) 2.5 MG chemo tablet 24 tablet 2    folic acid (FOLVITE) 1 MG tablet 30 tablet 11     Sig: Take 1 tablet by mouth daily Do not take on the day you take the methotrexate.     rOPINIRole (REQUIP) 0.5 MG tablet 90 tablet 3     Sig: TAKE 1 TABLET BY MOUTH AT  NIGHT         Last Office Visit:   12/14/2021      Next Visit Date:  Future Appointments   Date Time Provider Cami Ledesma   3/14/2022  4:00 PM Ning Betts MD [address] [place] [address]

## 2022-02-23 NOTE — CARE COORDINATION
Telephone call with patient. She is upset that she not heard from case management with [facility]. She asked that this writer reach out to [facility] again. She is considering changing insurances.

## 2022-02-24 RX ORDER — FOLIC ACID 1 MG/1
1 TABLET ORAL DAILY
Qty: 90 TABLET | Refills: 4 | Status: SHIPPED | OUTPATIENT
Start: 2022-02-24 | End: 2022-10-25 | Stop reason: SDUPTHER

## 2022-02-24 RX ORDER — ASPIRIN 81 MG/1
81 TABLET ORAL DAILY
Qty: 90 TABLET | Refills: 4 | Status: SHIPPED | OUTPATIENT
Start: 2022-02-24 | End: 2022-10-25 | Stop reason: SDUPTHER

## 2022-02-24 RX ORDER — ROPINIROLE 0.5 MG/1
TABLET, FILM COATED ORAL
Qty: 90 TABLET | Refills: 3 | Status: SHIPPED | OUTPATIENT
Start: 2022-02-24 | End: 2022-06-28 | Stop reason: SDUPTHER

## 2022-02-24 RX ORDER — TRAMADOL HYDROCHLORIDE 50 MG/1
50 TABLET ORAL EVERY 4 HOURS PRN
Qty: 42 TABLET | Refills: 0 | OUTPATIENT
Start: 2022-02-24 | End: 2022-03-26

## 2022-02-24 RX ORDER — LEVETIRACETAM 500 MG/1
TABLET ORAL
Qty: 180 TABLET | Refills: 1 | Status: SHIPPED | OUTPATIENT
Start: 2022-02-24 | End: 2022-08-10

## 2022-02-28 ENCOUNTER — CARE COORDINATION (OUTPATIENT)
Dept: CARE COORDINATION | Age: 67
End: 2022-02-28

## 2022-02-28 ENCOUNTER — TELEPHONE (OUTPATIENT)
Dept: FAMILY MEDICINE CLINIC | Age: 67
End: 2022-02-28

## 2022-02-28 NOTE — CARE COORDINATION
Returning patient's phone call. Telephone call to patient. Left voicemail of purpose of call with request for return phone call. Call back number was provided.

## 2022-02-28 NOTE — TELEPHONE ENCOUNTER
Unfortunately , I am unable to do that because I need to see her before another refill can be written.

## 2022-02-28 NOTE — CARE COORDINATION
Telephone with patient. She indicated that she has a new  with Misty Bourgeois [phone]). She was told that she would not qualify for a HHA because she is not needing assistance with personal care. Patient's name was put on waiting list for these services for the future need. She will not qualify for housekeeping assistance at this time.

## 2022-02-28 NOTE — TELEPHONE ENCOUNTER
Pt called in and states she needed a refill on tramadol advised pt this was refused due to needing an appointment pt has an appt on 3/14/22 Can she get a short supply until than   Please advise

## 2022-03-07 ENCOUNTER — CARE COORDINATION (OUTPATIENT)
Dept: CARE COORDINATION | Age: 67
End: 2022-03-07

## 2022-03-07 NOTE — CARE COORDINATION
Patient call explaining about her new  with Joey Coulter. She was she was told that she would not qualify for a HHA because is independent in the house. She would not qualify for housekeeping assistance. Discussed placed patient's name of waiting list at [facility] in [address] [place] [address] several month ago and could call and check on status of this service.

## 2022-03-07 NOTE — CARE COORDINATION
Telephone call to [facility].   Left voicemail requesting return phone call on the status of patient's  referral.

## 2022-03-07 NOTE — CARE COORDINATION
Telephone call to [facility] returning phone call from [place]. She indicated that patient's name is on a waiting but would like to confirm information. When this writer called she left information needed for referral which included name, address,  and phone number of patient. Discussed to call if additional information is necessary.

## 2022-03-07 NOTE — CARE COORDINATION
Telephone call to patient. Left voicemail of nature of call with request for return phone call Call back number was provided.

## 2022-03-14 ENCOUNTER — CARE COORDINATION (OUTPATIENT)
Dept: CARE COORDINATION | Age: 67
End: 2022-03-14

## 2022-03-14 ENCOUNTER — OFFICE VISIT (OUTPATIENT)
Dept: FAMILY MEDICINE CLINIC | Age: 67
End: 2022-03-14
Payer: COMMERCIAL

## 2022-03-14 VITALS
HEIGHT: 58 IN | SYSTOLIC BLOOD PRESSURE: 110 MMHG | HEART RATE: 74 BPM | BODY MASS INDEX: 19.02 KG/M2 | WEIGHT: 90.6 LBS | OXYGEN SATURATION: 95 % | TEMPERATURE: 96 F | RESPIRATION RATE: 15 BRPM | DIASTOLIC BLOOD PRESSURE: 68 MMHG

## 2022-03-14 DIAGNOSIS — L40.50 PSORIASIS WITH ARTHROPATHY (HCC): ICD-10-CM

## 2022-03-14 DIAGNOSIS — E03.9 ACQUIRED HYPOTHYROIDISM: Primary | ICD-10-CM

## 2022-03-14 PROCEDURE — 3017F COLORECTAL CA SCREEN DOC REV: CPT | Performed by: INTERNAL MEDICINE

## 2022-03-14 PROCEDURE — G8484 FLU IMMUNIZE NO ADMIN: HCPCS | Performed by: INTERNAL MEDICINE

## 2022-03-14 PROCEDURE — 99214 OFFICE O/P EST MOD 30 MIN: CPT | Performed by: INTERNAL MEDICINE

## 2022-03-14 PROCEDURE — 1036F TOBACCO NON-USER: CPT | Performed by: INTERNAL MEDICINE

## 2022-03-14 PROCEDURE — G8427 DOCREV CUR MEDS BY ELIG CLIN: HCPCS | Performed by: INTERNAL MEDICINE

## 2022-03-14 PROCEDURE — G8420 CALC BMI NORM PARAMETERS: HCPCS | Performed by: INTERNAL MEDICINE

## 2022-03-14 RX ORDER — TRAMADOL HYDROCHLORIDE 50 MG/1
50 TABLET ORAL EVERY 6 HOURS PRN
Qty: 42 TABLET | Refills: 0 | Status: SHIPPED | OUTPATIENT
Start: 2022-03-14 | End: 2022-06-16

## 2022-03-14 SDOH — ECONOMIC STABILITY: TRANSPORTATION INSECURITY
IN THE PAST 12 MONTHS, HAS THE LACK OF TRANSPORTATION KEPT YOU FROM MEDICAL APPOINTMENTS OR FROM GETTING MEDICATIONS?: NO

## 2022-03-14 SDOH — ECONOMIC STABILITY: TRANSPORTATION INSECURITY
IN THE PAST 12 MONTHS, HAS LACK OF TRANSPORTATION KEPT YOU FROM MEETINGS, WORK, OR FROM GETTING THINGS NEEDED FOR DAILY LIVING?: NO

## 2022-03-14 SDOH — ECONOMIC STABILITY: FOOD INSECURITY: WITHIN THE PAST 12 MONTHS, YOU WORRIED THAT YOUR FOOD WOULD RUN OUT BEFORE YOU GOT MONEY TO BUY MORE.: NEVER TRUE

## 2022-03-14 SDOH — ECONOMIC STABILITY: FOOD INSECURITY: WITHIN THE PAST 12 MONTHS, THE FOOD YOU BOUGHT JUST DIDN'T LAST AND YOU DIDN'T HAVE MONEY TO GET MORE.: NEVER TRUE

## 2022-03-14 ASSESSMENT — SOCIAL DETERMINANTS OF HEALTH (SDOH): HOW HARD IS IT FOR YOU TO PAY FOR THE VERY BASICS LIKE FOOD, HOUSING, MEDICAL CARE, AND HEATING?: NOT HARD AT ALL

## 2022-03-14 ASSESSMENT — ENCOUNTER SYMPTOMS
COUGH: 0
VOMITING: 0
WHEEZING: 0
CHEST TIGHTNESS: 0
NAUSEA: 0
SHORTNESS OF BREATH: 0

## 2022-03-14 NOTE — PROGRESS NOTES
Subjective:      Patient ID: Millie Puga is a 61 y.o. female who presents today for:  Chief Complaint   Patient presents with    Follow-up     thyroid, psoriasis        HPI   Patient presenting for follow up for Hypothyroidism and Psoriatic Arthritis. Patient generally doing well however reports progressive fatigue over the last few weeks. Pertinent history of Acquired Hypothyroidism on Synthroid. Patient reports her Synthroid dose was recently reduced from 50 mcg daily to 50 mcg daily 5x a week (M-F). She is concerned that this may be contributory. No reports of associated skin/hair changes, weight gain or constipation. Patient reports a mild flare in her Psoriasis, notably over elbows and right shoulder. Taking her Methotrexate and folic acid as prescribed and tolerating well. Also reports an exacerbation in her related Arthropathy, particularly in both knees. OTC pain medications have provided no relief. Requesting a refill for her Tramadol. Past Medical History:   Diagnosis Date    Adhesive capsulitis of right shoulder 8/12/2021    Alcoholism ([address]     Arthritis     Bilateral leg and foot pain 8/12/2021    Chronic lung disease     Cognitive impairment 4/15/2021    Depression     Diabetes mellitus ([address]     Diarrhea 1/4/2021    Possibly related to Metformin, Keppra, constipation, colitis. Stop Metformin. Treat constipation. Consider adjustment to Keppra given side effects.     Encephalopathy 12/10/2020    Grief reaction 4/15/2021    Hyperlipidemia     Hypertension     Hypokalemia 11/6/2020    Hypomagnesemia 11/6/2020    Hypothyroidism     Lymphadenopathy, axillary 1/4/2021    Numbness and tingling of upper and lower extremities of both sides 8/12/2021    Second hand smoke exposure     Seizure ([address]     Stage 3b chronic kidney disease ([address] 8/12/2021    Syncope and collapse 12/10/2020     Past Surgical History:   Procedure Laterality Date    APPENDECTOMY      BIOPSY MOUTH LESION Bilateral 12/19/2016    REMOVAL  OF BILATERAL LINGUAL MACIE performed by Boubacar Small DDS at [facility] COLONOSCOPY N/A 1/21/2021    COLONOSCOPY DIAGNOSTIC performed by Gamal Horne MD at [address] [place] [address] ENDOSCOPY N/A 1/21/2021    EGD with polypectomy performed by Gamal Horne MD at [facility]     Family History   Problem Relation Age of Onset    Heart Disease Mother     Hypertension Mother     Diabetes Mother     Stroke Mother     Heart Disease Father     Hypertension Father     Heart Disease Sister     Hypertension Sister     Heart Disease Brother     Hypertension Brother     Diabetes Brother     Cancer Maternal Grandmother     Diabetes Maternal Grandfather      Allergies   Allergen Reactions    Morphine Swelling     Current Outpatient Medications on File Prior to Visit   Medication Sig Dispense Refill    aspirin EC 81 MG EC tablet Take 1 tablet by mouth daily 90 tablet 4    methotrexate (RHEUMATREX) 2.5 MG chemo tablet TAKE 6 TABLETS BY MOUTH ONCE WEEKLY 24 tablet 0    folic acid (FOLVITE) 1 MG tablet Take 1 tablet by mouth daily Do not take on the day you take the methotrexate.  90 tablet 4    rOPINIRole (REQUIP) 0.5 MG tablet TAKE 1 TABLET BY MOUTH AT  NIGHT 90 tablet 3    levETIRAcetam (KEPPRA) 500 MG tablet TAKE ONE (1) TABLET BY MOUTH TWICE DAILY 180 tablet 1    triamcinolone (KENALOG) 0.1 % cream APPLY TOPICALLY TWICE DAILY 454 g 2    hydrOXYzine (ATARAX) 10 MG tablet TAKE 1 TABLET BY MOUTH EVERY 8 HOURS AS NEEDED FOR ITCHING 90 tablet 2    Handicap Placard MISC by Does not apply route Permanent: NO EXPIRATION 1 each 0    alendronate (FOSAMAX) 35 MG tablet Take 1 tablet by mouth every 7 days 12 tablet 3    atorvastatin (LIPITOR) 40 MG tablet TAKE 1 TABLET BY MOUTH ONCE DAILY 90 tablet 3    fenofibrate (TRIGLIDE) 160 MG tablet Take 1 tablet by mouth daily 90 tablet 4    levothyroxine (SYNTHROID) 50 MCG tablet Take 1 tablet by mouth Daily 90 tablet 3    pantoprazole (PROTONIX) 40 MG tablet Take 1 tablet by mouth daily TAKE 1 TABLET BY MOUTH  DAILY 90 tablet 1    calcium carbonate-vitamin D (CALTRATE) 600-400 MG-UNIT TABS per tab Take 1 tablet by mouth 2 times daily 90 tablet 3    fluocinonide (LIDEX) 0.05 % ointment APPLY TWICE DAILY 180 g 1    Magnesium 400 MG CAPS Take 1 capsule by mouth 3 times daily 270 capsule 4    Nutritional Supplements (ENSURE COMPLETE) LIQD Take 1 Bottle by mouth 2 times daily 60 Bottle 5    acetaminophen (ACETAMINOPHEN EXTRA STRENGTH) 500 MG tablet Take 1 tablet by mouth 2 times daily as needed for Pain 60 tablet 5     No current facility-administered medications on file prior to visit. Review of Systems   Constitutional: Negative for activity change, chills, diaphoresis, fatigue and fever. Respiratory: Negative for cough, chest tightness, shortness of breath and wheezing. Cardiovascular: Negative for chest pain, palpitations and leg swelling. Gastrointestinal: Negative for nausea and vomiting. Musculoskeletal: Positive for arthralgias. Neurological: Negative for dizziness, syncope, light-headedness and headaches. Objective:   /68   Pulse 74   Temp 96 °F (35.6 °C) (Temporal)   Resp 15   Ht 4' 10\" (1.473 m)   Wt 90 lb 9.6 oz (41.1 kg)   LMP  (LMP Unknown)   SpO2 95%   BMI 18.94 kg/m²     Physical Exam  Constitutional:       General: She is not in acute distress. Appearance: Normal appearance. She is normal weight. She is not diaphoretic. HENT:      Head: Normocephalic and atraumatic. Cardiovascular:      Rate and Rhythm: Normal rate and regular rhythm. Pulses: Normal pulses. Heart sounds: Normal heart sounds. No murmur heard. No friction rub. Pulmonary:      Effort: Pulmonary effort is normal. No respiratory distress. Breath sounds: Normal breath sounds. No wheezing or rhonchi.    Chest:      Chest wall: No tenderness. Abdominal:      General: Abdomen is flat. Bowel sounds are normal. There is no distension. Palpations: Abdomen is soft. Tenderness: There is no abdominal tenderness. Musculoskeletal:         General: No tenderness. Normal range of motion. Right lower leg: No edema. Left lower leg: No edema. Neurological:      General: No focal deficit present. Mental Status: She is alert and oriented to person, place, and time. Assessment:      Henry Frost was seen today for follow-up. Diagnoses and all orders for this visit:    Acquired hypothyroidism               -     Currently taking Levothyroxine 50 mcg qd 5x weekly (M-F); recently decreased from 50 mcg 7 x weekly. -     TSH with Reflex; in view of reported progressive fatigue         -      Continue on current Levothyroxine dose pending results. Psoriasis with arthropathy ([facility]  Comments:  Reports flare in arthropathy. Mild psoriatic lesions. Orders:  -     traMADol (ULTRAM) 50 MG tablet; Take 1 tablet by mouth every 6 hours as needed for Pain for up to 30 days. Intended supply: 30  days. No refills.  Take lowest dose possible to manage pain        Health Maintenance   Topic Date Due    COVID-19 Vaccine (1) Never done    DTaP/Tdap/Td vaccine (1 - Tdap) Never done    Shingles Vaccine (1 of 2) Never done    Flu vaccine (1) 09/01/2021    Depression Monitoring  04/28/2022    Annual Wellness Visit (AWV)  04/29/2022    A1C test (Diabetic or Prediabetic)  12/21/2022    Lipid screen  12/21/2022    TSH testing  12/21/2022    Potassium monitoring  12/21/2022    Creatinine monitoring  12/21/2022    Breast cancer screen  02/03/2023    Pneumococcal 0-64 years Vaccine (2 of 2 - PPSV23) 05/09/2024    Colorectal Cancer Screen  01/21/2031    Hepatitis C screen  Completed    HIV screen  Completed    Hepatitis A vaccine  Aged Out    Hepatitis B vaccine  Aged Out    Hib vaccine  Aged C/ Chris Vicki 19 Meningococcal (ACWY) vaccine  Aged Out            Plan:    As above. Orders Placed This Encounter   Procedures    TSH with Reflex     Standing Status:   Future     Standing Expiration Date:   3/14/2023     Orders Placed This Encounter   Medications    traMADol (ULTRAM) 50 MG tablet     Sig: Take 1 tablet by mouth every 6 hours as needed for Pain for up to 30 days. Intended supply: 30  days. Take lowest dose possible to manage pain     Dispense:  42 tablet     Refill:  0     Reduce doses taken as pain becomes manageable       No follow-ups on file.       Juan Jose Hdz MD

## 2022-03-15 ENCOUNTER — CARE COORDINATION (OUTPATIENT)
Dept: CARE COORDINATION | Age: 67
End: 2022-03-15

## 2022-03-15 NOTE — CARE COORDINATION
Returning patient's phone call. Telephone call to patient. Left message regarding purpose of call with request for return phone call. Call back number was provided.

## 2022-03-15 NOTE — CARE COORDINATION
Telephone call to [facility]/housekeeping. Left voicemail message inquiring where patient's name is on the waiting list. Requested return phone call. Call back number was provided.

## 2022-03-15 NOTE — CARE COORDINATION
Telephone call with patient. She was wondering where she is at on waiting list for housekeeping assistance at [facility]. Discussed plan to reach out to [facility].

## 2022-03-18 DIAGNOSIS — E03.9 ACQUIRED HYPOTHYROIDISM: ICD-10-CM

## 2022-03-18 LAB — TSH REFLEX: 1.72 UIU/ML (ref 0.44–3.86)

## 2022-03-22 ENCOUNTER — CARE COORDINATION (OUTPATIENT)
Dept: CARE COORDINATION | Age: 67
End: 2022-03-22

## 2022-03-22 DIAGNOSIS — K21.9 GASTROESOPHAGEAL REFLUX DISEASE WITHOUT ESOPHAGITIS: Chronic | ICD-10-CM

## 2022-03-22 NOTE — CARE COORDINATION
Telephone call with patient. She voiced no new needs or concerns. Discussed plan to discharge from [facility]/ and she was in agreement. Patient has this writer's phone number for future reference.

## 2022-03-23 DIAGNOSIS — L40.50 PSORIASIS WITH ARTHROPATHY (HCC): ICD-10-CM

## 2022-03-24 RX ORDER — TRAMADOL HYDROCHLORIDE 50 MG/1
TABLET ORAL
Qty: 42 TABLET | Refills: 0 | OUTPATIENT
Start: 2022-03-24

## 2022-03-26 RX ORDER — PANTOPRAZOLE SODIUM 40 MG/1
TABLET, DELAYED RELEASE ORAL
Qty: 30 TABLET | Refills: 1 | Status: SHIPPED | OUTPATIENT
Start: 2022-03-26 | End: 2022-05-16

## 2022-03-30 DIAGNOSIS — L40.50 PSORIASIS WITH ARTHROPATHY (HCC): ICD-10-CM

## 2022-03-30 RX ORDER — TRAMADOL HYDROCHLORIDE 50 MG/1
50 TABLET ORAL EVERY 6 HOURS PRN
Qty: 42 TABLET | Refills: 0 | OUTPATIENT
Start: 2022-03-30 | End: 2022-04-29

## 2022-03-30 NOTE — TELEPHONE ENCOUNTER
Pharmacy requesting medication refill. Please approve or deny this request.    Rx requested:  Requested Prescriptions     Pending Prescriptions Disp Refills    traMADol (ULTRAM) 50 MG tablet 42 tablet 0     Sig: Take 1 tablet by mouth every 6 hours as needed for Pain for up to 30 days. Intended supply: 30  days.  Take lowest dose possible to manage pain         Last Office Visit:   12/14/2021      Next Visit Date:  Future Appointments   Date Time Provider [facility] Callie   5/17/2022  2:30 PM Joselin Yung MD [address] [place] [address]

## 2022-04-05 ENCOUNTER — CARE COORDINATION (OUTPATIENT)
Dept: CARE COORDINATION | Age: 67
End: 2022-04-05

## 2022-04-05 NOTE — CARE COORDINATION
Telephone call with patient who indicated that she got a letter in the mail from [employer] [address] [place] [address] Waiver/Annual enrollment services. Patient stated that she now has [employer] and doesn't deal with [employer] [address] [place] [address]. Discussed contacting the number on the letter and to let them know that she has [employer] now.

## 2022-04-19 NOTE — TELEPHONE ENCOUNTER
Rx request   Requested Prescriptions     Pending Prescriptions Disp Refills    calcium carbonate-vitamin D3 (CALTRATE) 600-400 MG-UNIT TABS per tab [Pharmacy Med Name: [address] [place] [address] 600+VIT D3 10MCG(400)T 600-400 Tablet] 60 tablet 10     Sig: TAKE ONE (1) TABLET BY MOUTH TWICE DAILY     LOV 12/14/2021  Next Visit Date:  Future Appointments   Date Time Provider Cami Ledesma   5/17/2022  2:30 PM Anna Carlson MD [address] [place] [address]

## 2022-04-21 DIAGNOSIS — L40.50 PSORIASIS WITH ARTHROPATHY (HCC): ICD-10-CM

## 2022-04-22 DIAGNOSIS — Z51.81 THERAPEUTIC DRUG MONITORING: Primary | ICD-10-CM

## 2022-04-22 RX ORDER — TRAMADOL HYDROCHLORIDE 50 MG/1
TABLET ORAL
Qty: 42 TABLET | Refills: 0 | OUTPATIENT
Start: 2022-04-22

## 2022-04-22 NOTE — TELEPHONE ENCOUNTER
Orlando Oppenheim is supposed to be having her labs drawn monthly. Hasn't had them done in 8 months. Can't refill until they are done.  Standing labs are in lab

## 2022-04-26 ENCOUNTER — TELEPHONE (OUTPATIENT)
Dept: FAMILY MEDICINE CLINIC | Age: 67
End: 2022-04-26

## 2022-04-29 NOTE — TELEPHONE ENCOUNTER
Patient was seen with Dr. Rhonda Lagunas 3/14/2022 since you had a booked day to help you out. Does it matter or she needs to be seen by you ASAP?

## 2022-05-16 DIAGNOSIS — K21.9 GASTROESOPHAGEAL REFLUX DISEASE WITHOUT ESOPHAGITIS: Chronic | ICD-10-CM

## 2022-05-16 RX ORDER — PANTOPRAZOLE SODIUM 40 MG/1
TABLET, DELAYED RELEASE ORAL
Qty: 30 TABLET | Refills: 5 | Status: SHIPPED | OUTPATIENT
Start: 2022-05-16 | End: 2022-10-25 | Stop reason: SDUPTHER

## 2022-05-16 NOTE — TELEPHONE ENCOUNTER
pharmacy requesting medication refill.  Please approve or deny this request.    Rx requested:  Requested Prescriptions     Pending Prescriptions Disp Refills    pantoprazole (PROTONIX) 40 MG tablet [Pharmacy Med Name: PANTOPRAZOLE DR 40MG TAB 40 Tablet] 30 tablet 10     Sig: TAKE 1 TABLET BY MOUTH DAILY         Last Office Visit:   3/14/2022      Next Visit Date:  Future Appointments   Date Time Provider Cami Ledesma   5/17/2022  2:30 PM Kavita Yip MD [address] [place] [address]

## 2022-05-17 ENCOUNTER — OFFICE VISIT (OUTPATIENT)
Dept: FAMILY MEDICINE CLINIC | Age: 67
End: 2022-05-17
Payer: COMMERCIAL

## 2022-05-17 VITALS
HEART RATE: 79 BPM | DIASTOLIC BLOOD PRESSURE: 70 MMHG | TEMPERATURE: 96.3 F | WEIGHT: 91 LBS | OXYGEN SATURATION: 99 % | SYSTOLIC BLOOD PRESSURE: 138 MMHG | HEIGHT: 58 IN | BODY MASS INDEX: 19.1 KG/M2

## 2022-05-17 DIAGNOSIS — Z51.81 THERAPEUTIC DRUG MONITORING: ICD-10-CM

## 2022-05-17 DIAGNOSIS — G63 POLYNEUROPATHY ASSOCIATED WITH UNDERLYING DISEASE (HCC): ICD-10-CM

## 2022-05-17 DIAGNOSIS — E83.42 HYPOMAGNESEMIA: ICD-10-CM

## 2022-05-17 DIAGNOSIS — D63.8 ANEMIA IN OTHER CHRONIC DISEASES CLASSIFIED ELSEWHERE: ICD-10-CM

## 2022-05-17 DIAGNOSIS — M25.561 CHRONIC PAIN OF RIGHT KNEE: ICD-10-CM

## 2022-05-17 DIAGNOSIS — R73.09 ABNORMAL GLUCOSE: ICD-10-CM

## 2022-05-17 DIAGNOSIS — M75.41 IMPINGEMENT SYNDROME OF BOTH SHOULDERS: ICD-10-CM

## 2022-05-17 DIAGNOSIS — E03.9 ACQUIRED HYPOTHYROIDISM: ICD-10-CM

## 2022-05-17 DIAGNOSIS — N18.32 STAGE 3B CHRONIC KIDNEY DISEASE (HCC): ICD-10-CM

## 2022-05-17 DIAGNOSIS — G25.81 RESTLESS LEG SYNDROME: ICD-10-CM

## 2022-05-17 DIAGNOSIS — H01.02A SQUAMOUS BLEPHARITIS OF UPPER AND LOWER EYELIDS OF BOTH EYES: Chronic | ICD-10-CM

## 2022-05-17 DIAGNOSIS — R63.4 UNINTENDED WEIGHT LOSS: ICD-10-CM

## 2022-05-17 DIAGNOSIS — M41.35 THORACOGENIC SCOLIOSIS OF THORACOLUMBAR REGION: Chronic | ICD-10-CM

## 2022-05-17 DIAGNOSIS — G89.29 CHRONIC PAIN OF RIGHT KNEE: ICD-10-CM

## 2022-05-17 DIAGNOSIS — R79.9 ABNORMAL FINDING OF BLOOD CHEMISTRY, UNSPECIFIED: ICD-10-CM

## 2022-05-17 DIAGNOSIS — Z23 NEED FOR PROPHYLACTIC VACCINATION AND INOCULATION AGAINST VARICELLA: ICD-10-CM

## 2022-05-17 DIAGNOSIS — R56.9 SEIZURE (HCC): ICD-10-CM

## 2022-05-17 DIAGNOSIS — M54.9 CHRONIC BACK PAIN, UNSPECIFIED BACK LOCATION, UNSPECIFIED BACK PAIN LATERALITY: Chronic | ICD-10-CM

## 2022-05-17 DIAGNOSIS — H01.02B SQUAMOUS BLEPHARITIS OF UPPER AND LOWER EYELIDS OF BOTH EYES: Chronic | ICD-10-CM

## 2022-05-17 DIAGNOSIS — M75.42 IMPINGEMENT SYNDROME OF BOTH SHOULDERS: ICD-10-CM

## 2022-05-17 DIAGNOSIS — L40.50 PSORIASIS WITH ARTHROPATHY (HCC): ICD-10-CM

## 2022-05-17 DIAGNOSIS — Z00.00 INITIAL MEDICARE ANNUAL WELLNESS VISIT: Primary | ICD-10-CM

## 2022-05-17 DIAGNOSIS — G89.4 CHRONIC PAIN SYNDROME: ICD-10-CM

## 2022-05-17 DIAGNOSIS — F32.0 CURRENT MILD EPISODE OF MAJOR DEPRESSIVE DISORDER, UNSPECIFIED WHETHER RECURRENT (HCC): ICD-10-CM

## 2022-05-17 DIAGNOSIS — Z00.00 ENCOUNTER FOR WELL ADULT EXAM WITHOUT ABNORMAL FINDINGS: Primary | ICD-10-CM

## 2022-05-17 DIAGNOSIS — M47.819 FACET ARTHROPATHY, MULTILEVEL: ICD-10-CM

## 2022-05-17 DIAGNOSIS — G89.29 CHRONIC BACK PAIN, UNSPECIFIED BACK LOCATION, UNSPECIFIED BACK PAIN LATERALITY: Chronic | ICD-10-CM

## 2022-05-17 DIAGNOSIS — Z71.89 COUNSELING ON HEALTH PROMOTION AND DISEASE PREVENTION: ICD-10-CM

## 2022-05-17 DIAGNOSIS — F10.20 ALCOHOLISM (HCC): ICD-10-CM

## 2022-05-17 LAB
ALBUMIN SERPL-MCNC: 4.4 G/DL (ref 3.5–4.6)
ALP BLD-CCNC: 59 U/L (ref 40–130)
ALT SERPL-CCNC: 29 U/L (ref 0–33)
ANION GAP SERPL CALCULATED.3IONS-SCNC: 14 MEQ/L (ref 9–15)
AST SERPL-CCNC: 48 U/L (ref 0–35)
BASOPHILS ABSOLUTE: 0 K/UL (ref 0–0.2)
BASOPHILS RELATIVE PERCENT: 0.4 %
BILIRUB SERPL-MCNC: 0.4 MG/DL (ref 0.2–0.7)
BUN BLDV-MCNC: 27 MG/DL (ref 8–23)
C-REACTIVE PROTEIN: <3 MG/L (ref 0–5)
CALCIUM SERPL-MCNC: 8.9 MG/DL (ref 8.5–9.9)
CHLORIDE BLD-SCNC: 99 MEQ/L (ref 95–107)
CO2: 25 MEQ/L (ref 20–31)
CREAT SERPL-MCNC: 0.97 MG/DL (ref 0.5–0.9)
EOSINOPHILS ABSOLUTE: 0.2 K/UL (ref 0–0.7)
EOSINOPHILS RELATIVE PERCENT: 4 %
GFR AFRICAN AMERICAN: >60
GFR NON-AFRICAN AMERICAN: 57.6
GLOBULIN: 2.4 G/DL (ref 2.3–3.5)
GLUCOSE BLD-MCNC: 116 MG/DL (ref 70–99)
HBA1C MFR BLD: 5.1 % (ref 4.8–5.9)
HCT VFR BLD CALC: 30 % (ref 37–47)
HEMOGLOBIN: 9.8 G/DL (ref 12–16)
LYMPHOCYTES ABSOLUTE: 0.9 K/UL (ref 1–4.8)
LYMPHOCYTES RELATIVE PERCENT: 15.3 %
MAGNESIUM: 1.8 MG/DL (ref 1.7–2.4)
MCH RBC QN AUTO: 32.6 PG (ref 27–31.3)
MCHC RBC AUTO-ENTMCNC: 32.7 % (ref 33–37)
MCV RBC AUTO: 99.5 FL (ref 82–100)
MONOCYTES ABSOLUTE: 0.5 K/UL (ref 0.2–0.8)
MONOCYTES RELATIVE PERCENT: 8.5 %
NEUTROPHILS ABSOLUTE: 4.3 K/UL (ref 1.4–6.5)
NEUTROPHILS RELATIVE PERCENT: 71.8 %
PDW BLD-RTO: 17 % (ref 11.5–14.5)
PLATELET # BLD: 130 K/UL (ref 130–400)
POTASSIUM SERPL-SCNC: 4.2 MEQ/L (ref 3.4–4.9)
PREALBUMIN: 21.7 MG/DL (ref 20–40)
RBC # BLD: 3.01 M/UL (ref 4.2–5.4)
SEDIMENTATION RATE, ERYTHROCYTE: 25 MM (ref 0–30)
SODIUM BLD-SCNC: 138 MEQ/L (ref 135–144)
T4 FREE: 1.59 NG/DL (ref 0.84–1.68)
TOTAL PROTEIN: 6.8 G/DL (ref 6.3–8)
TSH SERPL DL<=0.05 MIU/L-ACNC: 1.78 UIU/ML (ref 0.44–3.86)
WBC # BLD: 5.9 K/UL (ref 4.8–10.8)

## 2022-05-17 PROCEDURE — G8399 PT W/DXA RESULTS DOCUMENT: HCPCS | Performed by: FAMILY MEDICINE

## 2022-05-17 PROCEDURE — 99214 OFFICE O/P EST MOD 30 MIN: CPT | Performed by: FAMILY MEDICINE

## 2022-05-17 PROCEDURE — 3017F COLORECTAL CA SCREEN DOC REV: CPT | Performed by: FAMILY MEDICINE

## 2022-05-17 PROCEDURE — G0438 PPPS, INITIAL VISIT: HCPCS | Performed by: FAMILY MEDICINE

## 2022-05-17 PROCEDURE — 1036F TOBACCO NON-USER: CPT | Performed by: FAMILY MEDICINE

## 2022-05-17 PROCEDURE — 1123F ACP DISCUSS/DSCN MKR DOCD: CPT | Performed by: FAMILY MEDICINE

## 2022-05-17 PROCEDURE — G8420 CALC BMI NORM PARAMETERS: HCPCS | Performed by: FAMILY MEDICINE

## 2022-05-17 PROCEDURE — G8427 DOCREV CUR MEDS BY ELIG CLIN: HCPCS | Performed by: FAMILY MEDICINE

## 2022-05-17 PROCEDURE — 1090F PRES/ABSN URINE INCON ASSESS: CPT | Performed by: FAMILY MEDICINE

## 2022-05-17 RX ORDER — TRAMADOL HYDROCHLORIDE 50 MG/1
TABLET ORAL
Qty: 42 TABLET | Refills: 0 | OUTPATIENT
Start: 2022-05-17 | End: 2022-06-16

## 2022-05-17 RX ORDER — DULOXETIN HYDROCHLORIDE 30 MG/1
30 CAPSULE, DELAYED RELEASE ORAL DAILY
Qty: 7 CAPSULE | Refills: 0 | Status: SHIPPED | OUTPATIENT
Start: 2022-05-17 | End: 2022-06-28 | Stop reason: ALTCHOICE

## 2022-05-17 RX ORDER — DULOXETIN HYDROCHLORIDE 60 MG/1
60 CAPSULE, DELAYED RELEASE ORAL DAILY
Qty: 90 CAPSULE | Refills: 1 | Status: SHIPPED | OUTPATIENT
Start: 2022-05-17 | End: 2022-10-25 | Stop reason: SDUPTHER

## 2022-05-17 RX ORDER — NICOTINE POLACRILEX 2 MG
LOZENGE BUCCAL
Qty: 400 ML | Refills: 5 | Status: SHIPPED | OUTPATIENT
Start: 2022-05-17 | End: 2022-10-25

## 2022-05-17 RX ORDER — ZOSTER VACCINE RECOMBINANT, ADJUVANTED 50 MCG/0.5
0.5 KIT INTRAMUSCULAR ONCE
Qty: 1 EACH | Refills: 1 | Status: SHIPPED | OUTPATIENT
Start: 2022-05-17 | End: 2022-05-17

## 2022-05-17 ASSESSMENT — PATIENT HEALTH QUESTIONNAIRE - PHQ9
SUM OF ALL RESPONSES TO PHQ QUESTIONS 1-9: 8
8. MOVING OR SPEAKING SO SLOWLY THAT OTHER PEOPLE COULD HAVE NOTICED. OR THE OPPOSITE, BEING SO FIGETY OR RESTLESS THAT YOU HAVE BEEN MOVING AROUND A LOT MORE THAN USUAL: 1
9. THOUGHTS THAT YOU WOULD BE BETTER OFF DEAD, OR OF HURTING YOURSELF: 0
2. FEELING DOWN, DEPRESSED OR HOPELESS: 1
5. POOR APPETITE OR OVEREATING: 1
4. FEELING TIRED OR HAVING LITTLE ENERGY: 1
3. TROUBLE FALLING OR STAYING ASLEEP: 1
10. IF YOU CHECKED OFF ANY PROBLEMS, HOW DIFFICULT HAVE THESE PROBLEMS MADE IT FOR YOU TO DO YOUR WORK, TAKE CARE OF THINGS AT HOME, OR GET ALONG WITH OTHER PEOPLE: 1
7. TROUBLE CONCENTRATING ON THINGS, SUCH AS READING THE NEWSPAPER OR WATCHING TELEVISION: 1
SUM OF ALL RESPONSES TO PHQ QUESTIONS 1-9: 8
SUM OF ALL RESPONSES TO PHQ QUESTIONS 1-9: 8
SUM OF ALL RESPONSES TO PHQ9 QUESTIONS 1 & 2: 2
6. FEELING BAD ABOUT YOURSELF - OR THAT YOU ARE A FAILURE OR HAVE LET YOURSELF OR YOUR FAMILY DOWN: 1
SUM OF ALL RESPONSES TO PHQ QUESTIONS 1-9: 8
1. LITTLE INTEREST OR PLEASURE IN DOING THINGS: 1

## 2022-05-17 ASSESSMENT — LIFESTYLE VARIABLES: HOW OFTEN DO YOU HAVE A DRINK CONTAINING ALCOHOL: NEVER

## 2022-05-17 NOTE — PATIENT INSTRUCTIONS
Advance Directives: Care Instructions  Overview  An advance directive is a legal way to state your wishes at the end of your life. It tells your family and your doctor what to do if you can't say what youwant. There are two main types of advance directives. You can change them any timeyour wishes change. Living will. This form tells your family and your doctor your wishes about life support and other treatment. The form is also called a declaration. Medical power of . This form lets you name a person to make treatment decisions for you when you can't speak for yourself. This person is called a health care agent (health care proxy, health care surrogate). The form is also called a durable power of  for health care. If you do not have an advance directive, decisions about your medical care maybe made by a family member, or by a doctor or a  who doesn't know you. It may help to think of an advance directive as a gift to the people who carefor you. If you have one, they won't have to make tough decisions by themselves. Follow-up care is a key part of your treatment and safety. Be sure to make and go to all appointments, and call your doctor if you are having problems. It's also a good idea to know your test results and keep alist of the medicines you take. What should you include in an advance directive? Many states have a unique advance directive form. (It may ask you to address specific issues.) Or you might use a universal form that's approved by manystates. If your form doesn't tell you what to address, it may be hard to know what to include in your advance directive. Use the questions below to help you getstarted.  Who do you want to make decisions about your medical care if you are not able to?  What life-support measures do you want if you have a serious illness that gets worse over time or can't be cured?  What are you most afraid of that might happen?  (Maybe you're afraid of having pain, losing your independence, or being kept alive by machines.)   Where would you prefer to die? (Your home? A hospital? A nursing home?)   Do you want to donate your organs when you die?  Do you want certain Caodaism practices performed before you die? When should you call for help? Be sure to contact your doctor if you have any questions. Where can you learn more? Go to [email or web address] and sign in to your Startupeando account. Enter R264 in the InRadio box to learn more about \"Advance Directives: Care Instructions. \"     If you do not have an account, please click on the \"Sign Up Now\" link. Current as of: October 18, 2021               Content Version: 13.2  © 2006-2022 [employer]. Care instructions adapted under license by [facility] ([facility]). If you have questions about a medical condition or this instruction, always ask your healthcare professional. [place] [address] any warranty or liability for your use of this information. Learning About Medical Power of   What is a medical power of ? A medical power of , also called a durable power of  for health care, is one type of the legal forms called advance directives. It lets you name the person you want to make treatment decisions for you if you can't speak or decide for yourself. The person you choose is called your health care agent. This person is also called a health care proxy or health care surrogate. A medical power of  may be called something else in your state. How do you choose a health care agent? Choose your health care agent carefully. This person may or may not be a familymember. Talk to the person before you make your final decision. Make sure he or she iscomfortable with this responsibility. It's a good idea to choose someone who:   Is at least 25years old.    Knows you well and understands what makes life meaningful for you.  Understands your Islam and moral values.  Will do what you want, not what he or she wants.  Will be able to make difficult choices at a stressful time.  Will be able to refuse or stop treatment, if that is what you would want, even if you could die.  Will be firm and confident with health professionals if needed.  Will ask questions to get needed information.  Lives near you or agrees to travel to you if needed. Your family may help you make medical decisions while you can still be part of that process. But it's important to choose one person to be your health careagent in case you aren't able to make decisions for yourself. If you don't fill out the legal form and name a health care agent, thedecisions your family can make may be limited. A health care agent may be called something else in your state. Who will make decisions for you if you don't have a health care agent? If you don't have a health care agent or a living will, you may not get the care you want. Decisions may be made by family members who disagree about your medical care. Or decisions may be made by a medical professional who doesn'tknow you well. In some cases, a  makes the decisions. When you name a health care agent, it is very clear who has the power to [place] decisions for you. How do you name a health care agent? You name your health care agent on a legal form. This form is usually called a medical power of . Ask your hospital, state bar association, or officeon aging where to find these forms. You must sign the form to make it legal. Some states require you to get the form notarized. This means that a person called a  watches you sign the form and then he or she signs the form. Some states also require thattwo or more witnesses sign the form. Be sure to tell your family members and doctors who your health care agent is. Where can you learn more?   Go to [email or web address] and sign in to your NetzVacation account. Enter [id number] in the [facility] box to learn more about \"Learning About [address]. \"     If you do not have an account, please click on the \"Sign Up Now\" link. Current as of: October 18, 2021               Content Version: 13.2  © 2006-2022 [employer]. Care instructions adapted under license by [facility] ([facility]). If you have questions about a medical condition or this instruction, always ask your healthcare professional. [address] any warranty or liability for your use of this information. Learning About Living [place]  What is a living will? A living will, also called a declaration, is a legal form. It tells your family and your doctor your wishes when you can't speak for yourself. It's used by the health professionals who will treat you as you near the end of your life or ifyou get seriously hurt or ill. If you put your wishes in writing, your loved ones and others will know what kind of care you want. They won't need to guess. This can ease your mind and behelpful to others. And you can change or cancel your living will at any time. A living will is not the same as an estate or property will. An estate willexplains what you want to happen with your money and property after you die. How do you use it? Keep these facts in mind about how a living will is used.  Your living will is used only if you can't speak or make decisions for yourself. Most often, one or more doctors must certify that you can't speak or decide for yourself before your living will takes effect.  If you get better and can speak for yourself again, you can accept or refuse any treatment. It doesn't matter what you said in your living will.  Some states may limit your right to refuse treatment in certain cases.  For example, you may need to clearly state in your living will that you don't want artificial hydration and nutrition, such as being fed through a tube. Is a living will a legal document? A living will is a legal document. Each state has its own laws about livingwills. And a living will may be called something else in your state. Here are some things to know about living al.  You don't need an  to complete a living will. But legal advice can be helpful if your state's laws are unclear. It can also help if your health history is complicated or your family can't agree on what should be in your living will.  You can change your living will at any time. Some people find that their wishes about end-of-life care change as their health changes. If you make big changes to your living will, complete a new form.  If you move to another state, make sure that your living will is legal in the state where you now live. In most cases, doctors will respect your wishes even if you have a form from a different state.  You might use a universal form that has been approved by many states. This kind of form can sometimes be filled out and stored online. Your digital copy will then be available wherever you have a connection to the internet. The doctors and nurses who need to treat you can find it right away.  Your state may offer an online registry. This is another place where you can store your living will online.  It's a good idea to get your living will notarized. This means using a person called a  to watch two people sign, or witness, your living will. What should you know when you create a living will? Here are some questions to ask yourself as you make your living will.  Do you know enough about life support methods that might be used? If not, talk to your doctor so you know what might be done if you can't breathe on your own, your heart stops, or you can't swallow.  What things would you still want to be able to do after you receive life-support methods?  Would you want to be able to walk? To speak? To eat on your own? To live without the help of machines?  Do you want certain Pentecostalism practices performed if you become very ill?  If you have a choice, where do you want to be cared for? In your home? At a hospital or nursing home?  If you have a choice at the end of your life, where would you prefer to die? At home? In a hospital or nursing home? Somewhere else?  Would you prefer to be buried or cremated?  Do you want your organs to be donated after you die? What should you do with your living will?  Make sure that your family members and your health care agent have copies of your living will (also called a declaration).  Give your doctor a copy of your living will. Ask to have it kept as part of your medical record. If you have more than one doctor, make sure that each one has a copy.  Put a copy of your living will where it can be easily found. For example, some people may put a copy on their refrigerator door. If you are using a digital copy, be sure your doctor, family members, and health care agent know how to find and access it. Where can you learn more? Go to [email or web address] and sign in to your Cloze account. Enter D965 in the Pelikon box to learn more about \"Learning About Living [place]. \"     If you do not have an account, please click on the \"Sign Up Now\" link. Current as of: October 18, 2021               Content Version: 13.2  © 2006-2022 [employer]. Care instructions adapted under license by [facility] ([facility]). If you have questions about a medical condition or this instruction, always ask your healthcare professional. [place] [address] any warranty or liability for your use of this information. Personalized Preventive Plan for Valentina Lion - 5/17/2022  Medicare offers a range of preventive health benefits.  Some of the tests and screenings are paid in full while other may be subject to a deductible, co-insurance, and/or copay. Some of these benefits include a comprehensive review of your medical history including lifestyle, illnesses that may run in your family, and various assessments and screenings as appropriate. After reviewing your medical record and screening and assessments performed today your provider may have ordered immunizations, labs, imaging, and/or referrals for you. A list of these orders (if applicable) as well as your Preventive Care list are included within your After Visit Summary for your review. Other Preventive Recommendations:    · A preventive eye exam performed by an eye specialist is recommended every 1-2 years to screen for glaucoma; cataracts, macular degeneration, and other eye disorders. · A preventive dental visit is recommended every 6 months. · Try to get at least 150 minutes of exercise per week or 10,000 steps per day on a pedometer . · Order or download the FREE \"Exercise & Physical Activity: Your Everyday Guide\" from [employer]. Call 5-301.818.2761 or search [employer] online. · You need 4080-3361 mg of calcium and 5457-8975 IU of vitamin D per day. It is possible to meet your calcium requirement with diet alone, but a vitamin D supplement is usually necessary to meet this goal.  · When exposed to the sun, use a sunscreen that protects against both UVA and UVB radiation with an SPF of 30 or greater. Reapply every 2 to 3 hours or after sweating, drying off with a towel, or swimming. · Always wear a seat belt when traveling in a car. Always wear a helmet when riding a bicycle or motorcycle. Heart-Healthy Diet   Sodium, Fat, and Cholesterol Controlled Diet       What Is a Heart Healthy Diet? A heart-healthy diet is one that limits sodium , certain types of fat , and cholesterol .  This type of diet is recommended for:   People with any form of cardiovascular disease (eg, coronary heart disease , peripheral vascular disease , previous heart attack , previous stroke )   People with risk factors for cardiovascular disease, such as high blood pressure , high cholesterol , or diabetes   Anyone who wants to lower their risk of developing cardiovascular disease   Sodium    Sodium is a mineral found in many foods. In general, most people consume much more sodium than they need. Diets high in sodium can increase blood pressure and lead to edema (water retention). On a heart-healthy diet, you should consume no more than 2,300 mg (milligrams) of sodium per dayabout the amount in one teaspoon of table salt. The foods highest in sodium include table salt (about 50% sodium), processed foods, convenience foods, and preserved foods. Cholesterol    Cholesterol is a fat-like, waxy substance in your blood. Our bodies make some cholesterol. It is also found in animal products, with the highest amounts in fatty meat, egg yolks, whole milk, cheese, shellfish, and organ meats. On a heart-healthy diet, you should limit your cholesterol intake to less than 200 mg per day. It is normal and important to have some cholesterol in your bloodstream. But too much cholesterol can cause plaque to build up within your arteries, which can eventually lead to a heart attack or stroke. The two types of cholesterol that are most commonly referred to are:   Low-density lipoprotein (LDL) cholesterol  Also known as bad cholesterol, this is the cholesterol that tends to build up along your arteries. Bad cholesterol levels are increased by eating fats that are saturated or hydrogenated. Optimal level of this cholesterol is less than 100. Over 130 starts to get risky for heart disease. High-density lipoprotein (HDL) cholesterol  Also known as good cholesterol, this type of cholesterol actually carries cholesterol away from your arteries and may, therefore, help lower your risk of having a heart attack.  You want this level to be high (ideally greater than 60). It is a risk to have a level less than 40. You can raise this good cholesterol by eating olive oil, canola oil, avocados, or nuts. Exercise raises this level, too. Fat    Fat is calorie dense and packs a lot of calories into a small amount of food. Even though fats should be limited due to their high calorie content, not all fats are bad. In fact, some fats are quite healthful. Fat can be broken down into four main types. The good-for-you fats are:   Monounsaturated fat  found in oils such as olive and canola, avocados, and nuts and natural nut butters; can decrease cholesterol levels, while keeping levels of HDL cholesterol high   Polyunsaturated fat  found in oils such as safflower, sunflower, soybean, corn, and sesame; can decrease total cholesterol and LDL cholesterol   Omega-3 fatty acids  particularly those found in fatty fish (such as salmon, trout, tuna, mackerel, herring, and sardines); can decrease risk of arrhythmias, decrease triglyceride levels, and slightly lower blood pressure   The fats that you want to limit are:   Saturated fat  found in animal products, many fast foods, and a few vegetables; increases total blood cholesterol, including LDL levels   Animal fats that are saturated include: butter, lard, whole-milk dairy products, meat fat, and poultry skin   Vegetable fats that are saturated include: hydrogenated shortening, palm oil, coconut oil, cocoa butter   Hydrogenated or trans fat  found in margarine and vegetable shortening, most shelf stable snack foods, and fried foods; increases LDL and decreases HDL     It is generally recommended that you limit your total fat for the day to less than 30% of your total calories. If you follow an 1800-calorie heart healthy diet, for example, this would mean 60 grams of fat or less per day.    Saturated fat and trans fat in your diet raises your blood cholesterol the most, much more than dietary cholesterol does. For this reason, on a heart-healthy diet, less than 7% of your calories should come from saturated fat and ideally 0% from trans fat. On an 1800-calorie diet, this translates into less than 14 grams of saturated fat per day, leaving 46 grams of fat to come from mono- and polyunsaturated fats.    Food Choices on a Heart Healthy Diet   Food Category   Foods Recommended   Foods to Avoid   Grains   Breads and rolls without salted tops Most dry and cooked cereals Unsalted crackers and breadsticks Low-sodium or homemade breadcrumbs or stuffing All rice and pastas   Breads, rolls, and crackers with salted tops High-fat baked goods (eg, muffins, donuts, pastries) Quick breads, self-rising flour, and biscuit mixes Regular bread crumbs Instant hot cereals Commercially prepared rice, pasta, or stuffing mixes   Vegetables   Most fresh, frozen, and low-sodium canned vegetables Low-sodium and salt-free vegetable juices Canned vegetables if unsalted or rinsed   Regular canned vegetables and juices, including sauerkraut and pickled vegetables Frozen vegetables with sauces Commercially prepared potato and vegetable mixes   Fruits   Most fresh, frozen, and canned fruits All fruit juices   Fruits processed with salt or sodium   Milk   Nonfat or low-fat (1%) milk Nonfat or low-fat yogurt Cottage cheese, low-fat ricotta, cheeses labeled as low-fat and low-sodium   Whole milk Reduced-fat (2%) milk Malted and chocolate milk Full fat yogurt Most cheeses (unless low-fat and low salt) Buttermilk (no more than 1 cup per week)   Meats and Beans   Lean cuts of fresh or frozen beef, veal, lamb, or pork (look for the word loin) Fresh or frozen poultry without the skin Fresh or frozen fish and some shellfish Egg whites and egg substitutes (Limit whole eggs to three per week) Tofu Nuts or seeds (unsalted, dry-roasted), low-sodium peanut butter Dried peas, beans, and lentils   Any smoked, cured, salted, or canned meat, fish, or poultry (including ramirez, chipped beef, cold cuts, hot dogs, sausages, sardines, and anchovies) Poultry skins Breaded and/or fried fish or meats Canned peas, beans, and lentils Salted nuts   Fats and Oils   Olive oil and canola oil Low-sodium, low-fat salad dressings and mayonnaise   Butter, margarine, coconut and palm oils, ramirez fat   Snacks, Sweets, and Condiments   Low-sodium or unsalted versions of broths, soups, soy sauce, and condiments Pepper, herbs, and spices; vinegar, lemon, or lime juice Low-fat frozen desserts (yogurt, sherbet, fruit bars) Sugar, cocoa powder, honey, syrup, jam, and preserves Low-fat, trans-fat free cookies, cakes, and pies [place] and animal crackers, fig bars, elvie snaps   High-fat desserts Broth, soups, gravies, and sauces, made from instant mixes or other high-sodium ingredients Salted snack foods Canned olives Meat tenderizers, seasoning salt, and most flavored vinegars   Beverages   Low-sodium carbonated beverages Tea and coffee in moderation Soy milk   Commercially softened water   Suggestions   Make whole grains, fruits, and vegetables the base of your diet. Choose heart-healthy fats such as canola, olive, and flaxseed oil, and foods high in heart-healthy fats, such as nuts, seeds, soybeans, tofu, and fish. Eat fish at least twice per week; the fish highest in omega-3 fatty acids and lowest in mercury include salmon, herring, mackerel, sardines, and canned chunk light tuna. If you eat fish less than twice per week or have high triglycerides, talk to your doctor about taking fish oil supplements. Read food labels. For products low in fat and cholesterol, look for fat free, low-fat, cholesterol free, saturated fat free, and trans fat freeAlso scan the Nutrition Facts Label, which lists saturated fat, trans fat, and cholesterol amounts.    For products low in sodium, look for sodium free, very low sodium, low sodium, no added salt, and unsalted   Skip the salt when cooking or at the table; if food needs more flavor, get creative and try out different herbs and spices. Garlic and onion also add substantial flavor to foods. Trim any visible fat off meat and poultry before cooking, and drain the fat off after manzano. Use cooking methods that require little or no added fat, such as grilling, boiling, baking, poaching, broiling, roasting, steaming, stir-frying, and sauting. Avoid fast food and convenience food. They tend to be high in saturated and trans fat and have a lot of added salt. Talk to a registered dietitian for individualized diet advice. Last Reviewed: March 2011 Vernon Miguel MS, MPH, RD   Updated: 3/29/2011   ·     Heart-Healthy Diet   Sodium, Fat, and Cholesterol Controlled Diet       What Is a Heart Healthy Diet? A heart-healthy diet is one that limits sodium , certain types of fat , and cholesterol . This type of diet is recommended for:   People with any form of cardiovascular disease (eg, coronary heart disease , peripheral vascular disease , previous heart attack , previous stroke )   People with risk factors for cardiovascular disease, such as high blood pressure , high cholesterol , or diabetes   Anyone who wants to lower their risk of developing cardiovascular disease   Sodium    Sodium is a mineral found in many foods. In general, most people consume much more sodium than they need. Diets high in sodium can increase blood pressure and lead to edema (water retention). On a heart-healthy diet, you should consume no more than 2,300 mg (milligrams) of sodium per dayabout the amount in one teaspoon of table salt. The foods highest in sodium include table salt (about 50% sodium), processed foods, convenience foods, and preserved foods. Cholesterol    Cholesterol is a fat-like, waxy substance in your blood. Our bodies make some cholesterol.  It is also found in animal products, with the highest amounts in fatty meat, egg yolks, whole milk, cheese, shellfish, and organ meats. On a heart-healthy diet, you should limit your cholesterol intake to less than 200 mg per day. It is normal and important to have some cholesterol in your bloodstream. But too much cholesterol can cause plaque to build up within your arteries, which can eventually lead to a heart attack or stroke. The two types of cholesterol that are most commonly referred to are:   Low-density lipoprotein (LDL) cholesterol  Also known as bad cholesterol, this is the cholesterol that tends to build up along your arteries. Bad cholesterol levels are increased by eating fats that are saturated or hydrogenated. Optimal level of this cholesterol is less than 100. Over 130 starts to get risky for heart disease. High-density lipoprotein (HDL) cholesterol  Also known as good cholesterol, this type of cholesterol actually carries cholesterol away from your arteries and may, therefore, help lower your risk of having a heart attack. You want this level to be high (ideally greater than 60). It is a risk to have a level less than 40. You can raise this good cholesterol by eating olive oil, canola oil, avocados, or nuts. Exercise raises this level, too. Fat    Fat is calorie dense and packs a lot of calories into a small amount of food. Even though fats should be limited due to their high calorie content, not all fats are bad. In fact, some fats are quite healthful. Fat can be broken down into four main types.    The good-for-you fats are:   Monounsaturated fat  found in oils such as olive and canola, avocados, and nuts and natural nut butters; can decrease cholesterol levels, while keeping levels of HDL cholesterol high   Polyunsaturated fat  found in oils such as safflower, sunflower, soybean, corn, and sesame; can decrease total cholesterol and LDL cholesterol   Omega-3 fatty acids  particularly those found in fatty fish (such as salmon, trout, tuna, mackerel, herring, and sardines); can decrease risk of arrhythmias, decrease triglyceride levels, and slightly lower blood pressure   The fats that you want to limit are:   Saturated fat  found in animal products, many fast foods, and a few vegetables; increases total blood cholesterol, including LDL levels   Animal fats that are saturated include: butter, lard, whole-milk dairy products, meat fat, and poultry skin   Vegetable fats that are saturated include: hydrogenated shortening, palm oil, coconut oil, cocoa butter   Hydrogenated or trans fat  found in margarine and vegetable shortening, most shelf stable snack foods, and fried foods; increases LDL and decreases HDL     It is generally recommended that you limit your total fat for the day to less than 30% of your total calories. If you follow an 1800-calorie heart healthy diet, for example, this would mean 60 grams of fat or less per day. Saturated fat and trans fat in your diet raises your blood cholesterol the most, much more than dietary cholesterol does. For this reason, on a heart-healthy diet, less than 7% of your calories should come from saturated fat and ideally 0% from trans fat. On an 1800-calorie diet, this translates into less than 14 grams of saturated fat per day, leaving 46 grams of fat to come from mono- and polyunsaturated fats.    Food Choices on a Heart Healthy Diet   Food Category   Foods Recommended   Foods to Avoid   Grains   Breads and rolls without salted tops Most dry and cooked cereals Unsalted crackers and breadsticks Low-sodium or homemade breadcrumbs or stuffing All rice and pastas   Breads, rolls, and crackers with salted tops High-fat baked goods (eg, muffins, donuts, pastries) Quick breads, self-rising flour, and biscuit mixes Regular bread crumbs Instant hot cereals Commercially prepared rice, pasta, or stuffing mixes   Vegetables   Most fresh, frozen, and low-sodium canned vegetables Low-sodium and salt-free vegetable juices Canned vegetables if unsalted or rinsed   Regular canned vegetables and juices, including sauerkraut and pickled vegetables Frozen vegetables with sauces Commercially prepared potato and vegetable mixes   Fruits   Most fresh, frozen, and canned fruits All fruit juices   Fruits processed with salt or sodium   Milk   Nonfat or low-fat (1%) milk Nonfat or low-fat yogurt Cottage cheese, low-fat ricotta, cheeses labeled as low-fat and low-sodium   Whole milk Reduced-fat (2%) milk Malted and chocolate milk Full fat yogurt Most cheeses (unless low-fat and low salt) Buttermilk (no more than 1 cup per week)   Meats and Beans   Lean cuts of fresh or frozen beef, veal, lamb, or pork (look for the word loin) Fresh or frozen poultry without the skin Fresh or frozen fish and some shellfish Egg whites and egg substitutes (Limit whole eggs to three per week) Tofu Nuts or seeds (unsalted, dry-roasted), low-sodium peanut butter Dried peas, beans, and lentils   Any smoked, cured, salted, or canned meat, fish, or poultry (including ramirez, chipped beef, cold cuts, hot dogs, sausages, sardines, and anchovies) Poultry skins Breaded and/or fried fish or meats Canned peas, beans, and lentils Salted nuts   Fats and Oils   Olive oil and canola oil Low-sodium, low-fat salad dressings and mayonnaise   Butter, margarine, coconut and palm oils, ramirez fat   Snacks, Sweets, and Condiments   Low-sodium or unsalted versions of broths, soups, soy sauce, and condiments Pepper, herbs, and spices; vinegar, lemon, or lime juice Low-fat frozen desserts (yogurt, sherbet, fruit bars) Sugar, cocoa powder, honey, syrup, jam, and preserves Low-fat, trans-fat free cookies, cakes, and pies [place] and animal crackers, fig bars, elvie snaps   High-fat desserts Broth, soups, gravies, and sauces, made from instant mixes or other high-sodium ingredients Salted snack foods Canned olives Meat tenderizers, seasoning salt, and most flavored vinegars   Beverages   Low-sodium carbonated beverages Tea and coffee in moderation Soy milk Commercially softened water   Suggestions   Make whole grains, fruits, and vegetables the base of your diet. Choose heart-healthy fats such as canola, olive, and flaxseed oil, and foods high in heart-healthy fats, such as nuts, seeds, soybeans, tofu, and fish. Eat fish at least twice per week; the fish highest in omega-3 fatty acids and lowest in mercury include salmon, herring, mackerel, sardines, and canned chunk light tuna. If you eat fish less than twice per week or have high triglycerides, talk to your doctor about taking fish oil supplements. Read food labels. For products low in fat and cholesterol, look for fat free, low-fat, cholesterol free, saturated fat free, and trans fat freeAlso scan the Nutrition Facts Label, which lists saturated fat, trans fat, and cholesterol amounts. For products low in sodium, look for sodium free, very low sodium, low sodium, no added salt, and unsalted   Skip the salt when cooking or at the table; if food needs more flavor, get creative and try out different herbs and spices. Garlic and onion also add substantial flavor to foods. Trim any visible fat off meat and poultry before cooking, and drain the fat off after manzano. Use cooking methods that require little or no added fat, such as grilling, boiling, baking, poaching, broiling, roasting, steaming, stir-frying, and sauting. Avoid fast food and convenience food. They tend to be high in saturated and trans fat and have a lot of added salt. Talk to a registered dietitian for individualized diet advice. Last Reviewed: March 2011 Corrina Mackey MS, MPH, RD   Updated: 3/29/2011   ·     DASH Diet: After Your Visit  Your Care Instructions  The DASH diet is an eating plan that can help lower your blood pressure. DASH stands for Dietary Approaches to Stop Hypertension. Hypertension is high blood pressure. The DASH diet focuses on eating foods that are high in calcium, potassium, and magnesium.  These nutrients can lower blood pressure. The foods that are highest in these nutrients are fruits, vegetables, low-fat dairy products, nuts, seeds, and legumes. But taking calcium, potassium, and magnesium supplements instead of eating foods that are high in those nutrients does not have the same effect. The DASH diet also includes whole grains, fish, and poultry. The DASH diet is one of several lifestyle changes your doctor may recommend to lower your high blood pressure. Your doctor may also want you to decrease the amount of sodium in your diet. Lowering sodium while following the DASH diet can lower blood pressure even further than just the DASH diet alone. Follow-up care is a key part of your treatment and safety. Be sure to make and go to all appointments, and call your doctor if you are having problems. Its also a good idea to know your test results and keep a list of the medicines you take. How can you care for yourself at home? Following the DASH diet  Eat 4 to 5 servings of fruit each day. A serving is 1 medium-sized piece of fruit, ½ cup chopped or canned fruit, 1/4 cup dried fruit, or 4 ounces (½ cup) of fruit juice. Choose fruit more often than fruit juice. Eat 4 to 5 servings of vegetables each day. A serving is 1 cup of lettuce or raw leafy vegetables, ½ cup of chopped or cooked vegetables, or 4 ounces (½ cup) of vegetable juice. Choose vegetables more often than vegetable juice. Get 2 to 3 servings of low-fat and fat-free dairy each day. A serving is 8 ounces of milk, 1 cup of yogurt, or 1 ½ ounces of cheese. Eat 7 to 8 servings of grains each day. A serving is 1 slice of bread, 1 ounce of dry cereal, or ½ cup of cooked rice, pasta, or cooked cereal. Try to choose whole-grain products as much as possible. Limit lean meat, poultry, and fish to 6 ounces each day. Six ounces is about the size of two decks of cards.   Eat 4 to 5 servings of nuts, seeds, and legumes (cooked dried beans, lentils, and split peas) each week. A serving is 1/3 cup of nuts, 2 tablespoons of seeds, or ½ cup cooked dried beans or peas. Limit sweets and added sugars to 5 servings or less a week. A serving is 1 tablespoon jelly or jam, ½ cup sorbet, or 1 cup of lemonade. Tips for success  Start small. Do not try to make dramatic changes to your diet all at once. You might feel that you are missing out on your favorite foods and then be more likely to not follow the plan. Make small changes, and stick with them. Once those changes become habit, add a few more changes. Try some of the following:  Make it a goal to eat a fruit or vegetable at every meal and at snacks. This will make it easy to get the recommended amount of fruits and vegetables each day. Try yogurt topped with fruit and nuts for a snack or healthy dessert. Add lettuce, tomato, cucumber, and onion to sandwiches. Combine a ready-made pizza crust with low-fat mozzarella cheese and lots of vegetable toppings. Try using tomatoes, squash, spinach, broccoli, carrots, cauliflower, and onions. Have a variety of cut-up vegetables with a low-fat dip as an appetizer instead of chips and dip. Sprinkle sunflower seeds or chopped almonds over salads. Or try adding chopped walnuts or almonds to cooked vegetables. Try some vegetarian meals using beans and peas. Add garbanzo or kidney beans to salads. Make burritos and tacos with mashed eastman beans or black beans    © 3578-7715 [employer]. Care instructions adapted under license by [facility]. This care instruction is for use with your licensed healthcare professional. If you have questions about a medical condition or this instruction, always ask your healthcare professional. [address]  any warranty or liability for your use of this information. Content Version: 9.4.48108; Last Revised: March 15, 2012              ·     High-Fiber Diet     What Is Fiber?    Dietary fiber is a form of carbohydrate found in plants that cannot be digested by humans. All plants contain fiber, including fruits, vegetables, grains, and legumes. Fiber is often classified into two categories: soluble and insoluble. Soluble fiber draws water into the bowel and can help slow digestion. Examples of foods that are high in soluble fiber include oatmeal, oat bran, barley, legumes (eg, beans and peas), apples, and strawberries. Insoluble fiber speeds digestion and can add bulk to the stool. Examples of foods that are high in insoluble fiber include whole-wheat products, wheat bran, cauliflower, green beans, and potatoes. Why Follow a High-Fiber Diet? A high-fiber diet is often recommended to prevent and treat constipation , hemorrhoids , diverticulitis , and irritable bowel syndrome . Eating a high-fiber diet can also help improve your cholesterol levels, lower your risk of coronary heart disease , reduce your risk of type 2 diabetes , and lower your weight. For people with type 1 or 2 diabetes, a high-fiber diet can also help stabilize blood sugar levels. How Much Fiber Should I Eat? A high-fiber diet should contain  20-35 grams  of fiber a day. This is actually the amount recommended for the general adult population; however, most Americans eat only 15 grams of fiber per day. Digestion of Fiber   Eating a higher fiber diet than usual can take some getting used to by your body's digestive system. To avoid the side effects of sudden increases in dietary fiber (eg, gas, cramping, bloating, and diarrhea), increase fiber gradually and be sure to drink plenty of fluids every day. Tips for Increasing Fiber Intake   Whenever possible, choose whole grains over refined grains (eg, brown rice instead of white rice, whole-wheat bread instead of white bread). Include a variety of grains in your diet, such as wheat, rye, barley, oats, quinoa, and bulgur. Eat more vegetarian-based meals.  Here are some ideas: black bean burgers, eggplant lasagna, and veggie tofu stir-fitzpatrick. Choose high-fiber snacks, such as fruits, popcorn, whole-grain crackers, and nuts. Make whole-grain cereal or whole-grain toast part of your daily breakfast regime. When eating out, whether ordering a sandwich or dinner, ask for extra vegetables. When baking, replace part of the white flour with whole-wheat flour. Whole-wheat flour is particularly easy to incorporate into a recipe. High-Fiber Diet Eating Guide   Food Category   Foods Recommended   Notes   Grains   Whole-grain breads, muffins, bagels, or cb bread Rye bread Whole-wheat crackers or crisp breads Whole-grain or bran cereals Oatmeal, oat bran, or grits Wheat germ Whole-wheat pasta and brown rice   Read the ingredients list on food labels. Look for products that list \"whole\" as the first ingredient (eg, whole-wheat, whole oats). Choose cereals with at least 2 grams of fiber per serving. Vegetables   All vegetables, especially asparagus, bean sprouts, broccoli, [place] sprouts, cabbage, carrots, cauliflower, celery, corn, greens, green beans, green pepper, onions, peas, potatoes (with skin), snow peas, spinach, squash, sweet potatoes, tomatoes, zucchini   For maximum fiber intake, eat the peels of fruits and vegetablesjust be sure to wash them well first.   Fruits   All fruits, especially apples, berries, grapefruits, mangoes, nectarines, oranges, peaches, pears, dried fruits (figs, dates, prunes, raisins)   Choose raw fruits and vegetables over juice, cooked, or cannedraw fruit has more fiber. Dried fruit is also a good source of fiber. Milk   With the exception of yogurt containing inulin (a type of fiber), dairy foods provide little fiber. Add more fiber by topping your yogurt or cottage cheese with fresh fruit, whole grain or bran cereals, nuts, or seeds.    Meats and Beans   All beans and peas, especially Garbanzo beans, kidney beans, lentils, lima beans, split peas, and eastman beans All nuts and seeds, especially almonds, peanuts, [place] nuts, cashews, peanut butter, walnuts, sesame and sunflower seeds All meat, poultry, fish, and eggs   Increase fiber in meat dishes by adding eastman beans, kidney beans, black-eyed peas, bran, or oatmeal. If you are following a low-fat diet, use nuts and seeds only in moderation. Fats and Oils   All in moderation   Fats and oils do not provide fiber   Snacks, Sweets, and Condiments   Fruit Nuts Popcorn, whole-wheat pretzels, or trail mix made with dried fruits, nuts, and seeds Cakes, breads, and cookies made with oatmeal or whole-wheat flour   Most snack foods do not provide much fiber. Choose snacks with at least 2 grams of fiber per serving. Last Reviewed: March 2011 Chacha Mar MS, MPH, RD   Updated: 3/29/2011   ·     Keep Your Memory Meche        Many factors can affect your ability to remembera hectic lifestyle, aging, stress, chronic disease, and certain medicines. But, there are steps you can take to sharpen your mind and help preserve your memory. Challenge Your Brain   Regularly challenging your mind may help keeps it in top shape. Good mental exercises include:   Crossword puzzlesUse a dictionary if you need it; you will learn more that way. Brainteasers Try some! Crafts, such as wood working and sewing   Hobbies, such as gardening and building model airplanes   SocializingVisit old friends or join groups to meet new ones. Reading   Learning a new language   Taking a class, whether it be art history or monse chi   TravelingExperience the food, history, and culture of your destination   Learning to use a computer   Going to museums, the theater, or thought-provoking movies   Changing things in your daily life, such as reversing your pattern in the grocery store or brushing your teeth using your nondominant hand   Use Memory Aids   There is no need to remember every detail on your own.  These memory aids can help:   Calendars and day planners   Electronic organizers to store all sorts of helpful informationThese devices can \"beep\" to remind you of appointments. A book of days to record birthdays, anniversaries, and other occasions that occur on the same date every year   Detailed \"to-do\" lists and strategically placed sticky notes   Quick \"study\" sessionsBefore a gathering, review who will be there so their names will be fresh in your mind. Establish routinesFor example, keep your keys, wallet, and umbrella in the same place all the time or take medicine with your 8:00 AM glass of juice   Live a Healthy Life   Many actions that will keep your body strong will do the same for your mind. For example:   Talk to Your Doctor About Herbs and Supplements    Malnutrition and vitamin deficiencies can impair your mental function. For example, vitamin B12 deficiency can cause a range of symptoms, including confusion. But, what if your nutritional needs are being met? Can herbs and supplements still offer a benefit? Researchers have investigated a range of natural remedies, such as ginkgo , ginseng , and the supplement phosphatidylserine (PS). So far, though, the evidence is inconsistent as to whether these products can improve memory or thinking. If you are interested in taking herbs and supplements, talk to your doctor first because they may interact with other medicines that you are taking. Exercise Regularly    Among the many benefits of regular exercise are increased blood flow to the brain and decreased risk of certain diseases that can interfere with memory function. One study found that even moderate exercise has a beneficial effect. Examples of \"moderate\" exercise include:   Playing 18 holes of golf once a week, without a cart   Playing tennis twice a week   Walking one mile per day   Manage Stress    It can be tough to remember what is important when your mind is cluttered. Make time for relaxation.  Choose activities that calm you down, and make it routine. Manage Chronic Conditions    Side effects of high blood pressure , diabetes, and heart disease can interfere with mental function. Many of the lifestyle steps discussed here can help manage these conditions. Strive to eat a healthy diet, exercise regularly, get stress under control, and follow your doctor's advice for your condition. Minimize Medications    Talk to your doctor about the medicines that you take. Some may be unnecessary. Also, healthy lifestyle habits may lower the need for certain drugs. Last Reviewed: April 2010 Maura Robbins MD   Updated: 4/13/2010   ·     [address]       As we get older, changes in balance, gait, strength, vision, hearing, and cognition make even the most youthful senior more prone to accidents. Falls are one of the leading health risks for older people. This increased risk of falling is related to:   Aging process (eg, decreased muscle strength, slowed reflexes)   Higher incidence of chronic health problems (eg, arthritis, diabetes) that may limit mobility, agility or sensory awareness   Side effects of medicine (eg, dizziness, blurred vision)especially medicines like prescription pain medicines and drugs used to treat mental health conditions   Depending on the brittleness of your bones, the consequences of a fall can be serious and long lasting. Home Life   Research by the Association of Aging [facility]) shows that some home accidents among older adults can be prevented by making simple lifestyle changes and basic modifications and repairs to the home environment. Here are some lifestyle changes that experts recommend:   Have your hearing and vision checked regularly. Be sure to wear prescription glasses that are right for you. Speak to your doctor or pharmacist about the possible side effects of your medicines. A number of medicines can cause dizziness. If you have problems with sleep, talk to your doctor.    Limit your intake of alcohol. If necessary, use a cane or walker to help maintain your balance. Wear supportive, rubber-soled shoes, even at home. If you live in a region that gets wintry weather, you may want to put special cleats on your shoes to prevent you from slipping on the snow and ice. Exercise regularly to help maintain muscle tone, agility, and balance. Always hold the banister when going up or down stairs. Also, use  bars when getting in or out of the bath or shower, or using the toilet. To avoid dizziness, get up slowly from a lying down position. Sit up first, dangling your legs for a minute or two before rising to a standing position. Overall Home Safety Check   According to the Consumer Product Safety Commision's \"Older Consumer Home Safety Checklist,\" it is important to check for potential hazards in each room. And remember, proper lighting is an essential factor in home safety. If you cannot see clearly, you are more likely to fall. Important questions to ask yourself include:   Are lamp, electric, extension, and telephone cords placed out of the flow of traffic and maintained in good condition? Have frayed cords been replaced? Are all small rugs and runners slip resistant? If not, you can secure them to the floor with a special double-sided carpet tape. Are smoke detectors properly locatedone on every floor of your home and one outside of every sleeping area? Are they in good working order? Are batteries replaced at least once a year? Do you have a well-maintained carbon monoxide detector outside every sleeping are in your home? Does your furniture layout leave plenty of space to maneuver between and around chairs, tables, beds, and sofas? Are hallways, stairs and passages between rooms well lit? Can you reach a lamp without getting out of bed? Are floor surfaces well maintained? Shag rugs, high-pile carpeting, tile floors, and polished wood floors can be particularly slippery. Stairs should always have handrails and be carpeted or fitted with a non-skid tread. Is your telephone easily reachable. Is the cord safely tucked away? Room by Room   According to the Association of Aging, bathrooms and ulices are the two most potentially hazardous rooms in your home. In the Kitchen    Be sure your stove is in proper working order and always make sure burners and the oven are off before you go out or go to sleep. Keep pots on the back burners, turn handles away from the front of the stove, and keep stove clean and free of grease build-up. Kitchen ventilation systems and range exhausts should be working properly. Keep flammable objects such as towels and pot holders away from the cooking area except when in use. Make sure kitchen curtains are tied back. Move cords and appliances away from the sink and hot surfaces. If extension cords are needed, install wiring guides so they do not hang over the sink, range, or working areas. Look for coffee pots, kettles and toaster ovens with automatic shut-offs. Keep a mop handy in the kitchen so you can wipe up spills instantly. You should also have a small fire extinguisher. Arrange your kitchen with frequently used items on lower shelves to avoid the need to stand on a stepstool to reach them. Make sure countertops are well-lit to avoid injuries while cutting and preparing food. In the Bathroom    Use a non-slip mat or decals in the tub and shower, since wet, soapy tile or porcelain surfaces are extremely slippery. Make sure bathroom rugs are non-skid or tape them firmly to the floor. Bathtubs should have at least one, preferably two, grab bars, firmly attached to structural supports in the wall. (Do not use built-in soap holders or glass shower doors as grab bars.)    Tub seats fitted with non-slip material on the legs allow you to wash sitting down.  For people with limited mobility, bathtub transfer benches allow you to slide safely into the tub. Raised toilet seats and toilet safety rails are helpful for those with knee or hip problems. In the [facility]    Make sure you use a nightlight and that the area around your bed is clear of potential obstacles. Be careful with electric blankets and never go to sleep with a heating pad, which can cause serious burns even if on a low setting. Use fire-resistant mattress covers and pillows, and NEVER smoke in bed. Keep a phone next to the bed that is programmed to dial 911 at the push of a button. If you have a chronic condition, you may want to sign on with an automatic call-in service. Typically the system includes a small pendant that connects directly to an emergency medical voice-response system. You should also make arrangements to stay in contact with someonefriend, neighbor, family memberon a regular schedule. Fire Prevention   According to the [employer] (Smoke Alarms for Every) [address] [place] [address], senior citizens are one of the two highest risk groups for death and serious injuries due to residential fires. When cooking, wear short-sleeved items, never a bulky long-sleeved robe. The [facility]'s Safety Checklist for Older Consumers emphasizes the importance of checking basements, garages, workshops and storage areas for fire hazards, such as volatile liquids, piles of old rags or clothing and overloaded circuits. Never smoke in bed or when lying down on a couch or recliner chair. Small portable electric or kerosene heaters are responsible for many home fires and should be used cautiously if at all. If you do use one, be sure to keep them away from flammable materials. In case of fire, make sure you have a pre-established emergency exit plan. Have a professional check your fireplace and other fuel-burning appliances yearly.     Helping Hands   Baby boomers entering the mcintyre years will continue to see the development of new products to help older adults live safely and independently in spite of age-related changes. Making Life More Livable  , by Dylan Conde, lists over 1,000 products for \"living well in the mature years,\" such as bathing and mobility aids, household security devices, ergonomically designed knives and peelers, and faucet valves and knobs for temperature control. Medical supply stores and organizations are good sources of information about products that improve your quality of life and insure your safety.      Last Reviewed: November 2009 Izabela Barroso MD   Updated: 3/7/2011     ·

## 2022-05-17 NOTE — PROGRESS NOTES
Well Adult Note  Name: Denise Maxwell Date: 2022   MRN: [id number] Sex: Female   Age: 74 y.o. Ethnicity: Non- / Non    : 1955 Race: White (non-)      Steven Alberto is here for well adult exam.  History:  1. Psoriatic Arthritis/Chronic leg and back pain - has pain in low back radiating BLE limiting activity and necessitating frequent breaks. HAving trouble with reduced ROM of hands and fingers; reduced  strength, pain with writing  and frequent dropping of things. Went to Dr. Shama Rader, ortho, who gave her cortisone injections and referred her to PT. She went to 2 PT sessions and stopped going because she was feeling better. She states that she has been doing them at home until 2 weeks ago. States that APAP and tramadol do not help with pain. 2. B/L shoulder impingement - shoulder pain and sig reduced ROM in all directions. 3. Functional decline - needs help with showering, housekeeping;; receives meds organized in pill-pack; know how to take her PRN meds. 4. Depression - stopped her SSRI because she was feeling better. She does not think she is depressed - just bored and lonely. Has no energy. 5. Unintentional weight loss - lost 25 pounds between 2021 and 2022. Review of Systems allergies flaring    Allergies   Allergen Reactions    Morphine Swelling         Prior to Visit Medications    Medication Sig Taking?  Authorizing Provider   DULoxetine (CYMBALTA) 30 MG extended release capsule Take 1 capsule by mouth daily Yes Silver Olivo MD   DULoxetine (CYMBALTA) 60 MG extended release capsule Take 1 capsule by mouth daily Yes Silver Olivo MD   Magnesium 400 MG CAPS Take 1 capsule by mouth 3 times daily Yes Silver Olivo MD   Infant Care Products (BABY SHAMPOO) SHAM Use to clean eye lids Yes Silver Olivo MD   pantoprazole (PROTONIX) 40 MG tablet TAKE 1 TABLET BY MOUTH DAILY Yes Otilia Bassett MD   calcium carbonate-vitamin D3 (CALTRATE) 600-400 MG-UNIT TABS per tab TAKE ONE (1) TABLET BY MOUTH TWICE DAILY Yes Otilia Bassett MD   aspirin EC 81 MG EC tablet Take 1 tablet by mouth daily Yes Otilia Bassett MD   folic acid (FOLVITE) 1 MG tablet Take 1 tablet by mouth daily Do not take on the day you take the methotrexate.  Yes Otilia Bassett MD   rOPINIRole (REQUIP) 0.5 MG tablet TAKE 1 TABLET BY MOUTH AT  NIGHT Yes Otilia Bassett MD   levETIRAcetam (KEPPRA) 500 MG tablet TAKE ONE (1) TABLET BY MOUTH TWICE DAILY Yes Emiliana Mueller PA-C   triamcinolone (KENALOG) 0.1 % cream APPLY TOPICALLY TWICE DAILY Yes Otilia Bassett MD   hydrOXYzine (ATARAX) 10 MG tablet TAKE 1 TABLET BY MOUTH EVERY 8 HOURS AS NEEDED FOR ITCHING Yes Otilia Bassett MD   Handicap Placard MISC by Does not apply route Permanent: NO EXPIRATION Yes Otilia Bassett MD   alendronate (FOSAMAX) 35 MG tablet Take 1 tablet by mouth every 7 days Yes Otilia Bassett MD   atorvastatin (LIPITOR) 40 MG tablet TAKE 1 TABLET BY MOUTH ONCE DAILY Yes Otilia Bassett MD   fenofibrate (TRIGLIDE) 160 MG tablet Take 1 tablet by mouth daily Yes Otilia Bassett MD   levothyroxine (SYNTHROID) 50 MCG tablet Take 1 tablet by mouth Daily Yes Otilia Bassett MD   calcium carbonate-vitamin D (CALTRATE) 600-400 MG-UNIT TABS per tab Take 1 tablet by mouth 2 times daily Yes Otilia Bassett MD   fluocinonide (LIDEX) 0.05 % ointment APPLY TWICE DAILY Yes Otilia Bassett MD   Nutritional Supplements (ENSURE COMPLETE) LIQD Take 1 Bottle by mouth 2 times daily Yes Otilia Bassett MD   methotrexate (RHEUMATREX) 2.5 MG chemo tablet TAKE 6 TABLETS BY MOUTH ONCE WEEKLY  Otilia Bassett MD   acetaminophen (ACETAMINOPHEN EXTRA STRENGTH) 500 MG tablet Take 1 tablet by mouth 2 times daily as needed for Pain  Nabil Villanueva MD         Past Medical History:   Diagnosis Date    Adhesive capsulitis of right shoulder 8/12/2021    Alcoholism ([address]     Arthritis     Bilateral leg and foot pain 8/12/2021    Chronic back pain 5/17/2022    Chronic lung disease     Cognitive impairment 4/15/2021    Depression     Diabetes mellitus ([address]     Diarrhea 1/4/2021    Possibly related to Metformin, Keppra, constipation, colitis. Stop Metformin. Treat constipation. Consider adjustment to Keppra given side effects.     Encephalopathy 12/10/2020    Grief reaction 4/15/2021    Hyperlipidemia     Hypertension     Hypokalemia 11/6/2020    Hypomagnesemia 11/6/2020    Hypothyroidism     Lymphadenopathy, axillary 1/4/2021    Numbness and tingling of upper and lower extremities of both sides 8/12/2021    Second hand smoke exposure     Seizure ([address]     Squamous blepharitis of upper and lower eyelids of both eyes 5/17/2022    Stage 3b chronic kidney disease ([address] 8/12/2021    Syncope and collapse 12/10/2020    Thoracogenic scoliosis of thoracolumbar region 5/17/2022       Past Surgical History:   Procedure Laterality Date    APPENDECTOMY      BIOPSY MOUTH LESION Bilateral 12/19/2016    REMOVAL  OF BILATERAL LINGUAL MACIE performed by Dedrick Hackett DDS at [facility] COLONOSCOPY N/A 1/21/2021    COLONOSCOPY DIAGNOSTIC performed by Kee Nielsen MD at [address] [place] [address] ENDOSCOPY N/A 1/21/2021    EGD with polypectomy performed by Kee Nielsen MD at [facility]         Family History   Problem Relation Age of Onset    Heart Disease Mother     Hypertension Mother     Diabetes Mother     Stroke Mother     Heart Disease Father     Hypertension Father     Heart Disease Sister     Hypertension Sister     Heart Disease Brother     Hypertension Brother     Diabetes Brother     Cancer for well adult exam with abnormal findings  2. Polyneuropathy associated with underlying disease ([place] [address].)  Comments:  worse, poor control; start Cymbalta; consider gabapentin  Orders:  -     Rheumatoid Factor; Future  -     Ferritin; Future  3. Facet arthropathy, multilevel  Comments:  worse, poor control; start Cymbalta, consider PT; referred to Dr. Arin Claudio DO, Physical Medicine and Rehabilitation, [place]  4. Stage 3b chronic kidney disease ([facility]  Comments:  IMproving, well-controlled; avoid ephrotoxic agents  5. Abnormal glucose  Comments:  assess A1C  Orders:  -     Hemoglobin A1C; Future  6. Restless leg syndrome  Comments:  continue ropinirole  7. Chronic pain of right knee  Comments:  referred to Dr. Deanne Orona; has engaged in PT in the  past for multiple joint complaints  Orders:  -     [address] Malgorzata Torre [facility]  8. Therapeutic drug monitoring  -     Comprehensive Metabolic Panel; Future  9. Alcoholism ([place] [address].)  Comments:  improving, well-controlled; abstinent for over one year  10. Anemia in other chronic diseases classified elsewhere  Comments:  follow labs  Orders:  -     CBC with Auto Differential; Future  11. Hypomagnesemia  Comments:  follow labs  Orders:  -     Magnesium 400 MG CAPS; Take 1 capsule by mouth 3 times daily, Disp-270 capsule, R-4Normal  -     Magnesium; Future  12. Seizure ([place] [address]  Comments:  stable and well-controlled; suspect r/t prior ETOH use  13. Psoriasis with arthropathy ([place] [address].)  Comments:  worse, poor control d/t nonadherence to med plan - [id number] labs and F6nwbof OVs in order to cont MTX. Resume MTX  Orders:  -     C-Reactive Protein; Future  -     Sedimentation Rate; Future  -     Comprehensive Metabolic Panel; Future  -     [address] Malgorzata Torre DO, Physical Medicine and Rehabilitation, [place]  14.  Impingement syndrome of both shoulders  Comments:  referred to Dr. Carlos [facility] [place]  15. Acquired hypothyroidism  -     TSH; Future  -     T4, Free; Future  16. Unintended weight loss  Comments:  stabilized; will monitor  Orders:  -     Prealbumin; Future  17. Chronic pain syndrome  Comments:  diffuse spine, legs, knees, hands, shoulders; r/t ETOH/debility/inflammatory arthritis/scoliosis; temp tramadol  Orders:  -     DULoxetine (CYMBALTA) 30 MG extended release capsule; Take 1 capsule by mouth daily, Disp-7 capsule, R-0Normal  -     DULoxetine (CYMBALTA) 60 MG extended release capsule; Take 1 capsule by mouth daily, Disp-90 capsule, R-1Normal  -     Nae - Lucia Farias DO, Physical Medicine and Rehabilitation, [place]  18. Thoracogenic scoliosis of thoracolumbar region  Comments:  temp tramadol; given multiple pain issues, referred to Dr. Mo Oswald DO, [facility], [facility]  19. Hypomagnesemia  Comments:  Increase magnesium to 400 mg 3 times daily. Follow labs. Orders:  -     Magnesium 400 MG CAPS; Take 1 capsule by mouth 3 times daily, Disp-270 capsule, R-4Normal  -     Magnesium; Future  20. Current mild episode of major depressive disorder, unspecified whether recurrent ([address]  21. Abnormal finding of blood chemistry, unspecified   -     Ferritin; Future  22. Need for prophylactic vaccination and inoculation against varicella  -     zoster recombinant adjuvanted vaccine [facility]) 50 MCG/0.5ML SUSR injection; Inject 0.5 mLs into the muscle once for 1 dose, Disp-1 each, R-1Print  -     pneumococcal 20-valent conjugat (PREVNAR) 0.5 ML KOTA inj; Inject 0.5 mLs into the muscle once for 1 dose, Disp-0.5 mL, R-0Print  23. Chronic back pain, unspecified back location, unspecified back pain laterality  -     Ted Hartman, Lucia Cisse DO, Physical Medicine and Rehabilitation, [place]  24.  Squamous blepharitis of upper and lower eyelids of both eyes  -     Infant Care Products (BABY SHAMPOO) SHAM; Disp-400 mL, R-5, NormalUse to clean eye lids  25.  Counseling on health promotion and disease prevention         Personalized Preventive Plan   Current Health Maintenance Status  Immunization History   Administered Date(s) Administered    Influenza Virus Vaccine 11/05/2017    Influenza, MDCK Quadv, IM, PF (Flucelvax 2 yrs and older) 10/17/2019    Influenza, MDCK, Preservative free 10/09/2015    Influenza, Quadv, IM, (6 mo and older Fluzone, Flulaval, Fluarix and 3 yrs and older Afluria) 11/05/2018    Influenza, Quadv, IM, PF (6 mo and older Fluzone, Flulaval, Fluarix, and 3 yrs and older Afluria) 10/06/2020    Influenza, Triv, 3 Years and older, IM, PF (Afluria 5yrs and older) 10/04/2016    Pneumococcal Polysaccharide ([id number]) 05/09/2019        Health Maintenance   Topic Date Due    COVID-19 Vaccine (1) Never done    DTaP/Tdap/Td vaccine (1 - Tdap) Never done    Shingles vaccine (1 of 2) Never done    Pneumococcal 65+ years Vaccine (2 - PCV) 05/09/2020    Flu vaccine (Season Ended) 09/01/2022    Lipids  12/21/2022    Breast cancer screen  02/03/2023    A1C test (Diabetic or Prediabetic)  05/17/2023    Depression Monitoring  05/17/2023    Annual Wellness Visit (AWV)  05/18/2023    Colorectal Cancer Screen  01/21/2031    DEXA (modify frequency per FRAX score)  Completed    Hepatitis C screen  Completed    HIV screen  Completed    Hepatitis A vaccine  Aged Out    Hepatitis B vaccine  Aged Out    Hib vaccine  Aged Out    Meningococcal (ACWY) vaccine  Aged Out     Recommendations for [employer] Due: see orders and patient instructions/AVS.        Reviewed with the patient: all disease processes, current clinical status, medications, activities and diet.      Side effects, adverse effects of the medication prescribed today, as well as treatment plan/ rationale and result expectations have been discussed with the patient who expresses understanding and desires to proceed.     Close follow up to evaluate treatment results and for coordination of care. I have reviewed the patient's medical history in detail and updated the computerized patient record. More than 50% of the appointment was spent in face-to-face counseling, education and care coordination. Please note this report has been partially produced using speech recognition software and may contain mistakes related to that system including errors in grammar, punctuation and spelling as well as words and phrases that may seem inappropriate. If there are questions or concerns, please feel free to contact me to clarify. Time spent on appointment included reviewing past laboratory and radiographic results, previous notes, consultations, past procedures, medications, obtaining history and performing physical, formulating mutually agreed upon plan of care with patient - 40 minutes. Return in about 2 months (around 7/17/2022) for PsA, pain, .

## 2022-05-17 NOTE — PROGRESS NOTES
Medicare Annual Wellness Visit    Kevan Kaminski is here for Medicare AWV    Assessment & Plan    Recommendations for Preventive Services Due: see orders and patient instructions/AVS.  Recommended screening schedule for the next 5-10 years is provided to the patient in written form: see Patient Instructions/AVS.     No follow-ups on file. Subjective       Patient's complete Health Risk Assessment and screening values have been reviewed and are found in Flowsheets. The following problems were reviewed today and where indicated follow up appointments were made and/or referrals ordered.     Positive Risk Factor Screenings with Interventions:    Fall Risk:  Do you feel unsteady or are you worried about falling? : (!) yes  2 or more falls in past year?: no  Fall with injury in past year?: no     Fall Risk Interventions:    · Home safety tips provided  · Home exercises provided to promote strength and balance     Depression:  PHQ-2 Score: 2  PHQ-9 Total Score: 8    Severity:1-4 = minimal depression, 5-9 = mild depression, 10-14 = moderate depression, 15-19 = moderately severe depression, 20-27 = severe depression    Depression Interventions:  · Regular exercise recommended- 3-5 times per week, 30-45 minutes per session          General Health and ACP:  General  In general, how would you say your health is?: Fair  In the past 7 days, have you experienced any of the following: New or Increased Pain, New or Increased Fatigue, Loneliness, Social Isolation, Stress or Anger?: No  Select all that apply: (!) Loneliness  Do you get the social and emotional support that you need?: Yes  Do you have a Living Will?: (!) No    Advance Directives     Power of  Living Will ACP-Advance Directive ACP-Power of     Not on File Not on File Not on File Not on File      General Health Risk Interventions:  · Loneliness: patient declines any further intervention for this issue, has health aide  · No Living Will: given documents for completion    Health Habits/Nutrition:     Physical Activity: Inactive    Days of Exercise per Week: 0 days    Minutes of Exercise per Session: 0 min     Have you lost any weight without trying in the past 3 months?: No     Have you seen the dentist within the past year?: Appointment is scheduled    Health Habits/Nutrition Interventions:  · Inadequate physical activity:  patient agrees to exercise for at least 150 minutes/week    Hearing/Vision:  Do you or your family notice any trouble with your hearing that hasn't been managed with hearing aids?: (!) Yes  Do you have difficulty driving, watching TV, or doing any of your daily activities because of your eyesight?: (!) Yes  Have you had an eye exam within the past year?: Appointment is scheduled  No exam data present    Hearing/Vision Interventions:  · Hearing concerns:  patient declines any further evaluation/treatment for hearing issues  · Vision concerns:  patient encouraged to make appointment with his/her eye specialist     ADLs:  In the past 7 days, did you need help from others to perform any of the following everyday activities: Eating, dressing, grooming, bathing, toileting, or walking/balance?: No  In the past 7 days, did you need help from others to take care of any of the following: Laundry, housekeeping, banking/finances, shopping, telephone use, food preparation, transportation, or taking medications?: (!) Yes  Select all that apply: (!) Transportation    ADL Interventions:  · Patient declines any further evaluation/treatment for this issue          Objective   There were no vitals filed for this visit. There is no height or weight on file to calculate BMI. Allergies   Allergen Reactions    Morphine Swelling     Prior to Visit Medications    Medication Sig Taking?  Authorizing Provider   zoster recombinant adjuvanted vaccine [facility]) 50 MCG/0.5ML SUSR injection Inject 0.5 mLs into the muscle once for 1 dose  Rhett Longoria Jon Lai MD   pneumococcal 20-valent conjugat (PREVNAR) 0.5 ML KOTA inj Inject 0.5 mLs into the muscle once for 1 dose  Beulah Saab MD   pantoprazole (PROTONIX) 40 MG tablet TAKE 1 TABLET BY MOUTH DAILY  Beulah Saab MD   calcium carbonate-vitamin D3 (CALTRATE) 600-400 MG-UNIT TABS per tab TAKE ONE (1) TABLET BY MOUTH TWICE DAILY  Beulah Saab MD   aspirin EC 81 MG EC tablet Take 1 tablet by mouth daily  Beulah Saab MD   methotrexate (RHEUMATREX) 2.5 MG chemo tablet TAKE 6 TABLETS BY MOUTH ONCE WEEKLY  Beulah Saab MD   folic acid (FOLVITE) 1 MG tablet Take 1 tablet by mouth daily Do not take on the day you take the methotrexate.   Beulah Saab MD   rOPINIRole (REQUIP) 0.5 MG tablet TAKE 1 TABLET BY MOUTH AT  NIGHT  Beulah Saab MD   levETIRAcetam (KEPPRA) 500 MG tablet TAKE ONE (1) TABLET BY MOUTH TWICE DAILY  Lauren Gonzales PA-C   triamcinolone (KENALOG) 0.1 % cream APPLY TOPICALLY TWICE DAILY  Beulah Saab MD   hydrOXYzine (ATARAX) 10 MG tablet TAKE 1 TABLET BY MOUTH EVERY 8 HOURS AS NEEDED FOR ITCHING  Beulah Saab MD   Handicap Placard MISC by Does not apply route Permanent: NO EXPIRATION  Beulah Saab MD   acetaminophen (ACETAMINOPHEN EXTRA STRENGTH) 500 MG tablet Take 1 tablet by mouth 2 times daily as needed for Pain  Beulah Saab MD   alendronate (FOSAMAX) 35 MG tablet Take 1 tablet by mouth every 7 days  Beulah Saab MD   atorvastatin (LIPITOR) 40 MG tablet TAKE 1 TABLET BY MOUTH ONCE DAILY  Beulah Saab MD   fenofibrate (TRIGLIDE) 160 MG tablet Take 1 tablet by mouth daily  Beulah Saab MD   levothyroxine (SYNTHROID) 50 MCG tablet Take 1 tablet by mouth Daily  Beulah Saab MD   calcium carbonate-vitamin D (CALTRATE) 600-400 MG-UNIT TABS per tab Take 1 tablet by mouth 2 times daily  Clayton Curiel MD   fluocinonide (LIDEX) 0.05 % ointment APPLY TWICE DAILY  Clayton Curiel MD   Magnesium 400 MG CAPS Take 1 capsule by mouth 3 times daily  Clayton Curiel MD   Nutritional Supplements (ENSURE COMPLETE) LIQD Take 1 Bottle by mouth 2 times daily  Clayton Curiel MD       CareTeam (Including outside providers/suppliers regularly involved in providing care):   Patient Care Team:  Clayton Curiel MD as PCP - General (Family Medicine)  Clayton Curiel MD as PCP - [facility] Empaneled Provider  Fermin Ramos PA-C (Physician Assistant)     Reviewed and updated this visit:  Tobacco  Allergies  Meds  Problems  Med Hx  Surg Hx  Soc Hx  Fam Hx

## 2022-05-17 NOTE — TELEPHONE ENCOUNTER
Past Visits    Date Provider Specialty Visit Type Primary Dx   05/17/2022 Teena Files, MD Family Medicine Office Visit

## 2022-05-17 NOTE — PATIENT INSTRUCTIONS
Advance Directives: Care Instructions  Overview  An advance directive is a legal way to state your wishes at the end of your life. It tells your family and your doctor what to do if you can't say what youwant. There are two main types of advance directives. You can change them any timeyour wishes change. Living will. This form tells your family and your doctor your wishes about life support and other treatment. The form is also called a declaration. Medical power of . This form lets you name a person to make treatment decisions for you when you can't speak for yourself. This person is called a health care agent (health care proxy, health care surrogate). The form is also called a durable power of  for health care. If you do not have an advance directive, decisions about your medical care maybe made by a family member, or by a doctor or a  who doesn't know you. It may help to think of an advance directive as a gift to the people who carefor you. If you have one, they won't have to make tough decisions by themselves. Follow-up care is a key part of your treatment and safety. Be sure to make and go to all appointments, and call your doctor if you are having problems. It's also a good idea to know your test results and keep alist of the medicines you take. What should you include in an advance directive? Many states have a unique advance directive form. (It may ask you to address specific issues.) Or you might use a universal form that's approved by manystates. If your form doesn't tell you what to address, it may be hard to know what to include in your advance directive. Use the questions below to help you getstarted.  Who do you want to make decisions about your medical care if you are not able to?  What life-support measures do you want if you have a serious illness that gets worse over time or can't be cured?  What are you most afraid of that might happen?  (Maybe you're afraid of having pain, losing your independence, or being kept alive by machines.)   Where would you prefer to die? (Your home? A hospital? A nursing home?)   Do you want to donate your organs when you die?  Do you want certain Faith practices performed before you die? When should you call for help? Be sure to contact your doctor if you have any questions. Where can you learn more? Go to https://[email or web address] org and sign in to your Liveset account. Enter R264 in the Trigger.io box to learn more about \"Advance Directives: Care Instructions. \"     If you do not have an account, please click on the \"Sign Up Now\" link. Current as of: October 18, 2021               Content Version: 13.2  © 2006-2022 [employer]. Care instructions adapted under license by [facility] ([facility]). If you have questions about a medical condition or this instruction, always ask your healthcare professional. [place] [address] any warranty or liability for your use of this information. Learning About Medical Power of   What is a medical power of ? A medical power of , also called a durable power of  for health care, is one type of the legal forms called advance directives. It lets you name the person you want to make treatment decisions for you if you can't speak or decide for yourself. The person you choose is called your health care agent. This person is also called a health care proxy or health care surrogate. A medical power of  may be called something else in your state. How do you choose a health care agent? Choose your health care agent carefully. This person may or may not be a familymember. Talk to the person before you make your final decision. Make sure he or she iscomfortable with this responsibility. It's a good idea to choose someone who:   Is at least 25years old.    Knows you well and understands what makes life meaningful for you.  Understands your Christian and moral values.  Will do what you want, not what he or she wants.  Will be able to make difficult choices at a stressful time.  Will be able to refuse or stop treatment, if that is what you would want, even if you could die.  Will be firm and confident with health professionals if needed.  Will ask questions to get needed information.  Lives near you or agrees to travel to you if needed. Your family may help you make medical decisions while you can still be part of that process. But it's important to choose one person to be your health careagent in case you aren't able to make decisions for yourself. If you don't fill out the legal form and name a health care agent, thedecisions your family can make may be limited. A health care agent may be called something else in your state. Who will make decisions for you if you don't have a health care agent? If you don't have a health care agent or a living will, you may not get the care you want. Decisions may be made by family members who disagree about your medical care. Or decisions may be made by a medical professional who doesn'tknow you well. In some cases, a  makes the decisions. When you name a health care agent, it is very clear who has the power to [place] decisions for you. How do you name a health care agent? You name your health care agent on a legal form. This form is usually called a medical power of . Ask your hospital, state bar association, or officeon aging where to find these forms. You must sign the form to make it legal. Some states require you to get the form notarized. This means that a person called a  watches you sign the form and then he or she signs the form. Some states also require thattwo or more witnesses sign the form. Be sure to tell your family members and doctors who your health care agent is. Where can you learn more?   Go to https://chpepiceweb.healthMNG International [email or web address] org and sign in to your SERPs account. Enter [id number] in the [facility] box to learn more about \"Learning About [address]. \"     If you do not have an account, please click on the \"Sign Up Now\" link. Current as of: October 18, 2021               Content Version: 13.2  © 2006-2022 Healthwise, [employer]. Care instructions adapted under license by [facility] ([facility]). If you have questions about a medical condition or this instruction, always ask your healthcare professional. [place] [address] any warranty or liability for your use of this information. Well Visit, Women 48 to 72: Care Instructions  Overview     Well visits can help you stay healthy. Your doctor has checked your overall health and may have suggested ways to take good care of yourself. Your doctor also may have recommended tests. At home, you can help prevent illness withhealthy eating, regular exercise, and other steps. Follow-up care is a key part of your treatment and safety. Be sure to make and go to all appointments, and call your doctor if you are having problems. It's also a good idea to know your test results and keep alist of the medicines you take. How can you care for yourself at home?  Get screening tests that you and your doctor decide on. Screening helps find diseases before any symptoms appear.  Eat healthy foods. Choose fruits, vegetables, whole grains, protein, and low-fat dairy foods. Limit fat, especially saturated fat. Reduce salt in your diet.  Limit alcohol. Have no more than 1 drink a day or 7 drinks a week.  Get at least 30 minutes of exercise on most days of the week. Walking is a good choice. You also may want to do other activities, such as running, swimming, cycling, or playing tennis or team sports.  Reach and stay at a healthy weight.  This will lower your risk for many problems, such as obesity, diabetes, heart disease, and high blood pressure.  Do not smoke. Smoking can make health problems worse. If you need help quitting, talk to your doctor about stop-smoking programs and medicines. These can increase your chances of quitting for good.  Care for your mental health. It is easy to get weighed down by worry and stress. Learn strategies to manage stress, like deep breathing and mindfulness, and stay connected with your family and community. If you find you often feel sad or hopeless, talk with your doctor. Treatment can help.  Talk to your doctor about whether you have any risk factors for sexually transmitted infections (STIs). You can help prevent STIs if you wait to have sex with a new partner (or partners) until you've each been tested for STIs. It also helps if you use condoms (male or female condoms) and if you limit your sex partners to one person who only has sex with you. Vaccines are available for some STIs.  If you think you may have a problem with alcohol or drug use, talk to your doctor. This includes prescription medicines (such as amphetamines and opioids) and illegal drugs (such as cocaine and methamphetamine). Your doctor can help you figure out what type of treatment is best for you.  Protect your skin from too much sun. When you're outdoors from 10 a.m. to 4 p.m., stay in the shade or cover up with clothing and a hat with a wide brim. Wear sunglasses that block UV rays. Even when it's cloudy, put broad-spectrum sunscreen (SPF 30 or higher) on any exposed skin.  See a dentist one or two times a year for checkups and to have your teeth cleaned.  Wear a seat belt in the car. When should you call for help? Watch closely for changes in your health, and be sure to contact your doctor if you have any problems or symptoms that concern you. Where can you learn more? Go to https://ese.health-partners. org and sign in to your DrNaturalHealing account.  Enter Y123 in the [employer] box to learn more about \"Well Visit, Women 50 to 72: Care Instructions. \"     If you do not have an account, please click on the \"Sign Up Now\" link. Current as of: October 6, 2021               Content Version: 13.2  © 1035-0092 Healthwise, Incorporated. Care instructions adapted under license by [facility] ([facility]). If you have questions about a medical condition or this instruction, always ask your healthcare professional. [address] any warranty or liability for your use of this information. Learning About Changing a Habit by Setting Goals  How can you change a habit? If you've decided to change a habit--whether it's quitting smoking, lowering your blood pressure, becoming more active, or doing something else to improve your health--congratulations! Making that decision is the first step towardmaking a change. What happens next? Have a reason. Set goals you can reach. Prepare forslip-ups. And get support. What's your reason? Your reason for wanting to change a habit is really important. Maybe you want to quit smoking so that you can avoid future health problems. Or maybe you want to eat a healthier diet so you can lose weight. If you have high blood pressure, your reason may be clear: to lower your blood pressure. Maybe yousmoke and want to save money on cigarettes. You need to feel ready to make a change. If you don't feel ready now, that's okay. You can still be thinking and planning. When you truly want to [facility], you're ready for the next step. It's not easy to change habits--but you can do it. Taking the time to reallythink about what will motivate or inspire you will help you reach your goals. How do you set goals? Setting goals can help a lot when you're trying to make a healthy change.  Focus on small goals. This will help you reach larger goals over time. With smaller goals, you'll have success more often, which will help you stay with it.  For example, your large goal may be to lose 20 pounds. Your small goal could be to lose 5.   Write down your goals. This will help you remember, and you'll have a clearer idea of what you want to achieve. Use a journal or notebook to record your goals. Hang up your plan where you will see it often as a reminder of what you're trying to do.  Make your goals specific. Specific goals help you measure your progress. For example, setting a goal to eat one extra serving of vegetables a day is better than a general goal to \"eat more vegetables. \"   Focus on one goal at a time. By doing this, you're less likely to feel overwhelmed and then give up.  When you reach a goal, reward yourself. Celebrate your new behavior and success for several days, and then think about setting your next goal.  How can you prepare for slip-ups? It's perfectly normal to try to change a habit, go along fine for a while, and then have a setback. Lots of people try and try again before they reach theirgoals. What are the things that might cause a setback for you? If you have tried tochange a habit before, think about what helped you and what got in your way. By thinking about these barriers now, you can plan ahead for how to deal withthem if they happen. There will be times when you slip up and don't make your goal for the week. When that happens, don't get mad at yourself. Learn from the experience. Ask yourself what got in the way of reaching your goal. Positive thinking goes along way when you're making lifestyle changes. How can you get support?  Get a partner. It's motivating to know that someone is trying to make the same change that you're making, like being more active or changing your eating habits. You have someone who is counting on you to help them succeed. That person can also remind you how far you've come.  Get friends and family involved. They can exercise with you. Or they can encourage you by saying how they admire what you are doing.  Family members can join you in your healthy eating efforts. Don't be afraid to tell family and friends that their encouragement makes a big difference to you.  Join a class or support group. People in these groups often have some of the same barriers you have. They can give you support when you don't feel like staying with your plan. They can boost your morale when you need a lift. Alysia Re also find a number of online support groups.  Encourage yourself. When you feel like giving up, don't waste energy feeling bad about yourself. Remember your reason for wanting to change, think about the progress you've made, and give yourself a pep talk and a pat on the back.  Get professional help. A dietitian can help you make your diet healthier while still allowing you to eat foods that you enjoy. A  or physical therapist can help design an exercise program that is fun and easy to stay on. A counselor, a , or your doctor can help you overcome hurdles, reduce stress, or quit smoking. Where can you learn more? Go to https://AwesomePiecebrandenMarkado.GameFly. org and sign in to your MyCrowd account. Enter K751 in the Chayamuni box to learn more about \"Learning About Changing a Habit by Setting Goals. \"     If you do not have an account, please click on the \"Sign Up Now\" link. Current as of: June 16, 2021               Content Version: 13.2  © 4299-2953 Yell.[employer]. Care instructions adapted under license by [facility] ([facility]). If you have questions about a medical condition or this instruction, always ask your healthcare professional. [address] any warranty or liability for your use of this information. A Healthy Lifestyle: Care Instructions  Your Care Instructions     A healthy lifestyle can help you feel good, stay at a healthy weight, and have plenty of energy for both work and play. A healthy lifestyle is something youcan share with your whole family.   A healthy lifestyle also can lower your risk for serious health problems, suchas high blood pressure, heart disease, and diabetes. You can follow a few steps listed below to improve your health and the healthof your family. Follow-up care is a key part of your treatment and safety. Be sure to make and go to all appointments, and call your doctor if you are having problems. It's also a good idea to know your test results and keep alist of the medicines you take. How can you care for yourself at home?  Do not eat too much sugar, fat, or fast foods. You can still have dessert and treats now and then. The goal is moderation.  Start small to improve your eating habits. Pay attention to portion sizes, drink less juice and soda pop, and eat more fruits and vegetables. ? Eat a healthy amount of food. A 3-ounce serving of meat, for example, is about the size of a deck of cards. Fill the rest of your plate with vegetables and whole grains. ? Limit the amount of soda and sports drinks you have every day. Drink more water when you are thirsty. ? Eat plenty of fruits and vegetables every day. Have an apple or some carrot sticks as an afternoon snack instead of a candy bar. Try to have fruits and/or vegetables at every meal.   Make exercise part of your daily routine. You may want to start with simple activities, such as walking, bicycling, or slow swimming. Try to be active 30 to 60 minutes every day. You do not need to do all 30 to 60 minutes all at once. For example, you can exercise 3 times a day for 10 or 20 minutes. Moderate exercise is safe for most people, but it is always a good idea to talk to your doctor before starting an exercise program.   Keep moving. Rosado Maurice the lawn, work in the garden, or EMcube. Take the stairs instead of the elevator at work.  If you smoke, quit. People who smoke have an increased risk for heart attack, stroke, cancer, and other lung illnesses.  Quitting is hard, but there are ways to boost your chance of quitting tobacco for good. ? Use nicotine gum, patches, or lozenges. ? Ask your doctor about stop-smoking programs and medicines. ? Keep trying. In addition to reducing your risk of diseases in the future, you will notice some benefits soon after you stop using tobacco. If you have shortness of breath or asthma symptoms, they will likely get better within a few weeks after you quit.  Limit how much alcohol you drink. Moderate amounts of alcohol (up to 2 drinks a day for men, 1 drink a day for women) are okay. But drinking too much can lead to liver problems, high blood pressure, and other health problems. Family health  If you have a family, there are many things you can do together to improve yourhealth.  Eat meals together as a family as often as possible.  Eat healthy foods. This includes fruits, vegetables, lean meats and dairy, and whole grains.  Include your family in your fitness plan. Most people think of activities such as jogging or tennis as the way to fitness, but there are many ways you and your family can be more active. Anything that makes you breathe hard and gets your heart pumping is exercise. Here are some tips:  ? Walk to do errands or to take your child to school or the bus.  ? Go for a family bike ride after dinner instead of watching TV. Where can you learn more? Go to https://Pantry.Evolita. org and sign in to your PrimeraDx [employer]) account. Enter F028 in the [facility] box to learn more about \"A Healthy Lifestyle: Care Instructions. \"     If you do not have an account, please click on the \"Sign Up Now\" link. Current as of: June 16, 2021               Content Version: 13.2  © 6377-6851 Healthwise, [place]. Care instructions adapted under license by [facility] ([facility]).  If you have questions about a medical condition or this instruction, always ask your healthcare professional. [place] any warranty or liability for your use of this information. Heart-Healthy Diet   Sodium, Fat, and Cholesterol Controlled Diet       What Is a Heart Healthy Diet? A heart-healthy diet is one that limits sodium , certain types of fat , and cholesterol . This type of diet is recommended for:   · People with any form of cardiovascular disease (eg, coronary heart disease , peripheral vascular disease , previous heart attack , previous stroke )   · People with risk factors for cardiovascular disease, such as high blood pressure , high cholesterol , or diabetes   · Anyone who wants to lower their risk of developing cardiovascular disease   Sodium    Sodium is a mineral found in many foods. In general, most people consume much more sodium than they need. Diets high in sodium can increase blood pressure and lead to edema (water retention). On a heart-healthy diet, you should consume no more than 2,300 mg (milligrams) of sodium per dayabout the amount in one teaspoon of table salt. The foods highest in sodium include table salt (about 50% sodium), processed foods, convenience foods, and preserved foods. Cholesterol    Cholesterol is a fat-like, waxy substance in your blood. Our bodies make some cholesterol. It is also found in animal products, with the highest amounts in fatty meat, egg yolks, whole milk, cheese, shellfish, and organ meats. On a heart-healthy diet, you should limit your cholesterol intake to less than 200 mg per day. It is normal and important to have some cholesterol in your bloodstream. But too much cholesterol can cause plaque to build up within your arteries, which can eventually lead to a heart attack or stroke. The two types of cholesterol that are most commonly referred to are:   · Low-density lipoprotein (LDL) cholesterol  Also known as bad cholesterol, this is the cholesterol that tends to build up along your arteries. Bad cholesterol levels are increased by eating fats that are saturated or hydrogenated.  Optimal level of this cholesterol is less than 100. Over 130 starts to get risky for heart disease. · High-density lipoprotein (HDL) cholesterol  Also known as good cholesterol, this type of cholesterol actually carries cholesterol away from your arteries and may, therefore, help lower your risk of having a heart attack. You want this level to be high (ideally greater than 60). It is a risk to have a level less than 40. You can raise this good cholesterol by eating olive oil, canola oil, avocados, or nuts. Exercise raises this level, too. Fat    Fat is calorie dense and packs a lot of calories into a small amount of food. Even though fats should be limited due to their high calorie content, not all fats are bad. In fact, some fats are quite healthful. Fat can be broken down into four main types.    · The good-for-you fats are:   ¨ Monounsaturated fat  found in oils such as olive and canola, avocados, and nuts and natural nut butters; can decrease cholesterol levels, while keeping levels of HDL cholesterol high   ¨ Polyunsaturated fat  found in oils such as safflower, sunflower, soybean, corn, and sesame; can decrease total cholesterol and LDL cholesterol   ¨ Omega-3 fatty acids  particularly those found in fatty fish (such as salmon, trout, tuna, mackerel, herring, and sardines); can decrease risk of arrhythmias, decrease triglyceride levels, and slightly lower blood pressure   · The fats that you want to limit are:   ¨ Saturated fat  found in animal products, many fast foods, and a few vegetables; increases total blood cholesterol, including LDL levels   § Animal fats that are saturated include: butter, lard, whole-milk dairy products, meat fat, and poultry skin   § Vegetable fats that are saturated include: hydrogenated shortening, palm oil, coconut oil, cocoa butter   ¨ Hydrogenated or trans fat  found in margarine and vegetable shortening, most shelf stable snack foods, and fried foods; increases LDL and decreases HDL     It is generally recommended that you limit your total fat for the day to less than 30% of your total calories. If you follow an 1800-calorie heart healthy diet, for example, this would mean 60 grams of fat or less per day. Saturated fat and trans fat in your diet raises your blood cholesterol the most, much more than dietary cholesterol does. For this reason, on a heart-healthy diet, less than 7% of your calories should come from saturated fat and ideally 0% from trans fat. On an 1800-calorie diet, this translates into less than 14 grams of saturated fat per day, leaving 46 grams of fat to come from mono- and polyunsaturated fats.    Food Choices on a Heart Healthy Diet   Food Category   Foods Recommended   Foods to Avoid   Grains   Breads and rolls without salted tops Most dry and cooked cereals Unsalted crackers and breadsticks Low-sodium or homemade breadcrumbs or stuffing All rice and pastas   Breads, rolls, and crackers with salted tops High-fat baked goods (eg, muffins, donuts, pastries) Quick breads, self-rising flour, and biscuit mixes Regular bread crumbs Instant hot cereals Commercially prepared rice, pasta, or stuffing mixes   Vegetables   Most fresh, frozen, and low-sodium canned vegetables Low-sodium and salt-free vegetable juices Canned vegetables if unsalted or rinsed   Regular canned vegetables and juices, including sauerkraut and pickled vegetables Frozen vegetables with sauces Commercially prepared potato and vegetable mixes   Fruits   Most fresh, frozen, and canned fruits All fruit juices   Fruits processed with salt or sodium   Milk   Nonfat or low-fat (1%) milk Nonfat or low-fat yogurt Cottage cheese, low-fat ricotta, cheeses labeled as low-fat and low-sodium   Whole milk Reduced-fat (2%) milk Malted and chocolate milk Full fat yogurt Most cheeses (unless low-fat and low salt) Buttermilk (no more than 1 cup per week)   Meats and Beans   Lean cuts of fresh or frozen beef, veal, lamb, or pork (look for the word loin) Fresh or frozen poultry without the skin Fresh or frozen fish and some shellfish Egg whites and egg substitutes (Limit whole eggs to three per week) Tofu Nuts or seeds (unsalted, dry-roasted), low-sodium peanut butter Dried peas, beans, and lentils   Any smoked, cured, salted, or canned meat, fish, or poultry (including ramirez, chipped beef, cold cuts, hot dogs, sausages, sardines, and anchovies) Poultry skins Breaded and/or fried fish or meats Canned peas, beans, and lentils Salted nuts   Fats and Oils   Olive oil and canola oil Low-sodium, low-fat salad dressings and mayonnaise   Butter, margarine, coconut and palm oils, ramirez fat   Snacks, Sweets, and Condiments   Low-sodium or unsalted versions of broths, soups, soy sauce, and condiments Pepper, herbs, and spices; vinegar, lemon, or lime juice Low-fat frozen desserts (yogurt, sherbet, fruit bars) Sugar, cocoa powder, honey, syrup, jam, and preserves Low-fat, trans-fat free cookies, cakes, and pies [place] and animal crackers, fig bars, elvie snaps   High-fat desserts Broth, soups, gravies, and sauces, made from instant mixes or other high-sodium ingredients Salted snack foods Canned olives Meat tenderizers, seasoning salt, and most flavored vinegars   Beverages   Low-sodium carbonated beverages Tea and coffee in moderation Soy milk   Commercially softened water   Suggestions   · Make whole grains, fruits, and vegetables the base of your diet. · Choose heart-healthy fats such as canola, olive, and flaxseed oil, and foods high in heart-healthy fats, such as nuts, seeds, soybeans, tofu, and fish. · Eat fish at least twice per week; the fish highest in omega-3 fatty acids and lowest in mercury include salmon, herring, mackerel, sardines, and canned chunk light tuna. If you eat fish less than twice per week or have high triglycerides, talk to your doctor about taking fish oil supplements. · Read food labels.    ¨ For products low in fat and cholesterol, look for fat free, low-fat, cholesterol free, saturated fat free, and trans fat freeAlso scan the Nutrition Facts Label, which lists saturated fat, trans fat, and cholesterol amounts. ¨ For products low in sodium, look for sodium free, very low sodium, low sodium, no added salt, and unsalted   · Skip the salt when cooking or at the table; if food needs more flavor, get creative and try out different herbs and spices. Garlic and onion also add substantial flavor to foods. · Trim any visible fat off meat and poultry before cooking, and drain the fat off after manzano. · Use cooking methods that require little or no added fat, such as grilling, boiling, baking, poaching, broiling, roasting, steaming, stir-frying, and sauting. · Avoid fast food and convenience food. They tend to be high in saturated and trans fat and have a lot of added salt. · Talk to a registered dietitian for individualized diet advice. Last Reviewed: March 2011 Brooke Ferrer MS, MPH, RD   Updated: 3/29/2011     WELL ADULT LIFESTYLE INSTRUCTIONS:    Christine Hall a day in the next week to spend an hour reviewing the information below then:     1) determine your health goals for the year   2) determine what changes you need to achieve those goals   3) design your daily routine, shopping habits etc to implement those changes        Default Right Action (no choices)       Make it EASY to do the RIGHT THINGS. 4) I invite you to send me your plans via [email or web address] so I can continue to help you       with them    Examine your lifestyle with an emphasis on BARRIERS to bad and good habits and how you can design your life to make better choices. If you want to feel better these are the FUNDAMENTAL PILLARS of Wellness:    1)  You can choose to Get 150 min/week of moderate exercise (can talk but can't        sing) or 75 min/week of vigorous exercise (can't talk).    This will enhance your sense of well being (Exercise is as good as medicine for   depression.)    2)  You can choose to Get 7-9 hours of sleep per night    Detoxifies your brain, reduces risk of dementia    3)  You can choose to Strength Train 2 x a week on non-consecutive days   This will improve function and reduce risk of injury. Body weight type exercises   such as Yoga and Pilates are excellent choices. 4)  You can choose Good Nutrition. Only eat your goal weight (in lbs) x 10        calories/day and get 5 servings of Vegetables/day   Plant based diets reduce risk of heart attack/stroke and will help you feel full on   less food. Avoid highly processed foods and processed carbohydrates. 5)  You can choose Moderate alcohol intake < 1-2 drinks/day   Alcohol will disrupt your sleep and add calories to your day. 6)  You can choose to Develop a Charismatic/Supportive relationship. This will strengthen your resilience for the ups and downs. 7)  You can choose to Practice Mindfulness. An hour a day of prayer/meditation/gratitude will change your life! If you are trying to lose weight, here are some recommendations for weight loss:  Not every weight loss program is appropriate for everybody. ..  good online sources include [employer] (more social with daily check ins), [employer] (similar but less social) and Naturally slim, as well as [employer] ($1500)    The GI Diet or \"Primal diet\", Intermittent fasting can also be effective choices. If you have diabetes treated with insulin be sure to ask for specific guidance around meals. Take your desired weight in pounds and multiply by 10 and that is your average daily calorie allowance. For example if you wish to weigh 170 lb x 10 = 1700 regis/day (this is how to gradually lose the weight and maintain your desired weight). Avoid soda/coke and all \"wet carbs\" => Drink ice water instead    Drink a large glass of ice water before meals and EAT SLOWLY (talk while you eat)!     Rethink your hunger => it means your losing weight. Minimize highly processed carbohydrates as they stimulate your appetite:  Specifically cut back on Bread, Rice, Pasta and Potatoes    Avoid eating calories after 6 pm        High-Fiber Diet     What Is Fiber? Dietary fiber is a form of carbohydrate found in plants that cannot be digested by humans. All plants contain fiber, including fruits, vegetables, grains, and legumes. Fiber is often classified into two categories: soluble and insoluble. · Soluble fiber draws water into the bowel and can help slow digestion. Examples of foods that are high in soluble fiber include oatmeal, oat bran, barley, legumes (eg, beans and peas), apples, and strawberries. · Insoluble fiber speeds digestion and can add bulk to the stool. Examples of foods that are high in insoluble fiber include whole-wheat products, wheat bran, cauliflower, green beans, and potatoes. Why Follow a High-Fiber Diet? A high-fiber diet is often recommended to prevent and treat constipation , hemorrhoids , diverticulitis , and irritable bowel syndrome . Eating a high-fiber diet can also help improve your cholesterol levels, lower your risk of coronary heart disease , reduce your risk of type 2 diabetes , and lower your weight. For people with type 1 or 2 diabetes, a high-fiber diet can also help stabilize blood sugar levels. How Much Fiber Should I Eat? A high-fiber diet should contain  20-35 grams  of fiber a day. This is actually the amount recommended for the general adult population; however, most Americans eat only 15 grams of fiber per day. Digestion of Fiber   Eating a higher fiber diet than usual can take some getting used to by your body's digestive system. To avoid the side effects of sudden increases in dietary fiber (eg, gas, cramping, bloating, and diarrhea), increase fiber gradually and be sure to drink plenty of fluids every day.    Tips for Increasing Fiber Intake   · Whenever possible, choose whole grains over refined grains (eg, brown rice instead of white rice, whole-wheat bread instead of white bread). · Include a variety of grains in your diet, such as wheat, rye, barley, oats, quinoa, and bulgur. · Eat more vegetarian-based meals. Here are some ideas: black bean burgers, eggplant lasagna, and veggie tofu stir-fitzpatrick. · Choose high-fiber snacks, such as fruits, popcorn, whole-grain crackers, and nuts. · Make whole-grain cereal or whole-grain toast part of your daily breakfast regime. · When eating out, whether ordering a sandwich or dinner, ask for extra vegetables. · When baking, replace part of the white flour with whole-wheat flour. Whole-wheat flour is particularly easy to incorporate into a recipe. High-Fiber Diet Eating Guide   Food Category   Foods Recommended   Notes   Grains   Whole-grain breads, muffins, bagels, or cb bread Rye bread Whole-wheat crackers or crisp breads Whole-grain or bran cereals Oatmeal, oat bran, or grits Wheat germ Whole-wheat pasta and brown rice   Read the ingredients list on food labels. Look for products that list \"whole\" as the first ingredient (eg, whole-wheat, whole oats). Choose cereals with at least 2 grams of fiber per serving. Vegetables   All vegetables, especially asparagus, bean sprouts, broccoli, [place] sprouts, cabbage, carrots, cauliflower, celery, corn, greens, green beans, green pepper, onions, peas, potatoes (with skin), snow peas, spinach, squash, sweet potatoes, tomatoes, zucchini   For maximum fiber intake, eat the peels of fruits and vegetablesjust be sure to wash them well first.   Fruits   All fruits, especially apples, berries, grapefruits, mangoes, nectarines, oranges, peaches, pears, dried fruits (figs, dates, prunes, raisins)   Choose raw fruits and vegetables over juice, cooked, or cannedraw fruit has more fiber. Dried fruit is also a good source of fiber.    Milk   With the exception of yogurt containing inulin (a type of fiber), dairy foods provide little fiber. Add more fiber by topping your yogurt or cottage cheese with fresh fruit, whole grain or bran cereals, nuts, or seeds. Meats and Beans   All beans and peas, especially Garbanzo beans, kidney beans, lentils, lima beans, split peas, and eastman beans All nuts and seeds, especially almonds, peanuts, [place] nuts, cashews, peanut butter, walnuts, sesame and sunflower seeds All meat, poultry, fish, and eggs   Increase fiber in meat dishes by adding eastman beans, kidney beans, black-eyed peas, bran, or oatmeal. If you are following a low-fat diet, use nuts and seeds only in moderation. Fats and Oils   All in moderation   Fats and oils do not provide fiber   Snacks, Sweets, and Condiments   Fruit Nuts Popcorn, whole-wheat pretzels, or trail mix made with dried fruits, nuts, and seeds Cakes, breads, and cookies made with oatmeal or whole-wheat flour   Most snack foods do not provide much fiber. Choose snacks with at least 2 grams of fiber per serving. Last Reviewed: March 2011 Chacha Mar MS, MPH, RD   Updated: 3/29/2011       Keeping Home a [address]       As we get older, changes in balance, gait, strength, vision, hearing, and cognition make even the most youthful senior more prone to accidents. Falls are one of the leading health risks for older people. This increased risk of falling is related to:   · Aging process (eg, decreased muscle strength, slowed reflexes)   · Higher incidence of chronic health problems (eg, arthritis, diabetes) that may limit mobility, agility or sensory awareness   · Side effects of medicine (eg, dizziness, blurred vision)especially medicines like prescription pain medicines and drugs used to treat mental health conditions   Depending on the brittleness of your bones, the consequences of a fall can be serious and long lasting.    Home Life   Research by the Association of Aging [facility]) shows that some home accidents among older adults can be prevented by making simple lifestyle changes and basic modifications and repairs to the home environment. Here are some lifestyle changes that experts recommend:   · Have your hearing and vision checked regularly. Be sure to wear prescription glasses that are right for you. · Speak to your doctor or pharmacist about the possible side effects of your medicines. A number of medicines can cause dizziness. · If you have problems with sleep, talk to your doctor. · Limit your intake of alcohol. · If necessary, use a cane or walker to help maintain your balance. · Wear supportive, rubber-soled shoes, even at home. If you live in a region that gets wintry weather, you may want to put special cleats on your shoes to prevent you from slipping on the snow and ice. · Exercise regularly to help maintain muscle tone, agility, and balance. · Always hold the banister when going up or down stairs. Also, use  bars when getting in or out of the bath or shower, or using the toilet. · To avoid dizziness, get up slowly from a lying down position. Sit up first, dangling your legs for a minute or two before rising to a standing position. Overall Home Safety Check   According to the Consumer [employer] Safety Commision's \"Older Consumer Home Safety Checklist,\" it is important to check for potential hazards in each room. And remember, proper lighting is an essential factor in home safety. If you cannot see clearly, you are more likely to fall. Important questions to ask yourself include:   · Are lamp, electric, extension, and telephone cords placed out of the flow of traffic and maintained in good condition? Have frayed cords been replaced? · Are all small rugs and runners slip resistant? If not, you can secure them to the floor with a special double-sided carpet tape. · Are smoke detectors properly locatedone on every floor of your home and one outside of every sleeping area? Are they in good working order?  Are batteries replaced at least once a year? · Do you have a well-maintained carbon monoxide detector outside every sleeping are in your home? · Does your furniture layout leave plenty of space to maneuver between and around chairs, tables, beds, and sofas? · Are hallways, stairs and passages between rooms well lit? Can you reach a lamp without getting out of bed? · Are floor surfaces well maintained? Shag rugs, high-pile carpeting, tile floors, and polished wood floors can be particularly slippery. Stairs should always have handrails and be carpeted or fitted with a non-skid tread. · Is your telephone easily reachable. Is the cord safely tucked away? Room by Room   According to the Association of Aging, bathrooms and ulices are the two most potentially hazardous rooms in your home. In the Kitchen    · Be sure your stove is in proper working order and always make sure burners and the oven are off before you go out or go to sleep. · Keep pots on the back burners, turn handles away from the front of the stove, and keep stove clean and free of grease build-up. · Kitchen ventilation systems and range exhausts should be working properly. · Keep flammable objects such as towels and pot holders away from the cooking area except when in use. Make sure kitchen curtains are tied back. · Move cords and appliances away from the sink and hot surfaces. If extension cords are needed, install wiring guides so they do not hang over the sink, range, or working areas. · Look for coffee pots, kettles and toaster ovens with automatic shut-offs. · Keep a mop handy in the kitchen so you can wipe up spills instantly. You should also have a small fire extinguisher. · Arrange your kitchen with frequently used items on lower shelves to avoid the need to stand on a stepstool to reach them. · Make sure countertops are well-lit to avoid injuries while cutting and preparing food.     In the Bathroom    · Use a non-slip mat or decals in the tub and shower, since wet, soapy tile or porcelain surfaces are extremely slippery. · Make sure bathroom rugs are non-skid or tape them firmly to the floor. Bathtubs should have at least one, preferably two, grab bars, firmly attached to structural supports in the wall. (Do not use built-in soap holders or glass shower doors as grab bars.)    · Tub seats fitted with non-slip material on the legs allow you to wash sitting down. For people with limited mobility, bathtub transfer benches allow you to slide safely into the tub. · Raised toilet seats and toilet safety rails are helpful for those with knee or hip problems. In the Bedroom    · Make sure you use a nightlight and that the area around your bed is clear of potential obstacles. · Be careful with electric blankets and never go to sleep with a heating pad, which can cause serious burns even if on a low setting. · Use fire-resistant mattress covers and pillows, and NEVER smoke in bed. · Keep a phone next to the bed that is programmed to dial 911 at the push of a button. If you have a chronic condition, you may want to sign on with an automatic call-in service. Typically the system includes a small pendant that connects directly to an emergency medical voice-response system. You should also make arrangements to stay in contact with someonefriend, neighbor, family memberon a regular schedule. Fire Prevention   According to the [employer]. (Smoke Alarms for Every) [address] [place] [address], senior citizens are one of the two highest risk groups for death and serious injuries due to residential fires. · When cooking, wear short-sleeved items, never a bulky long-sleeved robe. · The [facility]'s Safety Checklist for Older Consumers emphasizes the importance of checking basements, garages, workshops and storage areas for fire hazards, such as volatile liquids, piles of old rags or clothing and overloaded circuits.     · Never smoke in bed or when lying down on a couch or recliner chair. · Small portable electric or kerosene heaters are responsible for many home fires and should be used cautiously if at all. If you do use one, be sure to keep them away from flammable materials. · In case of fire, make sure you have a pre-established emergency exit plan. · Have a professional check your fireplace and other fuel-burning appliances yearly. [employer]   Baby boomers entering the mcintyre years will continue to see the development of new products to help older adults live safely and independently in spite of age-related changes. Making Life More Livable  , by Jose Calderon, lists over 1,000 products for \"living well in the mature years,\" such as bathing and mobility aids, household security devices, ergonomically designed knives and peelers, and faucet valves and knobs for temperature control. Medical supply stores and organizations are good sources of information about products that improve your quality of life and insure your safety.      Last Reviewed: November 2009 Steven Infante MD   Updated: 3/7/2011

## 2022-05-18 LAB
FERRITIN: 255 NG/ML (ref 13–150)
RHEUMATOID FACTOR: <10 IU/ML

## 2022-05-19 DIAGNOSIS — L40.50 PSORIASIS WITH ARTHROPATHY (HCC): ICD-10-CM

## 2022-05-19 NOTE — RESULT ENCOUNTER NOTE
Please call Marietta Silverio and Mae Rivero. Let them know her labs look very good. Kidney function has gotten a lot better. Live enzymes are ok. One of them is slightly increased but it is not worrisome at this time. It is very important that Ayesha have her liver tests drawn every 3 months. Please remind them that there is a standing order for this test at the lab. WIthout the results, I cannot refill the methotrexate. Also, I wish I could increase the dose but I am not comfortable doing that at this time.

## 2022-05-20 DIAGNOSIS — G89.4 CHRONIC PAIN SYNDROME: ICD-10-CM

## 2022-05-20 RX ORDER — DULOXETIN HYDROCHLORIDE 30 MG/1
30 CAPSULE, DELAYED RELEASE ORAL DAILY
Qty: 7 CAPSULE | Refills: 10 | OUTPATIENT
Start: 2022-05-20

## 2022-05-20 NOTE — TELEPHONE ENCOUNTER
pharmacy requesting medication refill.  Please approve or deny this request.    Rx requested:  Requested Prescriptions     Pending Prescriptions Disp Refills    DULoxetine (CYMBALTA) 30 MG extended release capsule [Pharmacy Med Name: DULOXETINE DR 30MG CAP 30 Capsule] 7 capsule 10     Sig: TAKE 1 CAPSULE BY MOUTH DAILY         Last Office Visit:   5/17/2022      Next Visit Date:  Future Appointments   Date Time Provider Cami Ledesma   6/28/2022  8:30 AM Jewell Mckay MD [address] [place] [address]

## 2022-06-07 ENCOUNTER — HOSPITAL ENCOUNTER (OUTPATIENT)
Dept: OCCUPATIONAL THERAPY | Age: 67
Setting detail: THERAPIES SERIES
Discharge: HOME | End: 2022-06-07
Payer: COMMERCIAL

## 2022-06-07 PROCEDURE — 97167 OT EVAL HIGH COMPLEX 60 MIN: CPT

## 2022-06-07 NOTE — PROGRESS NOTES
[address]  [address] [place]  [address]  Dept: [phone]  Dept Fax: [phone]  Loc: [phone]    OCCUPATIONAL THERAPY EVALUATION    Occupational Therapy: Initial evaluation    Patient: Agapito Blas (37 y.o. female)   Evaluation Date: 2022   :  1955  MRN: [id number]  CSN: [id number]  Account #: [de-identified]   Insurance: Payor: Huan Ling / Plan: [facility] Case / Product Type: *No Product type* /   Insurance ID: [id number] - (Medicare Managed) Secondary Insurance (if applicable):    Referring Physician: Angie Russ*     PCP: Bebo Lovell MD  REFERRAL DATE: 22  Onset Date:  Onset Date:  (Roughly a year ago) Visits to Date: Total # of Visits to Date: 1 Visits Approved: 12    No Show:    Cancelled Appts:      Medical Diagnosis: Impingement syndrome of right shoulder [M75.41]  Impingement syndrome of left shoulder [M75.42] M75.41 - Impingement of the R shoulder, M75.42 - Impingement of the L shoulder  Treatment Diagnosis: Impingement of R and L shoulders.   Treating diagnosis: R29.898 Other symptoms and signs involving the musculoskeletal systems (decreased ROM, pain, strength)       Therapy Time  Individual Time In: 1000  Individual Time Out: 0346  Minutes: 52             PERTINENT MEDICAL HISTORY     PMH:  Patient Active Problem List   Diagnosis    Mixed hyperlipidemia    Abnormal glucose    Gastroesophageal reflux disease without esophagitis    Alcoholism ([address]    Psoriasis with arthropathy ([address]    Vitamin D deficiency    Restless leg syndrome    Hypothyroidism    Osteopenia of multiple sites    Facet arthropathy, multilevel    Chronic pain of right knee    Abnormal EKG    BARRIENTOS (dyspnea on exertion)    Anemia    Weight loss    Hypomagnesemia    Hypokalemia    Hypercalcemia    Polyneuropathy associated with underlying disease ([address]    Generalized idiopathic epilepsy and epileptic syndromes, intractable, with status epilepticus ([address]    Lymphadenopathy, axillary    Fatigue    Epigastric pain    Gastric polyp    Altered bowel habits    Cognitive impairment    Anxiety    Grief reaction    Major depressive disorder    Stage 3b chronic kidney disease (HCC)    Adhesive capsulitis of right shoulder    Bilateral leg and foot pain    Numbness and tingling of upper and lower extremities of both sides    Therapeutic drug monitoring    Seizure ([address]    Thoracogenic scoliosis of thoracolumbar region    Chronic back pain    Squamous blepharitis of upper and lower eyelids of both eyes     Past Medical History:   Diagnosis Date    Adhesive capsulitis of right shoulder 8/12/2021    Alcoholism ([address]     Arthritis     Bilateral leg and foot pain 8/12/2021    Chronic back pain 5/17/2022    Chronic lung disease     Cognitive impairment 4/15/2021    Depression     Diabetes mellitus ([address]     Diarrhea 1/4/2021    Possibly related to Metformin, Keppra, constipation, colitis. Stop Metformin. Treat constipation. Consider adjustment to Keppra given side effects.     Encephalopathy 12/10/2020    Grief reaction 4/15/2021    Hyperlipidemia     Hypertension     Hypokalemia 11/6/2020    Hypomagnesemia 11/6/2020    Hypothyroidism     Lymphadenopathy, axillary 1/4/2021    Numbness and tingling of upper and lower extremities of both sides 8/12/2021    Second hand smoke exposure     Seizure ([address]     Squamous blepharitis of upper and lower eyelids of both eyes 5/17/2022    Stage 3b chronic kidney disease ([address] 8/12/2021    Syncope and collapse 12/10/2020    Thoracogenic scoliosis of thoracolumbar region 5/17/2022     Past Surgical History:   Procedure Laterality Date    APPENDECTOMY      BIOPSY MOUTH LESION Bilateral 12/19/2016    REMOVAL  OF BILATERAL LINGUAL MACIE performed by Tisha Keane DDS at [facility]  COLONOSCOPY N/A 1/21/2021    COLONOSCOPY DIAGNOSTIC performed by Leida Klein MD at [address] [place] [address] N/A 1/21/2021    EGD with polypectomy performed by Leida Klein MD at [facility]     Allergies   Allergen Reactions    Morphine Swelling       Diagnostic imaging: N/A    Medications:    Current Outpatient Medications:     methotrexate (RHEUMATREX) 2.5 MG chemo tablet, TAKE 6 TABLETS BY MOUTH ONCE WEEKLY, Disp: 24 tablet, Rfl: 2    DULoxetine (CYMBALTA) 30 MG extended release capsule, Take 1 capsule by mouth daily, Disp: 7 capsule, Rfl: 0    DULoxetine (CYMBALTA) 60 MG extended release capsule, Take 1 capsule by mouth daily, Disp: 90 capsule, Rfl: 1    Magnesium 400 MG CAPS, Take 1 capsule by mouth 3 times daily, Disp: 270 capsule, Rfl: 4    Infant Care Products (BABY SHAMPOO) SHAM, Use to clean eye lids, Disp: 400 mL, Rfl: 5    pantoprazole (PROTONIX) 40 MG tablet, TAKE 1 TABLET BY MOUTH DAILY, Disp: 30 tablet, Rfl: 5    calcium carbonate-vitamin D3 (CALTRATE) 600-400 MG-UNIT TABS per tab, TAKE ONE (1) TABLET BY MOUTH TWICE DAILY, Disp: 60 tablet, Rfl: 10    aspirin EC 81 MG EC tablet, Take 1 tablet by mouth daily, Disp: 90 tablet, Rfl: 4    folic acid (FOLVITE) 1 MG tablet, Take 1 tablet by mouth daily Do not take on the day you take the methotrexate. , Disp: 90 tablet, Rfl: 4    rOPINIRole (REQUIP) 0.5 MG tablet, TAKE 1 TABLET BY MOUTH AT  NIGHT, Disp: 90 tablet, Rfl: 3    levETIRAcetam (KEPPRA) 500 MG tablet, TAKE ONE (1) TABLET BY MOUTH TWICE DAILY, Disp: 180 tablet, Rfl: 1    triamcinolone (KENALOG) 0.1 % cream, APPLY TOPICALLY TWICE DAILY, Disp: 454 g, Rfl: 2    hydrOXYzine (ATARAX) 10 MG tablet, TAKE 1 TABLET BY MOUTH EVERY 8 HOURS AS NEEDED FOR ITCHING, Disp: 90 tablet, Rfl: 2    Handicap Placard MISC, by Does not apply route Permanent: NO EXPIRATION, Disp: 1 each, Rfl: 0    acetaminophen (ACETAMINOPHEN EXTRA STRENGTH) fatigue.)  Health Care Management: Primary (Sometimes able to open bottles, however she will put under water)  Ambulation Assistance: Independent  Transfer Assistance: Independent  Active : No  Patient's  Info: nieces  Occupation: On disability  Leisure & Hobbies: Watch TV, phone / tablet    Social History:  Social History  Lives With: Alone [facility] in [facility] [place]  Type of Home:  (Lives on the 6th floor, with an elevator)  Bathroom Shower/Tub: Tub/Shower unit,Shower chair with back  Bathroom Toilet: Standard  Bathroom Equipment: Grab bars around toilet,Grab bars in shower    Pain Screening:       Pain Location  Description Initial Rating  Current Rating  Improved by Worsened by   biceps tightness 4/10 4/10 Sometimes takes medicine Over using                             Action for pain:   No action necessary. Prior Level of Functioning:    Comments: Pt was at 50% d/t pain and broken L wrist    Occupation/Interests  Occupation: On disability  Leisure & Hobbies: Watch TV, phone / tablet  Is this a work related injury: no   Is this a [facility] claim: no      Driving:no     Previous OT treatments for this condition: yes. Pt saw OT in [place]. History of Present Illness or Pain/ Chief complaint: Pt reported roughly 3+ years ago, pt fell and broke her wrist, reported pain in L shoulder. Since then, pt has seen OT for shoulder pain, however reports that R arm has limited ROM d/t overuse. Scoliosis, and reported that knees are bad as well. Patient goal for therapy: \"Get better. \"         Current Functional Limitations Per Patient Report:   Cognition:  Perception  Overall Perceptual Status: WFL   Orientation: Oriented x 4  Communication: Pt has a speech problem. Vision/Hearing:  Vision  Vision: Impaired  Visual Deficits:  (Reading. Has an appointment for prescription)     Additional   Vision  Vision: Impaired  Visual Deficits:  (Reading.  Has an appointment for prescription)  Hearing  Hearing: Within functional limits  Perception:  Perception  Overall Perceptual Status: WFL    ADL's:  Feeding: Impaired due to pain , reported that her hands hurt really bad. Grooming: Impaired due to pain , and decreased ROM  Bathing: Impaired due to pain  and decreased ROM  UE dressing: Impaired due to pain / ROM  LE dressing: Impaired d/t pain / ROM  Toileting: Impaired due to pain   Toilet transfer: IND  Tub/Shower transfer: IND    Cooking: Pt reports not cooking that much anymore. Cooks frozen meals  Cleaning: Impaired due to pain   Laundry: Impaired due to pain      Sleep: Sometimes 2-3 hours a night, discomfort / unable to sleep    Medication management: Impaired due to pain     OBJECTIVE EXAMINATION     Observations:  Scoliosis   , pt's L wrist is broken / healed. Hand Dominance: right       Upper Extremity Strength and Range of Motion     Right  Left    MMT A P Norm  A P MMT   Shoulder          Flexion 1/5 103 NT 0-180 93 NT 2/5   Abduction 1/5 88 NT 0-180 85 NT 2/5   Internal Rotation NT/5 WFL NT 0-80 WFL NT NT/5   External Rotation NT/5 WFL NT 0-60 WFL NT NT/5   Elbow          Flexion NT/5 WFL NT 0-150 WFL NT NT/5   Extension NT/5 WFL -0 WFL NT NT/5   Pronation NT/5 WFL NT 0-80 WFL NT NT/5   Supination NT/5 WFL NT 0-80 WFL NT NT/5   Wrist          Flexion NT/5 WFL NT 0-70 WFL NT NT5   Extension  NT/5 WFL NT 0-60 WFL NT NT/5   Ulnar deviation NT/5 WFL NT 0-30 WFL NT NT/5   Radial Deviation  NT/5 WFL NT 0-20 WFL NT NT/5   Comments: Pain with R elbow flexion, pt experienced a lot of pain with shoulder MMT.       Hand Range of Motion      Right  Left   Normal  MP PIP DIP  MP PIP DIP    0-90 0-100 0-70  0-90 0-100 0-70    A P A P A P  A P A P A P   Index                Extension WFL NT WFL NT WFL NT  WFL NT WFL NT WFL NT   Flexion WFL NT WFL NT WFL NT  WFL NT WFL NT WFL NT   Middle                Extension WFL NT WFL NT WFL NT  WFL NT WFL NT WFL NT   Flexion WFL NT [facility] NT  WFL NT WFL NT WFL NT   Ring                Extension WFL NT WFL NT WFL NT  WFL NT WFL NT WFL NT   Flexion  WFL NT WFL NT WFL NT  WFL NT WFL NT WFL NT   Little                 Extension WFL NT WFL NT WFL NT  WFL NT WFL NT WFL NT   Flexion  WFL NT WFL NT WFL NT  WFL NT WFL NT WFL NT   Comments: NT       Right   Left Norms    A P  A P    Thumb         MP Flexion WFL NT  WFL NT 0-70   IP Flexion WFL NT  WFL NT 0-90   Radial Abduction WFL NT  WFL NT 0-50   Palmar Adduction WFL NT  WFL NT 0-40   Comments: NT    Opposition  Right Hand: WFL  Left Hand: WFL     & Pinch Strength  Average of 3 tries Right Norm Left Norm    (lb) 32 Female age 65-71: 50 lbs  23 Female age 62-78: 43 lbs    Bey Pinch (lb) 4.6 Female age 65-71: 10.0 lbs  4.6 Female age 65-71: 9.0 lbs    Lateral Pinch (lb) 5.6 Female age 65-71: 11.0 lbs  6.6 Female age 65-71: 10.0 lbs    Comments:    Coordination & Dexterity   Right Norm Left Norm   Nine Hole Peg Test  (seconds) 21.48 Female age 65-71: 22.6 s  23.40 Female age 65-71: 20.3 s    Box & Blocks  (# of blocks) NT Female age 65-71: 77.0 NT Female age 65-71: 78.1   Comments: Slight pain reported in R shoulder. \"I don't know how I'll do with my L hand, I don't even use it. \" Pain reported in L bicep area. Functional Mobility:   Pt reported a fear of falling, therapist discussed PT with patient. Pt reported that she only wanted to focus on shoulders right now. Skin Integrity:   WFL    Sensation:    WFL    Tone:   WFL     Joint Mobility:  Impaired due to pain     Palpation/Tenderness:   Tenderness in B biceps / shoulders. Education/Barriers to learning:   OT Education  OT Education: Ed Iron of Care Barriers:none   Education on this date: N/A Eval only     Outcome Measure(s) Completed:   Upper Extremity Functional Index   Today, do you or would you have any difficulty at all with:   Any of your usual work, housework, or school activities[de-identified] Quite a bit of difficulty  Your usual hobbies, re creational or sporting activities[de-identified] Extreme difficulty or unable to perform activity  Lifting a bag of groceries to waist level[de-identified] Quite a bit of difficulty  Lifting a bag of groceries above your head[de-identified] Extreme difficulty or unable to perform activity  Grooming your hair[de-identified] Extreme difficulty or unable to perform activity  Pushing up on your hands (eg from bathtub or chair):: Moderate difficulty  Preparing food (eg peeling, cutting):: Quite a bit of difficulty  Driving[de-identified] Extreme difficulty or unable to perform activity (Not driving anymore)  Vacuuming, sweeping or raking[de-identified] Quite a bit of difficulty  Dressing[de-identified] Extreme difficulty or unable to perform activity (Pulling shirt over head and bra)  Doing up buttons[de-identified] Moderate difficulty  Using tools or appliances[de-identified] A little bit of difficulty (Pt reported having someone come over)  Opening doors[de-identified] A little bit of difficulty  Cleaning[de-identified] Quite a bit of difficulty (Pt reports taking it slow)  Tying or lacing shoes[de-identified] No difficulty (Pt has slide on shoes)  Sleeping[de-identified] Extreme difficulty or unable to perform activity  Laundering clothes (eg washing, ironing, folding):: Quite a bit of difficulty (Has a cart to push clothes)  Opening a jar[de-identified] Extreme difficulty or unable to perform activity  Throwing a ball[de-identified] Extreme difficulty or unable to perform activity  Carrying a small suitcase with your affected limb[de-identified] Extreme difficulty or unable to perform activity  UEFI Total Score: 20  UEFI Total Percentage: 25 %     Total Score (out of 80) - if applicable: 20   Current Percentage of Function: 25 % % (Date: 6/7/2022)    Interpretation of Score: The final UEFI score ranges between 0 and 80 points. Scores closer to 0 indicate severe limitation whilst scores closer to 80 indicate very little to no limitation. The significant change ([address]) between two subsequent evaluations is set at 9 points. Higher Score indicates less disability, more function.        ASSESSMENT     Impression: Patient is a 74 y.o. female     Performance Deficits/Impairments: Decreased ADL status,Decreased strength,Decreased fine motor control,Decreased high-level IADLs,Decreased endurance,Decreased ROM    REQUIRES OT FOLLOW-UP: Yes Statement of Medical Necessity: Occupational Therapy is both indicated and medically necessary as outlined in the POC to increase the likelihood of meeting the functionally related goals stated below. Patient's Activity Tolerance:          Patient's rehabilitation potential/prognosis is considered to be: fair  Fair    Post pain: 4/10    Factors which may impact rehabilitation potential include:            Eval Complexity Decision Making: High Complexity    TREATMENT PLAN     PLAN OF CARE: Evaluation and patient rights have been reviewed and patient/caregiver agrees with plan of care. yes. If no, explain. FREQUENCY: 1 days per week       DURATION: 12 visits     Treatment may include any combination of the following:  [x] Evaluate and Treat  [x] Re-Evaluation   [x] GRIFFIN able to work with patient   [x] Instruction in HEP [x] Discharge plan: Will assess patient after established visits to determine need for continued therapy. ADL Training, Coordination/Dexterity Training, Instruction/Application of ECWS, joint protection, body mechanics , Manual Techniques , Pain Management, PROM/Stretching/AAROM/AROM, Strengthening/Graded Therapeutic Activity       Pt. and/or family actively involved in establishing Plan of Care and Goals: Yes    Patient/ Caregiver education and instruction:  OT Role,Plan of Care      OT Treatment Completed:  N/A - Evaluation Only    Transfer of pt care required: n/a,     OTHER RECOMMENDATIONS: Potential adaptive equipment. GOALS     LTG 1 : Patient will report pain 2 or less during functional activities. LTG 2 : Patient  will be independent with all recommended HEP's, adaptive strategies, and adaptive techniques.     LTG 3 : Patient will increase AROM of B shoulders from current by 45-50° to increase performance with I/ADL's. LTG 4 : Patient  will increase BUE strength from current by 2-3 MMT  to increase performance with I/ADL's. LTG 5 : Patient  will increase B  strength from current by 15-20 lbs to increase performance with I/ADL's. LTG 6 : Patient  will increase B pinch strength from current by 5-6 lbs to increase performance with I/ADL's. LTG 6 : Patient  will increase dexterity in B hand as observed by 9 hole peg test by decreasing time from current  by 2-3 seconds to increase performance with I/ADL's. Kezia Vazquez OT 6/7/2022 11:00 AM    Falls Risk Assessment    Age: 0-59 = 0          60-69= 1            > 70= 2 History of Falls:   0  Falls  last 6 mo = 0    1  fall  Last  6 mo = 1   1-3 falls last 6 mo = 2 Medical History:   Parkinsons, CVA,HTN, vertigo, >4 meds, use of assistive device (1pt.for each)  Mental Status:  A & O x 3 = 0  Disoriented to person, place, or time = 2     Date: 6/7/2022                                                  Age:   3                                                        Falls: 0                                                          PMH: 4                                                         Mental: 0                                                       Total:  5                                                        *Patient 4 or younger:   Vestibular:     Signature: Kezia Vazquez OT                                                      High Risk for Falls: >8  Intermediate Risk for Falls: 4-8   Low Risk for Falls: <4    * All pediatric patients (age 3 or younger) and vestibular patients will be considered high risk for falls- scoring does not need to be completed - treat as high risk. Interventions     Low Risk: Monitor for changes, reassess every three months. Intermediate Risk: Supervision for most activities, direct contact with new activities or equipment, reassess monthly.   High Risk: Stand by assitance at all times, reassess monthly. I certify that the above Occupational Therapy Services are being furnished while the patient is under my care. I agree with the treatment plan and certify that this therapy is necessary. [de-identified] Signature:  ___________________________   Date:_______                                                                   Angie Russ*        Physician Comments: _______________________________________________    Please sign and return to [address] [place] [address]. Please fax to the location listed below.  Papi Polanco for this referral!

## 2022-06-15 DIAGNOSIS — L40.50 PSORIASIS WITH ARTHROPATHY (HCC): ICD-10-CM

## 2022-06-16 RX ORDER — TRAMADOL HYDROCHLORIDE 50 MG/1
50 TABLET ORAL EVERY 8 HOURS PRN
Qty: 42 TABLET | Refills: 0 | Status: SHIPPED | OUTPATIENT
Start: 2022-06-16 | End: 2022-06-28 | Stop reason: DRUGHIGH

## 2022-06-21 ENCOUNTER — HOSPITAL ENCOUNTER (OUTPATIENT)
Dept: OCCUPATIONAL THERAPY | Age: 67
Setting detail: THERAPIES SERIES
Discharge: HOME | End: 2022-06-21
Payer: COMMERCIAL

## 2022-06-21 PROCEDURE — 97530 THERAPEUTIC ACTIVITIES: CPT

## 2022-06-21 NOTE — PROGRESS NOTES
[facility]       Occupational Therapy: Daily Note   Patient: Francis Guerra (86 y.o. female)   Date: 5478  Plan of Care Certification Period:      :  1955  MRN: [id number]  CSN: [id number]   Insurance: Payor: Mario Forbes / Plan: Claritzagloria Winsome / Product Type: *No Product type* /   Insurance ID: [id number] - (Medicare Managed) Secondary Insurance (if applicable):    Referring Physician: Heriberto Conti*     PCP: Keara Blackmon MD Visits to Date: Total # of Visits to Date: 1   Progress note:Progress Note Counter:   Visits Approved: 12    No Show:    Cancelled Appts:      Medical Diagnosis: Impingement syndrome of right shoulder [M75.41]  Impingement syndrome of left shoulder [M75.42] M75.41 - Impingement of the R shoulder, M75.42 - Impingement of the L shoulder        Therapy Time  Individual Time In: 1100  Individual Time Out: 1153  Minutes: 53          OT Therapeutic activities 53 minutes for 4 unit(s), CPT 03470       SUBJECTIVE EXAMINATION     Pain Level:   5/10, from both shoulders until elbow. Patient Comments:  \"I felt okay until I did all the cleaning yesterday, then I woke up pretty sore. \"    HEP Compliance: N/A        TREATMENT     Focus of treatment was on the following:   decreasing pain, fine motor/dexterity and strengthening  bilateral       Exercises:    Pt engaged in stacking blocks. Pt was unable to utilize the 1# weighted clothespin to stack blocks, therapist downgraded activity to just stacking x50 blocks with R hand and x50 blocks with L hand. Pt expressed fatigue in both shoulders. Pt engaged in small pegboard activity with B hands. Pt completed     Patient Education/HEP:       Therapist educated pt on HEPs for /pinch strengthening  -red foam block/red theraputty and HEP with pt demonstrating and verbalizing understanding x10 reps each. Pt expressed slight pain in the R forearm.     Therapist provided MFR to patient's R forearm to relieve pain. Pt stated \"oh that feels good. \"    Heat pack to R forearm for 10 minutes while engaging in coordination activities with L hand. Therapist educated pt on heating pad and where to purchase one for home use. Pt verbalized understanding and was interested in purchasing one. ASSESSMENT       Assessment: Pt tolerated treatment good. Post Treatment Pain: Pt denies pain    Patient's Activity Tolerance:                GOALS      LTG 1 : Patient will report pain 2 or less during functional activities. LTG 2 : Patient  will be independent with all recommended HEP's, adaptive strategies, and adaptive techniques. LTG 3 : Patient will increase AROM of B shoulders from current by 45-50° to increase performance with I/ADL's. LTG 4 : Patient  will increase BUE strength from current by 2-3 MMT  to increase performance with I/ADL's. LTG 5 : Patient  will increase B  strength from current by 15-20 lbs to increase performance with I/ADL's. LTG 6 : Patient  will increase B pinch strength from current by 5-6 lbs to increase performance with I/ADL's. LTG 6 : Patient  will increase dexterity in B hand as observed by 9 hole peg test by decreasing time from current  by 2-3 seconds to increase performance with I/ADL's.              TREATMENT PLAN   Continue POC           Electronically signed by José Miguel Rodriguez OT  on 6/21/2022 at 12:17 PM  POC NOTE

## 2022-06-28 ENCOUNTER — OFFICE VISIT (OUTPATIENT)
Dept: FAMILY MEDICINE CLINIC | Age: 67
End: 2022-06-28
Payer: COMMERCIAL

## 2022-06-28 VITALS
BODY MASS INDEX: 18.79 KG/M2 | DIASTOLIC BLOOD PRESSURE: 68 MMHG | HEIGHT: 58 IN | SYSTOLIC BLOOD PRESSURE: 124 MMHG | HEART RATE: 74 BPM | WEIGHT: 89.5 LBS | OXYGEN SATURATION: 98 % | TEMPERATURE: 96.2 F

## 2022-06-28 DIAGNOSIS — J30.89 NON-SEASONAL ALLERGIC RHINITIS, UNSPECIFIED TRIGGER: ICD-10-CM

## 2022-06-28 DIAGNOSIS — N18.31 STAGE 3A CHRONIC KIDNEY DISEASE (HCC): ICD-10-CM

## 2022-06-28 DIAGNOSIS — G25.81 RESTLESS LEG SYNDROME: Chronic | ICD-10-CM

## 2022-06-28 DIAGNOSIS — D63.8 ANEMIA IN OTHER CHRONIC DISEASES CLASSIFIED ELSEWHERE: ICD-10-CM

## 2022-06-28 DIAGNOSIS — L40.50 PSORIASIS WITH ARTHROPATHY (HCC): Primary | ICD-10-CM

## 2022-06-28 DIAGNOSIS — G89.4 CHRONIC PAIN SYNDROME: ICD-10-CM

## 2022-06-28 DIAGNOSIS — R63.4 UNINTENDED WEIGHT LOSS: ICD-10-CM

## 2022-06-28 DIAGNOSIS — M13.80 SERONEGATIVE ARTHRITIS: Chronic | ICD-10-CM

## 2022-06-28 LAB
IRON SATURATION: 13 % (ref 20–55)
IRON: 50 UG/DL (ref 37–145)
TOTAL IRON BINDING CAPACITY: 384 UG/DL (ref 250–450)
UNSATURATED IRON BINDING CAPACITY: 334 UG/DL (ref 112–347)

## 2022-06-28 PROCEDURE — 1090F PRES/ABSN URINE INCON ASSESS: CPT | Performed by: FAMILY MEDICINE

## 2022-06-28 PROCEDURE — 99215 OFFICE O/P EST HI 40 MIN: CPT | Performed by: FAMILY MEDICINE

## 2022-06-28 PROCEDURE — 1123F ACP DISCUSS/DSCN MKR DOCD: CPT | Performed by: FAMILY MEDICINE

## 2022-06-28 PROCEDURE — 3017F COLORECTAL CA SCREEN DOC REV: CPT | Performed by: FAMILY MEDICINE

## 2022-06-28 PROCEDURE — G8399 PT W/DXA RESULTS DOCUMENT: HCPCS | Performed by: FAMILY MEDICINE

## 2022-06-28 PROCEDURE — G8427 DOCREV CUR MEDS BY ELIG CLIN: HCPCS | Performed by: FAMILY MEDICINE

## 2022-06-28 PROCEDURE — G8420 CALC BMI NORM PARAMETERS: HCPCS | Performed by: FAMILY MEDICINE

## 2022-06-28 PROCEDURE — 1036F TOBACCO NON-USER: CPT | Performed by: FAMILY MEDICINE

## 2022-06-28 RX ORDER — ROPINIROLE 0.5 MG/1
0.5 TABLET, FILM COATED ORAL 3 TIMES DAILY
Qty: 270 TABLET | Refills: 4 | Status: SHIPPED | OUTPATIENT
Start: 2022-06-28 | End: 2022-10-25 | Stop reason: SDUPTHER

## 2022-06-28 RX ORDER — TRAMADOL HYDROCHLORIDE 50 MG/1
50 TABLET ORAL EVERY 6 HOURS PRN
Qty: 60 TABLET | Refills: 0 | Status: SHIPPED | OUTPATIENT
Start: 2022-06-28 | End: 2022-07-28

## 2022-06-28 RX ORDER — FEXOFENADINE HCL 180 MG/1
180 TABLET ORAL DAILY PRN
Qty: 90 TABLET | Refills: 1 | Status: SHIPPED | OUTPATIENT
Start: 2022-06-28 | End: 2022-10-25 | Stop reason: SDUPTHER

## 2022-06-28 ASSESSMENT — PATIENT HEALTH QUESTIONNAIRE - PHQ9
SUM OF ALL RESPONSES TO PHQ QUESTIONS 1-9: 2
SUM OF ALL RESPONSES TO PHQ QUESTIONS 1-9: 2
2. FEELING DOWN, DEPRESSED OR HOPELESS: 1
1. LITTLE INTEREST OR PLEASURE IN DOING THINGS: 1
SUM OF ALL RESPONSES TO PHQ QUESTIONS 1-9: 2
SUM OF ALL RESPONSES TO PHQ QUESTIONS 1-9: 2
SUM OF ALL RESPONSES TO PHQ9 QUESTIONS 1 & 2: 2

## 2022-06-28 NOTE — PROGRESS NOTES
Subjective  Jeffrey Walsh, 74 y.o. female presents today with:  Chief Complaint   Patient presents with    Follow-up     2 month follow up; is having eye surgery     Arthritis     OT is not working and legs are bothering her all night            HPI    05/17/2022: 1. Psoriatic Arthritis/Chronic leg and back pain - has pain in low back radiating BLE limiting activity and necessitating frequent breaks. HAving trouble with reduced ROM of hands and fingers; reduced  strength, pain with writing  and frequent dropping of things. Went to Dr. Monico Nageotte, ortho, who gave her cortisone injections and referred her to PT. She went to 2 PT sessions and stopped going because she was feeling better. She states that she has been doing them at home until 2 weeks ago. States that APAP and tramadol do not help with pain.     2. B/L shoulder impingement - shoulder pain and sig reduced ROM in all directions.     3. Functional decline - needs help with showering, housekeeping;; receives meds organized in pill-pack; know how to take her PRN meds.      4. Depression - stopped her SSRI because she was feeling better. She does not think she is depressed - just bored and lonely. Has no energy.      5. Unintentional weight loss - lost 25 pounds between 09/25/2021 and 03/25/2022.     06/28/2022: Labs. Ferritin elevated at 205. Prealbumin normal at 21.7. Up from 18.62 years ago. Magnesium 1.8. Rheumatoid factor WNL. Free T4 1.5 mg (WNL). TSH WNL. Glucose 116. Creatinine 0.97 down from 1.1. GFR 57.6 up from 47.9. ALT 29. AST mildly elevated at 48, previously 27. ESR 25. Previously 2.  Reactive protein less than 3.0, previously 4.1 and 24.5. Hemoglobin A1c 5.1. Hemoglobin 9.8, hematocrit 30.01, MCV 99.5, platelets 330. CBC stable. Vitamin B12 August 2021 was 886.      1.  Psoriatic arthritis/suspected sero-negativeinflammatory arthritis/chronic pain of multiple joints and diffuse back.   Restarted on methotrexate at last 6/28/2022    Squamous blepharitis of upper and lower eyelids of both eyes 5/17/2022    Stage 3b chronic kidney disease ([address] 8/12/2021    Syncope and collapse 12/10/2020    Thoracogenic scoliosis of thoracolumbar region 5/17/2022     Past Surgical History:   Procedure Laterality Date    APPENDECTOMY      BIOPSY MOUTH LESION Bilateral 12/19/2016    REMOVAL  OF BILATERAL LINGUAL MACIE performed by John Servin DDS at [facility] COLONOSCOPY N/A 1/21/2021    COLONOSCOPY DIAGNOSTIC performed by Jaz Cardona MD at [place] [address] (CERVIX STATUS UNKNOWN)      UPPER GASTROINTESTINAL ENDOSCOPY N/A 1/21/2021    EGD with polypectomy performed by Jaz Cardona MD at [facility] Marital status: Single     Spouse name: Not on file    Number of children: Not on file    Years of education: 15    Highest education level: Not on file   Occupational History    Occupation: disabled   Tobacco Use    Smoking status: Never Smoker    Smokeless tobacco: Never Used   Vaping Use    Vaping Use: Never used   Substance and Sexual Activity    Alcohol use: Not Currently     Alcohol/week: 28.0 standard drinks     Types: 28 Shots of liquor per week     Comment: quit drinking in November 2020    Drug use: No    Sexual activity: Not Currently   Other Topics Concern    Not on file   Social History Narrative    3/4/21 Varsha Chan lives in a private residence she had shared with her sister, her sister was the home owner, and she recently passed on 2/21, She is now needing to find a new home, as the house she is living in may be sold soon, Varsha Chan states she is independent with self care, does have some limitations but states able to function independently for most self care tasks. She does not drive related to history of seizures.  referral made for concerns with housing and transportation.      Social Determinants of Health     Financial Resource Strain: Low Risk     Difficulty of Paying Living Expenses: Not hard at all   Food Insecurity: No Food Insecurity    Worried About Running Out of Food in the Last Year: Never true    Katherine of Food in the Last Year: Never true   Transportation Needs: No Transportation Needs    Lack of Transportation (Medical): No    Lack of Transportation (Non-Medical): No   Physical Activity: Inactive    Days of Exercise per Week: 0 days    Minutes of Exercise per Session: 0 min   Stress:     Feeling of Stress : Not on file   Social Connections:     Frequency of Communication with Friends and Family: Not on file    Frequency of Social Gatherings with Friends and Family: Not on file    Attends Synagogue Services: Not on file    Active Member of [address] [place] [address] [employer] or Organizations: Not on file    Attends Club or Organization Meetings: Not on file    Marital Status: Not on file   Intimate Partner Violence:     Fear of Current or Ex-Partner: Not on file    Emotionally Abused: Not on file    Physically Abused: Not on file    Sexually Abused: Not on file   Housing Stability:     Unable to Pay for Housing in the Last Year: Not on file    Number of [place] in the Last Year: Not on file    Unstable Housing in the Last Year: Not on file     Family History   Problem Relation Age of Onset    Heart Disease Mother     Hypertension Mother     Diabetes Mother     Stroke Mother     Heart Disease Father     Hypertension Father     Heart Disease Sister     Hypertension Sister     Heart Disease Brother     Hypertension Brother     Diabetes Brother     Cancer Maternal Grandmother     Diabetes Maternal Grandfather      Allergies   Allergen Reactions    Morphine Swelling     Current Outpatient Medications   Medication Sig Dispense Refill    traMADol (ULTRAM) 50 MG tablet Take 1 tablet by mouth every 8 hours as needed for Pain for up to 30 days.  42 tablet 0    methotrexate (RHEUMATREX) 2.5 MG chemo tablet TAKE 6 TABLETS BY MOUTH ONCE WEEKLY 24 tablet 2    DULoxetine (CYMBALTA) 60 MG extended release capsule Take 1 capsule by mouth daily 90 capsule 1    Magnesium 400 MG CAPS Take 1 capsule by mouth 3 times daily 270 capsule 4    Infant Care Products (BABY SHAMPOO) SHAM Use to clean eye lids 400 mL 5    pantoprazole (PROTONIX) 40 MG tablet TAKE 1 TABLET BY MOUTH DAILY 30 tablet 5    calcium carbonate-vitamin D3 (CALTRATE) 600-400 MG-UNIT TABS per tab TAKE ONE (1) TABLET BY MOUTH TWICE DAILY 60 tablet 10    aspirin EC 81 MG EC tablet Take 1 tablet by mouth daily 90 tablet 4    folic acid (FOLVITE) 1 MG tablet Take 1 tablet by mouth daily Do not take on the day you take the methotrexate.  90 tablet 4    rOPINIRole (REQUIP) 0.5 MG tablet TAKE 1 TABLET BY MOUTH AT  NIGHT 90 tablet 3    levETIRAcetam (KEPPRA) 500 MG tablet TAKE ONE (1) TABLET BY MOUTH TWICE DAILY 180 tablet 1    triamcinolone (KENALOG) 0.1 % cream APPLY TOPICALLY TWICE DAILY 454 g 2    hydrOXYzine (ATARAX) 10 MG tablet TAKE 1 TABLET BY MOUTH EVERY 8 HOURS AS NEEDED FOR ITCHING 90 tablet 2    Handicap Placard MISC by Does not apply route Permanent: NO EXPIRATION 1 each 0    alendronate (FOSAMAX) 35 MG tablet Take 1 tablet by mouth every 7 days 12 tablet 3    atorvastatin (LIPITOR) 40 MG tablet TAKE 1 TABLET BY MOUTH ONCE DAILY 90 tablet 3    fenofibrate (TRIGLIDE) 160 MG tablet Take 1 tablet by mouth daily 90 tablet 4    levothyroxine (SYNTHROID) 50 MCG tablet Take 1 tablet by mouth Daily 90 tablet 3    calcium carbonate-vitamin D (CALTRATE) 600-400 MG-UNIT TABS per tab Take 1 tablet by mouth 2 times daily 90 tablet 3    fluocinonide (LIDEX) 0.05 % ointment APPLY TWICE DAILY 180 g 1    Nutritional Supplements (ENSURE COMPLETE) LIQD Take 1 Bottle by mouth 2 times daily 60 Bottle 5    acetaminophen (ACETAMINOPHEN EXTRA STRENGTH) 500 MG tablet Take 1 tablet by mouth 2 times daily as needed for Pain 60 tablet 5 No current facility-administered medications for this visit. PMH, Surgical Hx, Family Hx, and Social Hxreviewed and updated. Health Maintenance reviewed. Objective    Vitals:    06/28/22 0835   BP: 124/68   Pulse: 74   Temp: (!) 96.2 °F (35.7 °C)   TempSrc: Temporal   SpO2: 98%   Weight: 89 lb 8 oz (40.6 kg)   Height: 4' 10\" (1.473 m)        Physical Exam  Constitutional:       General: She is not in acute distress. Appearance: She is well-developed. HENT:      Head: Normocephalic and atraumatic. Eyes:      General: No scleral icterus. Conjunctiva/sclera: Conjunctivae normal.      Comments: Superior palpebrae with diffuse, mild erythema and mild edema   Cardiovascular:      Rate and Rhythm: Normal rate and regular rhythm. Heart sounds: Normal heart sounds. Pulmonary:      Effort: No respiratory distress. Breath sounds: No wheezing or rales. Musculoskeletal:      Comments: Wilson neck deformity of fingers; ulnar deviation of fingers, significant reduction of ROM of  B/L shoulders, wrists, fingers   Skin:     General: Skin is warm and dry. Neurological:      Mental Status: She is alert and oriented to person, place, and time. Lab Results   Component Value Date    LABA1C 5.1 05/17/2022    LABA1C 5.5 12/21/2021    LABA1C 6.6 (H) 08/25/2021     Lab Results   Component Value Date    CREATININE 0.97 (H) 05/17/2022     Lab Results   Component Value Date    ALT 29 05/17/2022    AST 48 (H) 05/17/2022     Lab Results   Component Value Date    CHOL 134 05/13/2021    TRIG 95 05/13/2021    HDL 39 (L) 12/21/2021    LDLCALC 64 12/21/2021        Assessment & Plan   Visit Diagnoses and Associated Orders     Psoriasis with arthropathy ([facility] [address]    -  Primary    worse, poor control on MTX. PRN tramadol. Pain Contract signed. Seronegative arthritis        worse, poor control on MTX. PRN tramadol. Pain Contract signed.          Anemia in other chronic diseases classified elsewhere UNclear etiology. CKD3a does not account. Chronic disease/weight loss may be contributing. FRANCISCO Chavez MD, Lonny Licking [[id number] Custom]   - Pending Order    Iron and TIBC [[id number] Custom]   - Future Order, Pending Order    Transferrin [[id number] Custom]   - Future Order, Pending Order         Restless leg syndrome        Worse, poor control. Ropinirole changed to 0.5 mg TID.    rOPINIRole (REQUIP) 0.5 MG tablet [[id number]]   - Pending Order         Unintended weight loss        ASsess for malignancy. Dietician consult. FRANCISCO Chavez MD, Oncololgy, Elly [[id number] Custom]   - Pending Order         Stage 3a chronic kidney disease ([facility] [address]        improving, fair control; continue to monitor. Non-seasonal allergic rhinitis, unspecified trigger                 Time spent on appointment included reviewing past laboratory and radiographic results, previous notes, consultations, past procedures, medications, obtaining history and performing physical, formulating mutually agreed upon plan of care with patient - 45 minutes. .       Reviewed with the patient: all disease processes, current clinical status, medications, activities and diet.      Side effects, adverse effects of the medication prescribed today, as well as treatment plan/ rationale and result expectations have been discussed with the patient who expresses understanding and desires to proceed.     Close follow up to evaluate treatment results and for coordination of care. I have reviewed the patient's medical history in detail and updated the computerized patient record. More than 50% of the appointment was spent in face-to-face counseling, education and care coordination. Please note this report has been partially produced using speech recognition software and may contain mistakes related to that system including errors in grammar, punctuation and spelling as well as words and phrases that may seem inappropriate.  If there are questions or concerns, please feel free to contact me to clarify. No orders of the defined types were placed in this encounter. No orders of the defined types were placed in this encounter. Medications Discontinued During This Encounter   Medication Reason    DULoxetine (CYMBALTA) 30 MG extended release capsule Therapy completed     No follow-ups on file. Controlled Substance Monitoring:    Acute and Chronic Pain Monitoring:   RX Monitoring 10/13/2021   Attestation -   Periodic Controlled Substance Monitoring No signs of potential drug abuse or diversion identified.;Obtaining appropriate analgesic effect of treatment.            Jewels Wilkins MD

## 2022-06-28 NOTE — LETTER
CONTROLLED SUBSTANCE MEDICATION AGREEMENT     Patient Name: Mary Saravia  Patient YOB: 1955   I understand, that controlled substance medications may be used to help better manage my symptoms and to improve my ability to function at home, work and in social settings. However, I also understand that these medications do have risks, which have been discussed with me, including possible development of physical or psychological dependence. I understand that successful treatment requires mutual trust and honesty between me and my provider. I understand and agree that following this Medication Agreement is necessary in continuing my provider-patient relationship and the success of my treatment plan. Explanation from my Provider: Benefits and Goals of Controlled Substance Medications: There are two potential goals for your treatment: (1) decreased pain and suffering (2) improved daily life functions. There are many possible treatments for your chronic condition(s). Alternatives such as physical therapy, yoga, massage, home daily exercise, meditation, relaxation techniques, injections, chiropractic manipulations, surgery, cognitive therapy, hypnosis and many medications that are not habit-forming may be used. Use of controlled substance medications may be helpful, but they are unlikely to resolve all symptoms or restore all function. Explanation from my Provider: Risks of Controlled Substance Medications:  Opioid pain medications: These medications can lead to problems such as addiction/dependence, sedation, lightheadedness/dizziness, memory issues, falls, constipation, nausea, or vomiting. They may also impair the ability to drive or operate machinery. Additionally, these medications may lower testosterone levels, leading to loss of bone strength, stamina and sex drive.   They may cause problems with breathing, sleep apnea and reduced coughing, which is especially dangerous for patients with lung disease. Overdose or dangerous interactions with alcohol and other medications may occur, leading to death. Hyperalgesia may develop, which means patients receiving opioids for the treatment of pain may become more sensitive to certain painful stimuli, and in some cases, experience pain from ordinarily non-painful stimuli. Women between the ages of 14-53 who could become pregnant should carefully weigh the risks and benefits of opioids with their physicians, as these medications increase the risk of pregnancy complications, including miscarriage,  delivery and stillbirth. It is also possible for babies to be born addicted to opioids. Opioid dependence withdrawal symptoms may include; feelings of uneasiness, increased pain, irritability, belly pain, diarrhea, sweats and goose-flesh. Benzodiazepines and non-benzodiazepine sleep medications: These medications can lead to problems such as addiction/dependence, sedation, fatigue, lightheadedness, dizziness, incoordination, falls, depression, hallucinations, and impaired judgment, memory and concentration. The ability to drive and operate machinery may also be affected. Abnormal sleep-related behaviors have been reported, including sleepwalking, driving, making telephone calls, eating, or having sex while not fully awake. These medications can suppress breathing and worsen sleep apnea, particularly when combined with alcohol or other sedating medications, potentially leading to death. Dependence withdrawal symptoms may include tremors, anxiety, hallucinations and seizures. Stimulants:  Common adverse effects include addiction/dependence, increased blood  pressure and heart rate, decreased appetite, nausea, involuntary weight loss, insomnia,                                                                                                                     Initials:_______   irritability, and headaches.   These risks may increase when these written agreement among other prescribers of controlled substances outlining the responsibility of the medications being prescribed.  I understand that the if the controlled medication is not helping to achieve goals, the dosage may be tapered and no longer prescribed. 3. MY RESPONSIBILITY FOR COMMUNICATION / PRESCRIPTION RENEWALS   I agree that all controlled substance medications that I take will be prescribed only by my provider. If another healthcare provider prescribes me medication in an emergency, I will notify my provider within seventy-two (72) hours.  I will arrange for refills at the prescribed interval ONLY during regular office hours. I will not ask for refills earlier than agreed, after-hours, on holidays or weekends. Refills may take up to 72 hours for processing and prescriptions to reach the pharmacy.  I will inform my other health care providers that I am taking these medications and of the existence of this [id number]. In the event of an emergency, I will provide the same information to the emergency department prescribers.  I will keep my provider updated on the pharmacy I am using for controlled medication prescription filling. Initials:_______  4. MY RESPONSIBILITY FOR PROTECTING MEDICATIONS   I will protect my prescriptions and medications. I understand that lost or misplaced prescriptions will not be replaced.  I will keep medications only for my own use and will not share them with others. I will keep all medications away from children.  I agree that if my medications are adjusted or discontinued, I will properly dispose of any remaining medications. I understand that I will be required to dispose of any remaining controlled medications as, directed by my prescriber, prior to being provided with any prescriptions for other controlled medications.   Medication drop box locations can be found at: [employer]dk    5. MY RESPONSIBILITY WITH ILLEGAL DRUGS    I will not use illegal or street drugs or another person's prescription medications not prescribed to me.  If there are identified addiction type symptoms, then referral to a program may be provided by my provider and I agree to follow through with this recommendation. 6. MY RESPONSIBILITY FOR COOPERATION WITH INVESTIGATIONS   I understand that my provider will comply with any applicable law and may discuss my use and/or possible misuse/abuse of controlled substances and alcohol, as appropriate, with any health care provider involved in my care, pharmacist, or legal authority.  I authorize my provider and pharmacy to cooperate fully with law enforcement agencies (as permitted by law) in the investigation of any possible misuse, sale, or other diversion of my controlled substances.  I agree to waive any applicable privilege or right of privacy or confidentiality with respect to these authorizations. 7. PROVIDERS RIGHT TO MONITOR FOR SAFETY: PRESCRIPTION MONITORING / DRUG TESTING   I consent to drug/toxicology screening and will submit to a drug screen upon my providers request to assure I am only taking the prescribed drugs for my safety monitoring. I understand that a drug screen is a laboratory test in which a sample of my urine, blood or saliva is checked to see what drugs I have been taking. This may entail an observed urine specimen, which means that a nurse or other health care provider may watch me provide urine, and I will cooperate if I am asked to provide an observed specimen.  I understand that my provider will check a copy of my State Prescription Monitoring Program () Report in order to safely prescribe medications.  Pill Counts: I consent to pill counts when requested.   I may be asked to bring all my prescribed controlled substance medications, in their original bottles, to all of my scheduled appointments. In addition, my provider may ask me to come to the practice at any time for a random pill count. 8. TERMINATION OF THIS AGREEMENT  For my safety, my prescriber has the right to stop prescribing controlled substance medications and may end this agreement. Initials:_______   Conditions that may result in termination of this agreement:  a. I do not show any improvement in pain, or my activity has not improved. b. I develop rapid tolerance or loss of improvement, as described in my treatment plan.  c. I develop significant side effects from the medication. d. My behavior is not consistent with the responsibilities outlined above, thereby causing safety concerns to continue prescribing controlled substance medications. e. I fail to follow the terms of this agreement. f. Other:____________________________       UNDERSTANDING THIS MEDICATION AGREEMENT:    I have read the above and have had all my questions answered. For chronic disease management, I know that my symptoms can be managed with many types of treatments. A chronic medication trial may be part of my treatment, but I must be an active participant in my care. Medication therapy is only one part of my symptom management plan. In some cases, there may be limited scientific evidence to support the chronic use of certain medications to improve symptoms and daily function. Furthermore, in certain circumstances, there may be scientific information that suggests that the use of chronic controlled substances may worsen my symptoms and increase my risk of unintentional death directly related to this medication therapy. I know that if my provider feels my risk from controlled medications is greater than my benefit, I will have my controlled substance medication(s) compassionately lowered or removed altogether.      I further agree to allow this office to contact my HIPAA contact if there are concerns about my safety and use of the controlled medications. I have agreed to use the prescribed controlled substance medications to me as instructed by my provider and as stated in this Medication Agreement. My initial on each page and my signature below shows that I have read each page and I have had the opportunity to ask questions with answers provided by my provider.     Patient Name (Printed): _____________________________________  Patient Signature:  ______________________   Date: _____________    Prescriber Name (Printed): ___________________________________  Prescriber Signature: _____________________  Date: _____________

## 2022-06-29 LAB — TRANSFERRIN: 323 MG/DL (ref 200–360)

## 2022-07-05 ENCOUNTER — HOSPITAL ENCOUNTER (OUTPATIENT)
Dept: OCCUPATIONAL THERAPY | Age: 67
Setting detail: THERAPIES SERIES
Discharge: HOME | End: 2022-07-05
Payer: COMMERCIAL

## 2022-07-05 PROCEDURE — 97530 THERAPEUTIC ACTIVITIES: CPT

## 2022-07-05 NOTE — PROGRESS NOTES
[facility]       Occupational Therapy: Daily Note   Patient: Carlton Lopez (10 y.o. female)   Date:   Plan of Care Certification Period:      :  1955  MRN: [id number]  CSN: [id number]   Insurance: Payor: Pb Ahr / Plan: Raulito Manzo / Product Type: *No Product type* /   Insurance ID: [id number] - (Medicare Managed) Secondary Insurance (if applicable):    Referring Physician: Nona Reaves*     PCP: Aide Tafoya MD Visits to Date: Total # of Visits to Date: 1   Progress note:Progress Note Counter:   Visits Approved: 12    No Show:    Cancelled Appts:      Medical Diagnosis: Impingement syndrome of right shoulder [M75.41]  Impingement syndrome of left shoulder [M75.42] M75.41 - Impingement of the R shoulder, M75.42 - Impingement of the L shoulder        Therapy Time    Time in 1000   Time out 1053   Total treatment minutes 53   Total time code minutes           OT Therapeutic activities 53 minutes for 4 unit(s), CPT 33684       SUBJECTIVE EXAMINATION     Pain Level:   Pt denies pain    Patient Comments:  \"I haven't cleaned my apartment in a week. \"       HEP/Strategies/Orthosis Compliance: Patient stated non-compliant with HEP. Informed patient of possible benefits of HEP and importance of completion        TREATMENT     Focus of treatment was on the following:   fine motor/dexterity, strengthening bilateral  UE and strengthening  bilateral       Exercises:    Pt engaged in red theraputty; flattening down with B hands. Increased effort noted. Pt utilized puttyciser tools; L-bar, knob-turn, cap turn. Pt expressed slight stretching sensation in both shoulders. Pt engaged in 1# weighted bar to engage in shoulder flexion, forward push, and bicep curls x10 reps each exercise x3 trials. Pt required frequent rest breaks d/t fatigue. Pt engaged in arm stretches on the table with towel.  Forward flexion, diagonal, and circles x10 reps each x2 trials both hands. Pt engaged in 1# weighted clothespin to grasp and release x45 push pop beads onto pegboard alternating between R and L hand. Pt displayed significant fatigue requiring frequent rest breaks. Pt then pushed beads together. Patient Education/HEP:   Continue recommended HEP/activities. ASSESSMENT       Assessment: Pt tolerated treatment fair. Post Treatment Pain: 5/10    Patient's Activity Tolerance:                GOALS        LTG 1 : Patient will report pain 2 or less during functional activities. LTG 2 : Patient  will be independent with all recommended HEP's, adaptive strategies, and adaptive techniques.    LTG 3 : Patient will increase AROM of B shoulders from current by 45-50° to increase performance with I/ADL's. LTG 4 : Patient  will increase BUE strength from current by 2-3 MMT  to increase performance with I/ADL's. LTG 5 : Patient  will increase B  strength from current by 15-20 lbs to increase performance with I/ADL's. LTG 6 : Patient  will increase B pinch strength from current by 5-6 lbs to increase performance with I/ADL's. LTG 6 : Patient  will increase dexterity in [place]rvore as observed by 9 hole peg test by decreasing time from current  by 2-3 seconds to increase performance with I/ADL's.              TREATMENT PLAN   Continue POC           Electronically signed by Albert Stacy OT  on 7/5/2022 at 10:54 AM  POC NOTE

## 2022-07-06 ENCOUNTER — TELEPHONE (OUTPATIENT)
Dept: FAMILY MEDICINE CLINIC | Age: 67
End: 2022-07-06

## 2022-07-06 DIAGNOSIS — D64.9 ANEMIA, UNSPECIFIED TYPE: Primary | ICD-10-CM

## 2022-07-06 NOTE — RESULT ENCOUNTER NOTE
Please call patient. Iron levels are normal. Your anemia appears to be related to chronic illness but I would like you to see a blood specialist in order to confirm this diagnosis. I have written the referral. Someone will call you to schedule the appt.

## 2022-07-06 NOTE — TELEPHONE ENCOUNTER
[facility] called to get medication clarification. Ropinirole (Requip) 0.5mg tablet  Take 1 tablet by mouth 3 times daily                Or   Take 1 tablet by mouth at night    Please call pharmacy to clarify prescription.

## 2022-07-06 NOTE — TELEPHONE ENCOUNTER
Authorization department states patients ct scans were denied. She will need additional tests prior to ct scans being approved.  They are sending a fax with additional inormation

## 2022-07-07 ENCOUNTER — TELEPHONE (OUTPATIENT)
Dept: FAMILY MEDICINE CLINIC | Age: 67
End: 2022-07-07

## 2022-07-07 NOTE — TELEPHONE ENCOUNTER
Patient notified that she is scheduled for a CT scans July 12th, however, the insurance denied them. She is unable to pay out of pocket for these screens.      Please advise and thank you,

## 2022-07-07 NOTE — TELEPHONE ENCOUNTER
Patient needs an Cedric Webster before this CT. There is an US from last year but I don't think I can use that. I will fax over the office note, I will pend an US or Xray for the doctor. Please advise.

## 2022-07-07 NOTE — TELEPHONE ENCOUNTER
[facility] faxed over something for clarification. I did put she is suppose to take 3 tablets by mouth at night. I have confirmation that this was sent to [facility] with the fax number that they provided.

## 2022-07-08 DIAGNOSIS — G89.4 CHRONIC PAIN SYNDROME: ICD-10-CM

## 2022-07-08 DIAGNOSIS — L40.50 PSORIASIS WITH ARTHROPATHY (HCC): ICD-10-CM

## 2022-07-08 DIAGNOSIS — M13.80 SERONEGATIVE ARTHRITIS: Chronic | ICD-10-CM

## 2022-07-08 RX ORDER — TRAMADOL HYDROCHLORIDE 50 MG/1
TABLET ORAL
Qty: 42 TABLET | Refills: 0 | OUTPATIENT
Start: 2022-07-08

## 2022-07-08 NOTE — TELEPHONE ENCOUNTER
A month's worth of tramadol was prescribed on June 28, 2022. Why is this being requested now? No refills now.

## 2022-07-11 ENCOUNTER — TELEPHONE (OUTPATIENT)
Dept: FAMILY MEDICINE CLINIC | Age: 67
End: 2022-07-11

## 2022-07-11 NOTE — TELEPHONE ENCOUNTER
Patient called in and states she had to cancel her CT scan due to insufficient amount of notes being sent over to the insurance company. No

## 2022-07-11 NOTE — TELEPHONE ENCOUNTER
Okay thank you. I sent a message to [facility] stating she needs other imaging like Xray for the insurance to even improve the CT.

## 2022-07-12 ENCOUNTER — HOSPITAL ENCOUNTER (OUTPATIENT)
Dept: OCCUPATIONAL THERAPY | Age: 67
Setting detail: THERAPIES SERIES
Discharge: HOME | End: 2022-07-12
Payer: COMMERCIAL

## 2022-07-12 PROCEDURE — 97530 THERAPEUTIC ACTIVITIES: CPT

## 2022-07-12 NOTE — PROGRESS NOTES
[address], Susan. \[facility].\""  [address] [place] [address]  [address]  [address]  Dept: [phone]  Dept Fax: [phone]  Loc: [phone]       Patient: Vinny Shaw (74 y.o. female)   Date: 2022   :  1955  MRN: [id number]  CSN: [id number]  Account #: [de-identified]   Insurance: Payor: Jeanna Filler / Plan: Sd Level / Product Type: *No Product type* /   Insurance ID: [id number] - (Medicare Managed) Secondary Insurance (if applicable):    Referring Physician: Jacey Pelayo*     PCP: Julius Gilliam MD  Date of eval: 22 Visits to Date: Total # of Visits to Date: 1  Progress note:Progress Note Counter: 3/12  Visits Approved: 12    No Show:    Cancelled Appts:      Medical Diagnosis: Impingement syndrome of right shoulder [M75.41]  Impingement syndrome of left shoulder [M75.42] M75.41 - Impingement of the R shoulder, M75.42 - Impingement of the L shoulder        Treating diagnosis: R29.898 Other symptoms and signs involving the musculoskeletal systems (decreased ROM, strength, , dexterity)     Comments: Patient to be discharged from outpatient occupational therapy at this time to continue progress via HEP. Assessment:    Goals Current/Discharge status  Met     LTG 1 : Patient will report pain 2 or less during functional activities. Not met: Patient reports 4-5/10 pain during activities, however once she sat down and relaxed, she felt better. She is trying not to take her pain medication and she reported that she hasn't used it too much. [] Met  [] Partially Met  [x] Not Met     LTG 2 : Patient  will be independent with all recommended HEP's, adaptive strategies, and adaptive techniques.   MET [x] Met  [] Partially Met  [] Not Met     LTG 3 : Patient will increase AROM of B shoulders from current by 45-50° to increase performance with I/ADL's.   Not met: Pt continued to make improvements, however not [facility] yet. [] Met  [] Partially Met  [x] Not Met     LTG 4 : Patient  will increase BUE strength from current by 2-3 MMT  to increase performance with I/ADL's. Not met: Patient remains at 1/5 and 2/5 [] Met  [] Partially Met  [x] Not Met     LTG 5 : Patient  will increase B  strength from current by 15-20 lbs to increase performance with I/ADL's. Not met: pt increased R  from 27lbs to 35lbs, and L  from 19lbs to 29lbs [] Met  [] Partially Met  [x] Not Met     LTG 6 : Patient  will increase B pinch strength from current by 5-6 lbs to increase performance with I/ADL's. Met: Pt increased R palmar pinch from 4.6lbs to 9lbs; norm 10lbs, lateral pinch from 5.6lbs to 8lbs; norm is 11lbs. L palmar pinch 4.6lbs to 7.4lbs; norm is 9lbs, lateral pinch 6.6lbs to 9lbs; norm is 10lbs. [x] Met  [] Partially Met  [] Not Met     LTG 6 : Patient  will increase dexterity in Árvore as observed by 9 hole peg test by decreasing time from current  by 2-3 seconds to increase performance with I/ADL's.    MET [x] Met  [] Partially Met  [] Not Met      [] Met  [] Partially Met  [] Not Met      [] Met  [] Partially Met  [] Not Met      [] Met  [] Partially Met  [] Not Met      [] Met  [] Partially Met  [] Not Met       Functional assessment used: Upper Extremity Functional Index  Score on eval: 20, 25%  Score at d/c: 20, 25%    Plan: D/C from OT    Thank you for referral of this patient.       Electronically signed by:  Shaista Banda OT 7/12/2022 10:53 AM

## 2022-07-12 NOTE — PROGRESS NOTES
[facility]       Occupational Therapy: Daily Note   Patient: Sukhjinder Finn (53 y.o. female)   Date:   Plan of Care Certification Period:      :  1955  MRN: [id number]  CSN: [id number]   Insurance: Payor: Celso Mayorga / Plan: Hodgson Gross / Product Type: *No Product type* /   Insurance ID: [id number] - (Medicare Managed) Secondary Insurance (if applicable):    Referring Physician: Mason Blanco*     PCP: Margarette Chapman MD Visits to Date: Total # of Visits to Date: 1   Progress note:Progress Note Counter: 3/12  Visits Approved: 12    No Show:    Cancelled Appts:      Medical Diagnosis: Impingement syndrome of right shoulder [M75.41]  Impingement syndrome of left shoulder [M75.42] M75.41 - Impingement of the R shoulder, M75.42 - Impingement of the L shoulder        Therapy Time    Time in 1002   Time out 1040   Total treatment minutes 38   Total time code minutes           OT Therapeutic activities 38 minutes for 3 unit(s), CPT 41521       SUBJECTIVE EXAMINATION     Pain Level:   Pt denies pain    Patient Comments:  Pt expressed she is going to an arthritis doctor on .      HEP/Strategies/Orthosis Compliance: Patient verbally confirmed compliant with HEP's        TREATMENT     Focus of treatment was on the following:   decreasing pain, fine motor/dexterity, increase bilateral  AROM active ROM, strengthening bilateral  UE and strengthening  bilateral       Exercises:    Upper Extremity Strength and Range of Motion                 Right   Left     MMT A 7/12 Norm  A 7/12 MMT   Shoulder                 Flexion 1/5 103 1/5  113 0-180 93 110  2/5 2/5   Abduction 1/5 88 80  1/5 0-180 85 113  2/5 2/5   Internal Rotation NT/5 WFL NT 0-80 WFL NT NT/5   External Rotation NT/5 WFL NT 0-60 WFL NT NT/5   Elbow                 Flexion NT/5 WFL NT 0-150 WFL NT NT/5   Extension NT/5 WFL -0 WFL NT NT/5   Pronation NT/5 [facility] NT 0-80 WFL NT NT/5   Supination NT/5 WFL NT 0-80 WFL NT NT/5   Wrist                 Flexion NT/5 WFL NT 0-70 WFL NT NT5   Extension  NT/5 WFL NT 0-60 WFL NT NT/5   Ulnar deviation NT/5 WFL NT 0-30 WFL NT NT/5   Radial Deviation  NT/5 WFL NT 0-20 WFL NT NT/5   Comments: Pt displayed significant pain with shoulder MMT. Pt's shoulder was noted to pop the entire time.         & Pinch Strength  Average of 3 tries R 7/12 Right Norm L 7/12 Left Norm    (lb) 35.6 32 Female age 65-71: 50 lbs  29.6 23 Female age 62-78: 37 lbs    Bey Pinch (lb) 9 4.6 Female age 65-71: 10.0 lbs  7.6 4.6 Female age 65-71: 9.0 lbs    Lateral Pinch (lb) 8 5.6 Female age 65-71: 11.0 lbs  9.6 6.6 Female age 65-71: 10.0 lbs    Comments: Slight pain in bicep/triceps with palmar pinch for R hand      Coordination & Dexterity    R 7/12 Right Norm L 7/12 Left Norm   Nine Hole Peg Test  (seconds) 20.34 21.48 Female age 65-71: 22.6 s  20 24.43 Female age 65-71: 20.3 s    Box & Blocks  (# of blocks)  NT Female age 65-71: 77.0  NT Female age 65-71: 78.1   Comments: Slight pain reported in R shoulder. \"I don't know how I'll do with my L hand, I don't even use it. \" Pain reported in L bicep area. The Upper Extremity Functional Index  Today, do you or would you have any difficulty at all with:   Any of your usual work, housework, or school activities[de-identified] Moderate difficulty (Pt reports cleaning one room at a time and taking her time)  Your usual hobbies, re creational or sporting activities[de-identified] Extreme difficulty or unable to perform activity  Lifting a bag of groceries to waist level[de-identified] Extreme difficulty or unable to perform activity (Pt reports she does not do that anymore.)  Lifting a bag of groceries above your head[de-identified] Extreme difficulty or unable to perform activity  Grooming your hair[de-identified] Extreme difficulty or unable to perform activity  Pushing up on your hands (eg from bathtub or chair):: A little bit of difficulty  Preparing food (eg peeling, cutting):: A little bit of difficulty  Driving[de-identified] Extreme difficulty or unable to perform activity (Pt is not driving)  Vacuuming, sweeping or raking[de-identified] Quite a bit of difficulty  Dressing[de-identified] Extreme difficulty or unable to perform activity (Bra and shirt are still challenging to do)  Doing up buttons[de-identified] A little bit of difficulty  Using tools or appliances[de-identified] Quite a bit of difficulty  Opening doors[de-identified] Quite a bit of difficulty  Cleaning[de-identified] Quite a bit of difficulty (Cleaning one door at a time and taking breaks)  Tying or lacing shoes[de-identified] No difficulty (Slide shoes)  Sleeping[de-identified] Extreme difficulty or unable to perform activity  Laundering clothes (eg washing, ironing, folding):: Quite a bit of difficulty (Still pushing cart)  Opening a jar[de-identified] Extreme difficulty or unable to perform activity  Throwing a ball[de-identified] Extreme difficulty or unable to perform activity  Carrying a small suitcase with your affected limb[de-identified] Extreme difficulty or unable to perform activity  UEFI Total Score: 20  UEFI Total Percentage: 25 %    Patient Education/HEP:       Pt reported that she wants to be discharged at this time. Therapist provided pt with yellow theraband and demonstrated exercises to complete. Pt verbalized and demonstrated exercises. Pt also completed AROM exercises on the table/wall with a towel to promote improvements with ROM. Pt verbalized she will continue to use the red putty. ASSESSMENT       Assessment:  Pt wishes to be d/c'd at this time. Post Treatment Pain: Pt denies pain    Patient's Activity Tolerance:                GOALS      LTG 1 : Patient will report pain 2 or less during functional activities. Not met: Patient reports 4-5/10 pain during activities, however once she sat down and relaxed, she felt better. She is trying not to take her pain medication and she reported that she hasn't used it too much. LTG 2 : Patient  will be independent with all recommended HEP's, adaptive strategies, and adaptive techniques.

## 2022-07-19 ENCOUNTER — HOSPITAL ENCOUNTER (OUTPATIENT)
Dept: OCCUPATIONAL THERAPY | Age: 67
Setting detail: THERAPIES SERIES
End: 2022-07-19
Payer: COMMERCIAL

## 2022-07-26 ENCOUNTER — APPOINTMENT (OUTPATIENT)
Dept: OCCUPATIONAL THERAPY | Age: 67
End: 2022-07-26
Payer: COMMERCIAL

## 2022-08-10 RX ORDER — LEVETIRACETAM 500 MG/1
TABLET ORAL
Qty: 60 TABLET | Refills: 10 | Status: SHIPPED | OUTPATIENT
Start: 2022-08-10 | End: 2022-10-25 | Stop reason: SDUPTHER

## 2022-08-11 DIAGNOSIS — L40.50 PSORIASIS WITH ARTHROPATHY (HCC): ICD-10-CM

## 2022-08-16 RX ORDER — METHOTREXATE 2.5 MG/1
TABLET ORAL
Qty: 24 TABLET | Refills: 10 | OUTPATIENT
Start: 2022-08-16

## 2022-09-01 DIAGNOSIS — E03.9 ACQUIRED HYPOTHYROIDISM: Chronic | ICD-10-CM

## 2022-09-01 RX ORDER — LEVOTHYROXINE SODIUM 0.05 MG/1
50 TABLET ORAL DAILY
Qty: 90 TABLET | Refills: 3 | Status: SHIPPED | OUTPATIENT
Start: 2022-09-01 | End: 2022-10-25 | Stop reason: SDUPTHER

## 2022-09-09 ENCOUNTER — TELEPHONE (OUTPATIENT)
Dept: FAMILY MEDICINE CLINIC | Age: 67
End: 2022-09-09

## 2022-09-09 NOTE — TELEPHONE ENCOUNTER
Needs new [facility] and she has new insurance that is [employer].

## 2022-09-13 DIAGNOSIS — G25.81 RESTLESS LEG SYNDROME: Chronic | ICD-10-CM

## 2022-09-13 DIAGNOSIS — L40.50 PSORIASIS WITH ARTHROPATHY (HCC): ICD-10-CM

## 2022-09-13 DIAGNOSIS — G89.4 CHRONIC PAIN SYNDROME: ICD-10-CM

## 2022-09-13 DIAGNOSIS — K21.9 GASTROESOPHAGEAL REFLUX DISEASE WITHOUT ESOPHAGITIS: Chronic | ICD-10-CM

## 2022-09-13 DIAGNOSIS — E83.42 HYPOMAGNESEMIA: ICD-10-CM

## 2022-09-13 DIAGNOSIS — M85.89 OSTEOPENIA OF MULTIPLE SITES: Chronic | ICD-10-CM

## 2022-09-13 DIAGNOSIS — M13.80 SERONEGATIVE ARTHRITIS: Chronic | ICD-10-CM

## 2022-09-13 DIAGNOSIS — E78.2 MIXED HYPERLIPIDEMIA: Chronic | ICD-10-CM

## 2022-09-13 DIAGNOSIS — I20.0 UNSTABLE ANGINA (HCC): ICD-10-CM

## 2022-09-13 NOTE — TELEPHONE ENCOUNTER
Pt called stating the she change pharmacy   [facility] [address] is her new pharmacy. Rx request   Requested Prescriptions     Pending Prescriptions Disp Refills    aspirin EC 81 MG EC tablet 90 tablet 4     Sig: Take 1 tablet by mouth daily    atorvastatin (LIPITOR) 40 MG tablet 90 tablet 3     Sig: TAKE 1 TABLET BY MOUTH ONCE DAILY    pantoprazole (PROTONIX) 40 MG tablet 30 tablet 5    Magnesium 400 MG CAPS 270 capsule 4     Sig: Take 1 capsule by mouth 3 times daily    rOPINIRole (REQUIP) 0.5 MG tablet 270 tablet 4     Sig: Take 1 tablet by mouth 3 times daily TAKE 1 TABLET BY MOUTH AT  NIGHT    methotrexate (RHEUMATREX) 2.5 MG chemo tablet 24 tablet 2     Sig: TAKE 6 TABLETS BY MOUTH ONCE WEEKLY    folic acid (FOLVITE) 1 MG tablet 90 tablet 4     Sig: Take 1 tablet by mouth daily Do not take on the day you take the methotrexate. traMADol (ULTRAM) 50 MG tablet 60 tablet 0     Sig: Take 1 tablet by mouth every 6 hours as needed for Pain for up to 30 days. Intended supply: 7 days. Take lowest dose possible to manage pain    fexofenadine (ALLEGRA) 180 MG tablet 90 tablet 1     Sig: Take 1 tablet by mouth daily as needed (allergies)    DULoxetine (CYMBALTA) 60 MG extended release capsule 90 capsule 1     Sig: Take 1 capsule by mouth daily    calcium carbonate-vitamin D (CALTRATE) 600-400 MG-UNIT TABS per tab 90 tablet 3     Sig: Take 1 tablet by mouth 2 times daily     LOV 6/28/2022  Next Visit Date:  No future appointments.

## 2022-09-14 RX ORDER — ATORVASTATIN CALCIUM 40 MG/1
TABLET, FILM COATED ORAL
Qty: 90 TABLET | Refills: 3 | OUTPATIENT
Start: 2022-09-14

## 2022-09-14 RX ORDER — ASPIRIN 81 MG/1
81 TABLET ORAL DAILY
Qty: 90 TABLET | Refills: 4 | OUTPATIENT
Start: 2022-09-14

## 2022-09-14 RX ORDER — FOLIC ACID 1 MG/1
1 TABLET ORAL DAILY
Qty: 90 TABLET | Refills: 4 | OUTPATIENT
Start: 2022-09-14

## 2022-09-14 RX ORDER — DULOXETIN HYDROCHLORIDE 60 MG/1
60 CAPSULE, DELAYED RELEASE ORAL DAILY
Qty: 90 CAPSULE | Refills: 1 | OUTPATIENT
Start: 2022-09-14

## 2022-09-14 RX ORDER — FEXOFENADINE HCL 180 MG/1
180 TABLET ORAL DAILY PRN
Qty: 90 TABLET | Refills: 1 | OUTPATIENT
Start: 2022-09-14

## 2022-09-14 RX ORDER — PANTOPRAZOLE SODIUM 40 MG/1
TABLET, DELAYED RELEASE ORAL
Qty: 30 TABLET | Refills: 5 | OUTPATIENT
Start: 2022-09-14

## 2022-09-14 RX ORDER — ROPINIROLE 0.5 MG/1
0.5 TABLET, FILM COATED ORAL 3 TIMES DAILY
Qty: 270 TABLET | Refills: 4 | OUTPATIENT
Start: 2022-09-14

## 2022-09-14 RX ORDER — TRAMADOL HYDROCHLORIDE 50 MG/1
50 TABLET ORAL EVERY 6 HOURS PRN
Qty: 60 TABLET | Refills: 0 | OUTPATIENT
Start: 2022-09-14 | End: 2022-10-14

## 2022-09-14 NOTE — TELEPHONE ENCOUNTER
It appears that patient does not require refills of all of these medications. Please review and resubmit only those which are necessary.

## 2022-10-25 ENCOUNTER — OFFICE VISIT (OUTPATIENT)
Dept: PRIMARY CARE CLINIC | Age: 67
End: 2022-10-25
Payer: COMMERCIAL

## 2022-10-25 VITALS
OXYGEN SATURATION: 96 % | BODY MASS INDEX: 21.79 KG/M2 | SYSTOLIC BLOOD PRESSURE: 130 MMHG | WEIGHT: 101 LBS | HEART RATE: 80 BPM | HEIGHT: 57 IN | TEMPERATURE: 97 F | DIASTOLIC BLOOD PRESSURE: 80 MMHG

## 2022-10-25 DIAGNOSIS — L40.50 PSORIASIS WITH ARTHROPATHY (HCC): ICD-10-CM

## 2022-10-25 DIAGNOSIS — F41.9 ANXIETY AND DEPRESSION: ICD-10-CM

## 2022-10-25 DIAGNOSIS — G25.81 RESTLESS LEG SYNDROME: Chronic | ICD-10-CM

## 2022-10-25 DIAGNOSIS — E83.42 HYPOMAGNESEMIA: ICD-10-CM

## 2022-10-25 DIAGNOSIS — R63.4 WEIGHT LOSS, UNINTENTIONAL: ICD-10-CM

## 2022-10-25 DIAGNOSIS — Z76.89 ENCOUNTER TO ESTABLISH CARE: Primary | ICD-10-CM

## 2022-10-25 DIAGNOSIS — K21.9 GASTROESOPHAGEAL REFLUX DISEASE WITHOUT ESOPHAGITIS: Chronic | ICD-10-CM

## 2022-10-25 DIAGNOSIS — Z76.0 ENCOUNTER FOR MEDICATION REFILL: ICD-10-CM

## 2022-10-25 DIAGNOSIS — F32.A ANXIETY AND DEPRESSION: ICD-10-CM

## 2022-10-25 DIAGNOSIS — E78.2 MIXED HYPERLIPIDEMIA: Chronic | ICD-10-CM

## 2022-10-25 DIAGNOSIS — R56.9 SEIZURE (HCC): ICD-10-CM

## 2022-10-25 DIAGNOSIS — E03.9 ACQUIRED HYPOTHYROIDISM: Chronic | ICD-10-CM

## 2022-10-25 PROCEDURE — G8427 DOCREV CUR MEDS BY ELIG CLIN: HCPCS | Performed by: INTERNAL MEDICINE

## 2022-10-25 PROCEDURE — 1036F TOBACCO NON-USER: CPT | Performed by: INTERNAL MEDICINE

## 2022-10-25 PROCEDURE — G8420 CALC BMI NORM PARAMETERS: HCPCS | Performed by: INTERNAL MEDICINE

## 2022-10-25 PROCEDURE — G8484 FLU IMMUNIZE NO ADMIN: HCPCS | Performed by: INTERNAL MEDICINE

## 2022-10-25 PROCEDURE — 3017F COLORECTAL CA SCREEN DOC REV: CPT | Performed by: INTERNAL MEDICINE

## 2022-10-25 PROCEDURE — 1123F ACP DISCUSS/DSCN MKR DOCD: CPT | Performed by: INTERNAL MEDICINE

## 2022-10-25 PROCEDURE — G8399 PT W/DXA RESULTS DOCUMENT: HCPCS | Performed by: INTERNAL MEDICINE

## 2022-10-25 PROCEDURE — 1090F PRES/ABSN URINE INCON ASSESS: CPT | Performed by: INTERNAL MEDICINE

## 2022-10-25 PROCEDURE — 99215 OFFICE O/P EST HI 40 MIN: CPT | Performed by: INTERNAL MEDICINE

## 2022-10-25 RX ORDER — LEVOTHYROXINE SODIUM 0.05 MG/1
50 TABLET ORAL DAILY
Qty: 90 TABLET | Refills: 3 | Status: SHIPPED | OUTPATIENT
Start: 2022-10-25

## 2022-10-25 RX ORDER — DULOXETIN HYDROCHLORIDE 60 MG/1
60 CAPSULE, DELAYED RELEASE ORAL DAILY
Qty: 90 CAPSULE | Refills: 1 | Status: SHIPPED | OUTPATIENT
Start: 2022-10-25

## 2022-10-25 RX ORDER — PANTOPRAZOLE SODIUM 40 MG/1
TABLET, DELAYED RELEASE ORAL
Qty: 30 TABLET | Refills: 5 | Status: SHIPPED | OUTPATIENT
Start: 2022-10-25

## 2022-10-25 RX ORDER — ROPINIROLE 0.5 MG/1
0.5 TABLET, FILM COATED ORAL 3 TIMES DAILY
Qty: 270 TABLET | Refills: 4 | Status: SHIPPED | OUTPATIENT
Start: 2022-10-25

## 2022-10-25 RX ORDER — KETOROLAC TROMETHAMINE 5 MG/ML
1 SOLUTION OPHTHALMIC
COMMUNITY
Start: 2022-10-11 | End: 2022-10-25 | Stop reason: SDUPTHER

## 2022-10-25 RX ORDER — LEVETIRACETAM 500 MG/1
TABLET ORAL
Qty: 60 TABLET | Refills: 10 | Status: SHIPPED | OUTPATIENT
Start: 2022-10-25

## 2022-10-25 RX ORDER — MAGNESIUM OXIDE 400 MG/1
TABLET ORAL
COMMUNITY
Start: 2022-08-05 | End: 2022-10-25 | Stop reason: SDUPTHER

## 2022-10-25 RX ORDER — FEXOFENADINE HCL 180 MG/1
180 TABLET ORAL DAILY PRN
Qty: 90 TABLET | Refills: 1 | Status: SHIPPED | OUTPATIENT
Start: 2022-10-25

## 2022-10-25 RX ORDER — ASPIRIN 81 MG/1
81 TABLET ORAL DAILY
Qty: 90 TABLET | Refills: 4 | Status: SHIPPED | OUTPATIENT
Start: 2022-10-25

## 2022-10-25 RX ORDER — FENOFIBRATE 160 MG/1
160 TABLET ORAL DAILY
Qty: 90 TABLET | Refills: 4 | Status: SHIPPED | OUTPATIENT
Start: 2022-10-25

## 2022-10-25 RX ORDER — KETOROLAC TROMETHAMINE 5 MG/ML
SOLUTION OPHTHALMIC
COMMUNITY
Start: 2022-10-19 | End: 2022-10-25 | Stop reason: SDUPTHER

## 2022-10-25 RX ORDER — MAGNESIUM OXIDE 400 MG/1
TABLET ORAL
Qty: 30 TABLET | Refills: 3 | Status: SHIPPED | OUTPATIENT
Start: 2022-10-25

## 2022-10-25 RX ORDER — FOLIC ACID 1 MG/1
1 TABLET ORAL DAILY
Qty: 90 TABLET | Refills: 4 | Status: SHIPPED | OUTPATIENT
Start: 2022-10-25 | End: 2022-10-31 | Stop reason: SDUPTHER

## 2022-10-25 RX ORDER — ATORVASTATIN CALCIUM 40 MG/1
TABLET, FILM COATED ORAL
Qty: 90 TABLET | Refills: 3 | Status: SHIPPED | OUTPATIENT
Start: 2022-10-25

## 2022-10-25 NOTE — PROGRESS NOTES
Subjective:      Patient ID: Garett Toussaint is a 74 y.o. female who presents today for:  Chief Complaint   Patient presents with    New Patient    Medication Refill       HPI  Patient is a 72yo Female presenting for an establish care visit and for medication refills. Here today with her Niece. Reports a history of Hypothyroidism, HLD, Stage 3A CKD, GERD, Seizure Disorder, Psoriasis with arthropathy, Osteoporosis, RLS and Anxiety. Course as follows; Hypothyroidism- remains stable on current dose of Synthroid. No associated complaints. Due for TSH re-check. HLD- controlled on statin therapy with fenofibrate. Psoriasis with arthropathy- Suspected. Chronic pain of multiple joints, particularly shoulders. Being managed on Methotrexate and undergoing OT. Has been referred to Rheumatology (Dr. Yara Santana) and is undergoing a full work-up d/t concern for suspected sero-negative inflammatory arthritis. Has an upcoming f/u appointment to discuss test results and treatment plan. Has also been referred to Pulmonology due to abnormal PFT. Osteoporosis- Planned for zoledronic acid infusions with Rheumatology. Pending insurance approval.    RLS- Currently stable on Requip. Requesting refill. Seizures- On Keppra twice a day, following with Neurology (Rex Isabel). Reports no breakthrough seizures. GERD- Stable on PPI therapy. Reports no active symptoms. History of cataracts- age-related; s/p b/l cataract extraction (left eye- DOS 9/28, right eye- DOS 10/12). Reports improved vision. Anxiety- co-existing depression. Has suffered losses of 2 close family members, including her sister ~ 2 years ago. Feels this is a major contributor. Reports stable response to Cymbalta. No SI.    Stage 3A CKD- stable. General function- Concern raised previously due to weight loss/functional decline. Patient now eating better and taking nutritional supplements (Ensure).  Has gained about 9 pounds since last visit. Patient is in good spirits today. Requesting refills on all her medications. Past Medical History:   Diagnosis Date    Adhesive capsulitis of right shoulder 8/12/2021    Alcoholism ([address]     Arthritis     Bilateral leg and foot pain 8/12/2021    Chronic back pain 5/17/2022    Chronic lung disease     Cognitive impairment 4/15/2021    Depression     Diabetes mellitus ([address]     Diarrhea 1/4/2021    Possibly related to Metformin, Keppra, constipation, colitis. Stop Metformin. Treat constipation. Consider adjustment to Keppra given side effects.     Encephalopathy 12/10/2020    Grief reaction 4/15/2021    Hyperlipidemia     Hypertension     Hypokalemia 11/6/2020    Hypomagnesemia 11/6/2020    Hypothyroidism     Lymphadenopathy, axillary 1/4/2021    Numbness and tingling of upper and lower extremities of both sides 8/12/2021    Second hand smoke exposure     Seizure ([address]     Seronegative arthritis 6/28/2022    Squamous blepharitis of upper and lower eyelids of both eyes 5/17/2022    Stage 3b chronic kidney disease ([address] 8/12/2021    Syncope and collapse 12/10/2020    Thoracogenic scoliosis of thoracolumbar region 5/17/2022     Past Surgical History:   Procedure Laterality Date    APPENDECTOMY      BIOPSY MOUTH LESION Bilateral 12/19/2016    REMOVAL  OF BILATERAL LINGUAL MACIE performed by Andrew Barkley DDS at [address] [place] [address]      COLONOSCOPY N/A 1/21/2021    COLONOSCOPY DIAGNOSTIC performed by Jordan Boswell MD at [facility] ([address] [place] [address])      UPPER GASTROINTESTINAL ENDOSCOPY N/A 1/21/2021    EGD with polypectomy performed by Jordan Boswell MD at [facility]     Family History   Problem Relation Age of Onset    Heart Disease Mother     Hypertension Mother     Diabetes Mother     Stroke Mother     Heart Disease Father     Hypertension Father     Heart Disease Sister     Hypertension Sister     Heart Disease Brother     Hypertension Brother Diabetes Brother     Cancer Maternal Grandmother     Diabetes Maternal Grandfather      Allergies   Allergen Reactions    Morphine Swelling     Current Outpatient Medications on File Prior to Visit   Medication Sig Dispense Refill    triamcinolone (KENALOG) 0.1 % cream APPLY TOPICALLY TWICE DAILY 454 g 2    Handicap Placard MISC by Does not apply route Permanent: NO EXPIRATION 1 each 0    acetaminophen (ACETAMINOPHEN EXTRA STRENGTH) 500 MG tablet Take 1 tablet by mouth 2 times daily as needed for Pain 60 tablet 5     No current facility-administered medications on file prior to visit. Review of Systems   Constitutional:  Negative for activity change, chills, diaphoresis, fatigue and fever. Respiratory:  Negative for cough, chest tightness, shortness of breath and wheezing. Cardiovascular:  Negative for chest pain, palpitations and leg swelling. Gastrointestinal:  Negative for nausea and vomiting. Musculoskeletal:  Positive for arthralgias. Neurological:  Negative for dizziness, syncope, light-headedness and headaches. Objective:   /80   Pulse 80   Temp 97 °F (36.1 °C) (Temporal)   Ht 4' 9\" (1.448 m)   Wt 101 lb (45.8 kg)   LMP  (LMP Unknown)   SpO2 96%   BMI 21.86 kg/m²     Physical Exam  Constitutional:       General: She is not in acute distress. Appearance: Normal appearance. She is normal weight. She is not diaphoretic. HENT:      Head: Normocephalic and atraumatic. Mouth/Throat:      Mouth: Mucous membranes are moist.      Pharynx: Oropharynx is clear. Eyes:      Conjunctiva/sclera: Conjunctivae normal.      Pupils: Pupils are equal, round, and reactive to light. Cardiovascular:      Rate and Rhythm: Normal rate and regular rhythm. Pulses: Normal pulses. Heart sounds: Normal heart sounds. No murmur heard. No friction rub. Pulmonary:      Effort: Pulmonary effort is normal. No respiratory distress. Breath sounds: Normal breath sounds.  No wheezing or rhonchi. Chest:      Chest wall: No tenderness. Abdominal:      General: Abdomen is flat. Bowel sounds are normal. There is no distension. Palpations: Abdomen is soft. Tenderness: There is no abdominal tenderness. Musculoskeletal:         General: Normal range of motion. Right lower leg: No edema. Left lower leg: No edema. Comments: No appreciable shoulder swelling or tenderness. Restricted ROM, mostly with abduction above 90 degrees. Neurological:      General: No focal deficit present. Mental Status: She is alert and oriented to person, place, and time. Cranial Nerves: No cranial nerve deficit. Motor: No weakness. Coordination: Coordination normal.   Psychiatric:         Mood and Affect: Mood normal.         Behavior: Behavior normal.         Thought Content: Thought content normal.       Assessment:      Christiane John was seen today for new patient and medication refill. Diagnoses and all orders for this visit:    Encounter to establish care         -     Medical history reviewed. -     Medication list reconciled. -     Blood work reviewed. Acquired hypothyroidism  Comments:  Stable and well-controlled on current meds. Orders:  -     levothyroxine (SYNTHROID) 50 MCG tablet; Take 1 tablet by mouth Daily  -     TSH; Future    Mixed hyperlipidemia  Comments:  Stable and well-controlled on current meds. Follow labs  Orders:  -     atorvastatin (LIPITOR) 40 MG tablet; TAKE 1 TABLET BY MOUTH ONCE DAILY  -     fenofibrate (TRIGLIDE) 160 MG tablet; Take 1 tablet by mouth daily    Psoriasis with arthropathy (HCC)        -     methotrexate (RHEUMATREX) 2.5 MG chemo tablet; TAKE 6 TABLETS BY MOUTH ONCE WEEKLY  -     folic acid (FOLVITE) 1 MG tablet;  Take 1 tablet by mouth daily Do not take on the day you take the methotrexate.  -     CMP 3-monthly  -     Ensure f/u with Rheumatology    Gastroesophageal reflux disease without esophagitis -     Controlled on PPI therapy  -     pantoprazole (PROTONIX) 40 MG tablet; TAKE 1 TABLET BY MOUTH DAILY    Weight loss, unintentional        -     Reports interval weight gain  -     Continue Nutritional Supplements (ENSURE COMPLETE) LIQD; Take 1 Bottle by mouth 2 times daily    Seizure ([address]        -     Stable on treatment. No breakthrough seizures. -     Following with Neurology. Continue LevETIRAcetam (KEPPRA) 500 MG tablet; TAKE 1 TABLET BY MOUTH TWICE DAILY    Restless leg syndrome  -     rOPINIRole (REQUIP) 0.5 MG tablet; Take 1 tablet by mouth 3 times daily TAKE 1 TABLET BY MOUTH AT  NIGHT    Hypomagnesemia  Comments:  Increase magnesium to 400 mg 3 times daily. Follow labs. Orders:  -     Magnesium 400 MG CAPS; Take 1 capsule by mouth 3 times daily    Anxiety and depression        -     Stable on treatment. -     DULoxetine (CYMBALTA) 60 MG extended release capsule; Take 1 capsule by mouth daily    Encounter for medication refill        -     Medication refills provided. Other orders  -     calcium carbonate-vitamin D3 (CALTRATE) 600-400 MG-UNIT TABS per tab; TAKE ONE (1) TABLET BY MOUTH TWICE DAILY  -     fexofenadine (ALLEGRA) 180 MG tablet; Take 1 tablet by mouth daily as needed (allergies)  -     magnesium oxide (MAG-OX) 400 MG tablet; TAKE 1 TABLET BY MOUTH THREE TIMES DAILY  -     aspirin EC 81 MG EC tablet;  Take 1 tablet by mouth daily      Health Maintenance   Topic Date Due    COVID-19 Vaccine (1) Never done    DTaP/Tdap/Td vaccine (1 - Tdap) Never done    Shingles vaccine (1 of 2) Never done    Flu vaccine (1) 08/01/2022    Lipids  12/21/2022    Breast cancer screen  02/03/2023    A1C test (Diabetic or Prediabetic)  05/17/2023    Annual Wellness Visit (AWV)  05/18/2023    Depression Monitoring  06/28/2023    Colorectal Cancer Screen  01/21/2031    DEXA (modify frequency per FRAX score)  Completed    Pneumococcal 65+ years Vaccine  Completed    Hepatitis C screen  Completed    HIV screen Completed    Hepatitis A vaccine  Aged Out    Hib vaccine  Aged Out    Meningococcal (ACWY) vaccine  Aged Out            Plan:    As above. Orders Placed This Encounter   Procedures    TSH     Standing Status:   Future     Standing Expiration Date:   10/25/2023       Orders Placed This Encounter   Medications    atorvastatin (LIPITOR) 40 MG tablet     Sig: TAKE 1 TABLET BY MOUTH ONCE DAILY     Dispense:  90 tablet     Refill:  3     Requesting 1 year supply    calcium carbonate-vitamin D3 (CALTRATE) 600-400 MG-UNIT TABS per tab     Sig: TAKE ONE (1) TABLET BY MOUTH TWICE DAILY     Dispense:  60 tablet     Refill:  10    DULoxetine (CYMBALTA) 60 MG extended release capsule     Sig: Take 1 capsule by mouth daily     Dispense:  90 capsule     Refill:  1    fenofibrate (TRIGLIDE) 160 MG tablet     Sig: Take 1 tablet by mouth daily     Dispense:  90 tablet     Refill:  4    fexofenadine (ALLEGRA) 180 MG tablet     Sig: Take 1 tablet by mouth daily as needed (allergies)     Dispense:  90 tablet     Refill:  1    levothyroxine (SYNTHROID) 50 MCG tablet     Sig: Take 1 tablet by mouth Daily     Dispense:  90 tablet     Refill:  3     Requesting 1 year supply    methotrexate (RHEUMATREX) 2.5 MG chemo tablet     Sig: TAKE 6 TABLETS BY MOUTH ONCE WEEKLY     Dispense:  24 tablet     Refill:  2    pantoprazole (PROTONIX) 40 MG tablet     Sig: TAKE 1 TABLET BY MOUTH DAILY     Dispense:  30 tablet     Refill:  5    levETIRAcetam (KEPPRA) 500 MG tablet     Sig: TAKE 1 TABLET BY MOUTH TWICE DAILY     Dispense:  60 tablet     Refill:  10    folic acid (FOLVITE) 1 MG tablet     Sig: Take 1 tablet by mouth daily Do not take on the day you take the methotrexate.      Dispense:  90 tablet     Refill:  4    Magnesium 400 MG CAPS     Sig: Take 1 capsule by mouth 3 times daily     Dispense:  270 capsule     Refill:  4    Nutritional Supplements (ENSURE COMPLETE) LIQD     Sig: Take 1 Bottle by mouth 2 times daily     Dispense:  60 each

## 2022-10-27 ASSESSMENT — ENCOUNTER SYMPTOMS
NAUSEA: 0
COUGH: 0
WHEEZING: 0
CHEST TIGHTNESS: 0
SHORTNESS OF BREATH: 0
VOMITING: 0

## 2022-10-31 ENCOUNTER — TELEPHONE (OUTPATIENT)
Dept: PRIMARY CARE CLINIC | Age: 67
End: 2022-10-31

## 2022-10-31 DIAGNOSIS — L40.50 PSORIASIS WITH ARTHROPATHY (HCC): ICD-10-CM

## 2022-10-31 RX ORDER — FOLIC ACID 1 MG/1
1 TABLET ORAL DAILY
Qty: 90 TABLET | Refills: 4 | Status: CANCELLED | OUTPATIENT
Start: 2022-10-31

## 2022-10-31 RX ORDER — FOLIC ACID 1 MG/1
1 TABLET ORAL DAILY
Qty: 90 TABLET | Refills: 4 | Status: SHIPPED | OUTPATIENT
Start: 2022-10-31

## 2022-11-03 ENCOUNTER — TELEPHONE (OUTPATIENT)
Dept: PRIMARY CARE CLINIC | Age: 67
End: 2022-11-03

## 2022-11-03 DIAGNOSIS — R63.4 WEIGHT LOSS, UNINTENTIONAL: Primary | ICD-10-CM

## 2022-11-03 NOTE — TELEPHONE ENCOUNTER
Please have patient clarify request for referral. Does she need the script for Ensure faxed over to [facility]? Thanks.

## 2022-11-03 NOTE — TELEPHONE ENCOUNTER
Questions - UTI medication and has questions about Ensure, needs referral for her insurance company to cover it. Referral needs to be called to [facility] [phone]. You'll have to get their phone number. Pili please call her.

## 2022-11-06 ENCOUNTER — HOSPITAL ENCOUNTER (EMERGENCY)
Age: 67
Discharge: HOME | End: 2022-11-06
Payer: COMMERCIAL

## 2022-11-06 VITALS
HEART RATE: 104 BPM | OXYGEN SATURATION: 97 % | TEMPERATURE: 99.2 F | BODY MASS INDEX: 21.57 KG/M2 | SYSTOLIC BLOOD PRESSURE: 132 MMHG | WEIGHT: 100 LBS | HEIGHT: 57 IN | DIASTOLIC BLOOD PRESSURE: 81 MMHG | RESPIRATION RATE: 18 BRPM

## 2022-11-06 DIAGNOSIS — E86.0 DEHYDRATION: ICD-10-CM

## 2022-11-06 DIAGNOSIS — N30.00 ACUTE CYSTITIS WITHOUT HEMATURIA: Primary | ICD-10-CM

## 2022-11-06 DIAGNOSIS — L04.2 ACUTE AXILLARY LYMPHADENITIS: ICD-10-CM

## 2022-11-06 LAB
ALBUMIN SERPL-MCNC: 4.1 G/DL (ref 3.5–4.6)
ALP BLD-CCNC: 72 U/L (ref 40–130)
ALT SERPL-CCNC: 21 U/L (ref 0–33)
ANION GAP SERPL CALCULATED.3IONS-SCNC: 11 MEQ/L (ref 9–15)
AST SERPL-CCNC: 29 U/L (ref 0–35)
BACTERIA: NEGATIVE /HPF
BASOPHILS ABSOLUTE: 0 K/UL (ref 0–0.2)
BASOPHILS RELATIVE PERCENT: 0.2 %
BILIRUB SERPL-MCNC: 0.5 MG/DL (ref 0.2–0.7)
BILIRUBIN URINE: NEGATIVE
BLOOD, URINE: NEGATIVE
BUN BLDV-MCNC: 29 MG/DL (ref 8–23)
CALCIUM SERPL-MCNC: 9.6 MG/DL (ref 8.5–9.9)
CHLORIDE BLD-SCNC: 97 MEQ/L (ref 95–107)
CLARITY: CLEAR
CO2: 28 MEQ/L (ref 20–31)
COLOR: YELLOW
CREAT SERPL-MCNC: 1.42 MG/DL (ref 0.5–0.9)
EOSINOPHILS ABSOLUTE: 0.1 K/UL (ref 0–0.7)
EOSINOPHILS RELATIVE PERCENT: 1 %
EPITHELIAL CELLS, UA: ABNORMAL /HPF (ref 0–5)
GFR SERPL CREATININE-BSD FRML MDRD: 40.9 ML/MIN/{1.73_M2}
GLOBULIN: 2.9 G/DL (ref 2.3–3.5)
GLUCOSE BLD-MCNC: 152 MG/DL (ref 70–99)
GLUCOSE URINE: NEGATIVE MG/DL
HCT VFR BLD CALC: 34.8 % (ref 37–47)
HEMOGLOBIN: 11.6 G/DL (ref 12–16)
HYALINE CASTS: ABNORMAL /HPF (ref 0–5)
KETONES, URINE: ABNORMAL MG/DL
LACTIC ACID: 2.2 MMOL/L (ref 0.5–2.2)
LEUKOCYTE ESTERASE, URINE: ABNORMAL
LYMPHOCYTES ABSOLUTE: 0.7 K/UL (ref 1–4.8)
LYMPHOCYTES RELATIVE PERCENT: 10.2 %
MCH RBC QN AUTO: 32 PG (ref 27–31.3)
MCHC RBC AUTO-ENTMCNC: 33.4 % (ref 33–37)
MCV RBC AUTO: 95.8 FL (ref 79.4–94.8)
MONOCYTES ABSOLUTE: 0.5 K/UL (ref 0.2–0.8)
MONOCYTES RELATIVE PERCENT: 6.6 %
NEUTROPHILS ABSOLUTE: 5.6 K/UL (ref 1.4–6.5)
NEUTROPHILS RELATIVE PERCENT: 82 %
NITRITE, URINE: NEGATIVE
PDW BLD-RTO: 16.3 % (ref 11.5–14.5)
PH UA: 5.5 (ref 5–9)
PLATELET # BLD: 174 K/UL (ref 130–400)
POTASSIUM SERPL-SCNC: 3.7 MEQ/L (ref 3.4–4.9)
PROCALCITONIN: 0.12 NG/ML (ref 0–0.15)
PROTEIN UA: ABNORMAL MG/DL
RBC # BLD: 3.64 M/UL (ref 4.2–5.4)
RBC UA: ABNORMAL /HPF (ref 0–5)
SODIUM BLD-SCNC: 136 MEQ/L (ref 135–144)
SPECIFIC GRAVITY UA: 1.02 (ref 1–1.03)
TOTAL PROTEIN: 7 G/DL (ref 6.3–8)
URINE REFLEX TO CULTURE: YES
UROBILINOGEN, URINE: 0.2 E.U./DL
WBC # BLD: 6.9 K/UL (ref 4.8–10.8)
WBC UA: ABNORMAL /HPF (ref 0–5)

## 2022-11-06 PROCEDURE — 83605 ASSAY OF LACTIC ACID: CPT

## 2022-11-06 PROCEDURE — 36415 COLL VENOUS BLD VENIPUNCTURE: CPT

## 2022-11-06 PROCEDURE — 6360000002 HC RX W HCPCS: Performed by: PHYSICIAN ASSISTANT

## 2022-11-06 PROCEDURE — 96361 HYDRATE IV INFUSION ADD-ON: CPT

## 2022-11-06 PROCEDURE — 96365 THER/PROPH/DIAG IV INF INIT: CPT

## 2022-11-06 PROCEDURE — 99284 EMERGENCY DEPT VISIT MOD MDM: CPT

## 2022-11-06 PROCEDURE — 84145 PROCALCITONIN (PCT): CPT

## 2022-11-06 PROCEDURE — 2580000003 HC RX 258: Performed by: PHYSICIAN ASSISTANT

## 2022-11-06 PROCEDURE — 81001 URINALYSIS AUTO W/SCOPE: CPT

## 2022-11-06 PROCEDURE — 87086 URINE CULTURE/COLONY COUNT: CPT

## 2022-11-06 PROCEDURE — 80053 COMPREHEN METABOLIC PANEL: CPT

## 2022-11-06 PROCEDURE — 85025 COMPLETE CBC W/AUTO DIFF WBC: CPT

## 2022-11-06 RX ORDER — 0.9 % SODIUM CHLORIDE 0.9 %
1000 INTRAVENOUS SOLUTION INTRAVENOUS ONCE
Status: COMPLETED | OUTPATIENT
Start: 2022-11-06 | End: 2022-11-06

## 2022-11-06 RX ORDER — CEPHALEXIN 500 MG/1
500 CAPSULE ORAL 2 TIMES DAILY
Qty: 14 CAPSULE | Refills: 0 | Status: SHIPPED | OUTPATIENT
Start: 2022-11-06 | End: 2022-11-13

## 2022-11-06 RX ADMIN — CEFTRIAXONE SODIUM 1000 MG: 1 INJECTION, POWDER, FOR SOLUTION INTRAMUSCULAR; INTRAVENOUS at 16:56

## 2022-11-06 RX ADMIN — SODIUM CHLORIDE 1000 ML: 9 INJECTION, SOLUTION INTRAVENOUS at 15:56

## 2022-11-06 ASSESSMENT — ENCOUNTER SYMPTOMS
EYE PAIN: 0
ABDOMINAL PAIN: 0
ALLERGIC/IMMUNOLOGIC NEGATIVE: 1
APNEA: 0
SHORTNESS OF BREATH: 0
TROUBLE SWALLOWING: 0
COLOR CHANGE: 0

## 2022-11-06 ASSESSMENT — PAIN SCALES - GENERAL: PAINLEVEL_OUTOF10: 9

## 2022-11-06 NOTE — ED PROVIDER NOTES
[facility] ED  eMERGENCYdEPARTMENT eNCOUnter      Pt Name: Janice Sharma  MRN: [id number]  [place] 1955  Date of evaluation: 11/6/2022  Provider:Noah Gonzalez PA-C    CHIEF COMPLAINT       Chief Complaint   Patient presents with    Other     Sent by dr. Elodia Kruger  (Location/Symptom, Timing/Onset, Context/Setting, Quality, Duration, Modifying Factors, Severity.)   Janice Sharma is a 74 y.o. female who presents to the emergency department with complaints of possible UTI. Patient states that she had testing done at another facility earlier in the week and was told that she may have a UTI and follow-up with her family doctor. Patient states that she has been trying to get into her family doctor but has not been able to do so. Patient states that she did have an infusion for her \"brittle bones\" and is unsure if that is what is causing her to have low-grade fevers for the past 2 to 3 days or if it is from a urine infection. She denies any nausea or vomiting. Patient does also complain of feeling a painful lump to the left axilla which was just noticed 2 days ago. Patient denies any breast pain or swelling and no nipple discharge. Patient has had some mild URI symptoms recently    HPI    Nursing Notes were reviewed and I agree. REVIEW OF SYSTEMS    (2-9 systems for level 4, 10 or more for level 5)     Review of Systems   Constitutional:  Positive for chills, fatigue and fever. Negative for diaphoresis. HENT:  Positive for sneezing. Negative for hearing loss and trouble swallowing. Eyes:  Negative for pain. Respiratory:  Negative for apnea and shortness of breath. Cardiovascular:  Negative for chest pain. Gastrointestinal:  Negative for abdominal pain. Endocrine: Negative. Genitourinary:  Negative for hematuria. Musculoskeletal:  Positive for myalgias. Negative for neck pain and neck stiffness. Skin:  Negative for color change. Allergic/Immunologic: Negative. Neurological:  Negative for dizziness and numbness. Hematological: Negative. Psychiatric/Behavioral: Negative. All other systems reviewed and are negative. Except as noted above the remainder of the review of systems was reviewed and negative. PAST MEDICAL HISTORY     Past Medical History:   Diagnosis Date    Adhesive capsulitis of right shoulder 8/12/2021    Alcoholism (HCC)     Arthritis     Bilateral leg and foot pain 8/12/2021    Chronic back pain 5/17/2022    Chronic lung disease     Cognitive impairment 4/15/2021    Depression     Diabetes mellitus ([address]     Diarrhea 1/4/2021    Possibly related to Metformin, Keppra, constipation, colitis. Stop Metformin. Treat constipation. Consider adjustment to Keppra given side effects.     Encephalopathy 12/10/2020    Grief reaction 4/15/2021    Hyperlipidemia     Hypertension     Hypokalemia 11/6/2020    Hypomagnesemia 11/6/2020    Hypothyroidism     Lymphadenopathy, axillary 1/4/2021    Numbness and tingling of upper and lower extremities of both sides 8/12/2021    Second hand smoke exposure     Seizure ([address]     Seronegative arthritis 6/28/2022    Squamous blepharitis of upper and lower eyelids of both eyes 5/17/2022    Stage 3b chronic kidney disease ([address] 8/12/2021    Syncope and collapse 12/10/2020    Thoracogenic scoliosis of thoracolumbar region 5/17/2022         SURGICAL HISTORY       Past Surgical History:   Procedure Laterality Date    APPENDECTOMY      BIOPSY MOUTH LESION Bilateral 12/19/2016    REMOVAL  OF BILATERAL LINGUAL MACIE performed by Efren Weaver DDS at [facility] N/A 1/21/2021    COLONOSCOPY DIAGNOSTIC performed by Lavonne Stewart MD at [facility] ([facility])      UPPER GASTROINTESTINAL ENDOSCOPY N/A 1/21/2021    EGD with polypectomy performed by Lavonne Stewart MD at [address] [place] [address] Previous Medications    ACETAMINOPHEN (ACETAMINOPHEN EXTRA STRENGTH) 500 MG TABLET    Take 1 tablet by mouth 2 times daily as needed for Pain    ASPIRIN EC 81 MG EC TABLET    Take 1 tablet by mouth daily    ATORVASTATIN (LIPITOR) 40 MG TABLET    TAKE 1 TABLET BY MOUTH ONCE DAILY    CALCIUM CARBONATE-VITAMIN D3 (CALTRATE) 600-400 MG-UNIT TABS PER TAB    TAKE ONE (1) TABLET BY MOUTH TWICE DAILY    DULOXETINE (CYMBALTA) 60 MG EXTENDED RELEASE CAPSULE    Take 1 capsule by mouth daily    FENOFIBRATE (TRIGLIDE) 160 MG TABLET    Take 1 tablet by mouth daily    FEXOFENADINE (ALLEGRA) 180 MG TABLET    Take 1 tablet by mouth daily as needed (allergies)    FOLIC ACID (FOLVITE) 1 MG TABLET    Take 1 tablet by mouth daily Do not take on the day you take the methotrexate.     HANDICAP PLACARD MISC    by Does not apply route Permanent: NO EXPIRATION    LEVETIRACETAM (KEPPRA) 500 MG TABLET    TAKE 1 TABLET BY MOUTH TWICE DAILY    LEVOTHYROXINE (SYNTHROID) 50 MCG TABLET    Take 1 tablet by mouth Daily    MAGNESIUM 400 MG CAPS    Take 1 capsule by mouth 3 times daily    MAGNESIUM OXIDE (MAG-OX) 400 MG TABLET    TAKE 1 TABLET BY MOUTH THREE TIMES DAILY    METHOTREXATE (RHEUMATREX) 2.5 MG CHEMO TABLET    TAKE 6 TABLETS BY MOUTH ONCE WEEKLY    NUTRITIONAL SUPPLEMENTS (ENSURE COMPLETE) LIQD    Take 1 Bottle by mouth 2 times daily    PANTOPRAZOLE (PROTONIX) 40 MG TABLET    TAKE 1 TABLET BY MOUTH DAILY    ROPINIROLE (REQUIP) 0.5 MG TABLET    Take 1 tablet by mouth 3 times daily TAKE 1 TABLET BY MOUTH AT  NIGHT    TRIAMCINOLONE (KENALOG) 0.1 % CREAM    APPLY TOPICALLY TWICE DAILY       ALLERGIES     Morphine    FAMILY HISTORY       Family History   Problem Relation Age of Onset    Heart Disease Mother     Hypertension Mother     Diabetes Mother     Stroke Mother     Heart Disease Father     Hypertension Father     Heart Disease Sister     Hypertension Sister     Heart Disease Brother     Hypertension Brother     Diabetes Brother Cancer Maternal Grandmother     Diabetes Maternal Grandfather           SOCIAL HISTORY       Social History     Socioeconomic History    Marital status: Single     Spouse name: None    Number of children: None    Years of education: 12    Highest education level: None   Occupational History    Occupation: disabled   Tobacco Use    Smoking status: Never    Smokeless tobacco: Never   Vaping Use    Vaping Use: Never used   Substance and Sexual Activity    Alcohol use: Not Currently     Alcohol/week: 28.0 standard drinks     Types: 28 Shots of liquor per week     Comment: quit drinking in November 2020    Drug use: No    Sexual activity: Not Currently   Social History Narrative    3/4/21 Cushing lives in a private residence she had shared with her sister, her sister was the home owner, and she recently passed on 2/21, She is now needing to find a new home, as the house she is living in may be sold soon, C[place] states she is independent with self care, does have some limitations but states able to function independently for most self care tasks. She does not drive related to history of seizures.  referral made for concerns with housing and transportation. Social Determinants of Health     Financial Resource Strain: Low Risk     Difficulty of Paying Living Expenses: Not hard at all   Food Insecurity: No Food Insecurity    Worried About [address] in the Last Year: Never true    [address]  [email or web address] in the Last Year: Never true   Transportation Needs: No Transportation Needs    Lack of Transportation (Medical): No    Lack of Transportation (Non-Medical):  No   Physical Activity: Inactive    Days of Exercise per Week: 0 days    Minutes of Exercise per Session: 0 min       SCREENINGS    [place] Coma Scale  Eye Opening: Spontaneous  Best Verbal Response: Oriented  Best Motor Response: Obeys commands  [place] Coma Scale Score: 15      PHYSICAL EXAM    (up to 7 forlevel 4, 8 or more for level 5)     ED Triage Vitals [11/06/22 1500]   BP Temp Temp Source Heart Rate Resp SpO2 Height Weight   132/81 99.2 °F (37.3 °C) Oral (!) 104 18 97 % 4' 9\" (1.448 m) 100 lb (45.4 kg)       Physical Exam  Vitals and nursing note reviewed. Constitutional:       General: She is not in acute distress. Appearance: She is well-developed. She is not diaphoretic. HENT:      Head: Normocephalic and atraumatic. Mouth/Throat:      Mouth: Mucous membranes are moist.      Pharynx: No oropharyngeal exudate. Eyes:      General: No scleral icterus. Conjunctiva/sclera: Conjunctivae normal.      Pupils: Pupils are equal, round, and reactive to light. Neck:      Trachea: No tracheal deviation. Cardiovascular:      Rate and Rhythm: Normal rate. Heart sounds: Normal heart sounds. Pulmonary:      Effort: Pulmonary effort is normal. No respiratory distress. Breath sounds: Normal breath sounds. Abdominal:      General: Bowel sounds are normal. There is no distension. Palpations: Abdomen is soft. Musculoskeletal:         General: Normal range of motion. Cervical back: Normal range of motion and neck supple. No rigidity or tenderness. Lymphadenopathy:      Upper Body:      Left upper body: Axillary adenopathy present. Skin:     General: Skin is warm and dry. Findings: No erythema or rash. Neurological:      Mental Status: She is alert and oriented to person, place, and time. Cranial Nerves: No cranial nerve deficit. Motor: No abnormal muscle tone. Psychiatric:         Behavior: Behavior normal.         Thought Content:  Thought content normal.         Judgment: Judgment normal.         DIAGNOSTIC RESULTS     RADIOLOGY:   Non-plain film images such as CT, Ultrasound and MRI are read by the radiologist. Plain radiographic images are visualized and preliminarilyinterpreted by Antwon Calvo PA-C with the below findings:      Interpretation per the Radiologist below, if available at the time Acute axillary lymphadenitis          DISPOSITION/PLAN   DISPOSITION Discharge - Pending Orders Complete 11/06/2022 04:47:34 PM      PATIENT REFERRED TO:  Amadou Deleon MD  [address]  [phone]    Call in 1 day      [address]  [phone]    Call in 2 days      DISCHARGE MEDICATIONS:  New Prescriptions    CEPHALEXIN (KEFLEX) 500 MG CAPSULE    Take 1 capsule by mouth 2 times daily for 7 days       (Please note that portions of this note were completed with a voice recognition program.  Efforts were made to edit the dictations but occasionally words are mis-transcribed.)    MARVIN Fisher PA-C  11/06/22 [id number]

## 2022-11-06 NOTE — ED NOTES
Pt reports lumps to L breast.  This RN present during examination by Shaquille Mcknight with pt's verbal consent.      Marcela Medina RN  11/06/22 3656

## 2022-11-06 NOTE — ED TRIAGE NOTES
Pt arrives via triage, sent by Dr. Rick Earing" pt states she was told by  UTI s/s. Pt does not report any UTI s/s. Pt reports low grade fever, 99.2 on assessment. Pt A&Ox4, ABCs intact, GCS15, skin, pink, warm, and dry.

## 2022-11-06 NOTE — ED NOTES
Pt ambulates to restroom with steady gait, independently, no obvious s/s of distress noted.       Nasrin Callahan, CHLOE  11/06/22 0137

## 2022-11-08 LAB — URINE CULTURE, ROUTINE: NORMAL

## 2022-11-08 RX ORDER — LACTOSE-REDUCED FOOD
1 LIQUID (ML) ORAL DAILY
Qty: 30 EACH | Refills: 3 | Status: SHIPPED | OUTPATIENT
Start: 2022-11-08

## 2022-11-08 RX ORDER — LACTOSE-REDUCED FOOD
1 LIQUID (ML) ORAL DAILY
Qty: 30 EACH | Refills: 3 | Status: SHIPPED | OUTPATIENT
Start: 2022-11-08 | End: 2022-11-08

## 2022-11-08 NOTE — TELEPHONE ENCOUNTER
Called patient to verify exactly what she was requesting, patient stated she needs a printed script for Boost strawberry flavor with a note stating the reason why she needs this, so we can fax it to [address], the referral and the medication question has been taking care of

## 2022-11-17 ENCOUNTER — TELEPHONE (OUTPATIENT)
Dept: PRIMARY CARE CLINIC | Age: 67
End: 2022-11-17

## 2022-11-17 NOTE — TELEPHONE ENCOUNTER
[address] calling patient has a referral for nutrition her prior authorization for insurance was denied.  Needs documentation of weight loss since that is the original diagnosis

## 2022-11-28 ENCOUNTER — TELEPHONE (OUTPATIENT)
Dept: OBGYN CLINIC | Age: 67
End: 2022-11-28

## 2022-11-28 NOTE — TELEPHONE ENCOUNTER
Received call from patient caregiver who stated patient needs more demographics,chart notes regarding  her weight loss and why she needs supplement Boost which she takes twice a day,stated it was denied,if possible you may contact the patient.

## 2022-12-15 ENCOUNTER — TELEPHONE (OUTPATIENT)
Dept: PRIMARY CARE CLINIC | Age: 67
End: 2022-12-15

## 2022-12-15 DIAGNOSIS — R63.4 UNINTENTIONAL WEIGHT LOSS: Primary | ICD-10-CM

## 2022-12-15 NOTE — TELEPHONE ENCOUNTER
Parag Diane called to advise the Rx for BOOST has been denied and would like for the Provider to request an Appeal or refer the patient to a Nutritional ist so the BOOST can be covered by patient insurance

## 2022-12-15 NOTE — TELEPHONE ENCOUNTER
I have reached out to [facility] to see if they have samples for patient. 3 Will Morales placed a referral to nutrition, however it is closed and left message for dietician to call office. Would you like me to schedule a virtual visit with patient and caregiver?      Please advise and thank you,

## 2022-12-16 NOTE — TELEPHONE ENCOUNTER
Patient caregiver Doyle Do notified of referral placed and that Tonette Hamman does have boost samples in the office that they can give patient. That if they have a problem to call office back.

## 2022-12-28 DIAGNOSIS — L40.50 PSORIASIS WITH ARTHROPATHY (HCC): ICD-10-CM

## 2022-12-28 NOTE — TELEPHONE ENCOUNTER
Lamberto Garcia, called to inform office that we need to contact care source insurance to appeal the PA denial.    [employer]  [phone] for medications  Medication denied because it can be bought over the counter transferred to    Appeal handled by Lucero Mccullough  appeal number [phone]  Boost Regular, Boost Breeze, Boost Glucose, Boost High Protein (becky flavor) - this is the one that I told them that I wanted for patient      Case number: [id number]    1st level appeal fax:  [phone]  Letter why this is appealed and they informed me that this can be completed by patient to send back to insurance.

## 2022-12-28 NOTE — TELEPHONE ENCOUNTER
Pharmacy requesting 90 day supply of medication refill.  Please approve or deny this request.    Rx requested:  Requested Prescriptions     Pending Prescriptions Disp Refills    methotrexate (RHEUMATREX) 2.5 MG chemo tablet 24 tablet 2     Sig: TAKE 6 TABLETS BY MOUTH ONCE WEEKLY         Last Office Visit:   10/25/2022      Next Visit Date:  Future Appointments   Date Time Provider Cami Ledesma   4/25/2023 10:00 AM MD Wilfrid Sosa

## 2023-01-03 ENCOUNTER — HOSPITAL ENCOUNTER (EMERGENCY)
Age: 68
Discharge: HOME | End: 2023-01-03

## 2023-01-03 VITALS
DIASTOLIC BLOOD PRESSURE: 81 MMHG | HEIGHT: 57 IN | OXYGEN SATURATION: 95 % | WEIGHT: 101 LBS | HEART RATE: 104 BPM | BODY MASS INDEX: 21.79 KG/M2 | TEMPERATURE: 98.5 F | RESPIRATION RATE: 19 BRPM | SYSTOLIC BLOOD PRESSURE: 117 MMHG

## 2023-01-03 ASSESSMENT — PAIN - FUNCTIONAL ASSESSMENT: PAIN_FUNCTIONAL_ASSESSMENT: NONE - DENIES PAIN

## 2023-01-03 NOTE — ED NOTES
Pt to [employer], states she does not want to wait any longer to be seen     Luis Aguilar, CHLOE  01/03/23 1127

## 2023-01-03 NOTE — ED TRIAGE NOTES
Pt c/o bad cough since Christmas, pt states coughing getting worse, 0 production reported, pt to triage with steady agit, 0 sob, 0 distress, 0 pain reported at this time, pt a&ox4, pulses palp, 0 n&v. Pt has home health aid and recommend to pt to got  er d/t this prolonged cough, pt states \" I thin I have pneumonia. \"

## 2023-01-12 DIAGNOSIS — L40.50 PSORIASIS WITH ARTHROPATHY (HCC): ICD-10-CM

## 2023-01-23 DIAGNOSIS — L40.50 PSORIASIS WITH ARTHROPATHY (HCC): ICD-10-CM

## 2023-01-23 NOTE — TELEPHONE ENCOUNTER
Patient is  requesting medication refill.  Please approve or deny this request.    Rx requested:  Requested Prescriptions      No prescriptions requested or ordered in this encounter         Last Office Visit:   Visit date not found      Next Visit Date:  Future Appointments   Date Time Provider Cami Callie   1/26/2023 12:15 PM Georgetta Frankel, MD [facility]   2/1/2023  9:00 AM SCHEDULE, NUTRITIONIST [id number]

## 2023-01-26 ENCOUNTER — TELEMEDICINE (OUTPATIENT)
Dept: PRIMARY CARE CLINIC | Age: 68
End: 2023-01-26
Payer: COMMERCIAL

## 2023-01-26 DIAGNOSIS — E03.9 HYPOTHYROIDISM, UNSPECIFIED TYPE: Chronic | ICD-10-CM

## 2023-01-26 DIAGNOSIS — N18.32 STAGE 3B CHRONIC KIDNEY DISEASE (HCC): Chronic | ICD-10-CM

## 2023-01-26 DIAGNOSIS — L40.50 PSORIASIS WITH ARTHROPATHY (HCC): ICD-10-CM

## 2023-01-26 DIAGNOSIS — R42 DIZZINESS: Primary | ICD-10-CM

## 2023-01-26 PROCEDURE — 99422 OL DIG E/M SVC 11-20 MIN: CPT | Performed by: FAMILY MEDICINE

## 2023-01-26 RX ORDER — ALBUTEROL SULFATE 90 UG/1
AEROSOL, METERED RESPIRATORY (INHALATION)
COMMUNITY
Start: 2023-01-19

## 2023-01-26 RX ORDER — FOLIC ACID 1 MG/1
1 TABLET ORAL DAILY
Qty: 90 TABLET | Refills: 4 | Status: SHIPPED | OUTPATIENT
Start: 2023-01-26

## 2023-01-26 RX ORDER — ALBUTEROL SULFATE 90 UG/1
2 AEROSOL, METERED RESPIRATORY (INHALATION) EVERY 4 HOURS PRN
COMMUNITY
Start: 2023-01-19

## 2023-01-26 RX ORDER — ZOLEDRONIC ACID 5 MG/100ML
INJECTION, SOLUTION INTRAVENOUS
COMMUNITY
Start: 2022-10-26

## 2023-01-26 ASSESSMENT — PATIENT HEALTH QUESTIONNAIRE - PHQ9
10. IF YOU CHECKED OFF ANY PROBLEMS, HOW DIFFICULT HAVE THESE PROBLEMS MADE IT FOR YOU TO DO YOUR WORK, TAKE CARE OF THINGS AT HOME, OR GET ALONG WITH OTHER PEOPLE: 0
SUM OF ALL RESPONSES TO PHQ9 QUESTIONS 1 & 2: 0
5. POOR APPETITE OR OVEREATING: 0
SUM OF ALL RESPONSES TO PHQ QUESTIONS 1-9: 0
6. FEELING BAD ABOUT YOURSELF - OR THAT YOU ARE A FAILURE OR HAVE LET YOURSELF OR YOUR FAMILY DOWN: 0
4. FEELING TIRED OR HAVING LITTLE ENERGY: 0
9. THOUGHTS THAT YOU WOULD BE BETTER OFF DEAD, OR OF HURTING YOURSELF: 0
7. TROUBLE CONCENTRATING ON THINGS, SUCH AS READING THE NEWSPAPER OR WATCHING TELEVISION: 0
1. LITTLE INTEREST OR PLEASURE IN DOING THINGS: 0
2. FEELING DOWN, DEPRESSED OR HOPELESS: 0
8. MOVING OR SPEAKING SO SLOWLY THAT OTHER PEOPLE COULD HAVE NOTICED. OR THE OPPOSITE, BEING SO FIGETY OR RESTLESS THAT YOU HAVE BEEN MOVING AROUND A LOT MORE THAN USUAL: 0
3. TROUBLE FALLING OR STAYING ASLEEP: 0

## 2023-01-26 ASSESSMENT — ENCOUNTER SYMPTOMS
EYES NEGATIVE: 1
RESPIRATORY NEGATIVE: 1
GASTROINTESTINAL NEGATIVE: 1

## 2023-01-26 NOTE — PROGRESS NOTES
Subjective:      Patient ID: Costa Anton is a 74 y.o. female who presents today for:  Chief Complaint   Patient presents with    Dizziness       HPI    Past Medical History:   Diagnosis Date    Adhesive capsulitis of right shoulder 8/12/2021    Alcoholism ([address]     Arthritis     Bilateral leg and foot pain 8/12/2021    Chronic back pain 5/17/2022    Chronic lung disease     Cognitive impairment 4/15/2021    Depression     Diabetes mellitus ([address]     Diarrhea 1/4/2021    Possibly related to Metformin, Keppra, constipation, colitis. Stop Metformin. Treat constipation. Consider adjustment to Keppra given side effects.     Encephalopathy 12/10/2020    Grief reaction 4/15/2021    Hyperlipidemia     Hypertension     Hypokalemia 11/6/2020    Hypomagnesemia 11/6/2020    Hypothyroidism     Lymphadenopathy, axillary 1/4/2021    Numbness and tingling of upper and lower extremities of both sides 8/12/2021    Second hand smoke exposure     Seizure ([address]     Seronegative arthritis 6/28/2022    Squamous blepharitis of upper and lower eyelids of both eyes 5/17/2022    Stage 3b chronic kidney disease ([address] 8/12/2021    Syncope and collapse 12/10/2020    Thoracogenic scoliosis of thoracolumbar region 5/17/2022     Past Surgical History:   Procedure Laterality Date    APPENDECTOMY      BIOPSY MOUTH LESION Bilateral 12/19/2016    REMOVAL  OF BILATERAL LINGUAL MACIE performed by Jaya Hernandez DDS at [facility] N/A 1/21/2021    COLONOSCOPY DIAGNOSTIC performed by Ashley Blas MD at [facility] ([address] [place] [address])      UPPER GASTROINTESTINAL ENDOSCOPY N/A 1/21/2021    EGD with polypectomy performed by Ashley Blas MD at [address] [place] [address] History    Marital status: Single     Spouse name: Not on file    Number of children: Not on file    Years of education: 12    Highest education level: Not on file   Occupational History    Occupation: disabled   Tobacco Use    Smoking status: Never    Smokeless tobacco: Never   Vaping Use    Vaping Use: Never used   Substance and Sexual Activity    Alcohol use: Not Currently     Alcohol/week: 28.0 standard drinks     Types: 28 Shots of liquor per week     Comment: quit drinking in November 2020    Drug use: No    Sexual activity: Not Currently   Other Topics Concern    Not on file   Social History Narrative    3/4/21 Elizabeth Meraz lives in a private residence she had shared with her sister, her sister was the home owner, and she recently passed on 2/21, She is now needing to find a new home, as the house she is living in may be sold soon, Elizabeth Meraz states she is independent with self care, does have some limitations but states able to function independently for most self care tasks. She does not drive related to history of seizures.  referral made for concerns with housing and transportation. Social Determinants of Health     Financial Resource Strain: Low Risk     Difficulty of Paying Living Expenses: Not hard at all   Food Insecurity: No Food Insecurity    Worried About [address] [employer] in the Last Year: Never true    [address]  [employer] in the Last Year: Never true   Transportation Needs: No Transportation Needs    Lack of Transportation (Medical): No    Lack of Transportation (Non-Medical):  No   Physical Activity: Inactive    Days of Exercise per Week: 0 days    Minutes of Exercise per Session: 0 min   Stress: Not on file   Social Connections: Not on file   Intimate Partner Violence: Not on file   Housing Stability: Not on file     Family History   Problem Relation Age of Onset    Heart Disease Mother     Hypertension Mother     Diabetes Mother     Stroke Mother     Heart Disease Father     Hypertension Father     Heart Disease Sister     Hypertension Sister     Heart Disease Brother     Hypertension Brother     Diabetes Brother     Cancer Maternal Grandmother     Diabetes Maternal Grandfather      Allergies   Allergen Reactions    Morphine Swelling         Review of Systems    Objective:   LMP  (LMP Unknown)     Physical Exam    Assessment:      {No diagnosis found. (Refresh or delete this SmartLink)}      Plan:      No orders of the defined types were placed in this encounter. No orders of the defined types were placed in this encounter. No follow-ups on file.       Sherley Cedillo MD

## 2023-01-26 NOTE — PROGRESS NOTES
Olya aMson (:  1955) is a Established patient, here for evaluation of the following:    Assessment & Plan   Below is the assessment and plan developed based on review of pertinent history, physical exam, labs, studies, and medications. 1. Dizziness  -     Comprehensive Metabolic Panel; Future  -     TSH with Reflex; Future  -     CBC with Auto Differential; Future  -     Magnesium; Future  2. Hypothyroidism, unspecified type  -     TSH with Reflex; Future  -     T4, Free; Future  3. Psoriasis with arthropathy (HCC)  -     folic acid (FOLVITE) 1 MG tablet; Take 1 tablet by mouth daily Do not take on the day you take the methotrexate. , Disp-90 tablet, R-4Normal  -     methotrexate (RHEUMATREX) 2.5 MG chemo tablet; TAKE 4 TABLETS BY MOUTH ONCE WEEKLY, Disp-16 tablet, R-0Normal  4. Stage 3b chronic kidney disease ([facility]  -     Comprehensive Metabolic Panel; Future  -     Magnesium; Future  Return in about 2 weeks (around 2023). Recommend patient go to the emergency room if dizziness worsens or if she develops other symptoms such as chest pain, difficulty breathing, weakness, confusion, difficulty with speech, headache or lethargy. Subjective   Dizziness  Associated symptoms include arthralgias. Pertinent negatives include no headaches, numbness or weakness. Patient complains of dizziness since she awoke this morning. She states that it has improved throughout the morning. She is sitting up watching television currently. No vision change or headache. No fever or chills. No cough or chest pain. She has upcoming appoint with rheumatology in 2 weeks. She needs a refill of her methotrexate, she is out. No seizures for over 1 year. She has not drinking any alcohol for over 2 years. She lives in a [facility] home. She had cold symptoms and went to the emergency room a few weeks ago, however that she left AMA because of the length of time it took to be evaluated.   Those cold symptoms have resolved. She only has mild cough at this point. She is followed by pulmonology due to abnormal PFTs, it is planned to repeat these in April. She is also followed by nephrology for chronic kidney disease, however she has not seen this provider in over a year per patient. She has home health. History of hypothyroidism, no labs past 6 months. Review of Systems   Constitutional: Negative. HENT: Negative. Eyes: Negative. Respiratory: Negative. Cardiovascular: Negative. Gastrointestinal: Negative. Endocrine: Negative. Genitourinary: Negative. Musculoskeletal:  Positive for arthralgias. Neurological:  Positive for dizziness. Negative for speech difficulty, weakness, numbness and headaches. Psychiatric/Behavioral: Negative. Objective   Patient-Reported Vitals  Patient-Reported Weight: 107 lbs  Patient-Reported Height: 4\"9       Physical Exam  Neurological:      Mental Status: She is oriented to person, place, and time. Psychiatric:         Mood and Affect: Mood normal.         Thought Content: Thought content normal.         Judgment: Judgment normal.   Patient unable to get camera to work on her end so physical exam is very limited. Taye Mcgraw, was evaluated through a synchronous (real-time) audio-video encounter. The patient (or guardian if applicable) is aware that this is a billable service, which includes applicable co-pays. This Virtual Visit was conducted with patient's (and/or legal guardian's) consent. The visit was conducted pursuant to the emergency declaration under the [facility], [facility] authority and the [employer] General Act. Patient identification was verified, and a caregiver was present when appropriate. The patient was located at Home: [facility]. [facility].    Provider was located at [facility] ([address] [place], [place] [address]t): [facility] [address], Gabino Harada, MD

## 2023-02-01 ENCOUNTER — HOSPITAL ENCOUNTER (OUTPATIENT)
Dept: NUTRITION | Age: 68
Discharge: HOME | End: 2023-02-01
Payer: COMMERCIAL

## 2023-02-01 PROCEDURE — 97802 MEDICAL NUTRITION INDIV IN: CPT

## 2023-02-01 NOTE — PROGRESS NOTES
OUTPATIENT NUTRITION COUNSELING       Corona Ko is a 74 y.o.  female     Reason for Counseling: Underweight/unintentional wt loss    NUTRITION RECOMMENDATIONS / Singh Jaleesa / EVALUATION  1. High calorie nutrition supplement TID to help promote weight gain  2. RD to monitor weight progression and early satiety  3. ~1400kcal daily   4. Name and Office phone number given for reference    Subjective/Current Data:  24 HOUR DIET RECALL  Breakfast High calorie nutrition supplement    Lunch skip   Dinner High calorie nutrition supplement    Snacks skip   Beverages >64 oz water daily   No food allergies per pt. Reports early satiety with unintentional weight loss. Pt has a home health aid that helps with cooking meals but pt struggles getting herself to eat. Pt has dropped as low as 89 lbs in the last year. She has recently gained weight d/t including nutrition supplements BID, in which she is paying out of pocket for. I recommend increasing this to TID to help prevent further weight loss. Past Medical History:  Past Medical History:   Diagnosis Date    Adhesive capsulitis of right shoulder 8/12/2021    Alcoholism (HCC)     Arthritis     Bilateral leg and foot pain 8/12/2021    Chronic back pain 5/17/2022    Chronic lung disease     Cognitive impairment 4/15/2021    Depression     Diabetes mellitus ([address]     Diarrhea 1/4/2021    Possibly related to Metformin, Keppra, constipation, colitis. Stop Metformin. Treat constipation. Consider adjustment to Keppra given side effects.     Encephalopathy 12/10/2020    Grief reaction 4/15/2021    Hyperlipidemia     Hypertension     Hypokalemia 11/6/2020    Hypomagnesemia 11/6/2020    Hypothyroidism     Lymphadenopathy, axillary 1/4/2021    Numbness and tingling of upper and lower extremities of both sides 8/12/2021    Second hand smoke exposure     Seizure ([address]     Seronegative arthritis 6/28/2022    Squamous blepharitis of upper and lower eyelids of both eyes 5/17/2022 Stage 3b chronic kidney disease ([facility] 8/12/2021    Syncope and collapse 12/10/2020    Thoracogenic scoliosis of thoracolumbar region 5/17/2022     Past Surgical History:   Procedure Laterality Date    APPENDECTOMY      BIOPSY MOUTH LESION Bilateral 12/19/2016    REMOVAL  OF BILATERAL LINGUAL MACIE performed by Liset Torres DDS at [facility] N/A 1/21/2021    COLONOSCOPY DIAGNOSTIC performed by Jennifer Hill MD at [facility] ([facility])      UPPER GASTROINTESTINAL ENDOSCOPY N/A 1/21/2021    [facility] with [facility]pectomy [facility]    Chemistry CBC/Coags Misc. No results for input(s): NA, K, CL, CO2, BUN, CREATININE, GLUCOSE, CA in the last 72 hours. No results for input(s): PHOS in the last 72 hours. No results for input(s): WBC, RBC, HGB, HCT, MCV, MCH, MCHC, RDW, PLT, MPV in the last 72 hours. No results for input(s): LABALBU in the last 72 hours.     Invalid input(s):  PREALBUMIN  Lab Results   Component Value Date/Time    LABA1C 5.1 05/17/2022 03:54 PM          Anthropometric Measures:  Height: 4'9\"  Weight: 101 lb (45.8 kg)  Ideal Body Weight: 90 lb (40.9kg)  Ideal Body Weight %: >100%  BMI = 21.86 which is classified as underweight for age 72 and over      Wt Readings from Last 20 Encounters:   01/03/23 101 lb (45.8 kg)   11/06/22 100 lb (45.4 kg)   10/25/22 101 lb (45.8 kg)   06/28/22 89 lb 8 oz (40.6 kg)   05/17/22 91 lb (41.3 kg)   03/14/22 90 lb 9.6 oz (41.1 kg)   09/14/21 106 lb (48.1 kg)   08/12/21 116 lb (52.6 kg)   06/28/21 109 lb (49.4 kg)   04/28/21 101 lb 3.2 oz (45.9 kg)   04/15/21 84 lb 9.6 oz (38.4 kg)   04/15/21 96 lb 9.6 oz (43.8 kg)   02/03/21 82 lb 14.4 oz (37.6 kg)   01/21/21 98 lb (44.5 kg)   12/30/20 98 lb (44.5 kg)   11/02/20 110 lb (49.9 kg)   01/13/20 105 lb 9.6 oz (47.9 kg)   05/28/19 105 lb (47.6 kg)   05/09/19 109 lb 12.8 oz (49.8 kg)   04/05/19 114 lb (51.7 kg)       Assessment and Plan: I instructed the pt today on basic healthful nutrition for weight gain. We discussed the importance of not skipping meals and being sure to eat 3 well rounded meals per day. Emphasis was placed on calorically dense foods. We reviewed strategies to incorporate more food during her day and discussed the benefits of more small frequent meals. I think it is unlikely pt will be able to consume the amount of food she needs to maintain her weight d/t chronic early satiety. Pt was receptive to education and asked appropriate questions.      Nutritional Requirements:  Estimated Energy Needs:  Weight Used: 45.8kg   Method Used: 30kcal/kg  Estimated kcal Needs: ~1400 kcal per day  Protein Needs:  Weight used: 45.8 kg  1-1.3 g/kg = 45-59 g per day      Nutrition Diagnosis and Goal  Problem: Underweight  Etiology/related to: early satiety    Symptoms/Signs/as evidenced by: <50% estimated nutrient needs x 1 month, unintentional wt loss      Goal:   1) weight gain   2) Nutrition supplement TID  3) ~1400kcal daily, 45-59 g protein daily    Chandler Conner, MS, RD, LD

## 2023-02-16 ENCOUNTER — TELEPHONE (OUTPATIENT)
Dept: FAMILY MEDICINE CLINIC | Age: 68
End: 2023-02-16

## 2023-02-16 ENCOUNTER — OFFICE VISIT (OUTPATIENT)
Dept: FAMILY MEDICINE CLINIC | Age: 68
End: 2023-02-16
Payer: COMMERCIAL

## 2023-02-16 VITALS
BODY MASS INDEX: 22.87 KG/M2 | OXYGEN SATURATION: 97 % | RESPIRATION RATE: 18 BRPM | TEMPERATURE: 96.7 F | HEIGHT: 57 IN | HEART RATE: 92 BPM | SYSTOLIC BLOOD PRESSURE: 108 MMHG | WEIGHT: 106 LBS | DIASTOLIC BLOOD PRESSURE: 62 MMHG

## 2023-02-16 DIAGNOSIS — K21.9 GASTROESOPHAGEAL REFLUX DISEASE WITHOUT ESOPHAGITIS: ICD-10-CM

## 2023-02-16 DIAGNOSIS — R63.4 UNINTENDED WEIGHT LOSS: ICD-10-CM

## 2023-02-16 DIAGNOSIS — R20.2 NUMBNESS AND TINGLING OF UPPER AND LOWER EXTREMITIES OF BOTH SIDES: ICD-10-CM

## 2023-02-16 DIAGNOSIS — G25.81 RESTLESS LEG SYNDROME: ICD-10-CM

## 2023-02-16 DIAGNOSIS — G89.29 CHRONIC PAIN OF RIGHT KNEE: ICD-10-CM

## 2023-02-16 DIAGNOSIS — L40.50 PSORIASIS WITH ARTHROPATHY (HCC): ICD-10-CM

## 2023-02-16 DIAGNOSIS — E83.42 HYPOMAGNESEMIA: ICD-10-CM

## 2023-02-16 DIAGNOSIS — R26.9 ABNORMAL GAIT: ICD-10-CM

## 2023-02-16 DIAGNOSIS — G47.9 SLEEP DISTURBANCE: ICD-10-CM

## 2023-02-16 DIAGNOSIS — E03.9 ACQUIRED HYPOTHYROIDISM: ICD-10-CM

## 2023-02-16 DIAGNOSIS — E78.2 MIXED HYPERLIPIDEMIA: ICD-10-CM

## 2023-02-16 DIAGNOSIS — N18.31 STAGE 3A CHRONIC KIDNEY DISEASE (HCC): ICD-10-CM

## 2023-02-16 DIAGNOSIS — M75.01 ADHESIVE CAPSULITIS OF RIGHT SHOULDER: ICD-10-CM

## 2023-02-16 DIAGNOSIS — M25.561 CHRONIC PAIN OF RIGHT KNEE: ICD-10-CM

## 2023-02-16 DIAGNOSIS — R53.83 FATIGUE, UNSPECIFIED TYPE: ICD-10-CM

## 2023-02-16 DIAGNOSIS — R56.9 SEIZURE (HCC): ICD-10-CM

## 2023-02-16 DIAGNOSIS — G63 POLYNEUROPATHY ASSOCIATED WITH UNDERLYING DISEASE (HCC): Primary | ICD-10-CM

## 2023-02-16 DIAGNOSIS — R20.0 NUMBNESS AND TINGLING OF UPPER AND LOWER EXTREMITIES OF BOTH SIDES: ICD-10-CM

## 2023-02-16 PROCEDURE — G8399 PT W/DXA RESULTS DOCUMENT: HCPCS | Performed by: PHYSICIAN ASSISTANT

## 2023-02-16 PROCEDURE — G8427 DOCREV CUR MEDS BY ELIG CLIN: HCPCS | Performed by: PHYSICIAN ASSISTANT

## 2023-02-16 PROCEDURE — 3017F COLORECTAL CA SCREEN DOC REV: CPT | Performed by: PHYSICIAN ASSISTANT

## 2023-02-16 PROCEDURE — 1123F ACP DISCUSS/DSCN MKR DOCD: CPT | Performed by: PHYSICIAN ASSISTANT

## 2023-02-16 PROCEDURE — 1036F TOBACCO NON-USER: CPT | Performed by: PHYSICIAN ASSISTANT

## 2023-02-16 PROCEDURE — G8420 CALC BMI NORM PARAMETERS: HCPCS | Performed by: PHYSICIAN ASSISTANT

## 2023-02-16 PROCEDURE — 99214 OFFICE O/P EST MOD 30 MIN: CPT | Performed by: PHYSICIAN ASSISTANT

## 2023-02-16 PROCEDURE — G8484 FLU IMMUNIZE NO ADMIN: HCPCS | Performed by: PHYSICIAN ASSISTANT

## 2023-02-16 PROCEDURE — 1090F PRES/ABSN URINE INCON ASSESS: CPT | Performed by: PHYSICIAN ASSISTANT

## 2023-02-16 RX ORDER — CALCIUM CARBONATE 160(400)MG
1 TABLET,CHEWABLE ORAL DAILY
Qty: 1 EACH | Refills: 0 | Status: SHIPPED | OUTPATIENT
Start: 2023-02-16

## 2023-02-16 SDOH — ECONOMIC STABILITY: HOUSING INSECURITY
IN THE LAST 12 MONTHS, WAS THERE A TIME WHEN YOU DID NOT HAVE A STEADY PLACE TO SLEEP OR SLEPT IN A SHELTER (INCLUDING NOW)?: NO

## 2023-02-16 SDOH — ECONOMIC STABILITY: FOOD INSECURITY: WITHIN THE PAST 12 MONTHS, THE FOOD YOU BOUGHT JUST DIDN'T LAST AND YOU DIDN'T HAVE MONEY TO GET MORE.: NEVER TRUE

## 2023-02-16 SDOH — ECONOMIC STABILITY: FOOD INSECURITY: WITHIN THE PAST 12 MONTHS, YOU WORRIED THAT YOUR FOOD WOULD RUN OUT BEFORE YOU GOT MONEY TO BUY MORE.: NEVER TRUE

## 2023-02-16 SDOH — ECONOMIC STABILITY: INCOME INSECURITY: HOW HARD IS IT FOR YOU TO PAY FOR THE VERY BASICS LIKE FOOD, HOUSING, MEDICAL CARE, AND HEATING?: NOT VERY HARD

## 2023-02-16 ASSESSMENT — PATIENT HEALTH QUESTIONNAIRE - PHQ9
SUM OF ALL RESPONSES TO PHQ QUESTIONS 1-9: 8
SUM OF ALL RESPONSES TO PHQ QUESTIONS 1-9: 8
9. THOUGHTS THAT YOU WOULD BE BETTER OFF DEAD, OR OF HURTING YOURSELF: 0
SUM OF ALL RESPONSES TO PHQ QUESTIONS 1-9: 8
4. FEELING TIRED OR HAVING LITTLE ENERGY: 1
SUM OF ALL RESPONSES TO PHQ9 QUESTIONS 1 & 2: 2
1. LITTLE INTEREST OR PLEASURE IN DOING THINGS: 1
2. FEELING DOWN, DEPRESSED OR HOPELESS: 1
10. IF YOU CHECKED OFF ANY PROBLEMS, HOW DIFFICULT HAVE THESE PROBLEMS MADE IT FOR YOU TO DO YOUR WORK, TAKE CARE OF THINGS AT HOME, OR GET ALONG WITH OTHER PEOPLE: 1
SUM OF ALL RESPONSES TO PHQ QUESTIONS 1-9: 8
7. TROUBLE CONCENTRATING ON THINGS, SUCH AS READING THE NEWSPAPER OR WATCHING TELEVISION: 1
8. MOVING OR SPEAKING SO SLOWLY THAT OTHER PEOPLE COULD HAVE NOTICED. OR THE OPPOSITE, BEING SO FIGETY OR RESTLESS THAT YOU HAVE BEEN MOVING AROUND A LOT MORE THAN USUAL: 1
5. POOR APPETITE OR OVEREATING: 1
6. FEELING BAD ABOUT YOURSELF - OR THAT YOU ARE A FAILURE OR HAVE LET YOURSELF OR YOUR FAMILY DOWN: 1
3. TROUBLE FALLING OR STAYING ASLEEP: 1

## 2023-02-16 ASSESSMENT — ENCOUNTER SYMPTOMS
VOMITING: 0
SORE THROAT: 0
COUGH: 0
NAUSEA: 0
SHORTNESS OF BREATH: 0
BACK PAIN: 0
SINUS PAIN: 0
ABDOMINAL PAIN: 0
DIARRHEA: 0
SINUS PRESSURE: 0
CHEST TIGHTNESS: 0

## 2023-02-16 ASSESSMENT — VISUAL ACUITY: OU: 1

## 2023-02-16 NOTE — PATIENT INSTRUCTIONS
FOOD RESOURCES    SNAP:  What they offer:  Helps low-income adults and families stretch their monthly food budgets and buy healthy food  Phone Number: 759.915.7330  Website: [employer]/financial-support-services/food-assistance  Meals on [facility] office on aging:  What they offer: Home delivered meals, restaurant voucher program, senior food box program and emergency food pantry. Phone Number:  [phone]  Website: https://[employer]/cherry-services/nutrition  [facility] bank:  What they offer: Will provide a list of available food pantries in the area weekly. Phone Number: [phone]  Website: [employer]    Need additional resources? Call 211   Find Help https://www. findhelp.org

## 2023-02-16 NOTE — PROGRESS NOTES
Subjective  Allie Lobe 1955 is a 74 y.o. female who presents today with:  Chief Complaint   Patient presents with    [address]  - Patient was previous seen by Dr. Ana Barker. Last ov on 10/25/22  - Seen by Dr. Subhash Lebron 1/26/23. Patient has lack of transportation. Will help set up transportation so patient can make to her appointments. HPI   Psoriasis  Psoriasis with arthropathy- Suspected. Chronic pain of multiple joints, particularly shoulders. Being managed on Methotrexate and undergoing OT. Followed [facility]. Doing well with medication. Currently stopped medication to evaluate if patient can tolerate without being on medication. - complaining of limited mobility and gait. Patient feels that she is having harder time getter around due to pain. She feel unsteady when ambulating on days she is having worse pain. - additionally patient having harder time with overhead motion. Unable to reach above her head due to chronic tendinopathy  of bilateral shoulders. Osteoporosis   Osteoporosis- Planned for zoledronic acid infusions with Rheumatology. Pending insurance approval. First dose of reclast on 10/2022      Pulmonology:   Seeing pulmonologist at [facility] w/ Dr. Latoya Luevano  - had PFT completed and sleep apnea. Has an upcoming f/u appointment to discuss test results and treatment plan. Has also been referred to Pulmonology due to abnormal PFT. Sleep disturbance   - Taking naps throughout the day. Will have sleep apnea assessemnt.   - patient reports not sleeping well. Feels like she isn't able to fall asleep in bed. Sometimes sleeps on couch.   -Anxiety- co-existing depression. Has suffered losses of 2 close family members, including her sister ~ 2 years ago. Feels this is a major contributor. Reports stable response to Cymbalta. No SI.      RLS   Currently stable on Requip. Still having days of sleeplessness.       Seizures  On Keppra twice a day, following with Neurology (Alonso Meraz). Reports no breakthrough seizures. Last seen refilled by Dr. Gus Bolaños. GERD  Well-controlled on prn PPI, with some caffeine restriction, diet restriction. No weight loss. No abdominal pain. No bloody or black tarry stools. Cataracts  Seen by Dr. Gamino Expose  History of cataracts- age-related; s/p b/l cataract extraction (left eye- DOS 9/28, right eye- DOS 10/12). Reports improved vision. CKD  Stage 3A CKD- stable. Dr. Maximo Connelly. Has not seen the nephrologist in 1-2 years. Would like to return. Hypothyroidism  - Last TSH on 1.59 9 months ago. Compliant with levothyroxine which is taken correctly. No diarrhea, constipation, palpitations, dry skin, depression, difficulty sleeping or fatigue. No weight gain or loss. HLD  Patient is here for hyperlipidemia. Is compliant with medications and has no apparent side effects from them. On statin and fenofibrate. No signs or symptoms of pancreatitis      General function- Concern raised previously due to weight loss/functional decline. Patient now eating better and taking nutritional supplements (Ensure). Has gained about 9 pounds since last visit. Seeing Nutritionist Leobardo Reyes  Patient is in good spirits today. Requesting refills on all her medications. Review of Systems   Constitutional:  Positive for fatigue. Negative for activity change, appetite change, chills and fever. HENT:  Negative for congestion, drooling, sinus pressure, sinus pain and sore throat. Eyes:  Negative for visual disturbance. Respiratory:  Negative for cough, chest tightness and shortness of breath. Cardiovascular:  Negative for chest pain. Gastrointestinal:  Negative for abdominal pain, diarrhea, nausea and vomiting. Endocrine: Negative for cold intolerance. Genitourinary:  Negative for dysuria, flank pain, frequency and hematuria. Musculoskeletal:  Positive for arthralgias. Negative for back pain. Skin:  Negative for rash. Allergic/Immunologic: Negative for food allergies. Neurological:  Positive for dizziness, light-headedness and numbness. Negative for weakness and headaches. Hematological:  Does not bruise/bleed easily. Past Medical History:   Diagnosis Date    Adhesive capsulitis of right shoulder 8/12/2021    Alcoholism ([address]     Arthritis     Bilateral leg and foot pain 8/12/2021    Chronic back pain 5/17/2022    Chronic lung disease     Cognitive impairment 4/15/2021    Depression     Diabetes mellitus ([address]     Diarrhea 1/4/2021    Possibly related to Metformin, Keppra, constipation, colitis. Stop Metformin. Treat constipation. Consider adjustment to Keppra given side effects.     Encephalopathy 12/10/2020    Grief reaction 4/15/2021    Hyperlipidemia     Hypertension     Hypokalemia 11/6/2020    Hypomagnesemia 11/6/2020    Hypothyroidism     Lymphadenopathy, axillary 1/4/2021    Numbness and tingling of upper and lower extremities of both sides 8/12/2021    Second hand smoke exposure     Seizure ([address]     Seronegative arthritis 6/28/2022    Squamous blepharitis of upper and lower eyelids of both eyes 5/17/2022    Stage 3b chronic kidney disease ([address] 8/12/2021    Syncope and collapse 12/10/2020    Thoracogenic scoliosis of thoracolumbar region 5/17/2022     Past Surgical History:   Procedure Laterality Date    APPENDECTOMY      BIOPSY MOUTH LESION Bilateral 12/19/2016    REMOVAL  OF BILATERAL LINGUAL MACIE performed by Sharif Walsh DDS at [facility] N/A 1/21/2021    COLONOSCOPY DIAGNOSTIC performed by Ju Donis MD at [facility] ([facility]      UPPER GASTROINTESTINAL ENDOSCOPY N/A 1/21/2021    EGD with polypectomy performed by Ju Donis MD at [facility] History    Marital status: Single     Spouse name: Not on file    Number of children: Not on file    Years of education: 15 Highest education level: Not on file   Occupational History    Occupation: disabled   Tobacco Use    Smoking status: Never    Smokeless tobacco: Never   Vaping Use    Vaping Use: Never used   Substance and Sexual Activity    Alcohol use: Not Currently     Alcohol/week: 28.0 standard drinks     Types: 28 Shots of liquor per week     Comment: quit drinking in November 2020    Drug use: No    Sexual activity: Not Currently   Other Topics Concern    Not on file   Social History Narrative    3/4/21 Mamie Payne lives in a private residence she had shared with her sister, her sister was the home owner, and she recently passed on 2/21, She is now needing to find a new home, as the house she is living in may be sold soon, Mamie Payne states she is independent with self care, does have some limitations but states able to function independently for most self care tasks. She does not drive related to history of seizures.  referral made for concerns with housing and transportation. Social Determinants of Health     Financial Resource Strain: Low Risk     Difficulty of Paying Living Expenses: Not very hard   Food Insecurity: No Food Insecurity    Worried About Running Out of Food in the Last Year: Never true    Ran Out of Food in the Last Year: Never true   Transportation Needs: No Transportation Needs    Lack of Transportation (Medical): No    Lack of Transportation (Non-Medical):  No   Physical Activity: Inactive    Days of Exercise per Week: 0 days    Minutes of Exercise per Session: 0 min   Stress: Not on file   Social Connections: Not on file   Intimate Partner Violence: Not on file   Housing Stability: Unknown    Unable to Pay for Housing in the Last Year: Not on file    Number of Places Lived in the Last Year: Not on file    Unstable Housing in the Last Year: No     Family History   Problem Relation Age of Onset    Heart Disease Mother     Hypertension Mother     Diabetes Mother     Stroke Mother     Heart Disease Father     Hypertension Father     Heart Disease Sister     Hypertension Sister     Heart Disease Brother     Hypertension Brother     Diabetes Brother     Cancer Maternal Grandmother     Diabetes Maternal Grandfather      Allergies   Allergen Reactions    Morphine Swelling     Current Outpatient Medications   Medication Sig Dispense Refill    Misc. Devices (ROLLATOR ULTRA-LIGHT) MISC 1 each by Does not apply route daily 1 each 0    Misc. Devices (FOLDING REACHER) MISC 1 each by Does not apply route daily 1 each 0    albuterol sulfate HFA (PROVENTIL;VENTOLIN;PROAIR) 108 (90 Base) MCG/ACT inhaler       albuterol sulfate HFA (PROVENTIL;VENTOLIN;PROAIR) 108 (90 Base) MCG/ACT inhaler Inhale 2 puffs into the lungs every 4 hours as needed      cyanocobalamin 1000 MCG tablet Take 1,000 mcg by mouth daily      folic acid (FOLVITE) 1 MG tablet Take 1 tablet by mouth daily Do not take on the day you take the methotrexate.  90 tablet 4    methotrexate (RHEUMATREX) 2.5 MG chemo tablet TAKE 4 TABLETS BY MOUTH ONCE WEEKLY 16 tablet 0    atorvastatin (LIPITOR) 40 MG tablet TAKE 1 TABLET BY MOUTH ONCE DAILY 90 tablet 3    calcium carbonate-vitamin D3 (CALTRATE) 600-400 MG-UNIT TABS per tab TAKE ONE (1) TABLET BY MOUTH TWICE DAILY 60 tablet 10    DULoxetine (CYMBALTA) 60 MG extended release capsule Take 1 capsule by mouth daily 90 capsule 1    fenofibrate (TRIGLIDE) 160 MG tablet Take 1 tablet by mouth daily 90 tablet 4    fexofenadine (ALLEGRA) 180 MG tablet Take 1 tablet by mouth daily as needed (allergies) 90 tablet 1    levothyroxine (SYNTHROID) 50 MCG tablet Take 1 tablet by mouth Daily 90 tablet 3    pantoprazole (PROTONIX) 40 MG tablet TAKE 1 TABLET BY MOUTH DAILY 30 tablet 5    levETIRAcetam (KEPPRA) 500 MG tablet TAKE 1 TABLET BY MOUTH TWICE DAILY 60 tablet 10    Magnesium 400 MG CAPS Take 1 capsule by mouth 3 times daily (Patient taking differently: Take 1 capsule by mouth in the morning and at bedtime) 270 capsule 4    Nutritional Supplements (ENSURE COMPLETE) LIQD Take 1 Bottle by mouth 2 times daily 60 each 5    rOPINIRole (REQUIP) 0.5 MG tablet Take 1 tablet by mouth 3 times daily TAKE 1 TABLET BY MOUTH AT  NIGHT 270 tablet 4    aspirin EC 81 MG EC tablet Take 1 tablet by mouth daily 90 tablet 4    triamcinolone (KENALOG) 0.1 % cream APPLY TOPICALLY TWICE DAILY 454 g 2    Handicap Placard MISC by Does not apply route Permanent: NO EXPIRATION 1 each 0    zoledronic acid (RECLAST) 5 MG/100ML SOLN  (Patient not taking: Reported on 2/16/2023)      Nutritional Supplements (BOOST) LIQD Take 1 Bottle by mouth daily (Patient not taking: Reported on 2/16/2023) 30 each 3    magnesium oxide (MAG-OX) 400 MG tablet TAKE 1 TABLET BY MOUTH THREE TIMES DAILY (Patient not taking: Reported on 2/16/2023) 30 tablet 3    acetaminophen (ACETAMINOPHEN EXTRA STRENGTH) 500 MG tablet Take 1 tablet by mouth 2 times daily as needed for Pain 60 tablet 5     No current facility-administered medications for this visit. PMH, Surgical Hx, Family Hx, and Social Hx reviewed and updated. Health Maintenance reviewed.     Objective  Vitals:    02/16/23 1247   BP: 108/62   Pulse: 92   Resp: 18   Temp: (!) 96.7 °F (35.9 °C)   TempSrc: Temporal   SpO2: 97%   Weight: 106 lb (48.1 kg)   Height: 4' 9\" (1.448 m)     BP Readings from Last 3 Encounters:   02/16/23 108/62   01/03/23 117/81   11/06/22 132/81     Wt Readings from Last 3 Encounters:   02/16/23 106 lb (48.1 kg)   01/03/23 101 lb (45.8 kg)   11/06/22 100 lb (45.4 kg)       Lab Results   Component Value Date    LABA1C 5.1 05/17/2022    LABA1C 5.5 12/21/2021    LABA1C 6.6 (H) 08/25/2021     Lab Results   Component Value Date    CREATININE 1.42 (H) 11/06/2022     Lab Results   Component Value Date    ALT 21 11/06/2022    AST 29 11/06/2022     Lab Results   Component Value Date    CHOL 134 05/13/2021    TRIG 95 05/13/2021    HDL 39 (L) 12/21/2021    LDLCALC 64 12/21/2021          Physical Exam  Vitals and nursing note reviewed. Constitutional:       General: She is awake. She is not in acute distress. Appearance: Normal appearance. She is well-developed and normal weight. She is not ill-appearing, toxic-appearing or diaphoretic. HENT:      Head: Normocephalic and atraumatic. Right Ear: Hearing and external ear normal.      Left Ear: Hearing and external ear normal.      Nose: Nose normal.      Mouth/Throat:      Dentition: Abnormal dentition. Eyes:      General: Lids are normal. Vision grossly intact. Gaze aligned appropriately. Conjunctiva/sclera: Conjunctivae normal.      Pupils: Pupils are equal, round, and reactive to light. Cardiovascular:      Rate and Rhythm: Normal rate and regular rhythm. Pulses: Normal pulses. Heart sounds: Normal heart sounds, S1 normal and S2 normal.   Pulmonary:      Effort: Pulmonary effort is normal.      Breath sounds: Normal breath sounds and air entry. Musculoskeletal:      Right upper arm: Tenderness present. Left upper arm: Tenderness present. Cervical back: Normal range of motion. Comments: Limited ROM with above head motions. Skin:     General: Skin is warm. Capillary Refill: Capillary refill takes less than 2 seconds. Neurological:      Mental Status: She is alert and oriented to person, place, and time. Gait: Gait abnormal.   Psychiatric:         Attention and Perception: Attention normal.         Mood and Affect: Mood normal.         Speech: Speech normal.         Behavior: Behavior normal. Behavior is cooperative. Assessment & Plan   1. Polyneuropathy associated with underlying disease ([address]  -     Misc. Devices (ROLLATOR ULTRA-LIGHT) MISC; DAILY Starting Thu 2/16/2023, Disp-1 each, R-0, Print  -     Misc. Devices ([place]) MISC; DAILY Starting Thu 2/16/2023, Disp-1 each, R-0, Print  - psoriatic arthritis.  Followed by [facility] rheumatology   - due to unsteady gait, recommend rollator to help with patient's ambulatory ability. 2. Chronic pain of right knee  -     Misc. Devices (ROLLATOR ULTRA-LIGHT) MISC; DAILY Starting Thu 2/16/2023, Disp-1 each, R-0, Print  -     Misc. Devices ([place]) MISC; DAILY Starting Thu 2/16/2023, Disp-1 each, R-0, Print  - see above  3. Abnormal gait  -     Misc. Devices (ROLLATOR ULTRA-LIGHT) MISC; DAILY Starting Thu 2/16/2023, Disp-1 each, R-0, Print  -see above. 4. Adhesive capsulitis of right shoulder  -     Misc. Devices ([place]) MISC; DAILY Starting Thu 2/16/2023, Disp-1 each, R-0, Print  - physical exam shows limited ROM of bilateral upper extremities. - continue ROM exercises. Tylenol for pain. 5. Numbness and tingling of upper and lower extremities of both sides  -     Misc. Devices ([place]) MISC; DAILY Starting Thu 2/16/2023, Disp-1 each, R-0, Print  6. Fatigue, unspecified type  -     TSH with Reflex; Future  -     Magnesium; Future  -     Comprehensive Metabolic Panel; Future  7. Stage 3a chronic kidney disease ([facility]  - currently stable. Avoid nephrotoxic agents. - recommend repeat of blood work. Patient has not completed them in several month. - F/u with nephrology as scheduled. 8. Psoriasis with arthropathy [facility])  - Patient is currently being evaluated at [facility]. Medications include Methotrexate. Patient is currently holding medication per provider's request. Patient is seeing if her symptoms will return. - Recommend to continue regimen as dictated by specialty. - Continue Folic acid despite d/c methotrexate. 9. Hypomagnesemia  - Unclear control. Will need repeat BMP. 10. Unintended weight loss  -Patient is currently taking Ensure. Patient would like insurance to cover her nutritional supplement. Patient is seeing nutritionist. Will have nutritionist file appeal for patient BOOST. 11. Seizure ([facility]  - Stable with medication; no breakthrough seizures. Continue Keppra at current dose.    12. Acquired hypothyroidism  - Unclear of control. Need TSH levels to be drawn. Will continue current dose until then. 13. Sleep disturbance  - multifactorial. Went over sleep hygiene. Recommend that the patient continue to f/u with pulmonology for sleep apnea test. Will also address incrementally. 14. Restless leg syndrome  - well controlled with requip. Recommend to continue medication at current dose  15. Mixed hyperlipidemia  - over a year ago for lipid panel. Need repeat labs. Continue current dose as prescribed. 16. Gastroesophageal reflux disease without esophagitis   -stable. Well controlled with current medication dose. Orders Placed This Encounter   Procedures    TSH with Reflex     Standing Status:   Future     Standing Expiration Date:   2024    Magnesium     Standing Status:   Future     Standing Expiration Date:   2024    Comprehensive Metabolic Panel     Standing Status:   Future     Standing Expiration Date:   2024     Orders Placed This Encounter   Medications    Misc. Devices (ROLLATOR ULTRA-LIGHT) MISC     Si each by Does not apply route daily     Dispense:  1 each     Refill:  0    Misc. Devices (FOLDING REACHER) MISC     Si each by Does not apply route daily     Dispense:  1 each     Refill:  0     There are no discontinued medications. Return in about 4 weeks (around 3/16/2023) for Follow Up. Reviewed with the patient: current clinical status, medications, activities and diet. Side effects, adverse effects of the medication prescribed today, as well as treatment plan/ rationale and result expectations have been discussed with the patient who expresses understanding and desires to proceed. Close follow up to evaluate treatment results and for coordination of care. I have reviewed the patient's medical history in detail and updated the computerized patient record.     Duane Le

## 2023-02-21 DIAGNOSIS — E78.2 MIXED HYPERLIPIDEMIA: ICD-10-CM

## 2023-02-21 DIAGNOSIS — R53.83 FATIGUE, UNSPECIFIED TYPE: ICD-10-CM

## 2023-02-21 LAB
ALBUMIN SERPL-MCNC: 3.8 G/DL (ref 3.5–4.6)
ALP BLD-CCNC: 79 U/L (ref 40–130)
ALT SERPL-CCNC: 19 U/L (ref 0–33)
ANION GAP SERPL CALCULATED.3IONS-SCNC: 14 MEQ/L (ref 9–15)
AST SERPL-CCNC: 25 U/L (ref 0–35)
BILIRUB SERPL-MCNC: 0.3 MG/DL (ref 0.2–0.7)
BUN BLDV-MCNC: 24 MG/DL (ref 8–23)
CALCIUM SERPL-MCNC: 9.1 MG/DL (ref 8.5–9.9)
CHLORIDE BLD-SCNC: 103 MEQ/L (ref 95–107)
CHOLESTEROL, TOTAL: 115 MG/DL (ref 0–199)
CO2: 23 MEQ/L (ref 20–31)
CREAT SERPL-MCNC: 1.02 MG/DL (ref 0.5–0.9)
GFR SERPL CREATININE-BSD FRML MDRD: >60 ML/MIN/{1.73_M2}
GLOBULIN: 2.8 G/DL (ref 2.3–3.5)
GLUCOSE BLD-MCNC: 153 MG/DL (ref 70–99)
HDLC SERPL-MCNC: 39 MG/DL (ref 40–59)
LDL CHOLESTEROL CALCULATED: 43 MG/DL (ref 0–129)
MAGNESIUM: 1.8 MG/DL (ref 1.7–2.4)
POTASSIUM SERPL-SCNC: 4 MEQ/L (ref 3.4–4.9)
SODIUM BLD-SCNC: 140 MEQ/L (ref 135–144)
TOTAL PROTEIN: 6.6 G/DL (ref 6.3–8)
TRIGL SERPL-MCNC: 163 MG/DL (ref 0–150)
TSH REFLEX: 0.67 UIU/ML (ref 0.44–3.86)

## 2023-02-28 ENCOUNTER — OFFICE VISIT (OUTPATIENT)
Dept: GASTROENTEROLOGY | Age: 68
End: 2023-02-28
Payer: COMMERCIAL

## 2023-02-28 VITALS — WEIGHT: 106 LBS | BODY MASS INDEX: 22.87 KG/M2 | HEIGHT: 57 IN | OXYGEN SATURATION: 100 % | HEART RATE: 68 BPM

## 2023-02-28 DIAGNOSIS — R14.0 ABDOMINAL BLOATING: ICD-10-CM

## 2023-02-28 DIAGNOSIS — R63.6 LOW WEIGHT: Primary | ICD-10-CM

## 2023-02-28 DIAGNOSIS — F10.11 HISTORY OF ALCOHOL ABUSE: ICD-10-CM

## 2023-02-28 DIAGNOSIS — K21.9 GASTROESOPHAGEAL REFLUX DISEASE, UNSPECIFIED WHETHER ESOPHAGITIS PRESENT: ICD-10-CM

## 2023-02-28 PROCEDURE — G8420 CALC BMI NORM PARAMETERS: HCPCS | Performed by: INTERNAL MEDICINE

## 2023-02-28 PROCEDURE — G8399 PT W/DXA RESULTS DOCUMENT: HCPCS | Performed by: INTERNAL MEDICINE

## 2023-02-28 PROCEDURE — 3017F COLORECTAL CA SCREEN DOC REV: CPT | Performed by: INTERNAL MEDICINE

## 2023-02-28 PROCEDURE — G8427 DOCREV CUR MEDS BY ELIG CLIN: HCPCS | Performed by: INTERNAL MEDICINE

## 2023-02-28 PROCEDURE — G8484 FLU IMMUNIZE NO ADMIN: HCPCS | Performed by: INTERNAL MEDICINE

## 2023-02-28 PROCEDURE — 1036F TOBACCO NON-USER: CPT | Performed by: INTERNAL MEDICINE

## 2023-02-28 PROCEDURE — 1090F PRES/ABSN URINE INCON ASSESS: CPT | Performed by: INTERNAL MEDICINE

## 2023-02-28 PROCEDURE — 99204 OFFICE O/P NEW MOD 45 MIN: CPT | Performed by: INTERNAL MEDICINE

## 2023-02-28 PROCEDURE — 1123F ACP DISCUSS/DSCN MKR DOCD: CPT | Performed by: INTERNAL MEDICINE

## 2023-02-28 NOTE — PROGRESS NOTES
Gastroenterology Clinic Visit    Mikey Medeiros  [id number]  Chief Complaint   Patient presents with    New Patient     HPI: 74 y.o. female presents to the clinic with her niece to establish care and with constellation of symptoms including weight loss, decreased oral intake, early satiety, low energy, abdominal distention. Patient reports her weight going down to 86 lbs, patient started drinking Ensure with improvement in weight to 106 lbs. She additionally reports reflux symptoms with improvement in symptoms on Protonix. Patient is a poor historian  On further questioning patient reports longstanding history of significant alcohol abuse, stopped 2 years ago, prior to that drank excessively for 40 years. She additionally reports abdominal bloating, distention. Denies any hematemesis. No change in bowel habits. No melena    Previous GI work up/Endoscopic investigations:   EGD and colonoscopy: 1/21/2021: 3 cm sliding hiatal hernia, 2 mm gastric polyp, which bled on removal, hemostatic clip placed. Colonoscopy with left-sided diverticulosis and hypertrophied anal papilla. Review of Systems   All other systems reviewed and are negative. Past Medical History:   Diagnosis Date    Adhesive capsulitis of right shoulder 8/12/2021    Alcoholism ([facility] [address]     Arthritis     Bilateral leg and foot pain 8/12/2021    Chronic back pain 5/17/2022    Chronic lung disease     Cognitive impairment 4/15/2021    Depression     Diabetes mellitus ([address]     Diarrhea 1/4/2021    Possibly related to Metformin, Keppra, constipation, colitis. Stop Metformin. Treat constipation. Consider adjustment to Keppra given side effects.     Encephalopathy 12/10/2020    Grief reaction 4/15/2021    Hyperlipidemia     Hypertension     Hypokalemia 11/6/2020    Hypomagnesemia 11/6/2020    Hypothyroidism     Lymphadenopathy, axillary 1/4/2021    Numbness and tingling of upper and lower extremities of both sides 8/12/2021    Second hand smoke exposure     Seizure ([facility] [address]     Seronegative arthritis 6/28/2022    Squamous blepharitis of upper and lower eyelids of both eyes 5/17/2022    Stage 3b chronic kidney disease ([facility] [address] 8/12/2021    Syncope and collapse 12/10/2020    Thoracogenic scoliosis of thoracolumbar region 5/17/2022     Past Surgical History:   Procedure Laterality Date    APPENDECTOMY      BIOPSY MOUTH LESION Bilateral 12/19/2016    REMOVAL  OF BILATERAL LINGUAL MACIE performed by Leighton Mack DDS at [address] [facility] [place] N/A 1/21/2021    COLONOSCOPY DIAGNOSTIC performed by Eneida Balderas MD at [address] [facility] ([address] [facility])      UPPER GASTROINTESTINAL ENDOSCOPY N/A 1/21/2021    EGD with polypectomy performed by Eneida Balderas MD at [facility]     Current Outpatient Medications on File Prior to Visit   Medication Sig Dispense Refill    Misc. Devices (ROLLATOR ULTRA-LIGHT) MISC 1 each by Does not apply route daily 1 each 0    Misc. Devices (FOLDING REACHER) MISC 1 each by Does not apply route daily 1 each 0    albuterol sulfate HFA (PROVENTIL;VENTOLIN;PROAIR) 108 (90 Base) MCG/ACT inhaler       albuterol sulfate HFA (PROVENTIL;VENTOLIN;PROAIR) 108 (90 Base) MCG/ACT inhaler Inhale 2 puffs into the lungs every 4 hours as needed      cyanocobalamin 1000 MCG tablet Take 1,000 mcg by mouth daily      zoledronic acid (RECLAST) 5 VB/342UV SOLN       folic acid (FOLVITE) 1 MG tablet Take 1 tablet by mouth daily Do not take on the day you take the methotrexate.  90 tablet 4    methotrexate (RHEUMATREX) 2.5 MG chemo tablet TAKE 4 TABLETS BY MOUTH ONCE WEEKLY 16 tablet 0    Nutritional Supplements (BOOST) LIQD Take 1 Bottle by mouth daily 30 each 3    atorvastatin (LIPITOR) 40 MG tablet TAKE 1 TABLET BY MOUTH ONCE DAILY 90 tablet 3    calcium carbonate-vitamin D3 (CALTRATE) 600-400 MG-UNIT TABS per tab TAKE ONE (1) TABLET BY MOUTH TWICE DAILY 60 tablet 10    DULoxetine (CYMBALTA) 60 MG extended release capsule Take 1 capsule by mouth daily 90 capsule 1    fenofibrate (TRIGLIDE) 160 MG tablet Take 1 tablet by mouth daily 90 tablet 4    fexofenadine (ALLEGRA) 180 MG tablet Take 1 tablet by mouth daily as needed (allergies) 90 tablet 1    levothyroxine (SYNTHROID) 50 MCG tablet Take 1 tablet by mouth Daily 90 tablet 3    pantoprazole (PROTONIX) 40 MG tablet TAKE 1 TABLET BY MOUTH DAILY 30 tablet 5    levETIRAcetam (KEPPRA) 500 MG tablet TAKE 1 TABLET BY MOUTH TWICE DAILY 60 tablet 10    Magnesium 400 MG CAPS Take 1 capsule by mouth 3 times daily (Patient taking differently: Take 1 capsule by mouth in the morning and at bedtime) 270 capsule 4    Nutritional Supplements (ENSURE COMPLETE) LIQD Take 1 Bottle by mouth 2 times daily 60 each 5    rOPINIRole (REQUIP) 0.5 MG tablet Take 1 tablet by mouth 3 times daily TAKE 1 TABLET BY MOUTH AT  NIGHT 270 tablet 4    magnesium oxide (MAG-OX) 400 MG tablet TAKE 1 TABLET BY MOUTH THREE TIMES DAILY 30 tablet 3    aspirin EC 81 MG EC tablet Take 1 tablet by mouth daily 90 tablet 4    triamcinolone (KENALOG) 0.1 % cream APPLY TOPICALLY TWICE DAILY 454 g 2    Handicap Placard MISC by Does not apply route Permanent: NO EXPIRATION 1 each 0    acetaminophen (ACETAMINOPHEN EXTRA STRENGTH) 500 MG tablet Take 1 tablet by mouth 2 times daily as needed for Pain 60 tablet 5     No current facility-administered medications on file prior to visit.      Family History   Problem Relation Age of Onset    Heart Disease Mother     Hypertension Mother     Diabetes Mother     Stroke Mother     Heart Disease Father     Hypertension Father     Heart Disease Sister     Hypertension Sister     Heart Disease Brother     Hypertension Brother     Diabetes Brother     Cancer Maternal Grandmother     Diabetes Maternal Grandfather     Colon Cancer Neg Hx      Social History     Socioeconomic History    Marital status: Single    Years of education: 15   Occupational History    Occupation: disabled   Tobacco Use    Smoking status: Never    Smokeless tobacco: Never   Vaping Use    Vaping Use: Never used   Substance and Sexual Activity    Alcohol use: Not Currently     Alcohol/week: 28.0 standard drinks     Types: 28 Shots of liquor per week     Comment: quit drinking in November 2020    Drug use: No    Sexual activity: Not Currently   Social History Narrative    3/4/21 Maddy Worrell lives in a private residence she had shared with her sister, her sister was the home owner, and she recently passed on 2/21, She is now needing to find a new home, as the house she is living in may be sold soon, Maddy Worrell states she is independent with self care, does have some limitations but states able to function independently for most self care tasks. She does not drive related to history of seizures.  referral made for concerns with housing and transportation. Social Determinants of Health     Financial Resource Strain: Low Risk     Difficulty of Paying Living Expenses: Not very hard   Food Insecurity: No Food Insecurity    Worried About Running Out of Food in the Last Year: Never true    Ran Out of Food in the Last Year: Never true   Transportation Needs: No Transportation Needs    Lack of Transportation (Medical): No    Lack of Transportation (Non-Medical): No   Physical Activity: Inactive    Days of Exercise per Week: 0 days    Minutes of Exercise per Session: 0 min   Housing Stability: Unknown    Unstable Housing in the Last Year: No     Pulse 68, height 4' 9\" (1.448 m), weight 106 lb (48.1 kg), SpO2 100 %, not currently breastfeeding. Physical Exam  Constitutional:       General: She is not in acute distress. Appearance: Normal appearance. She is well-developed. Eyes:      General: No scleral icterus. Cardiovascular:      Rate and Rhythm: Normal rate and regular rhythm. Pulmonary:      Effort: Pulmonary effort is normal.      Breath sounds: Normal breath sounds.    Abdominal:      General: Abdomen is protuberant. Bowel sounds are normal. There is no distension. Palpations: Abdomen is soft. There is no mass. Tenderness: There is no abdominal tenderness. There is no guarding or rebound. Musculoskeletal:         General: Normal range of motion. Lymphadenopathy:      Cervical: No cervical adenopathy. Neurological:      Mental Status: She is alert and oriented to person, place, and time. Psychiatric:         Behavior: Behavior normal.         Thought Content: Thought content normal.         Judgment: Judgment normal.     Laboratory, Pathology, Radiology reviewed indetail with relevant important investigations summarized below:  Lab Results   Component Value Date    WBC 6.9 11/06/2022    HGB 11.6 (L) 11/06/2022    HCT 34.8 (L) 11/06/2022    MCV 95.8 (H) 11/06/2022     11/06/2022     Lab Results   Component Value Date    IRON 50 06/28/2022    TIBC 384 06/28/2022    FERRITIN 255 (H) 05/17/2022     Lab Results   Component Value Date    RNFLUIJS34 886 08/25/2021      Lab Results   Component Value Date    FOLATE >20.0 08/25/2021     Lab Results   Component Value Date    LABALBU 3.8 02/21/2023      Lab Results   Component Value Date    ALT 19 02/21/2023    AST 25 02/21/2023    ALKPHOS 79 02/21/2023    BILITOT 0.3 02/21/2023     Assessment and Plan:  74 y.o. female in clinic to establish care with new GI, patient switched providers, constellation of symptoms including early satiety, poor oral intake, weight loss, abdominal distention and reflux. Patient a poor historian, previous evaluation with GI in 2021 for similar symptoms. Patient with history of long-term excess alcohol use until 2 years ago. Will evaluate for presence of liver disease  1. Low weight  -Detailed discussion regarding diet, continue with protein supplements  -Evaluate for presence of chronic liver disease    2.  History of alcohol abuse  -Encouraged to continue alcohol abstinence  -  LIVER SPLEEN; Future  - Comprehensive Metabolic Panel; Future    3. Abdominal bloating  -Detailed discussion regarding diet, trial of over-the-counter antiflatulent agents like Gas-X/Beano/Phazyme/activated charcoal discussed    4. Gastroesophageal reflux disease, unspecified whether esophagitis present  -Antireflux measures  -Continue with Protonix    > 50% of 40 minutes was spent spent on counseling, coordinating care based on my plan and assessment as noted above. Return in 8 to 10 weeks (around 5/2/2023). Vanessa Plaza MD   Staff Gastroenterologist  [facility]    Please note this report has been partially produced using speech recognition software and may cause or contain errors related to thatsystem including grammar, punctuation and spelling as well as words and phrases that may seem inappropriate. If there are questions or concerns please feel free to contact me to clarify.

## 2023-03-16 ENCOUNTER — HOSPITAL ENCOUNTER (OUTPATIENT)
Dept: ULTRASOUND IMAGING | Age: 68
Discharge: HOME | End: 2023-03-18
Payer: COMMERCIAL

## 2023-03-16 DIAGNOSIS — F10.11 HISTORY OF ALCOHOL ABUSE: ICD-10-CM

## 2023-03-16 LAB
ALBUMIN SERPL-MCNC: 4.2 G/DL (ref 3.5–4.6)
ALP SERPL-CCNC: 65 U/L (ref 40–130)
ALT SERPL-CCNC: 18 U/L (ref 0–33)
ANION GAP SERPL CALCULATED.3IONS-SCNC: 10 MEQ/L (ref 9–15)
AST SERPL-CCNC: 27 U/L (ref 0–35)
BILIRUB SERPL-MCNC: 0.4 MG/DL (ref 0.2–0.7)
BUN SERPL-MCNC: 28 MG/DL (ref 8–23)
CALCIUM SERPL-MCNC: 10 MG/DL (ref 8.5–9.9)
CHLORIDE SERPL-SCNC: 101 MEQ/L (ref 95–107)
CO2 SERPL-SCNC: 29 MEQ/L (ref 20–31)
CREAT SERPL-MCNC: 1.23 MG/DL (ref 0.5–0.9)
GLOBULIN SER CALC-MCNC: 2.9 G/DL (ref 2.3–3.5)
GLUCOSE SERPL-MCNC: 112 MG/DL (ref 70–99)
POTASSIUM SERPL-SCNC: 4.2 MEQ/L (ref 3.4–4.9)
PROT SERPL-MCNC: 7.1 G/DL (ref 6.3–8)
SODIUM SERPL-SCNC: 140 MEQ/L (ref 135–144)

## 2023-03-16 PROCEDURE — 76705 ECHO EXAM OF ABDOMEN: CPT

## 2023-03-20 ENCOUNTER — OFFICE VISIT (OUTPATIENT)
Dept: FAMILY MEDICINE CLINIC | Age: 68
End: 2023-03-20
Payer: COMMERCIAL

## 2023-03-20 VITALS
SYSTOLIC BLOOD PRESSURE: 120 MMHG | WEIGHT: 107 LBS | HEART RATE: 94 BPM | TEMPERATURE: 97.2 F | DIASTOLIC BLOOD PRESSURE: 70 MMHG | HEIGHT: 57 IN | BODY MASS INDEX: 23.08 KG/M2 | OXYGEN SATURATION: 98 %

## 2023-03-20 DIAGNOSIS — G47.9 SLEEP DISTURBANCE: ICD-10-CM

## 2023-03-20 DIAGNOSIS — R94.2 ABNORMAL PFT: Primary | ICD-10-CM

## 2023-03-20 DIAGNOSIS — L40.50 PSORIASIS WITH ARTHROPATHY (HCC): ICD-10-CM

## 2023-03-20 PROCEDURE — G8427 DOCREV CUR MEDS BY ELIG CLIN: HCPCS | Performed by: PHYSICIAN ASSISTANT

## 2023-03-20 PROCEDURE — 1036F TOBACCO NON-USER: CPT | Performed by: PHYSICIAN ASSISTANT

## 2023-03-20 PROCEDURE — 99213 OFFICE O/P EST LOW 20 MIN: CPT | Performed by: PHYSICIAN ASSISTANT

## 2023-03-20 PROCEDURE — G8484 FLU IMMUNIZE NO ADMIN: HCPCS | Performed by: PHYSICIAN ASSISTANT

## 2023-03-20 PROCEDURE — G8399 PT W/DXA RESULTS DOCUMENT: HCPCS | Performed by: PHYSICIAN ASSISTANT

## 2023-03-20 PROCEDURE — 1123F ACP DISCUSS/DSCN MKR DOCD: CPT | Performed by: PHYSICIAN ASSISTANT

## 2023-03-20 PROCEDURE — 1090F PRES/ABSN URINE INCON ASSESS: CPT | Performed by: PHYSICIAN ASSISTANT

## 2023-03-20 PROCEDURE — 3017F COLORECTAL CA SCREEN DOC REV: CPT | Performed by: PHYSICIAN ASSISTANT

## 2023-03-20 PROCEDURE — G8420 CALC BMI NORM PARAMETERS: HCPCS | Performed by: PHYSICIAN ASSISTANT

## 2023-03-20 RX ORDER — FOLIC ACID 1 MG/1
1 TABLET ORAL DAILY
Qty: 90 TABLET | Refills: 4 | Status: SHIPPED | OUTPATIENT
Start: 2023-03-20 | End: 2023-03-22 | Stop reason: ALTCHOICE

## 2023-03-20 NOTE — PROGRESS NOTES
ill-appearing, toxic-appearing or diaphoretic. HENT:      Head: Normocephalic and atraumatic. Right Ear: Hearing and external ear normal.      Left Ear: Hearing and external ear normal.      Nose: Nose normal.   Eyes:      General: Lids are normal. Vision grossly intact. Gaze aligned appropriately. Conjunctiva/sclera: Conjunctivae normal.      Pupils: Pupils are equal, round, and reactive to light. Cardiovascular:      Rate and Rhythm: Normal rate and regular rhythm. Pulses: Normal pulses. Heart sounds: Normal heart sounds, S1 normal and S2 normal.   Pulmonary:      Effort: Pulmonary effort is normal.      Breath sounds: Normal breath sounds and air entry. Musculoskeletal:      Cervical back: Normal range of motion. Skin:     General: Skin is warm. Capillary Refill: Capillary refill takes less than 2 seconds. Neurological:      Mental Status: She is alert and oriented to person, place, and time. Gait: Gait is intact. Psychiatric:         Attention and Perception: Attention normal.         Mood and Affect: Mood normal.         Speech: Speech normal.         Behavior: Behavior normal. Behavior is cooperative. Assessment & Plan   1. Abnormal PFT  - patient is to continue f/u with pulmnology. - patient has PFT and sleep test scheduled. Recommend to continue   2. Psoriasis with arthropathy (HCC)  -     folic acid (FOLVITE) 1 MG tablet; Take 1 tablet by mouth daily Do not take on the day you take the methotrexate. , Disp-90 tablet, R-4Normal  - no flare at this time. - continue f/u with rheumatology. 3. Sleep disturbance   - continue sleep study. No orders of the defined types were placed in this encounter. Orders Placed This Encounter   Medications    folic acid (FOLVITE) 1 MG tablet     Sig: Take 1 tablet by mouth daily Do not take on the day you take the methotrexate.      Dispense:  90 tablet     Refill:  4     Medications Discontinued During This Encounter

## 2023-03-21 ENCOUNTER — TELEPHONE (OUTPATIENT)
Dept: FAMILY MEDICINE CLINIC | Age: 68
End: 2023-03-21

## 2023-03-21 ASSESSMENT — ENCOUNTER SYMPTOMS
SINUS PAIN: 0
VOMITING: 0
NAUSEA: 0
SINUS PRESSURE: 0
BACK PAIN: 0
COUGH: 0
SORE THROAT: 0
CHEST TIGHTNESS: 0
SHORTNESS OF BREATH: 0
DIARRHEA: 0
ABDOMINAL PAIN: 0

## 2023-03-21 ASSESSMENT — VISUAL ACUITY: OU: 1

## 2023-03-22 RX ORDER — FOLIC ACID 1 MG/1
1 TABLET ORAL DAILY
Qty: 30 TABLET | Refills: 5 | Status: SHIPPED | OUTPATIENT
Start: 2023-03-22

## 2023-03-24 ENCOUNTER — TELEPHONE (OUTPATIENT)
Dept: GASTROENTEROLOGY | Age: 68
End: 2023-03-24

## 2023-03-27 NOTE — TELEPHONE ENCOUNTER
Called patient. Discussed fatty liver and recommendation for Mediterranean style diet and continued alcohol cessation. Additionally discussed questionable dilated CBD (10mm). Patient reports history of cholecystectomy along with chronic opioid pain medication use (Ultram as needed). Discussed with patient continue to monitor symptoms (she currently reports gaining weight) and we will see her in follow-up.

## 2023-04-17 DIAGNOSIS — F41.9 ANXIETY AND DEPRESSION: ICD-10-CM

## 2023-04-17 DIAGNOSIS — K21.9 GASTROESOPHAGEAL REFLUX DISEASE WITHOUT ESOPHAGITIS: Chronic | ICD-10-CM

## 2023-04-17 DIAGNOSIS — F32.A ANXIETY AND DEPRESSION: ICD-10-CM

## 2023-04-17 RX ORDER — DULOXETIN HYDROCHLORIDE 60 MG/1
60 CAPSULE, DELAYED RELEASE ORAL DAILY
Qty: 90 CAPSULE | Refills: 1 | Status: SHIPPED | OUTPATIENT
Start: 2023-04-17

## 2023-04-17 RX ORDER — PANTOPRAZOLE SODIUM 40 MG/1
TABLET, DELAYED RELEASE ORAL
Qty: 30 TABLET | Refills: 5 | Status: SHIPPED | OUTPATIENT
Start: 2023-04-17

## 2023-05-01 ENCOUNTER — OFFICE VISIT (OUTPATIENT)
Dept: FAMILY MEDICINE CLINIC | Age: 68
End: 2023-05-01
Payer: COMMERCIAL

## 2023-05-01 VITALS
OXYGEN SATURATION: 97 % | DIASTOLIC BLOOD PRESSURE: 92 MMHG | SYSTOLIC BLOOD PRESSURE: 133 MMHG | TEMPERATURE: 96.6 F | BODY MASS INDEX: 22.55 KG/M2 | WEIGHT: 107.4 LBS | HEART RATE: 91 BPM | HEIGHT: 58 IN

## 2023-05-01 DIAGNOSIS — R06.09 DOE (DYSPNEA ON EXERTION): ICD-10-CM

## 2023-05-01 DIAGNOSIS — M85.89 OSTEOPENIA OF MULTIPLE SITES: Chronic | ICD-10-CM

## 2023-05-01 DIAGNOSIS — G63 POLYNEUROPATHY ASSOCIATED WITH UNDERLYING DISEASE (HCC): ICD-10-CM

## 2023-05-01 DIAGNOSIS — L40.50 PSORIASIS WITH ARTHROPATHY (HCC): ICD-10-CM

## 2023-05-01 DIAGNOSIS — R73.09 ABNORMAL GLUCOSE: ICD-10-CM

## 2023-05-01 DIAGNOSIS — R94.2 ABNORMAL PFT: Primary | ICD-10-CM

## 2023-05-01 DIAGNOSIS — N18.32 STAGE 3B CHRONIC KIDNEY DISEASE (HCC): Chronic | ICD-10-CM

## 2023-05-01 DIAGNOSIS — R07.9 CHEST PAIN, UNSPECIFIED TYPE: ICD-10-CM

## 2023-05-01 DIAGNOSIS — M81.0 AGE-RELATED OSTEOPOROSIS WITHOUT CURRENT PATHOLOGICAL FRACTURE: ICD-10-CM

## 2023-05-01 LAB — HBA1C MFR BLD: 6.9 % (ref 4.8–5.9)

## 2023-05-01 PROCEDURE — G8399 PT W/DXA RESULTS DOCUMENT: HCPCS | Performed by: PHYSICIAN ASSISTANT

## 2023-05-01 PROCEDURE — G8420 CALC BMI NORM PARAMETERS: HCPCS | Performed by: PHYSICIAN ASSISTANT

## 2023-05-01 PROCEDURE — 3017F COLORECTAL CA SCREEN DOC REV: CPT | Performed by: PHYSICIAN ASSISTANT

## 2023-05-01 PROCEDURE — 1123F ACP DISCUSS/DSCN MKR DOCD: CPT | Performed by: PHYSICIAN ASSISTANT

## 2023-05-01 PROCEDURE — G8427 DOCREV CUR MEDS BY ELIG CLIN: HCPCS | Performed by: PHYSICIAN ASSISTANT

## 2023-05-01 PROCEDURE — 99214 OFFICE O/P EST MOD 30 MIN: CPT | Performed by: PHYSICIAN ASSISTANT

## 2023-05-01 PROCEDURE — 1036F TOBACCO NON-USER: CPT | Performed by: PHYSICIAN ASSISTANT

## 2023-05-01 PROCEDURE — 1090F PRES/ABSN URINE INCON ASSESS: CPT | Performed by: PHYSICIAN ASSISTANT

## 2023-05-01 ASSESSMENT — ENCOUNTER SYMPTOMS
COUGH: 1
CHEST TIGHTNESS: 1
DIARRHEA: 0
SINUS PAIN: 0
NAUSEA: 0
BACK PAIN: 1
SINUS PRESSURE: 0
VOMITING: 0
SORE THROAT: 0
ABDOMINAL PAIN: 0
SHORTNESS OF BREATH: 1

## 2023-05-01 NOTE — PROGRESS NOTES
likely need to be started on medication. Will try to emphasize life style modifications. 8. Chest pain, unspecified type  -     Stress test, myoview; Future  -  Never completed while at [facility]. 9. BARRIENTOS (dyspnea on exertion)  -     Full PFT Study With Bronchodilator; Future  -     Stress test, myoview; Future     Orders Placed This Encounter   Procedures    DEXA BONE DENSITY AXIAL SKELETON     Standing Status:   Future     Standing Expiration Date:   5/1/2024    Hemoglobin A1C     Standing Status:   Future     Number of Occurrences:   1     Standing Expiration Date:   5/1/2024    Ambulatory referral to Pulmonology     Referral Priority:   Routine     Referral Type:   Eval and Treat     Referral Reason:   Specialty Services Required     Referred to Provider:   Gayle Jensen MD     Requested Specialty:   Pulmonology     Number of Visits Requested:   1    External Referral to Rheumatology     Referral Priority:   Routine     Referral Type:   Eval and Treat     Referral Reason:   Specialty Services Required     Referred to Provider:   Chelsea Montoya MD     Requested Specialty:   Internal Medicine     Number of Visits Requested:   1    Stress test, myoview     Standing Status:   Future     Standing Expiration Date:   7/1/2023     Order Specific Question:   Reason for Exam?     Answer:   Chest pain     Order Specific Question:   Reason for Exam?     Answer:   Shortness of breath    Full PFT Study With Bronchodilator     If an ABG is needed along with this PFT procedure, please place the appropriate lab order     Standing Status:   Future     Standing Expiration Date:   5/1/2024     No orders of the defined types were placed in this encounter. There are no discontinued medications. Return in about 3 months (around 8/1/2023). Reviewed with the patient: current clinical status, medications, activities and diet.      Side effects, adverse effects of the medication prescribed today, as well as treatment plan/

## 2023-05-02 ENCOUNTER — OFFICE VISIT (OUTPATIENT)
Dept: GASTROENTEROLOGY | Age: 68
End: 2023-05-02
Payer: COMMERCIAL

## 2023-05-02 VITALS — BODY MASS INDEX: 22.46 KG/M2 | HEIGHT: 58 IN | HEART RATE: 77 BPM | OXYGEN SATURATION: 99 % | WEIGHT: 107 LBS

## 2023-05-02 DIAGNOSIS — F10.11 HISTORY OF ALCOHOL ABUSE: ICD-10-CM

## 2023-05-02 DIAGNOSIS — R63.6 LOW WEIGHT: ICD-10-CM

## 2023-05-02 DIAGNOSIS — K76.0 HEPATIC STEATOSIS: Primary | ICD-10-CM

## 2023-05-02 DIAGNOSIS — R68.81 EARLY SATIETY: ICD-10-CM

## 2023-05-02 DIAGNOSIS — K21.9 GASTROESOPHAGEAL REFLUX DISEASE, UNSPECIFIED WHETHER ESOPHAGITIS PRESENT: ICD-10-CM

## 2023-05-02 DIAGNOSIS — K83.8 DILATED CBD, ACQUIRED: ICD-10-CM

## 2023-05-02 PROCEDURE — G8399 PT W/DXA RESULTS DOCUMENT: HCPCS | Performed by: NURSE PRACTITIONER

## 2023-05-02 PROCEDURE — 99214 OFFICE O/P EST MOD 30 MIN: CPT | Performed by: NURSE PRACTITIONER

## 2023-05-02 PROCEDURE — G8427 DOCREV CUR MEDS BY ELIG CLIN: HCPCS | Performed by: NURSE PRACTITIONER

## 2023-05-02 PROCEDURE — 1123F ACP DISCUSS/DSCN MKR DOCD: CPT | Performed by: NURSE PRACTITIONER

## 2023-05-02 PROCEDURE — 1090F PRES/ABSN URINE INCON ASSESS: CPT | Performed by: NURSE PRACTITIONER

## 2023-05-02 PROCEDURE — 3017F COLORECTAL CA SCREEN DOC REV: CPT | Performed by: NURSE PRACTITIONER

## 2023-05-02 PROCEDURE — G8420 CALC BMI NORM PARAMETERS: HCPCS | Performed by: NURSE PRACTITIONER

## 2023-05-02 PROCEDURE — 1036F TOBACCO NON-USER: CPT | Performed by: NURSE PRACTITIONER

## 2023-05-02 NOTE — PROGRESS NOTES
Gastroenterology Clinic Follow up Visit    John Moran  [id number]  Chief Complaint   Patient presents with    Follow-up     HPI: 74 y.o. female following up after last GI clinic on 2/28/2023. Interval change: Patient presents to the GI clinic with continued complaint of not eating well. States she will take 3-4 bites of food and feel full and feel as though she has to lay down. She denies nausea, vomiting or abdominal pain, simply describes a upper abdominal fullness with eating. States she has been drinking Ensure meal replacements twice daily with good response and some weight gain. She reports longstanding history of chronic opioid use, tramadol. States she has been off of tramadol now for a couple of months as she ha snot been able to get it refilled. She reports her GERD symptoms are well controlled with taking Protonix daily. Reports mild/occasional breakthrough symptoms. She reports history of constipation, however has started taking Metamucil daily, currently having a bowel movement every other day, reports her stools are not hard and she does not have to strain to have a bowel movement anymore. She denies nausea/vomiting, hematemesis, dysphagia, abdominal pain, hematochezia, melena or weight loss. Smoking status: denies  Alcohol use: sober for 2 years  Illicit drug use: denies  NSAID use: denies   She denies history of diabetes, however most recent hemoglobin A1c 6.9%. Does endorse eating poorly, sugary/processed foods. HPI from last GI clinic visit on 2/28/2023  summarized below:  74 y.o. female presents to the clinic with her niece to establish care and with constellation of symptoms including weight loss, decreased oral intake, early satiety, low energy, abdominal distention. Patient reports her weight going down to 86 lbs, patient started drinking Ensure with improvement in weight to 106 lbs.   She additionally reports reflux symptoms with improvement in symptoms on

## 2023-05-02 NOTE — PATIENT INSTRUCTIONS
Learning About the [facility] Diet  What is the Mediterranean diet? The Mediterranean diet is a style of eating rather than a diet plan. It features foods eaten in [place], [place], [place] and [place], and other countries along the [facility]. It emphasizes eating foods like fish, fruits, vegetables, beans, high-fiber breads and whole grains, nuts, and olive oil. This style of eating includes limited red meat, cheese, and sweets. Why choose the Mediterranean diet? A Mediterranean-style diet may improve heart health. It contains more fat than other heart-healthy diets. But the fats are mainly from nuts, unsaturated oils (such as fish oils and olive oil), and certain nut or seed oils (such as canola, soybean, or flaxseed oil). These fats may help protect the heart and blood vessels. How can you get started on the Mediterranean diet? Here are some things you can do to switch to a more Mediterranean way of eating. What to eat  Eat a variety of fruits and vegetables each day, such as grapes, blueberries, tomatoes, broccoli, peppers, figs, olives, spinach, eggplant, beans, lentils, and chickpeas. Eat a variety of whole-grain foods each day, such as oats, brown rice, and whole wheat bread, pasta, and couscous. Eat fish at least 2 times a week. Try tuna, salmon, mackerel, lake trout, herring, or sardines. Eat moderate amounts of low-fat dairy products, such as milk, cheese, or yogurt. Eat moderate amounts of poultry and eggs. Choose healthy (unsaturated) fats, such as nuts, olive oil, and certain nut or seed oils like canola, soybean, and flaxseed. Limit unhealthy (saturated) fats, such as butter, palm oil, and coconut oil. And limit fats found in animal products, such as meat and dairy products made with whole milk. Try to eat red meat only a few times a month in very small amounts. Limit sweets and desserts to only a few times a week. This includes sugar-sweetened drinks like soda.   The

## 2023-05-08 DIAGNOSIS — R06.09 DOE (DYSPNEA ON EXERTION): Primary | Chronic | ICD-10-CM

## 2023-05-08 DIAGNOSIS — R94.31 ABNORMAL EKG: Chronic | ICD-10-CM

## 2023-05-11 ENCOUNTER — ANCILLARY PROCEDURE (OUTPATIENT)
Dept: ENDOSCOPY | Age: 68
End: 2023-05-11
Payer: COMMERCIAL

## 2023-05-11 DIAGNOSIS — F10.11 HISTORY OF ALCOHOL ABUSE: ICD-10-CM

## 2023-05-11 DIAGNOSIS — K76.0 HEPATIC STEATOSIS: ICD-10-CM

## 2023-05-11 PROCEDURE — 91200 LIVER ELASTOGRAPHY: CPT

## 2023-05-26 DIAGNOSIS — L40.50 PSORIASIS WITH ARTHROPATHY (HCC): ICD-10-CM

## 2023-05-30 ENCOUNTER — HOSPITAL ENCOUNTER (OUTPATIENT)
Dept: WOMENS IMAGING | Age: 68
Discharge: HOME | End: 2023-06-01
Payer: COMMERCIAL

## 2023-05-30 DIAGNOSIS — L40.50 PSORIASIS WITH ARTHROPATHY (HCC): Primary | ICD-10-CM

## 2023-05-30 DIAGNOSIS — M85.89 OSTEOPENIA OF MULTIPLE SITES: Chronic | ICD-10-CM

## 2023-05-30 DIAGNOSIS — M81.0 AGE-RELATED OSTEOPOROSIS WITHOUT CURRENT PATHOLOGICAL FRACTURE: ICD-10-CM

## 2023-05-30 DIAGNOSIS — N18.32 STAGE 3B CHRONIC KIDNEY DISEASE (HCC): Chronic | ICD-10-CM

## 2023-05-30 PROCEDURE — 77080 DXA BONE DENSITY AXIAL: CPT

## 2023-06-01 ENCOUNTER — OFFICE VISIT (OUTPATIENT)
Dept: PULMONOLOGY | Age: 68
End: 2023-06-01
Payer: COMMERCIAL

## 2023-06-01 VITALS
DIASTOLIC BLOOD PRESSURE: 86 MMHG | TEMPERATURE: 97.3 F | BODY MASS INDEX: 21.53 KG/M2 | SYSTOLIC BLOOD PRESSURE: 134 MMHG | HEART RATE: 83 BPM | OXYGEN SATURATION: 98 % | WEIGHT: 103 LBS

## 2023-06-01 DIAGNOSIS — G47.30 SLEEP APNEA, UNSPECIFIED TYPE: ICD-10-CM

## 2023-06-01 DIAGNOSIS — R94.2 ABNORMAL PFT: Primary | ICD-10-CM

## 2023-06-01 DIAGNOSIS — G47.00 INSOMNIA, UNSPECIFIED TYPE: ICD-10-CM

## 2023-06-01 PROCEDURE — G8420 CALC BMI NORM PARAMETERS: HCPCS | Performed by: INTERNAL MEDICINE

## 2023-06-01 PROCEDURE — 1090F PRES/ABSN URINE INCON ASSESS: CPT | Performed by: INTERNAL MEDICINE

## 2023-06-01 PROCEDURE — G8427 DOCREV CUR MEDS BY ELIG CLIN: HCPCS | Performed by: INTERNAL MEDICINE

## 2023-06-01 PROCEDURE — 99204 OFFICE O/P NEW MOD 45 MIN: CPT | Performed by: INTERNAL MEDICINE

## 2023-06-01 ASSESSMENT — ENCOUNTER SYMPTOMS
NAUSEA: 0
SINUS PRESSURE: 0
SORE THROAT: 0
VOMITING: 0
COUGH: 0
RHINORRHEA: 0
VOICE CHANGE: 0
ABDOMINAL PAIN: 0
SHORTNESS OF BREATH: 0
WHEEZING: 0
CHEST TIGHTNESS: 0
EYE ITCHING: 0
EYE DISCHARGE: 0
TROUBLE SWALLOWING: 0
DIARRHEA: 0

## 2023-06-02 ENCOUNTER — HOSPITAL ENCOUNTER (OUTPATIENT)
Dept: NUCLEAR MEDICINE | Age: 68
Discharge: HOME | End: 2023-06-04
Payer: COMMERCIAL

## 2023-06-02 ENCOUNTER — HOSPITAL ENCOUNTER (OUTPATIENT)
Dept: NON INVASIVE DIAGNOSTICS | Age: 68
Discharge: HOME | End: 2023-06-02
Payer: COMMERCIAL

## 2023-06-02 VITALS — DIASTOLIC BLOOD PRESSURE: 83 MMHG | SYSTOLIC BLOOD PRESSURE: 155 MMHG | RESPIRATION RATE: 16 BRPM | HEART RATE: 82 BPM

## 2023-06-02 DIAGNOSIS — R94.31 ABNORMAL EKG: Chronic | ICD-10-CM

## 2023-06-02 DIAGNOSIS — R06.09 DOE (DYSPNEA ON EXERTION): Chronic | ICD-10-CM

## 2023-06-02 PROCEDURE — A9502 TC99M TETROFOSMIN: HCPCS | Performed by: PHYSICIAN ASSISTANT

## 2023-06-02 PROCEDURE — 3430000000 HC RX DIAGNOSTIC RADIOPHARMACEUTICAL: Performed by: PHYSICIAN ASSISTANT

## 2023-06-02 PROCEDURE — 2580000003 HC RX 258: Performed by: PHYSICIAN ASSISTANT

## 2023-06-02 PROCEDURE — 93017 CV STRESS TEST TRACING ONLY: CPT

## 2023-06-02 PROCEDURE — 6360000002 HC RX W HCPCS: Performed by: PHYSICIAN ASSISTANT

## 2023-06-02 PROCEDURE — 78452 HT MUSCLE IMAGE SPECT MULT: CPT

## 2023-06-02 RX ORDER — REGADENOSON 0.08 MG/ML
0.4 INJECTION, SOLUTION INTRAVENOUS
Status: COMPLETED | OUTPATIENT
Start: 2023-06-02 | End: 2023-06-02

## 2023-06-02 RX ORDER — SODIUM CHLORIDE 0.9 % (FLUSH) 0.9 %
10 SYRINGE (ML) INJECTION PRN
Status: DISCONTINUED | OUTPATIENT
Start: 2023-06-02 | End: 2023-06-05 | Stop reason: HOSPADM

## 2023-06-02 RX ADMIN — TETROFOSMIN 35.6 MILLICURIE: 1.38 INJECTION, POWDER, LYOPHILIZED, FOR SOLUTION INTRAVENOUS at 10:48

## 2023-06-02 RX ADMIN — REGADENOSON 0.4 MG: 0.08 INJECTION, SOLUTION INTRAVENOUS at 10:49

## 2023-06-02 RX ADMIN — Medication 10 ML: at 09:51

## 2023-06-02 RX ADMIN — Medication 10 ML: at 10:47

## 2023-06-02 RX ADMIN — TETROFOSMIN 11.7 MILLICURIE: 1.38 INJECTION, POWDER, LYOPHILIZED, FOR SOLUTION INTRAVENOUS at 09:48

## 2023-06-02 RX ADMIN — Medication 10 ML: at 10:48

## 2023-06-05 ENCOUNTER — HOSPITAL ENCOUNTER (OUTPATIENT)
Dept: NUCLEAR MEDICINE | Age: 68
Discharge: HOME | End: 2023-06-07
Payer: COMMERCIAL

## 2023-06-05 DIAGNOSIS — R68.81 EARLY SATIETY: ICD-10-CM

## 2023-06-05 PROCEDURE — 78264 GASTRIC EMPTYING IMG STUDY: CPT

## 2023-06-05 RX ADMIN — Medication 2 MILLICURIE: at 10:49

## 2023-06-05 NOTE — PROCEDURES
[address]                      [address], [address] [facility]                              CARDIAC STRESS TEST    PATIENT NAME: Susanne Yancey                :        1955  MED REC NO:   [id number]                            ROOM:  ACCOUNT NO:   [de-identified]                           ADMIT DATE: 2023  PROVIDER:     Mimi Baez DO    CARDIOVASCULAR DIAGNOSTIC DEPARTMENT    DATE OF STUDY:  2023    ONE-DAY LEXISCAN MYOCARDIAL PERFUSION STRESS TEST    ORDERING PROVIDER:  Royer Davis PA-C    EXAM TYPE:  Stress Myocardial Perfusion Regadenosin 1-day. REASON FOR EXAM:  Chest pain and shortness of breath. PROCEDURE DESCRIPTION:  The patient was injected intravenously at rest  with technetium-99m tetrofosmin followed by resting SPECT myocardial  perfusion imaging and then underwent stress protocol using regadenoson  0.4 mg injected intravenously. At that time, technetium-99m tetrofosmin  stress dose was injected intravenously and SPECT myocardial perfusion  and gated imaging were repeated. Rest dose:  11.7 mCi  Stress dose:  35.6 mCi    FINDINGS:  The stress and rest images exhibit homogeneous uptake of  tracer throughout the left ventricular myocardium. There is no evidence  of stress-induced reversible perfusion abnormalities to suggest  myocardial ischemia. Gated imaging exhibits normal left ventricular  size and normal wall motion and myocardial thickening. EKG analysis did  not demonstrate ST-segment changes nor arrhythmias concerning for  myocardial ischemia. EKG shows normal sinus rhythm and right-bundle  branch block. LVEF:  82%  TID ratio:  0.78    IMPRESSION:  1. No evidence of stress-induced myocardial ischemia. 2.  Normal left ventricular systolic function.         Gerry Caban DO    D: 2023 #7:28:04       T: 2023 7:59:21     [email or web address]  Job#: [id number]     Doc#: [id number]    CC:

## 2023-06-06 PROCEDURE — 93018 CV STRESS TEST I&R ONLY: CPT | Performed by: INTERNAL MEDICINE

## 2023-06-12 DIAGNOSIS — N18.32 STAGE 3B CHRONIC KIDNEY DISEASE (HCC): Chronic | ICD-10-CM

## 2023-06-12 DIAGNOSIS — L40.50 PSORIASIS WITH ARTHROPATHY (HCC): ICD-10-CM

## 2023-06-12 LAB
ALBUMIN SERPL-MCNC: 4.2 G/DL (ref 3.5–4.6)
ALP SERPL-CCNC: 61 U/L (ref 40–130)
ALT SERPL-CCNC: 19 U/L (ref 0–33)
ANION GAP SERPL CALCULATED.3IONS-SCNC: 11 MEQ/L (ref 9–15)
AST SERPL-CCNC: 27 U/L (ref 0–35)
BILIRUB SERPL-MCNC: 0.3 MG/DL (ref 0.2–0.7)
BUN SERPL-MCNC: 24 MG/DL (ref 8–23)
CALCIUM SERPL-MCNC: 9.3 MG/DL (ref 8.5–9.9)
CHLORIDE SERPL-SCNC: 100 MEQ/L (ref 95–107)
CO2 SERPL-SCNC: 27 MEQ/L (ref 20–31)
CREAT SERPL-MCNC: 1.14 MG/DL (ref 0.5–0.9)
GLOBULIN SER CALC-MCNC: 2.5 G/DL (ref 2.3–3.5)
GLUCOSE SERPL-MCNC: 94 MG/DL (ref 70–99)
POTASSIUM SERPL-SCNC: 4.6 MEQ/L (ref 3.4–4.9)
PROT SERPL-MCNC: 6.7 G/DL (ref 6.3–8)
SODIUM SERPL-SCNC: 138 MEQ/L (ref 135–144)

## 2023-06-19 DIAGNOSIS — Z12.39 BREAST SCREENING: Primary | ICD-10-CM

## 2023-06-26 ENCOUNTER — TRANSCRIBE ORDERS (OUTPATIENT)
Dept: WOMENS IMAGING | Age: 68
End: 2023-06-26

## 2023-06-26 ENCOUNTER — HOSPITAL ENCOUNTER (OUTPATIENT)
Dept: WOMENS IMAGING | Age: 68
Discharge: HOME | End: 2023-06-28
Payer: COMMERCIAL

## 2023-06-26 DIAGNOSIS — Z12.31 SCREENING MAMMOGRAM FOR BREAST CANCER: Primary | ICD-10-CM

## 2023-06-26 DIAGNOSIS — Z12.31 SCREENING MAMMOGRAM FOR BREAST CANCER: ICD-10-CM

## 2023-06-26 PROCEDURE — 77063 BREAST TOMOSYNTHESIS BI: CPT

## 2023-06-30 ENCOUNTER — OFFICE VISIT (OUTPATIENT)
Dept: CARDIOLOGY CLINIC | Age: 68
End: 2023-06-30

## 2023-06-30 VITALS
BODY MASS INDEX: 21.92 KG/M2 | WEIGHT: 101.6 LBS | DIASTOLIC BLOOD PRESSURE: 82 MMHG | HEIGHT: 57 IN | SYSTOLIC BLOOD PRESSURE: 118 MMHG

## 2023-06-30 DIAGNOSIS — E78.2 MIXED HYPERLIPIDEMIA: Chronic | ICD-10-CM

## 2023-06-30 DIAGNOSIS — I45.10 RBBB (RIGHT BUNDLE BRANCH BLOCK): ICD-10-CM

## 2023-06-30 DIAGNOSIS — Z00.00 PE (PHYSICAL EXAM), ANNUAL: Primary | ICD-10-CM

## 2023-06-30 PROBLEM — R06.09 DOE (DYSPNEA ON EXERTION): Chronic | Status: RESOLVED | Noted: 2019-05-09 | Resolved: 2023-06-30

## 2023-06-30 PROBLEM — R94.31 ABNORMAL EKG: Chronic | Status: RESOLVED | Noted: 2019-04-05 | Resolved: 2023-06-30

## 2023-07-04 NOTE — PROGRESS NOTES
extended release capsule Take 1 capsule by mouth daily 90 capsule 1    folic acid (FOLVITE) 1 MG tablet Take 1 tablet by mouth daily 30 tablet 5    Misc. Devices (ROLLATOR ULTRA-LIGHT) MISC 1 each by Does not apply route daily 1 each 0    Misc.  Devices (FOLDING REACHER) MISC 1 each by Does not apply route daily 1 each 0    atorvastatin (LIPITOR) 40 MG tablet TAKE 1 TABLET BY MOUTH ONCE DAILY 90 tablet 3    fenofibrate (TRIGLIDE) 160 MG tablet Take 1 tablet by mouth daily 90 tablet 4    fexofenadine (ALLEGRA) 180 MG tablet Take 1 tablet by mouth daily as needed (allergies) 90 tablet 1    levothyroxine (SYNTHROID) 50 MCG tablet Take 1 tablet by mouth Daily 90 tablet 3    levETIRAcetam (KEPPRA) 500 MG tablet TAKE 1 TABLET BY MOUTH TWICE DAILY 60 tablet 10    Magnesium 400 MG CAPS Take 1 capsule by mouth 3 times daily (Patient taking differently: Take 1 capsule by mouth in the morning and at bedtime) 270 capsule 4    Nutritional Supplements (ENSURE COMPLETE) LIQD Take 1 Bottle by mouth 2 times daily 60 each 5    rOPINIRole (REQUIP) 0.5 MG tablet Take 1 tablet by mouth 3 times daily TAKE 1 TABLET BY MOUTH AT  NIGHT 270 tablet 4    magnesium oxide (MAG-OX) 400 MG tablet TAKE 1 TABLET BY MOUTH THREE TIMES DAILY 30 tablet 3    aspirin EC 81 MG EC tablet Take 1 tablet by mouth daily 90 tablet 4    triamcinolone (KENALOG) 0.1 % cream APPLY TOPICALLY TWICE DAILY 454 g 2    Handicap Placard MISC by Does not apply route Permanent: NO EXPIRATION 1 each 0    denosumab (PROLIA) 60 MG/ML SOSY SC injection Inject 1 mL into the skin once for 1 dose 1 mL 0    albuterol sulfate HFA (PROVENTIL;VENTOLIN;PROAIR) 108 (90 Base) MCG/ACT inhaler  (Patient not taking: Reported on 6/30/2023)      albuterol sulfate HFA (PROVENTIL;VENTOLIN;PROAIR) 108 (90 Base) MCG/ACT inhaler Inhale 2 puffs into the lungs every 4 hours as needed (Patient not taking: Reported on 6/30/2023)      cyanocobalamin 1000 MCG tablet Take 1 tablet by mouth daily

## 2023-07-05 ENCOUNTER — OFFICE VISIT (OUTPATIENT)
Dept: FAMILY MEDICINE CLINIC | Age: 68
End: 2023-07-05
Payer: COMMERCIAL

## 2023-07-05 VITALS
DIASTOLIC BLOOD PRESSURE: 78 MMHG | TEMPERATURE: 96.5 F | SYSTOLIC BLOOD PRESSURE: 106 MMHG | RESPIRATION RATE: 16 BRPM | HEIGHT: 57 IN | WEIGHT: 101.2 LBS | BODY MASS INDEX: 21.83 KG/M2 | HEART RATE: 97 BPM | OXYGEN SATURATION: 100 %

## 2023-07-05 DIAGNOSIS — M85.89 OSTEOPENIA OF MULTIPLE SITES: Primary | ICD-10-CM

## 2023-07-05 PROCEDURE — 1036F TOBACCO NON-USER: CPT | Performed by: PHYSICIAN ASSISTANT

## 2023-07-05 PROCEDURE — G8399 PT W/DXA RESULTS DOCUMENT: HCPCS | Performed by: PHYSICIAN ASSISTANT

## 2023-07-05 PROCEDURE — G8427 DOCREV CUR MEDS BY ELIG CLIN: HCPCS | Performed by: PHYSICIAN ASSISTANT

## 2023-07-05 PROCEDURE — 1090F PRES/ABSN URINE INCON ASSESS: CPT | Performed by: PHYSICIAN ASSISTANT

## 2023-07-05 PROCEDURE — G8420 CALC BMI NORM PARAMETERS: HCPCS | Performed by: PHYSICIAN ASSISTANT

## 2023-07-05 PROCEDURE — 96372 THER/PROPH/DIAG INJ SC/IM: CPT | Performed by: PHYSICIAN ASSISTANT

## 2023-07-05 PROCEDURE — 99213 OFFICE O/P EST LOW 20 MIN: CPT | Performed by: PHYSICIAN ASSISTANT

## 2023-07-05 PROCEDURE — 3017F COLORECTAL CA SCREEN DOC REV: CPT | Performed by: PHYSICIAN ASSISTANT

## 2023-07-05 PROCEDURE — 1123F ACP DISCUSS/DSCN MKR DOCD: CPT | Performed by: PHYSICIAN ASSISTANT

## 2023-07-05 ASSESSMENT — PATIENT HEALTH QUESTIONNAIRE - PHQ9
SUM OF ALL RESPONSES TO PHQ QUESTIONS 1-9: 0
3. TROUBLE FALLING OR STAYING ASLEEP: 0
SUM OF ALL RESPONSES TO PHQ9 QUESTIONS 1 & 2: 0
SUM OF ALL RESPONSES TO PHQ QUESTIONS 1-9: 0
10. IF YOU CHECKED OFF ANY PROBLEMS, HOW DIFFICULT HAVE THESE PROBLEMS MADE IT FOR YOU TO DO YOUR WORK, TAKE CARE OF THINGS AT HOME, OR GET ALONG WITH OTHER PEOPLE: 0
6. FEELING BAD ABOUT YOURSELF - OR THAT YOU ARE A FAILURE OR HAVE LET YOURSELF OR YOUR FAMILY DOWN: 0
5. POOR APPETITE OR OVEREATING: 0
8. MOVING OR SPEAKING SO SLOWLY THAT OTHER PEOPLE COULD HAVE NOTICED. OR THE OPPOSITE, BEING SO FIGETY OR RESTLESS THAT YOU HAVE BEEN MOVING AROUND A LOT MORE THAN USUAL: 0
9. THOUGHTS THAT YOU WOULD BE BETTER OFF DEAD, OR OF HURTING YOURSELF: 0
7. TROUBLE CONCENTRATING ON THINGS, SUCH AS READING THE NEWSPAPER OR WATCHING TELEVISION: 0
SUM OF ALL RESPONSES TO PHQ QUESTIONS 1-9: 0
1. LITTLE INTEREST OR PLEASURE IN DOING THINGS: 0
2. FEELING DOWN, DEPRESSED OR HOPELESS: 0
4. FEELING TIRED OR HAVING LITTLE ENERGY: 0
SUM OF ALL RESPONSES TO PHQ QUESTIONS 1-9: 0

## 2023-07-05 ASSESSMENT — ENCOUNTER SYMPTOMS
SHORTNESS OF BREATH: 0
ABDOMINAL PAIN: 0
BACK PAIN: 0
SORE THROAT: 0
NAUSEA: 0
CHEST TIGHTNESS: 0
SINUS PRESSURE: 0
COUGH: 0
SINUS PAIN: 0
DIARRHEA: 0
VOMITING: 0

## 2023-07-05 ASSESSMENT — VISUAL ACUITY: OU: 1

## 2023-07-10 ENCOUNTER — APPOINTMENT (OUTPATIENT)
Dept: CT IMAGING | Age: 68
End: 2023-07-10
Payer: COMMERCIAL

## 2023-07-10 ENCOUNTER — HOSPITAL ENCOUNTER (OUTPATIENT)
Age: 68
Setting detail: OBSERVATION
Discharge: HOME | End: 2023-07-11
Attending: FAMILY MEDICINE | Admitting: FAMILY MEDICINE
Payer: COMMERCIAL

## 2023-07-10 DIAGNOSIS — G45.9 TIA (TRANSIENT ISCHEMIC ATTACK): ICD-10-CM

## 2023-07-10 DIAGNOSIS — R29.810 FACIAL DROOP: Primary | ICD-10-CM

## 2023-07-10 LAB
ALBUMIN SERPL-MCNC: 3.6 G/DL (ref 3.5–4.6)
ALP SERPL-CCNC: 53 U/L (ref 40–130)
ALT SERPL-CCNC: 17 U/L (ref 0–33)
ANION GAP SERPL CALCULATED.3IONS-SCNC: 9 MEQ/L (ref 9–15)
AST SERPL-CCNC: 39 U/L (ref 0–35)
BASOPHILS # BLD: 0 K/UL (ref 0–0.2)
BASOPHILS NFR BLD: 0.3 %
BILIRUB SERPL-MCNC: 0.5 MG/DL (ref 0.2–0.7)
BUN SERPL-MCNC: 24 MG/DL (ref 8–23)
CALCIUM SERPL-MCNC: 8.3 MG/DL (ref 8.5–9.9)
CHLORIDE SERPL-SCNC: 102 MEQ/L (ref 95–107)
CO2 SERPL-SCNC: 26 MEQ/L (ref 20–31)
CREAT SERPL-MCNC: 0.98 MG/DL (ref 0.5–0.9)
EOSINOPHIL # BLD: 0.2 K/UL (ref 0–0.7)
EOSINOPHIL NFR BLD: 2.6 %
ERYTHROCYTE [DISTWIDTH] IN BLOOD BY AUTOMATED COUNT: 16.7 % (ref 11.5–14.5)
GLOBULIN SER CALC-MCNC: 2.9 G/DL (ref 2.3–3.5)
GLUCOSE SERPL-MCNC: 127 MG/DL (ref 70–99)
HCT VFR BLD AUTO: 34.1 % (ref 37–47)
HGB BLD-MCNC: 10.9 G/DL (ref 12–16)
LYMPHOCYTES # BLD: 1 K/UL (ref 1–4.8)
LYMPHOCYTES NFR BLD: 12.3 %
MAGNESIUM SERPL-MCNC: 1.8 MG/DL (ref 1.7–2.4)
MCH RBC QN AUTO: 31.3 PG (ref 27–31.3)
MCHC RBC AUTO-ENTMCNC: 32 % (ref 33–37)
MCV RBC AUTO: 97.8 FL (ref 79.4–94.8)
MONOCYTES # BLD: 0.8 K/UL (ref 0.2–0.8)
MONOCYTES NFR BLD: 9.3 %
NEUTROPHILS # BLD: 6.3 K/UL (ref 1.4–6.5)
NEUTS SEG NFR BLD: 75.5 %
PLATELET # BLD AUTO: 195 K/UL (ref 130–400)
POC CREATININE WHOLE BLOOD: 1
POTASSIUM SERPL-SCNC: 4.1 MEQ/L (ref 3.4–4.9)
PROT SERPL-MCNC: 6.5 G/DL (ref 6.3–8)
RBC # BLD AUTO: 3.48 M/UL (ref 4.2–5.4)
SODIUM SERPL-SCNC: 137 MEQ/L (ref 135–144)
TROPONIN T SERPL-MCNC: <0.01 NG/ML (ref 0–0.01)
WBC # BLD AUTO: 8.3 K/UL (ref 4.8–10.8)

## 2023-07-10 PROCEDURE — 93005 ELECTROCARDIOGRAM TRACING: CPT | Performed by: PHYSICIAN ASSISTANT

## 2023-07-10 PROCEDURE — 83735 ASSAY OF MAGNESIUM: CPT

## 2023-07-10 PROCEDURE — 70498 CT ANGIOGRAPHY NECK: CPT

## 2023-07-10 PROCEDURE — 96372 THER/PROPH/DIAG INJ SC/IM: CPT

## 2023-07-10 PROCEDURE — 6370000000 HC RX 637 (ALT 250 FOR IP): Performed by: FAMILY MEDICINE

## 2023-07-10 PROCEDURE — 99285 EMERGENCY DEPT VISIT HI MDM: CPT

## 2023-07-10 PROCEDURE — G0378 HOSPITAL OBSERVATION PER HR: HCPCS

## 2023-07-10 PROCEDURE — 6360000002 HC RX W HCPCS: Performed by: FAMILY MEDICINE

## 2023-07-10 PROCEDURE — 80053 COMPREHEN METABOLIC PANEL: CPT

## 2023-07-10 PROCEDURE — 70496 CT ANGIOGRAPHY HEAD: CPT

## 2023-07-10 PROCEDURE — 70450 CT HEAD/BRAIN W/O DYE: CPT

## 2023-07-10 PROCEDURE — 85025 COMPLETE CBC W/AUTO DIFF WBC: CPT

## 2023-07-10 PROCEDURE — 6360000004 HC RX CONTRAST MEDICATION: Performed by: PHYSICIAN ASSISTANT

## 2023-07-10 PROCEDURE — 36415 COLL VENOUS BLD VENIPUNCTURE: CPT

## 2023-07-10 PROCEDURE — 6370000000 HC RX 637 (ALT 250 FOR IP): Performed by: PHYSICIAN ASSISTANT

## 2023-07-10 PROCEDURE — 84484 ASSAY OF TROPONIN QUANT: CPT

## 2023-07-10 RX ORDER — ONDANSETRON 2 MG/ML
4 INJECTION INTRAMUSCULAR; INTRAVENOUS EVERY 6 HOURS PRN
Status: DISCONTINUED | OUTPATIENT
Start: 2023-07-10 | End: 2023-07-11 | Stop reason: HOSPADM

## 2023-07-10 RX ORDER — LEVOTHYROXINE SODIUM 0.05 MG/1
50 TABLET ORAL DAILY
Status: DISCONTINUED | OUTPATIENT
Start: 2023-07-11 | End: 2023-07-11 | Stop reason: HOSPADM

## 2023-07-10 RX ORDER — LEVETIRACETAM 500 MG/1
500 TABLET ORAL ONCE
Status: COMPLETED | OUTPATIENT
Start: 2023-07-10 | End: 2023-07-10

## 2023-07-10 RX ORDER — LABETALOL HYDROCHLORIDE 5 MG/ML
10 INJECTION, SOLUTION INTRAVENOUS EVERY 10 MIN PRN
Status: DISCONTINUED | OUTPATIENT
Start: 2023-07-10 | End: 2023-07-11 | Stop reason: HOSPADM

## 2023-07-10 RX ORDER — ASPIRIN 300 MG/1
300 SUPPOSITORY RECTAL DAILY
Status: DISCONTINUED | OUTPATIENT
Start: 2023-07-10 | End: 2023-07-11 | Stop reason: HOSPADM

## 2023-07-10 RX ORDER — LEVETIRACETAM 500 MG/1
500 TABLET ORAL 2 TIMES DAILY
Status: DISCONTINUED | OUTPATIENT
Start: 2023-07-10 | End: 2023-07-11 | Stop reason: HOSPADM

## 2023-07-10 RX ORDER — ONDANSETRON 4 MG/1
4 TABLET, ORALLY DISINTEGRATING ORAL EVERY 8 HOURS PRN
Status: DISCONTINUED | OUTPATIENT
Start: 2023-07-10 | End: 2023-07-11 | Stop reason: HOSPADM

## 2023-07-10 RX ORDER — LEVOTHYROXINE SODIUM 0.05 MG/1
50 TABLET ORAL ONCE
Status: COMPLETED | OUTPATIENT
Start: 2023-07-10 | End: 2023-07-10

## 2023-07-10 RX ORDER — ENOXAPARIN SODIUM 100 MG/ML
30 INJECTION SUBCUTANEOUS DAILY
Status: DISCONTINUED | OUTPATIENT
Start: 2023-07-10 | End: 2023-07-11 | Stop reason: HOSPADM

## 2023-07-10 RX ORDER — CLOPIDOGREL BISULFATE 75 MG/1
150 TABLET ORAL ONCE
Status: COMPLETED | OUTPATIENT
Start: 2023-07-10 | End: 2023-07-10

## 2023-07-10 RX ORDER — POLYETHYLENE GLYCOL 3350 17 G/17G
17 POWDER, FOR SOLUTION ORAL DAILY PRN
Status: DISCONTINUED | OUTPATIENT
Start: 2023-07-10 | End: 2023-07-11 | Stop reason: HOSPADM

## 2023-07-10 RX ORDER — ASPIRIN 81 MG/1
81 TABLET ORAL DAILY
Status: DISCONTINUED | OUTPATIENT
Start: 2023-07-10 | End: 2023-07-11 | Stop reason: HOSPADM

## 2023-07-10 RX ORDER — ATORVASTATIN CALCIUM 40 MG/1
40 TABLET, FILM COATED ORAL NIGHTLY
Status: DISCONTINUED | OUTPATIENT
Start: 2023-07-10 | End: 2023-07-11 | Stop reason: HOSPADM

## 2023-07-10 RX ORDER — ATORVASTATIN CALCIUM 80 MG/1
80 TABLET, FILM COATED ORAL NIGHTLY
Status: DISCONTINUED | OUTPATIENT
Start: 2023-07-10 | End: 2023-07-10

## 2023-07-10 RX ADMIN — ENOXAPARIN SODIUM 30 MG: 100 INJECTION SUBCUTANEOUS at 14:54

## 2023-07-10 RX ADMIN — ASPIRIN 81 MG: 81 TABLET, COATED ORAL at 14:54

## 2023-07-10 RX ADMIN — IOPAMIDOL 75 ML: 612 INJECTION, SOLUTION INTRAVENOUS at 10:38

## 2023-07-10 RX ADMIN — CLOPIDOGREL BISULFATE 150 MG: 75 TABLET, FILM COATED ORAL at 12:00

## 2023-07-10 RX ADMIN — LEVOTHYROXINE SODIUM 50 MCG: 0.05 TABLET ORAL at 17:07

## 2023-07-10 RX ADMIN — LEVETIRACETAM 500 MG: 500 TABLET, FILM COATED ORAL at 17:06

## 2023-07-10 RX ADMIN — LEVETIRACETAM 500 MG: 500 TABLET ORAL at 21:16

## 2023-07-10 RX ADMIN — ATORVASTATIN CALCIUM 40 MG: 40 TABLET, FILM COATED ORAL at 22:32

## 2023-07-10 ASSESSMENT — ENCOUNTER SYMPTOMS
ABDOMINAL PAIN: 0
NAUSEA: 0
COUGH: 0
DIARRHEA: 0
SHORTNESS OF BREATH: 0
SORE THROAT: 0
VOMITING: 0
EYE PAIN: 0
PHOTOPHOBIA: 0
BACK PAIN: 0
RHINORRHEA: 0

## 2023-07-10 ASSESSMENT — PAIN - FUNCTIONAL ASSESSMENT
PAIN_FUNCTIONAL_ASSESSMENT: NONE - DENIES PAIN

## 2023-07-10 NOTE — H&P
No cervical or superior mediastinal lymphadenopathy. The larynx and pharynx are unremarkable. No acute abnormality of the salivary and thyroid glands. BONES: No acute osseous abnormality. Cervicothoracic scoliosis. CTA HEAD: ANTERIOR CIRCULATION: No significant stenosis of the intracranial internal carotid, anterior cerebral, or middle cerebral arteries. Robust right A1 and hypoplastic left A1 segments, anatomic variant. No aneurysm. POSTERIOR CIRCULATION: No significant stenosis of the vertebral, basilar, or posterior cerebral arteries. Fenestrated proximal basilar artery, anatomic variant. A small right posterior communicating artery is visualized, anatomic variant. No aneurysm. OTHER: No dural venous sinus thrombosis on this non-dedicated study. BRAIN: No mass effect or midline shift. Please see separate noncontrast CT brain report. 1. No evidence of large vessel occlusion or significant stenosis in the major arteries of the head and neck. 2. 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 3. Cervicothoracic scoliosis. Assessment and Plan   TIA vs Bell's Palsy  MRI/MRA brain, neurology consult, pt/ot/st, statin, plavix loaded in ER.     DM2  SSI, ac/hs checks, a1c  HTN, HLD  DVT proph      Patient Active Problem List   Diagnosis Code    Mixed hyperlipidemia E78.2    Abnormal glucose R73.09    Gastroesophageal reflux disease without esophagitis K21.9    Alcoholism ([address]) F10.20    Psoriasis with arthropathy ([address]) L40.50    Vitamin D deficiency E55.9    Restless leg syndrome G25.81    Hypothyroidism E03.9    Osteopenia of multiple sites M85.89    Facet arthropathy, multilevel M47.819    Chronic pain of right knee M25.561, G89.29    Anemia D64.9    Weight loss R63.4    Hypomagnesemia E83.42    Hypokalemia E87.6    Hypercalcemia E83.52    Polyneuropathy associated with underlying disease ([address]) G63    Generalized idiopathic epilepsy and epileptic syndromes,

## 2023-07-10 NOTE — ED NOTES
Pt requesting Keppra, Atorvastatin and Levothyroxine PO meds. LEODAN Bautista made aware.       Fransisca See RN  07/10/23 [id number]

## 2023-07-10 NOTE — CARE COORDINATION
Case Management Assessment  Initial Evaluation    Date/Time of Evaluation: 7/10/2023 12:34 PM  Assessment Completed by: GEETHA Leary    If patient is discharged prior to next notation, then this note serves as note for discharge by case management. Patient Name: Sreekanth Caceres                   YOB: 1955  Diagnosis: TIA (transient ischemic attack) [G45.9]                   Date / Time: 7/10/2023  8:54 AM    Patient Admission Status: Observation   Readmission Risk (Low < 19, Mod (19-27), High > 27): No data recorded  Current PCP: LEODAN Holly  PCP verified by CM? Yes    Chart Reviewed: Yes      History Provided by: Patient  Patient Orientation: Alert and Oriented, Person, Place, Situation, Self    Patient Cognition: Alert    Hospitalization in the last 30 days (Readmission):  No    If yes, Readmission Assessment in  Navigator will be completed. Advance Directives:      Code Status: Full Code   Patient's Primary Decision Maker is: Legal Next of Kin    Primary Decision Maker: GLORIA JIMENEZ - Niece/Nephew - [phone]    Discharge Planning:    Patient lives with:   Type of Home:    Primary Care Giver: Self  Patient Support Systems include: Family Members   Current Financial resources: Medicare  Current community resources: None  Current services prior to admission:              Current DME:              Type of Home Care services:       ADLS  Prior functional level: Independent in ADLs/IADLs  Current functional level: Independent in ADLs/IADLs    PT AM-PAC:   /24  OT AM-PAC:   /24    Family can provide assistance at DC: Yes  Would you like Case Management to discuss the discharge plan with any other family members/significant others, and if so, who?  Yes (Neice)  Plans to Return to Present Housing: Yes  Other Identified Issues/Barriers to RETURNING to current housing: MEDICAL CLEARANCE   Potential Assistance needed at discharge:              Potential DME:    Patient expects to

## 2023-07-10 NOTE — CARE COORDINATION
LSW meet with pt and niece at bedside. Pt report living at home alone; Independent at baseline; No Home O2; No DME: no Dialysis; Pt report she has tender love and care HHC come and help her everyday from [address] with pt. Pt would like to go home and resume services. LSW will follow.     Electronically signed by GEETHA Brown on 7/10/2023 at 12:36 PM

## 2023-07-10 NOTE — ED NOTES
Transport here. Belongings on bed with patient. Tele pack intact. No acute distress noted at this time. No further complaints.      Dexter Cabrera RN  07/10/23 2252

## 2023-07-10 NOTE — ED TRIAGE NOTES
Pt home health care states new facial droop, last normal visual 7 July 2023. Pt is A&OX4, skin intact, afebrile, breaths are equal and unlabored. Pt states receiving prolia injection on Wednesday.

## 2023-07-11 ENCOUNTER — APPOINTMENT (OUTPATIENT)
Dept: MRI IMAGING | Age: 68
End: 2023-07-11
Payer: COMMERCIAL

## 2023-07-11 VITALS
WEIGHT: 101 LBS | HEIGHT: 57 IN | SYSTOLIC BLOOD PRESSURE: 118 MMHG | RESPIRATION RATE: 18 BRPM | DIASTOLIC BLOOD PRESSURE: 75 MMHG | TEMPERATURE: 98.4 F | OXYGEN SATURATION: 97 % | BODY MASS INDEX: 21.79 KG/M2 | HEART RATE: 83 BPM

## 2023-07-11 PROBLEM — R29.810 FACIAL DROOP: Status: ACTIVE | Noted: 2023-07-11

## 2023-07-11 LAB
PERFORMED ON: NORMAL
POC CREATININE: 1 MG/DL (ref 0.6–1.2)
POC SAMPLE TYPE: NORMAL

## 2023-07-11 PROCEDURE — 70544 MR ANGIOGRAPHY HEAD W/O DYE: CPT

## 2023-07-11 PROCEDURE — 99223 1ST HOSP IP/OBS HIGH 75: CPT | Performed by: PSYCHIATRY & NEUROLOGY

## 2023-07-11 PROCEDURE — G0378 HOSPITAL OBSERVATION PER HR: HCPCS

## 2023-07-11 PROCEDURE — 6360000002 HC RX W HCPCS: Performed by: FAMILY MEDICINE

## 2023-07-11 PROCEDURE — 70551 MRI BRAIN STEM W/O DYE: CPT

## 2023-07-11 PROCEDURE — APPSS45 APP SPLIT SHARED TIME 31-45 MINUTES: Performed by: NURSE PRACTITIONER

## 2023-07-11 PROCEDURE — 92523 SPEECH SOUND LANG COMPREHEN: CPT

## 2023-07-11 PROCEDURE — 96372 THER/PROPH/DIAG INJ SC/IM: CPT

## 2023-07-11 PROCEDURE — 92610 EVALUATE SWALLOWING FUNCTION: CPT

## 2023-07-11 PROCEDURE — 97165 OT EVAL LOW COMPLEX 30 MIN: CPT

## 2023-07-11 PROCEDURE — 97161 PT EVAL LOW COMPLEX 20 MIN: CPT

## 2023-07-11 PROCEDURE — 6370000000 HC RX 637 (ALT 250 FOR IP): Performed by: NURSE PRACTITIONER

## 2023-07-11 PROCEDURE — 6370000000 HC RX 637 (ALT 250 FOR IP): Performed by: FAMILY MEDICINE

## 2023-07-11 RX ORDER — MECOBALAMIN 5000 MCG
5 TABLET,DISINTEGRATING ORAL NIGHTLY
Status: DISCONTINUED | OUTPATIENT
Start: 2023-07-11 | End: 2023-07-11 | Stop reason: HOSPADM

## 2023-07-11 RX ADMIN — ASPIRIN 81 MG: 81 TABLET, COATED ORAL at 09:57

## 2023-07-11 RX ADMIN — LEVOTHYROXINE SODIUM 50 MCG: 0.05 TABLET ORAL at 05:41

## 2023-07-11 RX ADMIN — Medication 5 MG: at 00:46

## 2023-07-11 RX ADMIN — LEVETIRACETAM 500 MG: 500 TABLET ORAL at 09:59

## 2023-07-11 RX ADMIN — ENOXAPARIN SODIUM 30 MG: 100 INJECTION SUBCUTANEOUS at 09:57

## 2023-07-11 ASSESSMENT — ENCOUNTER SYMPTOMS
CHEST TIGHTNESS: 0
NAUSEA: 0
ABDOMINAL DISTENTION: 0
COLOR CHANGE: 0
ABDOMINAL PAIN: 0
SHORTNESS OF BREATH: 0
VOMITING: 0
WHEEZING: 0
TROUBLE SWALLOWING: 0
COUGH: 0

## 2023-07-11 NOTE — PLAN OF CARE
Problem: Physical Therapy - Adult  Goal: By Discharge: Performs mobility at highest level of function for planned discharge setting. See evaluation for individualized goals. 7/11/2023 1255 by Sukumar aSnz PT  Outcome: Progressing     Problem: Safety - Adult  Goal: Free from fall injury  7/11/2023 1447 by Serina Arredondo RN  Outcome: Adequate for Discharge  7/11/2023 0145 by Caroline Arias RN  Outcome: Progressing     Problem: Chronic Conditions and Co-morbidities  Goal: Patient's chronic conditions and co-morbidity symptoms are monitored and maintained or improved  Outcome: Adequate for Discharge     Problem: SLP Adult - Impaired Swallowing  Goal: By Discharge: Advance to least restrictive diet without signs or symptoms of aspiration for planned discharge setting. See evaluation for individualized goals. 7/11/2023 1027 by CLAIRE Puri  Outcome: Completed     Problem: SLP Adult - Impaired Communication  Goal: By Discharge: Demonstrates communication skills at highest level of function for planned discharge setting. See evaluation for individualized goals.   7/11/2023 1037 by CLAIRE Puri  Outcome: Completed

## 2023-07-11 NOTE — PROGRESS NOTES
79year old female discharged with niece. Pt. Denies pain or discomfort. All questions answered.  Pt was cleared by hospitalist.
Brenna Davis   Facility/Department: [facility]  Speech Language Pathology    NAME:Lauren Butts  : 1955  ROOM:       DATE: 7/10/2023      This patient is currently in observation/outpatient status. To comply with Medicare and insurance regulations, please update orders to include a valid Speech Therapy treatment diagnosis (i.e. aphasia, dysphagia, dysarthria, cognitive impairment).        Thank you,  Massimo Wyman, SLP, CCC-SLP, Date: 7/10/2023, Time: 12:04 PM
[facility]   Facility/Department: [place] [address] [place]  Speech Language Pathology  Initial Speech/Language/Cognitive Assessment    NAME:Lauren Rodriguez  : 1955 (64 y.o.)   [x]   confirmed    MRN: [id number]  [facility]  ADMISSION DATE: 7/10/2023  PATIENT DIAGNOSIS(ES): TIA (transient ischemic attack) [G45.9]  Facial droop [R29.810]  Chief Complaint   Patient presents with    Facial Droop     Patient Active Problem List    Diagnosis Date Noted    Seronegative arthritis 2022    Thoracogenic scoliosis of thoracolumbar region 2022    Chronic back pain 2022    Squamous blepharitis of upper and lower eyelids of both eyes 2022    TIA (transient ischemic attack) 07/10/2023    RBBB (right bundle branch block) 2023    Seizure ([address]) 2021    Therapeutic drug monitoring 2021    Stage 3b chronic kidney disease ([address]) 2021    Adhesive capsulitis of right shoulder 2021    Bilateral leg and foot pain 2021    Numbness and tingling of upper and lower extremities of both sides 2021    Major depressive disorder 2021    Cognitive impairment 04/15/2021    Anxiety 04/15/2021    Grief reaction 04/15/2021    Epigastric pain     Gastric polyp     Altered bowel habits     Lymphadenopathy, axillary 2021    Fatigue 2021    Generalized idiopathic epilepsy and epileptic syndromes, intractable, with status epilepticus ([address]) 12/10/2020    Polyneuropathy associated with underlying disease ([address])     Hypercalcemia 11/10/2020    Hypomagnesemia 2020    Hypokalemia 2020    Anemia 2020    Weight loss 2020    Chronic pain of right knee 2018    Facet arthropathy, multilevel 2018    Osteopenia of multiple sites 2018    Hypothyroidism 2018    Psoriasis with arthropathy ([address]) 2017    Vitamin D deficiency 2017    Restless leg syndrome 2017    Alcoholism ([address])     Gastroesophageal reflux disease
[facility]   Facility/Department: [facility]  Speech Language Pathology  Clinical Bedside Swallow Evaluation    NAME:Lauren Floyd  : 1955 (79 y.o.)   [x]   confirmed    MRN: [id number]  ROOM: [place] [address]  ADMISSION DATE: 7/10/2023  PATIENT DIAGNOSIS(ES): TIA (transient ischemic attack) [G45.9]  Facial droop [R29.810]  Chief Complaint   Patient presents with    Facial Droop     Patient Active Problem List    Diagnosis Date Noted    Seronegative arthritis 2022    Thoracogenic scoliosis of thoracolumbar region 2022    Chronic back pain 2022    Squamous blepharitis of upper and lower eyelids of both eyes 2022    TIA (transient ischemic attack) 07/10/2023    RBBB (right bundle branch block) 2023    Seizure ([address]) 2021    Therapeutic drug monitoring 2021    Stage 3b chronic kidney disease ([address]) 2021    Adhesive capsulitis of right shoulder 2021    Bilateral leg and foot pain 2021    Numbness and tingling of upper and lower extremities of both sides 2021    Major depressive disorder 2021    Cognitive impairment 04/15/2021    Anxiety 04/15/2021    Grief reaction 04/15/2021    Epigastric pain     Gastric polyp     Altered bowel habits     Lymphadenopathy, axillary 2021    Fatigue 2021    Generalized idiopathic epilepsy and epileptic syndromes, intractable, with status epilepticus ([address]) 12/10/2020    Polyneuropathy associated with underlying disease ([address])     Hypercalcemia 11/10/2020    Hypomagnesemia 2020    Hypokalemia 2020    Anemia 2020    Weight loss 2020    Chronic pain of right knee 2018    Facet arthropathy, multilevel 2018    Osteopenia of multiple sites 2018    Hypothyroidism 2018    Psoriasis with arthropathy ([address]) 2017    Vitamin D deficiency 2017    Restless leg syndrome 2017    Alcoholism ([address])     Gastroesophageal reflux disease without
discharge today. Pain   Pre treatment screening: denies  Post treatment screening: denies    Prior Level of Function:  Social/Functional History  Lives With: Alone, Home care staff (NATALIA M-F 8-11 AM. Cleaning, ADLs (combing hait, UBD, shopping, laundry, meals))  Type of Home: Senior housing apartment  Home Layout: One level  Home Access: Elevator  Bathroom Shower/Tub: Tub/Shower unit, Shower chair without back  Bathroom Toilet: Standard  Bathroom Equipment: Grab bars in shower, Grab bars around toilet  Home Equipment:  (mostly sleeps on couch)  ADL Assistance: Independent (occasional assist from [facility] when they are there- typically independent)  Homemaking Responsibilities: Yes  Meal Prep Responsibility: Secondary  Laundry Responsibility: No  Cleaning Responsibility: No  Bill Paying/Finance Responsibility: No  Shopping Responsibility: No  Health Care Management: Primary (friend also does some shopping)  Ambulation Assistance: Independent (usually walks without AD, will use rollator when walking around common areas)  Transfer Assistance: Independent  Active : No    OBJECTIVE:   Vision  Vision Exceptions: Wears glasses for reading  Hearing: Within functional limits    Cognition:  Overall Orientation Status: Within Functional Limits  Orientation Level: Oriented X4  Follows Commands: Within Functional Limits  Overall Cognitive Status: Exceptions    Observation/Palpation  Posture: Fair  Observation: pleasant, agreeable, wanting to return home    ROM:  AROM: Within functional limits  PROM: Within functional limits    Strength:  Strength:  Within functional limits    Neuro:  Balance  Sitting - Static: Good  Sitting - Dynamic: Good  Standing - Static: Good  Standing - Dynamic: Good;-     Tone: Normal  Coordination: Within functional limits  Sensation: Intact    Bed mobility  Supine to Sit: Independent  Sit to Supine: Independent    Transfers  Sit to Stand: Independent  Stand to Sit: Independent    Ambulation  Surface:
Pt is a 78 y/o female who presents to [address] [place] [address] s/p stroke like symptoms. Pt presents symptoms has since improved. Pt with some baseline difficulty/deficits but does have a HHA. Pt reports she is at her functional baseline.  No continued OT needed at this time  Performance deficits / Impairments: Decreased fine motor control, Decreased cognition, Decreased high-level IADLs, Decreased endurance, Decreased strength  Prognosis: Good  No Skilled OT: At baseline function, Independent with ADL's  Decision Making: Low Complexity  History: complex chart  Exam: 5 perf deficits  Assistance / Modification: MI    [facility]) Self care Score   How much help is needed for putting on and taking off regular lower body clothing?: None  How much help is needed for bathing (which includes washing, rinsing, drying)?: None  How much help is needed for toileting (which includes using toilet, bedpan, or urinal)?: None  How much help is needed for putting on and taking off regular upper body clothing?: None  How much help is needed for taking care of personal grooming?: None  How much help for eating meals?: None  AM-[facility] Inpatient Daily Activity Raw Score: 24  AM-PAC Inpatient ADL T-Scale Score : 57.54  ADL Inpatient CMS 0-100% Score: 0    Therapy key for assistance levels -   Independent/Mod I = Pt. is able to perform task with no assistance but may require a device   Stand by assistance = Pt. does not perform task at an independent level but does not need physical assistance, requires verbal cues  Minimal, Moderate, Maximal Assistance = Pt. requires physical assistance (25%, 50%, 75% assist from helper) for task but is able to actively participate in task   Dependent = Pt. requires total assistance with task and is not able to actively participate with task completion     Plan:  Occupational Therapy Plan  Times Per Week: Eval only  Therapy Duration:  (eval only)    Goals:   Eval only    Patient Goal: Patient goals : go back home

## 2023-07-11 NOTE — CONSULTS
[facility] Neurology Consult Note  Name: Zara Sierra  Age: 79 y.o. Gender: female  CodeStatus: Full Code  Allergies: Morphine    Chief Complaint:Facial Droop    Primary Care Provider: LEODAN Hernandez  InpatientTreatment Team: Treatment Team: Attending Provider: Selma Keenan MD; Consulting Physician: Stu Carpenter MD; : Srinivasan Bruno, RN; Respiratory Therapist (Day): Annemarie Mo RCP; Tech: Edgar Smith; Registered Nurse: Cici Leija RN; Utilization Reviewer: Marga Benitez RN  Admission Date: 7/10/2023      HPI   Consulting provider: Dr. Selma Keenan for facial droop  Pt seen and examined for neurology consult. Patient is a 62-year-old  female with past medical history of CKD stage III, seizure, hypothyroidism, hypertension, hyperlipidemia, diabetes mellitus, depression, cognitive impairment, alcoholism, chronic back pain [address] [place] [address] on 7/10/2023 with complaints of left-sided facial droop of unknown onset. Patient was visited by her home health nurse on the day of presentation who noted the facial droop. Home health nurse reports patient was normal on Friday. Patient does have history of speech impediment but patient and home health provider both reported that speech was worse. No one-sided weakness, dysphagia,, vision changes. Patient did report a headache several days prior. Does take aspirin 81 mg daily. Blood pressure on arrival 140/81. Afebrile. No hypoxia. No hypoglycemia. Physical exam was notable for left facial droop and dysarthria as documented by emergency room provider. EKG showed normal sinus rhythm at 85 bpm.  CT of the head was negative for any acute findings. CTA of the head and neck negative for any significant occlusion or stenosis. Laboratory testing largely unremarkable. Patient was given Plavix loading in the emergency room. Patient is currently alert and oriented x3, no acute distress, cooperative.   Patient is

## 2023-07-11 NOTE — DISCHARGE INSTR - COC
Continuity of Care Form    Patient Name: Ben Harris   :  [birth year]  MRN:  [id number]    Admit date:  7/10/2023  Discharge date:  ***    Code Status Order: Full Code   Advance Directives:     Admitting Physician:  Anel Sahni MD  PCP: LEODAN Martin    Discharging Nurse: [facility] Unit/Room#: F718/H929-01  Discharging Unit Phone Number: ***    Emergency Contact:   Extended Emergency Contact Information  Primary Emergency Contact: [address] Phone: [phone]  Mobile Phone: [phone]  Relation: Niece/Nephew  Secondary Emergency Contact: Blaze [place] Phone: [phone]  Mobile Phone: [phone]  Relation: Niece/Nephew    Past Surgical History:  Past Surgical History:   Procedure Laterality Date    APPENDECTOMY      BIOPSY MOUTH LESION Bilateral 2016    REMOVAL  OF BILATERAL LINGUAL MACIE performed by Seth Greenberg DDS at [facility]      COLONOSCOPY N/A 2021    COLONOSCOPY DIAGNOSTIC performed by Inna Smith MD at [address] [place] [address] ([address] [place] [address])      OVARY REMOVAL      UPPER GASTROINTESTINAL ENDOSCOPY N/A 2021    EGD with polypectomy performed by Inna Smith MD at [facility]       Immunization History:   Immunization History   Administered Date(s) Administered    Influenza Nasal 2017    Influenza Virus Vaccine 2017, 10/06/2020    Influenza, AFLURIA (age 1 yrs+), FLUZONE, (age 10 mo+), MDV, 0.5mL 2018    Influenza, FLUARIX, FLULAVAL, FLUZONE (age 10 mo+) AND AFLURIA, (age 1 y+), PF, 0.5mL 10/06/2020    Influenza, FLUCELVAX, (age 10 mo+), MDCK, PF, 0.5mL 10/17/2019    Influenza, MDCK, Preservative free 10/09/2015    Influenza, Triv, 3 Years and older, IM, PF (Afluria 5yrs and older) 10/04/2016    Pneumococcal, PPSV23, PNEUMOVAX 21, (age 2y+), SC/IM, 0.5mL 2019       Active Problems:  Patient Active Problem List   Diagnosis Code    Mixed hyperlipidemia E78.2

## 2023-07-12 ENCOUNTER — TELEPHONE (OUTPATIENT)
Dept: FAMILY MEDICINE CLINIC | Age: 68
End: 2023-07-12

## 2023-07-12 LAB
EKG ATRIAL RATE: 85 BPM
EKG P AXIS: 47 DEGREES
EKG P-R INTERVAL: 208 MS
EKG Q-T INTERVAL: 420 MS
EKG QRS DURATION: 134 MS
EKG QTC CALCULATION (BAZETT): 499 MS
EKG R AXIS: -33 DEGREES
EKG T AXIS: 13 DEGREES
EKG VENTRICULAR RATE: 85 BPM

## 2023-07-12 PROCEDURE — 93010 ELECTROCARDIOGRAM REPORT: CPT | Performed by: INTERNAL MEDICINE

## 2023-07-12 NOTE — TELEPHONE ENCOUNTER
Care Transitions Initial Follow Up Call    Outreach made within 2 business days of discharge: Yes    Patient: Stella Renae Patient : 1955   MRN: [id number]  Reason for Admission: There are no discharge diagnoses documented for the most recent discharge. Discharge Date: 23       Spoke with: Pt    Discharge department/facility: [facility] Interactive Patient Contact:  Was patient able to fill all prescriptions: Yes  Was patient instructed to bring all medications to the follow-up visit: Yes  Is patient taking all medications as directed in the discharge summary?  Yes  Does patient understand their discharge instructions: Yes  Does patient have questions or concerns that need addressed prior to 7-14 day follow up office visit: no    Scheduled appointment with PCP within 7-14 days    Follow Up  Future Appointments   Date Time Provider [address] [place] [address]   2023  8:30 AM LEODAN Garcia [place] [facility]   2023 11:00 AM Shoshana Garcia [address]   2023  9:45 AM [facility] MD [address]   2023 11:15 AM Bev Beal MD [place], [place]

## 2023-07-18 NOTE — PROGRESS NOTES
Post-Discharge Transitional Care Follow Up      Roman Points   YOB: 1955    Date of Office Visit:  7/19/2023  Date of Hospital Admission: 7/10/23  Date of Hospital Discharge: 7/11/23  Readmission Risk Score (high >=14%. Medium >=10%):No data recorded    Care management risk score Rising risk (score 2-5) and Complex Care (Scores >=6): No Risk Score On File     Non face to face  following discharge, date last encounter closed (first attempt may have been earlier): 07/12/2023     Call initiated 2 business days of discharge: Yes     Low serum calcium  -     Basic Metabolic Panel; Future  Facial droop  Hospital discharge follow-up  -     MI DISCHARGE MEDS RECONCILED W/ CURRENT OUTPATIENT MED LIST  - improving. Patient's speech has improved. Possible TIA? CT and MRI normal. Will continue to monitor.   - will continue to monitor. Medical Decision Making: moderate complexity  Return in 1 month (on 8/19/2023). Subjective:   HPI    Inpatient course: Discharge summary reviewed- see chart. Interval history/Current status: Improved  Patient was seen in ER for Left-sided facial drooping.  - negative CT and MRI.   - Suspected bell's palsy. Patient recently had prolia shot. - Calcium dropped because she is not taking supplement. Will start taking medication. No other concern.    Patient Active Problem List   Diagnosis    Mixed hyperlipidemia    Abnormal glucose    Gastroesophageal reflux disease without esophagitis    Alcoholism ([address])    Psoriasis with arthropathy (HCC)    Vitamin D deficiency    Restless leg syndrome    Hypothyroidism    Osteopenia of multiple sites    Facet arthropathy, multilevel    Chronic pain of right knee    Anemia    Weight loss    Hypomagnesemia    Hypokalemia    Hypercalcemia    Polyneuropathy associated with underlying disease ([address])    Generalized idiopathic epilepsy and epileptic syndromes, intractable, with status epilepticus ([address])    Lymphadenopathy,

## 2023-07-19 ENCOUNTER — OFFICE VISIT (OUTPATIENT)
Dept: FAMILY MEDICINE CLINIC | Age: 68
End: 2023-07-19
Payer: COMMERCIAL

## 2023-07-19 VITALS
HEART RATE: 77 BPM | WEIGHT: 100.2 LBS | TEMPERATURE: 96.7 F | OXYGEN SATURATION: 97 % | RESPIRATION RATE: 16 BRPM | BODY MASS INDEX: 21.62 KG/M2 | SYSTOLIC BLOOD PRESSURE: 116 MMHG | HEIGHT: 57 IN | DIASTOLIC BLOOD PRESSURE: 70 MMHG

## 2023-07-19 DIAGNOSIS — R29.810 FACIAL DROOP: ICD-10-CM

## 2023-07-19 DIAGNOSIS — R79.89 LOW SERUM CALCIUM: Primary | ICD-10-CM

## 2023-07-19 DIAGNOSIS — Z09 HOSPITAL DISCHARGE FOLLOW-UP: ICD-10-CM

## 2023-07-19 PROCEDURE — 1090F PRES/ABSN URINE INCON ASSESS: CPT | Performed by: PHYSICIAN ASSISTANT

## 2023-07-19 PROCEDURE — 1036F TOBACCO NON-USER: CPT | Performed by: PHYSICIAN ASSISTANT

## 2023-07-19 PROCEDURE — G8420 CALC BMI NORM PARAMETERS: HCPCS | Performed by: PHYSICIAN ASSISTANT

## 2023-07-19 PROCEDURE — 99213 OFFICE O/P EST LOW 20 MIN: CPT | Performed by: PHYSICIAN ASSISTANT

## 2023-07-19 PROCEDURE — 1111F DSCHRG MED/CURRENT MED MERGE: CPT | Performed by: PHYSICIAN ASSISTANT

## 2023-07-19 PROCEDURE — G8427 DOCREV CUR MEDS BY ELIG CLIN: HCPCS | Performed by: PHYSICIAN ASSISTANT

## 2023-07-19 PROCEDURE — 3017F COLORECTAL CA SCREEN DOC REV: CPT | Performed by: PHYSICIAN ASSISTANT

## 2023-07-19 PROCEDURE — G8399 PT W/DXA RESULTS DOCUMENT: HCPCS | Performed by: PHYSICIAN ASSISTANT

## 2023-07-19 PROCEDURE — 1123F ACP DISCUSS/DSCN MKR DOCD: CPT | Performed by: PHYSICIAN ASSISTANT

## 2023-07-19 ASSESSMENT — ENCOUNTER SYMPTOMS
SORE THROAT: 0
CHEST TIGHTNESS: 0
NAUSEA: 0
SHORTNESS OF BREATH: 0
SINUS PRESSURE: 0
COUGH: 0
DIARRHEA: 0
VOMITING: 0
BACK PAIN: 0
SINUS PAIN: 0
ABDOMINAL PAIN: 0

## 2023-07-19 ASSESSMENT — PATIENT HEALTH QUESTIONNAIRE - PHQ9
SUM OF ALL RESPONSES TO PHQ QUESTIONS 1-9: 0
SUM OF ALL RESPONSES TO PHQ9 QUESTIONS 1 & 2: 0
10. IF YOU CHECKED OFF ANY PROBLEMS, HOW DIFFICULT HAVE THESE PROBLEMS MADE IT FOR YOU TO DO YOUR WORK, TAKE CARE OF THINGS AT HOME, OR GET ALONG WITH OTHER PEOPLE: 0
7. TROUBLE CONCENTRATING ON THINGS, SUCH AS READING THE NEWSPAPER OR WATCHING TELEVISION: 0
5. POOR APPETITE OR OVEREATING: 0
1. LITTLE INTEREST OR PLEASURE IN DOING THINGS: 0
SUM OF ALL RESPONSES TO PHQ QUESTIONS 1-9: 0
4. FEELING TIRED OR HAVING LITTLE ENERGY: 0
9. THOUGHTS THAT YOU WOULD BE BETTER OFF DEAD, OR OF HURTING YOURSELF: 0
8. MOVING OR SPEAKING SO SLOWLY THAT OTHER PEOPLE COULD HAVE NOTICED. OR THE OPPOSITE, BEING SO FIGETY OR RESTLESS THAT YOU HAVE BEEN MOVING AROUND A LOT MORE THAN USUAL: 0
SUM OF ALL RESPONSES TO PHQ QUESTIONS 1-9: 0
6. FEELING BAD ABOUT YOURSELF - OR THAT YOU ARE A FAILURE OR HAVE LET YOURSELF OR YOUR FAMILY DOWN: 0
SUM OF ALL RESPONSES TO PHQ QUESTIONS 1-9: 0
2. FEELING DOWN, DEPRESSED OR HOPELESS: 0
3. TROUBLE FALLING OR STAYING ASLEEP: 0

## 2023-07-19 ASSESSMENT — VISUAL ACUITY: OU: 1

## 2023-07-25 DIAGNOSIS — M85.89 OSTEOPENIA OF MULTIPLE SITES: ICD-10-CM

## 2023-07-25 NOTE — TELEPHONE ENCOUNTER
Patient states that her pharmacy never received the Rx for Calcium Carb-Cholecalciferol 1000-20 mg-mcg. She is asking if you will resend this to [facility]. Patient states that she did buy both of these over the counter and will continue to take them until she can get this Rx.

## 2023-08-02 ENCOUNTER — OFFICE VISIT (OUTPATIENT)
Dept: FAMILY MEDICINE CLINIC | Age: 68
End: 2023-08-02
Payer: COMMERCIAL

## 2023-08-02 VITALS
HEIGHT: 57 IN | RESPIRATION RATE: 15 BRPM | BODY MASS INDEX: 21.79 KG/M2 | DIASTOLIC BLOOD PRESSURE: 70 MMHG | TEMPERATURE: 96.5 F | WEIGHT: 101 LBS | HEART RATE: 84 BPM | SYSTOLIC BLOOD PRESSURE: 112 MMHG | OXYGEN SATURATION: 99 %

## 2023-08-02 DIAGNOSIS — F41.9 ANXIETY AND DEPRESSION: ICD-10-CM

## 2023-08-02 DIAGNOSIS — M85.89 OSTEOPENIA OF MULTIPLE SITES: ICD-10-CM

## 2023-08-02 DIAGNOSIS — R73.03 PREDIABETES: ICD-10-CM

## 2023-08-02 DIAGNOSIS — Z13.1 SCREENING FOR DIABETES MELLITUS: ICD-10-CM

## 2023-08-02 DIAGNOSIS — Z00.00 MEDICARE ANNUAL WELLNESS VISIT, SUBSEQUENT: Primary | ICD-10-CM

## 2023-08-02 DIAGNOSIS — F32.A ANXIETY AND DEPRESSION: ICD-10-CM

## 2023-08-02 LAB — HBA1C MFR BLD: 6 %

## 2023-08-02 PROCEDURE — G0439 PPPS, SUBSEQ VISIT: HCPCS | Performed by: PHYSICIAN ASSISTANT

## 2023-08-02 PROCEDURE — 3017F COLORECTAL CA SCREEN DOC REV: CPT | Performed by: PHYSICIAN ASSISTANT

## 2023-08-02 PROCEDURE — 83037 HB GLYCOSYLATED A1C HOME DEV: CPT | Performed by: PHYSICIAN ASSISTANT

## 2023-08-02 PROCEDURE — 1123F ACP DISCUSS/DSCN MKR DOCD: CPT | Performed by: PHYSICIAN ASSISTANT

## 2023-08-02 RX ORDER — DULOXETIN HYDROCHLORIDE 60 MG/1
60 CAPSULE, DELAYED RELEASE ORAL DAILY
Qty: 90 CAPSULE | Refills: 1 | Status: SHIPPED | OUTPATIENT
Start: 2023-08-02

## 2023-08-02 ASSESSMENT — PATIENT HEALTH QUESTIONNAIRE - PHQ9
3. TROUBLE FALLING OR STAYING ASLEEP: 0
4. FEELING TIRED OR HAVING LITTLE ENERGY: 0
9. THOUGHTS THAT YOU WOULD BE BETTER OFF DEAD, OR OF HURTING YOURSELF: 0
5. POOR APPETITE OR OVEREATING: 0
SUM OF ALL RESPONSES TO PHQ9 QUESTIONS 1 & 2: 0
2. FEELING DOWN, DEPRESSED OR HOPELESS: 0
SUM OF ALL RESPONSES TO PHQ QUESTIONS 1-9: 0
7. TROUBLE CONCENTRATING ON THINGS, SUCH AS READING THE NEWSPAPER OR WATCHING TELEVISION: 0
SUM OF ALL RESPONSES TO PHQ QUESTIONS 1-9: 0
1. LITTLE INTEREST OR PLEASURE IN DOING THINGS: 0
SUM OF ALL RESPONSES TO PHQ QUESTIONS 1-9: 0
SUM OF ALL RESPONSES TO PHQ QUESTIONS 1-9: 0
6. FEELING BAD ABOUT YOURSELF - OR THAT YOU ARE A FAILURE OR HAVE LET YOURSELF OR YOUR FAMILY DOWN: 0
10. IF YOU CHECKED OFF ANY PROBLEMS, HOW DIFFICULT HAVE THESE PROBLEMS MADE IT FOR YOU TO DO YOUR WORK, TAKE CARE OF THINGS AT HOME, OR GET ALONG WITH OTHER PEOPLE: 0
8. MOVING OR SPEAKING SO SLOWLY THAT OTHER PEOPLE COULD HAVE NOTICED. OR THE OPPOSITE, BEING SO FIGETY OR RESTLESS THAT YOU HAVE BEEN MOVING AROUND A LOT MORE THAN USUAL: 0

## 2023-08-02 ASSESSMENT — LIFESTYLE VARIABLES
HOW OFTEN DO YOU HAVE A DRINK CONTAINING ALCOHOL: NEVER
HOW MANY STANDARD DRINKS CONTAINING ALCOHOL DO YOU HAVE ON A TYPICAL DAY: PATIENT DOES NOT DRINK

## 2023-08-16 LAB — URINE CULTURE: ABNORMAL

## 2023-09-12 DIAGNOSIS — R56.9 SEIZURE (HCC): ICD-10-CM

## 2023-09-12 NOTE — TELEPHONE ENCOUNTER
Rx request   Requested Prescriptions     Pending Prescriptions Disp Refills    levETIRAcetam (KEPPRA) 500 MG tablet 60 tablet 10     Sig: TAKE 1 TABLET BY MOUTH TWICE DAILY     LOV 8/2/2023  Next Visit Date:  Future Appointments   Date Time Provider [address]   9/14/2023 11:00 AM Mamadou Kennedy, [address]   10/17/2023  2:15 PM Shanti Pop MD [facility]

## 2023-09-13 RX ORDER — LEVETIRACETAM 500 MG/1
TABLET ORAL
Qty: 60 TABLET | Refills: 10 | Status: SHIPPED | OUTPATIENT
Start: 2023-09-13

## 2023-09-21 ENCOUNTER — OFFICE VISIT (OUTPATIENT)
Dept: PULMONOLOGY | Age: 68
End: 2023-09-21
Payer: COMMERCIAL

## 2023-09-21 VITALS
HEART RATE: 74 BPM | TEMPERATURE: 96.9 F | WEIGHT: 101 LBS | SYSTOLIC BLOOD PRESSURE: 126 MMHG | DIASTOLIC BLOOD PRESSURE: 70 MMHG | OXYGEN SATURATION: 98 % | BODY MASS INDEX: 21.86 KG/M2

## 2023-09-21 DIAGNOSIS — R94.2 ABNORMAL PFT: Primary | ICD-10-CM

## 2023-09-21 DIAGNOSIS — G47.00 INSOMNIA, UNSPECIFIED TYPE: ICD-10-CM

## 2023-09-21 DIAGNOSIS — G25.81 RLS (RESTLESS LEGS SYNDROME): ICD-10-CM

## 2023-09-21 PROCEDURE — 99214 OFFICE O/P EST MOD 30 MIN: CPT | Performed by: INTERNAL MEDICINE

## 2023-09-21 PROCEDURE — 1090F PRES/ABSN URINE INCON ASSESS: CPT | Performed by: INTERNAL MEDICINE

## 2023-09-21 PROCEDURE — 3017F COLORECTAL CA SCREEN DOC REV: CPT | Performed by: INTERNAL MEDICINE

## 2023-09-21 PROCEDURE — 1123F ACP DISCUSS/DSCN MKR DOCD: CPT | Performed by: INTERNAL MEDICINE

## 2023-09-21 PROCEDURE — 1036F TOBACCO NON-USER: CPT | Performed by: INTERNAL MEDICINE

## 2023-09-21 PROCEDURE — G8399 PT W/DXA RESULTS DOCUMENT: HCPCS | Performed by: INTERNAL MEDICINE

## 2023-09-21 PROCEDURE — G8427 DOCREV CUR MEDS BY ELIG CLIN: HCPCS | Performed by: INTERNAL MEDICINE

## 2023-09-21 PROCEDURE — G8420 CALC BMI NORM PARAMETERS: HCPCS | Performed by: INTERNAL MEDICINE

## 2023-09-21 RX ORDER — DENOSUMAB 60 MG/ML
60 INJECTION SUBCUTANEOUS ONCE
COMMUNITY

## 2023-09-21 ASSESSMENT — ENCOUNTER SYMPTOMS
TROUBLE SWALLOWING: 0
EYE ITCHING: 0
NAUSEA: 0
ABDOMINAL PAIN: 0
VOICE CHANGE: 0
CHEST TIGHTNESS: 0
SORE THROAT: 0
WHEEZING: 0
EYE DISCHARGE: 0
SINUS PRESSURE: 0
COUGH: 1
RHINORRHEA: 0
VOMITING: 0
SHORTNESS OF BREATH: 1
DIARRHEA: 0

## 2023-09-21 NOTE — PROGRESS NOTES
Subjective:     Meaghan Cruz is a 79 y.o. female who complains today of:     Chief Complaint   Patient presents with    Follow-up     3m f/u on abnormal PFT, insomnia        HPI  Meaghan Cruz is a 74year old female with a PMH of non specific ventilatory defects with significant BD response and decreased diffusion capacity on PFT, thoracogenic scoliosis, seronegative arthritis (psoriasis) on methotrexate, seizure (on keppra, initially evaluated as alcohol related), CKD3, HTN, HLD, DM, GERD, hypothyroidism, alcohol use disorder, osteoporosis, chronic knee pain, MDD    She said she is having abdominal discomfort. Going to see dr. Osmel Navas    She is using bronchodilator with albuterol  HFA   She said she will have new PFT  Mild  C/o shortness of breath , No Wheezing. Dry Cough, chronic . No Hemoptysis. No Chest tightness. No Chest pain with radiation  or pleuritic pain. No  leg edema. No orthopnea. No Fever or chills. No Rhinorrhea and postnasal drip. HRCT on 11/1/2022: no evidence of ILD, clear lung parenchyma      She said she need to go for sleep study but does not want to at this time , she has chronic insomnia. RLS on requip       Allergies:  Morphine  Past Medical History:   Diagnosis Date    Adhesive capsulitis of right shoulder 8/12/2021    Alcoholism (HCC)     Arthritis     Bilateral leg and foot pain 8/12/2021    Chronic back pain 5/17/2022    Chronic lung disease     Cognitive impairment 4/15/2021    Depression     Diabetes mellitus ([address])     Diarrhea 1/4/2021    Possibly related to Metformin, Keppra, constipation, colitis. Stop Metformin. Treat constipation. Consider adjustment to Keppra given side effects.     Encephalopathy 12/10/2020    Grief reaction 4/15/2021    Hyperlipidemia     Hypertension     Hypokalemia 11/6/2020    Hypomagnesemia 11/6/2020    Hypothyroidism     Lymphadenopathy, axillary 1/4/2021    Numbness and tingling of upper and lower extremities of both sides 8/12/2021

## 2023-09-27 DIAGNOSIS — E78.2 MIXED HYPERLIPIDEMIA: Chronic | ICD-10-CM

## 2023-09-27 NOTE — TELEPHONE ENCOUNTER
Rx request   Requested Prescriptions     Pending Prescriptions Disp Refills    atorvastatin (LIPITOR) 40 MG tablet 90 tablet 3     Sig: TAKE 1 TABLET BY MOUTH ONCE DAILY     LOV 8/2/2023  Next Visit Date:  Future Appointments   Date Time Provider [address] [place] [address]   10/17/2023  2:15 PM Jacey Kearney  [address]   10/19/2023 11:45 AM Mikhail Berry MD [facility]

## 2023-09-28 RX ORDER — ATORVASTATIN CALCIUM 40 MG/1
TABLET, FILM COATED ORAL
Qty: 90 TABLET | Refills: 3 | Status: SHIPPED | OUTPATIENT
Start: 2023-09-28

## 2023-10-02 NOTE — CARE COORDINATION
Devonte Becker is a 67 y.o. female who presents with   Chief Complaint   Patient presents with    Hyperlipidemia    Hypothyroidism       Hyperlipidemia  Exacerbating diseases include hypothyroidism.   Hypothyroidism       The following data was reviewed by: Rachael Oconnell MD on 10/02/2023:  CMP          3/14/2023    09:48 6/13/2023    10:02 9/26/2023    10:04   CMP   Glucose 95  89  98    BUN 18  17  18    Creatinine 1.20  0.86  1.13    Sodium 142  140  142    Potassium 4.0  4.3  4.3    Chloride 103  106  105    Calcium 9.9  9.6  9.9    Total Protein 6.9  6.5  7.1    Albumin 4.5  4.3  4.6    Globulin 2.4  2.2  2.5    Total Bilirubin 0.4  <0.2  0.3    Alkaline Phosphatase 81  68  82    AST (SGOT) 25  24  25    ALT (SGPT) 34  23  22    BUN/Creatinine Ratio 15.0  19.8  15.9      CBC          3/14/2023    09:48 6/13/2023    10:02 9/26/2023    10:04   CBC   WBC 4.62  4.16  4.19    RBC 4.68  4.59  4.59    Hemoglobin 13.7  13.4  13.6    Hematocrit 41.3  41.8  41.8    MCV 88.2  91.1  91.1    MCH 29.3  29.2  29.6    MCHC 33.2  32.1  32.5    RDW 12.1  12.6  12.1    Platelets 236  235  250      Lipid Panel          3/14/2023    09:48 6/13/2023    10:02 9/26/2023    10:04   Lipid Panel   Total Cholesterol 287  237  258    Triglycerides 68  44  113    HDL Cholesterol 104  82  69    VLDL Cholesterol 10  7  20    LDL Cholesterol  173  148  169      TSH          3/14/2023    09:48 6/13/2023    10:02 9/26/2023    10:04   TSH   TSH 6.180  4.210  4.640      Hypothyroidism.  Numbers are stable.  Levothyroxine gave her palpitations.    HLD.  She follows a healthy diet.     Review of Systems   Respiratory: Negative.     Cardiovascular: Negative.      The following portions of the patient's history were reviewed and updated as appropriate: allergies, current medications and problem list.    Patient Active Problem List    Diagnosis Date Noted    Chronic midline low back pain with bilateral sciatica 03/22/2023    Cervical  Telephone call to [facility] Doan/Sandra/Juliann. Left voicemail message of purpose of call with request for return phone call. Call back number was provided. disc disorder at C5-C6 level with radiculopathy 08/22/2022    Vitamin D deficiency 03/30/2022    Displacement of cervical intervertebral disc without myelopathy 10/25/2021    Cubital tunnel syndrome 09/08/2020    Ulnar neuropathy of both upper extremities 08/17/2020    Carpal tunnel syndrome of right wrist 08/17/2020    Cervical spondylosis 01/21/2020    DDD (degenerative disc disease), lumbar 01/21/2020    Other specified hypothyroidism 02/08/2019    Hyperacusis, bilateral 07/16/2018    Pelvic floor dysfunction 03/24/2016    Atopic rhinitis 03/23/2016    Chronic neck pain 03/23/2016    Chronic interstitial cystitis 03/23/2016     Note Last Updated: 3/23/2016     Description: Extreme case followed by urologist at  as well as Dr. Mendez Franz.      Degeneration of cervical intervertebral disc 03/23/2016    Hyperlipidemia 03/23/2016     Note Last Updated: 1/28/2020     Description: 1.9% 10 year risk of ASCVD in 12/8/14; 1.6% on 3/23/16, 1.5% 10 year risk 3/28/2017;  2.1% 10 year risk.  2.8% 10 year risk o 1/28/2020.       Osteoporosis 03/23/2016     Note Last Updated: 1/25/2019     Start in 4/2017      Tinnitus 02/01/2016    Osteoarthritis (arthritis due to wear and tear of joints) 03/12/2012       Current Outpatient Medications on File Prior to Visit   Medication Sig Dispense Refill    baclofen (LIORESAL) 10 MG tablet Take by mouth 3 (three) times a day as needed. 1 to 2 tablets      clobetasol (TEMOVATE) 0.05 % ointment clobetasol 0.05 % topical ointment   APPLY THIN LAYER THREE TIMES DAILY FOR 3 WEEKS THEN ONCE DAILY UNTIL SKIN LOOKS AND FEELS NORMAL THEN USE TWICE WEEKLY      diazepam (VALIUM) 5 MG tablet Take 1 tablet by mouth At Night As Needed.      Estriol powder       Hormone Cream Base cream       Ivermectin powder       lidocaine (LIDODERM) 5 % Apply 1 patch topically to the appropriate area as directed As Needed.      nortriptyline (PAMELOR) 10 MG capsule Take 1 capsule by mouth Every Night.       "Omega-3 Fatty Acids (FISH OIL PO) Take by mouth.      oxyCODONE (ROXICODONE) 5 MG immediate release tablet Take 1 to 2 tablets every 4 to 6 hours as needed for pain      PREBIOTIC PRODUCT PO Take  by mouth.      Probiotic Product (PROBIOTIC ADVANCED PO) Take  by mouth.      Progesterone Micronized (PROGESTERONE, BULK,) powder Place on the skin.        promethazine (PHENERGAN) 25 MG tablet   2    traMADol (ULTRAM) 50 MG tablet Take 1 tablet by mouth 2 (Two) Times a Day As Needed.      levothyroxine (SYNTHROID, LEVOTHROID) 25 MCG tablet Take 1 tablet by mouth Daily. (Patient not taking: Reported on 10/2/2023) 90 tablet 3     No current facility-administered medications on file prior to visit.       Objective     /78   Pulse 67   Ht 160 cm (62.99\")   Wt 48.1 kg (106 lb)   SpO2 99%   BMI 18.78 kg/m²     Physical Exam  Constitutional:       Appearance: She is well-developed.   HENT:      Head: Normocephalic and atraumatic.   Pulmonary:      Effort: Pulmonary effort is normal.   Neurological:      Mental Status: She is alert and oriented to person, place, and time.   Psychiatric:         Behavior: Behavior normal.       Assessment & Plan   Diagnoses and all orders for this visit:    1. Other specified hypothyroidism (Primary)    2. Hyperlipidemia, unspecified hyperlipidemia type        Discussion    Hypothyroidism.  She is going to see if she can tolerate 1/4 of a levothyroxine 25mcg dose.    HLD.  I recommend a diet high in fruits, vegetables, whole grains, lean meats, nuts and beans.  I recommend limiting red meat, full fat dairy, eggs and processed white carbohydrates.  I recommend aerobic exercise at least 3 days per week. I also recommend to watch salt intake.           Future Appointments   Date Time Provider Department Center   3/18/2024  9:30 AM [facility]   3/25/2024 11:15 AM Rachael Oconnell MD [facility]   4/1/2024  9:00 AM Wang De La Vega MD [facility] "

## 2023-10-13 ENCOUNTER — HOSPITAL ENCOUNTER (OUTPATIENT)
Dept: PULMONOLOGY | Age: 68
Discharge: HOME | End: 2023-10-13
Payer: COMMERCIAL

## 2023-10-13 DIAGNOSIS — R94.2 ABNORMAL PFT: ICD-10-CM

## 2023-10-13 DIAGNOSIS — R06.09 DOE (DYSPNEA ON EXERTION): ICD-10-CM

## 2023-10-13 PROCEDURE — 94729 DIFFUSING CAPACITY: CPT

## 2023-10-13 PROCEDURE — 94060 EVALUATION OF WHEEZING: CPT

## 2023-10-13 PROCEDURE — 94726 PLETHYSMOGRAPHY LUNG VOLUMES: CPT

## 2023-10-13 PROCEDURE — 6360000002 HC RX W HCPCS: Performed by: INTERNAL MEDICINE

## 2023-10-13 RX ORDER — ALBUTEROL SULFATE 2.5 MG/3ML
2.5 SOLUTION RESPIRATORY (INHALATION) ONCE
Status: COMPLETED | OUTPATIENT
Start: 2023-10-13 | End: 2023-10-13

## 2023-10-13 RX ADMIN — ALBUTEROL SULFATE 2.5 MG: 2.5 SOLUTION RESPIRATORY (INHALATION) at 11:16

## 2023-10-17 ENCOUNTER — OFFICE VISIT (OUTPATIENT)
Dept: GASTROENTEROLOGY | Age: 68
End: 2023-10-17
Payer: COMMERCIAL

## 2023-10-17 VITALS — HEART RATE: 66 BPM | OXYGEN SATURATION: 99 % | WEIGHT: 104 LBS | HEIGHT: 57 IN | BODY MASS INDEX: 22.44 KG/M2

## 2023-10-17 DIAGNOSIS — R68.81 EARLY SATIETY: ICD-10-CM

## 2023-10-17 DIAGNOSIS — K21.9 GASTROESOPHAGEAL REFLUX DISEASE, UNSPECIFIED WHETHER ESOPHAGITIS PRESENT: ICD-10-CM

## 2023-10-17 DIAGNOSIS — K31.84 GASTROPARESIS: ICD-10-CM

## 2023-10-17 DIAGNOSIS — R11.0 NAUSEA: ICD-10-CM

## 2023-10-17 DIAGNOSIS — K30 DELAYED GASTRIC EMPTYING: Primary | ICD-10-CM

## 2023-10-17 DIAGNOSIS — K76.0 HEPATIC STEATOSIS: ICD-10-CM

## 2023-10-17 PROCEDURE — G8420 CALC BMI NORM PARAMETERS: HCPCS | Performed by: INTERNAL MEDICINE

## 2023-10-17 PROCEDURE — G8484 FLU IMMUNIZE NO ADMIN: HCPCS | Performed by: INTERNAL MEDICINE

## 2023-10-17 PROCEDURE — G8399 PT W/DXA RESULTS DOCUMENT: HCPCS | Performed by: INTERNAL MEDICINE

## 2023-10-17 PROCEDURE — G8427 DOCREV CUR MEDS BY ELIG CLIN: HCPCS | Performed by: INTERNAL MEDICINE

## 2023-10-17 PROCEDURE — 1090F PRES/ABSN URINE INCON ASSESS: CPT | Performed by: INTERNAL MEDICINE

## 2023-10-17 PROCEDURE — 1036F TOBACCO NON-USER: CPT | Performed by: INTERNAL MEDICINE

## 2023-10-17 PROCEDURE — 3017F COLORECTAL CA SCREEN DOC REV: CPT | Performed by: INTERNAL MEDICINE

## 2023-10-17 PROCEDURE — 99214 OFFICE O/P EST MOD 30 MIN: CPT | Performed by: INTERNAL MEDICINE

## 2023-10-17 PROCEDURE — 1123F ACP DISCUSS/DSCN MKR DOCD: CPT | Performed by: INTERNAL MEDICINE

## 2023-10-17 RX ORDER — METOCLOPRAMIDE 5 MG/1
5 TABLET ORAL 3 TIMES DAILY
Qty: 90 TABLET | Refills: 2 | Status: SHIPPED | OUTPATIENT
Start: 2023-10-17

## 2023-10-17 NOTE — PROGRESS NOTES
Gastroenterology Clinic Follow up Visit    Georgia Shah  [id number]  Chief Complaint   Patient presents with    Follow-up     HPI: 79 y.o. female following up after last GI clinic on 5/2/2023     Interval change:   Patient presents to GI clinic to follow up with results of FibroScan completed 5/20/2023  stage F1-S1 and Gastric Emptying 6/5/2023 with delayed gastric emptying of solids. Patient has been having nausea after every meal.  Patient eats 3 to 4 bites of food and feels full. Patient has been consuming 2 Ensure daily. Patient reports intermittent abdominal pain. Symptoms have improved over the last week. Patient has had a 3 lb weight gain since 7/2023. Patient denies NSAIDs, Illicit drugs and ETOH as she quit 2 years ago. Patient  reports GERD symptoms well controlled with PPI. Patient has formed bowel movement daily and takes Benefiber 3 days a week. Patient denies vomiting, diarrhea, hematemesis, hematochezia, nor melena. HPI from last GI clinic visit on 5/2/2023 summarized below:  Patient presents to the GI clinic with continued complaint of not eating well. States she will take 3-4 bites of food and feel full and feel as though she has to lay down. She denies nausea, vomiting or abdominal pain, simply describes a upper abdominal fullness with eating. States she has been drinking Ensure meal replacements twice daily with good response and some weight gain. She reports longstanding history of chronic opioid use, tramadol. States she has been off of tramadol now for a couple of months as she ha snot been able to get it refilled. She reports her GERD symptoms are well controlled with taking Protonix daily. Reports mild/occasional breakthrough symptoms. She reports history of constipation, however has started taking Metamucil daily, currently having a bowel movement every other day, reports her stools are not hard and she does not have to strain to have a bowel movement anymore.   She

## 2023-10-19 ENCOUNTER — OFFICE VISIT (OUTPATIENT)
Dept: PULMONOLOGY | Age: 68
End: 2023-10-19
Payer: COMMERCIAL

## 2023-10-19 VITALS
TEMPERATURE: 97.3 F | HEART RATE: 78 BPM | SYSTOLIC BLOOD PRESSURE: 116 MMHG | OXYGEN SATURATION: 97 % | WEIGHT: 105 LBS | BODY MASS INDEX: 22.72 KG/M2 | DIASTOLIC BLOOD PRESSURE: 68 MMHG

## 2023-10-19 DIAGNOSIS — G47.30 SLEEP APNEA, UNSPECIFIED TYPE: ICD-10-CM

## 2023-10-19 DIAGNOSIS — G47.00 INSOMNIA, UNSPECIFIED TYPE: ICD-10-CM

## 2023-10-19 DIAGNOSIS — G25.81 RLS (RESTLESS LEGS SYNDROME): ICD-10-CM

## 2023-10-19 DIAGNOSIS — R94.2 ABNORMAL PFT: Primary | ICD-10-CM

## 2023-10-19 PROCEDURE — G8484 FLU IMMUNIZE NO ADMIN: HCPCS | Performed by: INTERNAL MEDICINE

## 2023-10-19 PROCEDURE — 99214 OFFICE O/P EST MOD 30 MIN: CPT | Performed by: INTERNAL MEDICINE

## 2023-10-19 PROCEDURE — G8427 DOCREV CUR MEDS BY ELIG CLIN: HCPCS | Performed by: INTERNAL MEDICINE

## 2023-10-19 PROCEDURE — G8399 PT W/DXA RESULTS DOCUMENT: HCPCS | Performed by: INTERNAL MEDICINE

## 2023-10-19 PROCEDURE — 1036F TOBACCO NON-USER: CPT | Performed by: INTERNAL MEDICINE

## 2023-10-19 PROCEDURE — 1123F ACP DISCUSS/DSCN MKR DOCD: CPT | Performed by: INTERNAL MEDICINE

## 2023-10-19 PROCEDURE — G8420 CALC BMI NORM PARAMETERS: HCPCS | Performed by: INTERNAL MEDICINE

## 2023-10-19 PROCEDURE — 1090F PRES/ABSN URINE INCON ASSESS: CPT | Performed by: INTERNAL MEDICINE

## 2023-10-19 PROCEDURE — 3017F COLORECTAL CA SCREEN DOC REV: CPT | Performed by: INTERNAL MEDICINE

## 2023-10-19 ASSESSMENT — ENCOUNTER SYMPTOMS
EYE DISCHARGE: 0
DIARRHEA: 0
WHEEZING: 0
VOICE CHANGE: 0
SHORTNESS OF BREATH: 1
VOMITING: 0
NAUSEA: 0
TROUBLE SWALLOWING: 0
EYE ITCHING: 0
ABDOMINAL PAIN: 0
RHINORRHEA: 0
COUGH: 1
CHEST TIGHTNESS: 0
SORE THROAT: 0
SINUS PRESSURE: 0

## 2023-10-19 NOTE — PROGRESS NOTES
Worker referral made for concerns with housing and transportation. Social Determinants of Health     Financial Resource Strain: Low Risk  (2/16/2023)    Overall Financial Resource Strain (CARDIA)     Difficulty of Paying Living Expenses: Not very hard   Food Insecurity: No Food Insecurity (2/16/2023)    Hunger Vital Sign     Worried About Running Out of Food in the Last Year: Never true     Ran Out of Food in the Last Year: Never true   Transportation Needs: Unknown (2/16/2023)    PRAPARE - Transportation     Lack of Transportation (Medical): Not on file     Lack of Transportation (Non-Medical): No   Physical Activity: Inactive (8/2/2023)    Exercise Vital Sign     Days of Exercise per Week: 0 days     Minutes of Exercise per Session: 0 min   Stress: Stress Concern Present (3/4/2021)    [facility]     Feeling of Stress : Rather much   Social Connections: Socially Isolated (3/4/2021)    Social Connection and Isolation Panel [NHANES]     Frequency of Communication with Friends and Family: Once a week     Frequency of Social Gatherings with Friends and Family: Once a week     Attends Denominational Services: Never     Active Member of Clubs or Organizations: No     Attends Club or Organization Meetings: Never     Marital Status: Never    Intimate Partner Violence: Not At Risk (3/4/2021)    Humiliation, Afraid, Rape, and Kick questionnaire     Fear of Current or Ex-Partner: No     Emotionally Abused: No     Physically Abused: No     Sexually Abused: No   Housing Stability: Unknown (2/16/2023)    Housing Stability Vital Sign     Unable to Pay for Housing in the Last Year: Not on file     Number of [address] in the Last Year: Not on file     Unstable Housing in the Last Year: No         Review of Systems   Constitutional:  Negative for chills, diaphoresis, fatigue and fever.    HENT:  Negative for congestion, mouth sores, nosebleeds, postnasal drip,

## 2023-11-17 ENCOUNTER — OFFICE VISIT (OUTPATIENT)
Dept: FAMILY MEDICINE CLINIC | Age: 68
End: 2023-11-17
Payer: COMMERCIAL

## 2023-11-17 VITALS
HEART RATE: 76 BPM | TEMPERATURE: 96.6 F | WEIGHT: 105 LBS | HEIGHT: 57 IN | SYSTOLIC BLOOD PRESSURE: 110 MMHG | DIASTOLIC BLOOD PRESSURE: 68 MMHG | RESPIRATION RATE: 16 BRPM | OXYGEN SATURATION: 98 % | BODY MASS INDEX: 22.65 KG/M2

## 2023-11-17 DIAGNOSIS — R11.0 NAUSEA: ICD-10-CM

## 2023-11-17 DIAGNOSIS — R73.03 PREDIABETES: Primary | ICD-10-CM

## 2023-11-17 DIAGNOSIS — R73.09 ABNORMAL BLOOD SUGAR: ICD-10-CM

## 2023-11-17 DIAGNOSIS — L40.50 PSORIASIS WITH ARTHROPATHY (HCC): ICD-10-CM

## 2023-11-17 PROCEDURE — G8484 FLU IMMUNIZE NO ADMIN: HCPCS | Performed by: PHYSICIAN ASSISTANT

## 2023-11-17 PROCEDURE — 99213 OFFICE O/P EST LOW 20 MIN: CPT | Performed by: PHYSICIAN ASSISTANT

## 2023-11-17 PROCEDURE — 1123F ACP DISCUSS/DSCN MKR DOCD: CPT | Performed by: PHYSICIAN ASSISTANT

## 2023-11-17 PROCEDURE — G8399 PT W/DXA RESULTS DOCUMENT: HCPCS | Performed by: PHYSICIAN ASSISTANT

## 2023-11-17 PROCEDURE — 3017F COLORECTAL CA SCREEN DOC REV: CPT | Performed by: PHYSICIAN ASSISTANT

## 2023-11-17 PROCEDURE — 1036F TOBACCO NON-USER: CPT | Performed by: PHYSICIAN ASSISTANT

## 2023-11-17 PROCEDURE — 1090F PRES/ABSN URINE INCON ASSESS: CPT | Performed by: PHYSICIAN ASSISTANT

## 2023-11-17 PROCEDURE — G8427 DOCREV CUR MEDS BY ELIG CLIN: HCPCS | Performed by: PHYSICIAN ASSISTANT

## 2023-11-17 PROCEDURE — G8420 CALC BMI NORM PARAMETERS: HCPCS | Performed by: PHYSICIAN ASSISTANT

## 2023-11-17 RX ORDER — ONDANSETRON 4 MG/1
4 TABLET, FILM COATED ORAL DAILY PRN
Qty: 30 TABLET | Refills: 0 | Status: SHIPPED | OUTPATIENT
Start: 2023-11-17

## 2023-11-17 ASSESSMENT — COLUMBIA-SUICIDE SEVERITY RATING SCALE - C-SSRS
3. HAVE YOU BEEN THINKING ABOUT HOW YOU MIGHT KILL YOURSELF?: NO
7. DID THIS OCCUR IN THE LAST THREE MONTHS: NO
4. HAVE YOU HAD THESE THOUGHTS AND HAD SOME INTENTION OF ACTING ON THEM?: NO
5. HAVE YOU STARTED TO WORK OUT OR WORKED OUT THE DETAILS OF HOW TO KILL YOURSELF? DO YOU INTEND TO CARRY OUT THIS PLAN?: NO

## 2023-11-17 ASSESSMENT — PATIENT HEALTH QUESTIONNAIRE - PHQ9
5. POOR APPETITE OR OVEREATING: 0
6. FEELING BAD ABOUT YOURSELF - OR THAT YOU ARE A FAILURE OR HAVE LET YOURSELF OR YOUR FAMILY DOWN: 0
SUM OF ALL RESPONSES TO PHQ QUESTIONS 1-9: 0
8. MOVING OR SPEAKING SO SLOWLY THAT OTHER PEOPLE COULD HAVE NOTICED. OR THE OPPOSITE, BEING SO FIGETY OR RESTLESS THAT YOU HAVE BEEN MOVING AROUND A LOT MORE THAN USUAL: 0
2. FEELING DOWN, DEPRESSED OR HOPELESS: 0
SUM OF ALL RESPONSES TO PHQ QUESTIONS 1-9: 0
10. IF YOU CHECKED OFF ANY PROBLEMS, HOW DIFFICULT HAVE THESE PROBLEMS MADE IT FOR YOU TO DO YOUR WORK, TAKE CARE OF THINGS AT HOME, OR GET ALONG WITH OTHER PEOPLE: 0
SUM OF ALL RESPONSES TO PHQ9 QUESTIONS 1 & 2: 0
SUM OF ALL RESPONSES TO PHQ QUESTIONS 1-9: 0
1. LITTLE INTEREST OR PLEASURE IN DOING THINGS: 0
9. THOUGHTS THAT YOU WOULD BE BETTER OFF DEAD, OR OF HURTING YOURSELF: 0
4. FEELING TIRED OR HAVING LITTLE ENERGY: 0
7. TROUBLE CONCENTRATING ON THINGS, SUCH AS READING THE NEWSPAPER OR WATCHING TELEVISION: 0
3. TROUBLE FALLING OR STAYING ASLEEP: 0
SUM OF ALL RESPONSES TO PHQ QUESTIONS 1-9: 0

## 2023-11-17 ASSESSMENT — ENCOUNTER SYMPTOMS
BACK PAIN: 0
CHEST TIGHTNESS: 0
COUGH: 0
ABDOMINAL PAIN: 0
ABDOMINAL DISTENTION: 1
VOMITING: 0
SINUS PAIN: 0
SORE THROAT: 0
DIARRHEA: 0
SINUS PRESSURE: 0
SHORTNESS OF BREATH: 0
NAUSEA: 1

## 2023-11-17 ASSESSMENT — VISUAL ACUITY: OU: 1

## 2023-11-17 NOTE — PROGRESS NOTES
Hemoglobin A1C; Future  2. Abnormal blood sugar  -     Hemoglobin A1C; Future  3. Nausea  -     ondansetron (ZOFRAN) 4 MG tablet; Take 1 tablet by mouth daily as needed for Nausea or Vomiting, Disp-30 tablet, R-0Normal     Orders Placed This Encounter   Procedures    Hemoglobin A1C     Standing Status:   Future     Standing Expiration Date:   11/17/2024     Orders Placed This Encounter   Medications    ondansetron (ZOFRAN) 4 MG tablet     Sig: Take 1 tablet by mouth daily as needed for Nausea or Vomiting     Dispense:  30 tablet     Refill:  0     There are no discontinued medications. No follow-ups on file. Reviewed with the patient: current clinical status, medications, activities and diet. Side effects, adverse effects of the medication prescribed today, as well as treatment plan/ rationale and result expectations have been discussed with the patient who expresses understanding and desires to proceed. Close follow up to evaluate treatment results and for coordination of care. I have reviewed the patient's medical history in detail and updated the computerized patient record.     Prudencio Rinne, [place]

## 2023-11-28 DIAGNOSIS — E03.9 ACQUIRED HYPOTHYROIDISM: Chronic | ICD-10-CM

## 2023-11-28 RX ORDER — LEVOTHYROXINE SODIUM 0.05 MG/1
50 TABLET ORAL DAILY
Qty: 90 TABLET | Refills: 3 | Status: SHIPPED | OUTPATIENT
Start: 2023-11-28

## 2023-11-28 NOTE — TELEPHONE ENCOUNTER
Rx request   Requested Prescriptions     Pending Prescriptions Disp Refills    levothyroxine (SYNTHROID) 50 MCG tablet 90 tablet 3     Sig: Take 1 tablet by mouth Daily     LOV 11/17/2023  Next Visit Date:  Future Appointments   Date Time Provider [address] [place] [address]   12/12/2023 11:30 AM Luis Eduardo Coffman  [address]   [facility]   2/19/2024 11:45 AM Orly Marina MD [facility]

## 2023-12-08 NOTE — TELEPHONE ENCOUNTER
Comments:     Last Office Visit (last PCP visit):   11/17/2023    Next Visit Date:  Future Appointments   Date Time Provider Department Center   12/12/2023 11:30 AM Joseph, Nestor K, [facility]   1/17/2024 11:30 AM Dickson Mancilla [facility]   2/19/2024 11:45 AM Enrique Diallo MD [facility]       **If hasn't been seen in over a year OR hasn't followed up according to last diabetes/ADHD visit, make appointment for patient before sending refill to provider.    Rx requested:  Requested Prescriptions     Pending Prescriptions Disp Refills    PROLIA 60 MG/ML SOSY SC injection [Pharmacy Med Name: PROLIA 60 MG/ML SYRINGE] 1 mL      Sig: INJECT 1 ML UNDER THE SKIN ONCE FOR 1 DOSE.

## 2023-12-10 RX ORDER — DENOSUMAB 60 MG/ML
INJECTION SUBCUTANEOUS
Qty: 1 ML | Refills: 0 | Status: SHIPPED | OUTPATIENT
Start: 2023-12-10 | End: 2024-01-17

## 2023-12-26 DIAGNOSIS — E78.2 MIXED HYPERLIPIDEMIA: Chronic | ICD-10-CM

## 2023-12-26 DIAGNOSIS — G25.81 RESTLESS LEG SYNDROME: Chronic | ICD-10-CM

## 2023-12-26 RX ORDER — FENOFIBRATE 160 MG/1
160 TABLET ORAL DAILY
Qty: 90 TABLET | Refills: 4 | Status: SHIPPED | OUTPATIENT
Start: 2023-12-26

## 2023-12-26 RX ORDER — ROPINIROLE 0.5 MG/1
0.5 TABLET, FILM COATED ORAL 3 TIMES DAILY
Qty: 270 TABLET | Refills: 4 | Status: SHIPPED | OUTPATIENT
Start: 2023-12-26 | End: 2023-12-27 | Stop reason: SDUPTHER

## 2023-12-26 NOTE — TELEPHONE ENCOUNTER
pharmacy requesting medication refill.  Please approve or deny this request.    Rx requested:  Requested Prescriptions     Pending Prescriptions Disp Refills    fenofibrate (TRIGLIDE) 160 MG tablet 90 tablet 4     Sig: Take 1 tablet by mouth daily    rOPINIRole (REQUIP) 0.5 MG tablet 270 tablet 4     Sig: Take 1 tablet by mouth 3 times daily TAKE 1 TABLET BY MOUTH AT  NIGHT         Last Office Visit:   11/17/2023      Next Visit Date:  Future Appointments   Date Time Provider [address] [place] [address]   1/17/2024 11:30 AM LEODAN Turner [facility]   2/19/2024 11:45 AM Pete Craven MD [facility]

## 2023-12-27 DIAGNOSIS — G25.81 RESTLESS LEG SYNDROME: Chronic | ICD-10-CM

## 2023-12-27 RX ORDER — ROPINIROLE 0.5 MG/1
0.5 TABLET, FILM COATED ORAL 3 TIMES DAILY
Qty: 270 TABLET | Refills: 4 | Status: SHIPPED | OUTPATIENT
Start: 2023-12-27

## 2024-01-08 DIAGNOSIS — F32.A ANXIETY AND DEPRESSION: ICD-10-CM

## 2024-01-08 DIAGNOSIS — F41.9 ANXIETY AND DEPRESSION: ICD-10-CM

## 2024-01-09 RX ORDER — DULOXETIN HYDROCHLORIDE 60 MG/1
60 CAPSULE, DELAYED RELEASE ORAL DAILY
Qty: 90 CAPSULE | Refills: 3 | Status: SHIPPED | OUTPATIENT
Start: 2024-01-09

## 2024-01-12 DIAGNOSIS — K21.9 GASTROESOPHAGEAL REFLUX DISEASE WITHOUT ESOPHAGITIS: Chronic | ICD-10-CM

## 2024-01-16 RX ORDER — PANTOPRAZOLE SODIUM 40 MG/1
TABLET, DELAYED RELEASE ORAL
Qty: 30 TABLET | Refills: 11 | Status: SHIPPED | OUTPATIENT
Start: 2024-01-16

## 2024-01-16 NOTE — TELEPHONE ENCOUNTER
Comments:     Last Office Visit (last PCP visit):   11/17/2023    Next Visit Date:  Future Appointments   Date Time Provider Department Center   1/17/2024 11:30 AM Dickson Mancilla [facility]   2/19/2024 11:45 AM Enrique Diallo MD [facility]       **If hasn't been seen in over a year OR hasn't followed up according to last diabetes/ADHD visit, make appointment for patient before sending refill to provider.    Rx requested:  Requested Prescriptions     Pending Prescriptions Disp Refills    pantoprazole (PROTONIX) 40 MG tablet [Pharmacy Med Name: PANTOPRAZOLE SODIUM DR TABS 40MG] 30 tablet 11     Sig: TAKE 1 TABLET DAILY

## 2024-01-17 ENCOUNTER — OFFICE VISIT (OUTPATIENT)
Dept: FAMILY MEDICINE CLINIC | Age: 69
End: 2024-01-17

## 2024-01-17 VITALS
SYSTOLIC BLOOD PRESSURE: 116 MMHG | BODY MASS INDEX: 22.87 KG/M2 | DIASTOLIC BLOOD PRESSURE: 76 MMHG | WEIGHT: 106 LBS | HEART RATE: 85 BPM | HEIGHT: 57 IN | TEMPERATURE: 96.7 F | OXYGEN SATURATION: 99 %

## 2024-01-17 DIAGNOSIS — M81.0 AGE-RELATED OSTEOPOROSIS WITHOUT CURRENT PATHOLOGICAL FRACTURE: ICD-10-CM

## 2024-01-17 DIAGNOSIS — L40.50 PSORIASIS WITH ARTHROPATHY (HCC): ICD-10-CM

## 2024-01-17 DIAGNOSIS — G40.311 GENERALIZED IDIOPATHIC EPILEPSY AND EPILEPTIC SYNDROMES, INTRACTABLE, WITH STATUS EPILEPTICUS (HCC): ICD-10-CM

## 2024-01-17 DIAGNOSIS — E78.2 MIXED HYPERLIPIDEMIA: Chronic | ICD-10-CM

## 2024-01-17 DIAGNOSIS — E03.9 HYPOTHYROIDISM, UNSPECIFIED TYPE: Primary | Chronic | ICD-10-CM

## 2024-01-17 DIAGNOSIS — F41.9 ANXIETY AND DEPRESSION: ICD-10-CM

## 2024-01-17 DIAGNOSIS — G25.81 RESTLESS LEG SYNDROME: Chronic | ICD-10-CM

## 2024-01-17 DIAGNOSIS — E03.9 HYPOTHYROIDISM, UNSPECIFIED TYPE: Chronic | ICD-10-CM

## 2024-01-17 DIAGNOSIS — N18.32 STAGE 3B CHRONIC KIDNEY DISEASE (HCC): Chronic | ICD-10-CM

## 2024-01-17 DIAGNOSIS — F32.A ANXIETY AND DEPRESSION: ICD-10-CM

## 2024-01-17 DIAGNOSIS — R63.4 WEIGHT LOSS, UNINTENTIONAL: ICD-10-CM

## 2024-01-17 DIAGNOSIS — M85.89 OSTEOPENIA OF MULTIPLE SITES: ICD-10-CM

## 2024-01-17 LAB
ALBUMIN SERPL-MCNC: 4 G/DL (ref 3.5–4.6)
ALP SERPL-CCNC: 83 U/L (ref 40–130)
ALT SERPL-CCNC: 20 U/L (ref 0–33)
ANION GAP SERPL CALCULATED.3IONS-SCNC: 12 MEQ/L (ref 9–15)
AST SERPL-CCNC: 30 U/L (ref 0–35)
BASOPHILS # BLD: 0 K/UL (ref 0–0.2)
BASOPHILS NFR BLD: 0.4 %
BILIRUB SERPL-MCNC: 0.3 MG/DL (ref 0.2–0.7)
BUN SERPL-MCNC: 30 MG/DL (ref 8–23)
CALCIUM SERPL-MCNC: 9.9 MG/DL (ref 8.5–9.9)
CHLORIDE SERPL-SCNC: 99 MEQ/L (ref 95–107)
CO2 SERPL-SCNC: 28 MEQ/L (ref 20–31)
CREAT SERPL-MCNC: 1.07 MG/DL (ref 0.5–0.9)
EOSINOPHIL # BLD: 0.2 K/UL (ref 0–0.7)
EOSINOPHIL NFR BLD: 2.3 %
ERYTHROCYTE [DISTWIDTH] IN BLOOD BY AUTOMATED COUNT: 15.9 % (ref 11.5–14.5)
GLOBULIN SER CALC-MCNC: 3.4 G/DL (ref 2.3–3.5)
GLUCOSE SERPL-MCNC: 161 MG/DL (ref 70–99)
HCT VFR BLD AUTO: 42.5 % (ref 37–47)
HGB BLD-MCNC: 13.2 G/DL (ref 12–16)
LYMPHOCYTES # BLD: 1.5 K/UL (ref 1–4.8)
LYMPHOCYTES NFR BLD: 17.8 %
MCH RBC QN AUTO: 29.5 PG (ref 27–31.3)
MCHC RBC AUTO-ENTMCNC: 31.1 % (ref 33–37)
MCV RBC AUTO: 95.1 FL (ref 79.4–94.8)
MONOCYTES # BLD: 0.7 K/UL (ref 0.2–0.8)
MONOCYTES NFR BLD: 8.1 %
NEUTROPHILS # BLD: 5.9 K/UL (ref 1.4–6.5)
NEUTS SEG NFR BLD: 70.8 %
PLATELET # BLD AUTO: 279 K/UL (ref 130–400)
POTASSIUM SERPL-SCNC: 5.1 MEQ/L (ref 3.4–4.9)
PROT SERPL-MCNC: 7.4 G/DL (ref 6.3–8)
RBC # BLD AUTO: 4.47 M/UL (ref 4.2–5.4)
SODIUM SERPL-SCNC: 139 MEQ/L (ref 135–144)
T4 FREE SERPL-MCNC: 1.76 NG/DL (ref 0.84–1.68)
TSH SERPL-MCNC: 0.52 UIU/ML (ref 0.44–3.86)
WBC # BLD AUTO: 8.3 K/UL (ref 4.8–10.8)

## 2024-01-17 RX ORDER — CHOLECALCIFEROL (VITAMIN D3) 50 MCG
1 CAPSULE ORAL DAILY
Qty: 90 CAPSULE | Refills: 4 | Status: SHIPPED | OUTPATIENT
Start: 2024-01-17

## 2024-01-17 RX ORDER — ROPINIROLE 0.5 MG/1
0.5 TABLET, FILM COATED ORAL 3 TIMES DAILY
Qty: 270 TABLET | Refills: 4 | Status: SHIPPED | OUTPATIENT
Start: 2024-01-17

## 2024-01-17 RX ORDER — PHENOL 1.4 %
1 AEROSOL, SPRAY (ML) MUCOUS MEMBRANE DAILY
Qty: 90 TABLET | Refills: 4 | Status: SHIPPED | OUTPATIENT
Start: 2024-01-17

## 2024-01-17 ASSESSMENT — ENCOUNTER SYMPTOMS
NAUSEA: 1
CHEST TIGHTNESS: 0
BACK PAIN: 0
VOMITING: 0
ABDOMINAL DISTENTION: 1
SINUS PAIN: 0
SINUS PRESSURE: 0
ABDOMINAL PAIN: 0
DIARRHEA: 0
COUGH: 0
SORE THROAT: 0
SHORTNESS OF BREATH: 0

## 2024-01-17 ASSESSMENT — PATIENT HEALTH QUESTIONNAIRE - PHQ9
4. FEELING TIRED OR HAVING LITTLE ENERGY: 2
9. THOUGHTS THAT YOU WOULD BE BETTER OFF DEAD, OR OF HURTING YOURSELF: 0
SUM OF ALL RESPONSES TO PHQ9 QUESTIONS 1 & 2: 4
1. LITTLE INTEREST OR PLEASURE IN DOING THINGS: 2
SUM OF ALL RESPONSES TO PHQ QUESTIONS 1-9: 10
5. POOR APPETITE OR OVEREATING: 2
SUM OF ALL RESPONSES TO PHQ QUESTIONS 1-9: 10
3. TROUBLE FALLING OR STAYING ASLEEP: 2
8. MOVING OR SPEAKING SO SLOWLY THAT OTHER PEOPLE COULD HAVE NOTICED. OR THE OPPOSITE, BEING SO FIGETY OR RESTLESS THAT YOU HAVE BEEN MOVING AROUND A LOT MORE THAN USUAL: 0
SUM OF ALL RESPONSES TO PHQ QUESTIONS 1-9: 10
6. FEELING BAD ABOUT YOURSELF - OR THAT YOU ARE A FAILURE OR HAVE LET YOURSELF OR YOUR FAMILY DOWN: 0
10. IF YOU CHECKED OFF ANY PROBLEMS, HOW DIFFICULT HAVE THESE PROBLEMS MADE IT FOR YOU TO DO YOUR WORK, TAKE CARE OF THINGS AT HOME, OR GET ALONG WITH OTHER PEOPLE: 1
7. TROUBLE CONCENTRATING ON THINGS, SUCH AS READING THE NEWSPAPER OR WATCHING TELEVISION: 0
2. FEELING DOWN, DEPRESSED OR HOPELESS: 2
SUM OF ALL RESPONSES TO PHQ QUESTIONS 1-9: 10

## 2024-01-17 ASSESSMENT — VISUAL ACUITY: OU: 1

## 2024-01-17 NOTE — PROGRESS NOTES
pantoprazole (PROTONIX) 40 MG tablet TAKE 1 TABLET DAILY 30 tablet 11    DULoxetine (CYMBALTA) 60 MG extended release capsule TAKE 1 CAPSULE DAILY 90 capsule 3    fenofibrate (TRIGLIDE) 160 MG tablet Take 1 tablet by mouth daily 90 tablet 4    levothyroxine (SYNTHROID) 50 MCG tablet Take 1 tablet by mouth Daily 90 tablet 3    ondansetron (ZOFRAN) 4 MG tablet Take 1 tablet by mouth daily as needed for Nausea or Vomiting 30 tablet 0    Nutritional Supplements (ENSURE ACTIVE PO) Take by mouth      metoclopramide (REGLAN) 5 MG tablet Take 1 tablet by mouth 3 times daily 90 tablet 2    atorvastatin (LIPITOR) 40 MG tablet TAKE 1 TABLET BY MOUTH ONCE DAILY 90 tablet 3    levETIRAcetam (KEPPRA) 500 MG tablet TAKE 1 TABLET BY MOUTH TWICE DAILY 60 tablet 10    Calcium Carb-Cholecalciferol 1000-20 MG-MCG TABS Take 1 tablet by mouth daily 30 tablet 11    folic acid (FOLVITE) 1 MG tablet Take 1 tablet by mouth daily 30 tablet 5    Misc. Devices (ROLLATOR ULTRA-LIGHT) MISC 1 each by Does not apply route daily 1 each 0    Misc. Devices (FOLDING REACHER) MISC 1 each by Does not apply route daily 1 each 0    fexofenadine (ALLEGRA) 180 MG tablet Take 1 tablet by mouth daily as needed (allergies) 90 tablet 1    Magnesium 400 MG CAPS Take 1 capsule by mouth 3 times daily (Patient taking differently: Take 1 capsule by mouth in the morning and at bedtime) 270 capsule 4    Nutritional Supplements (ENSURE COMPLETE) LIQD Take 1 Bottle by mouth 2 times daily 60 each 5    aspirin EC 81 MG EC tablet Take 1 tablet by mouth daily 90 tablet 4    Handicap Placard MISC by Does not apply route Permanent: NO EXPIRATION 1 each 0    cyanocobalamin 1000 MCG tablet Take 1 tablet by mouth daily       No current facility-administered medications for this visit.       PMH, Surgical Hx, Family Hx, and Social Hx reviewed and updated.  Health Maintenance reviewed.    Objective  Vitals:    01/17/24 1127   BP: 116/76   Site: Right Upper Arm   Pulse: 85

## 2024-01-18 DIAGNOSIS — R73.09 ABNORMAL GLUCOSE: Primary | ICD-10-CM

## 2024-01-19 ENCOUNTER — TELEPHONE (OUTPATIENT)
Dept: FAMILY MEDICINE CLINIC | Age: 69
End: 2024-01-19

## 2024-01-19 DIAGNOSIS — R56.9 SEIZURE (HCC): ICD-10-CM

## 2024-01-19 RX ORDER — LEVETIRACETAM 500 MG/1
500 TABLET ORAL 2 TIMES DAILY
Qty: 60 TABLET | Refills: 0 | Status: SHIPPED | OUTPATIENT
Start: 2024-01-19 | End: 2024-01-24

## 2024-01-19 RX ORDER — LEVETIRACETAM 500 MG/1
TABLET ORAL
Qty: 180 TABLET | Refills: 4 | Status: SHIPPED | OUTPATIENT
Start: 2024-01-19

## 2024-01-22 ENCOUNTER — TELEPHONE (OUTPATIENT)
Dept: FAMILY MEDICINE CLINIC | Age: 69
End: 2024-01-22

## 2024-01-22 NOTE — TELEPHONE ENCOUNTER
FYI - Patient states she has not experienced any of the previous symptoms with the prolia injection form 1/17/2024 as she did from her last prolia injection.

## 2024-01-23 DIAGNOSIS — K21.9 GASTROESOPHAGEAL REFLUX DISEASE WITHOUT ESOPHAGITIS: Chronic | ICD-10-CM

## 2024-01-24 DIAGNOSIS — M85.89 OSTEOPENIA OF MULTIPLE SITES: Primary | ICD-10-CM

## 2024-01-24 RX ORDER — DENOSUMAB 60 MG/ML
60 INJECTION SUBCUTANEOUS ONCE
Qty: 1 ML | Refills: 0 | Status: SHIPPED | OUTPATIENT
Start: 2024-07-08 | End: 2024-07-08

## 2024-01-24 RX ORDER — PANTOPRAZOLE SODIUM 40 MG/1
TABLET, DELAYED RELEASE ORAL
Qty: 90 TABLET | Refills: 4 | Status: SHIPPED | OUTPATIENT
Start: 2024-01-24

## 2024-02-07 ENCOUNTER — TELEPHONE (OUTPATIENT)
Dept: FAMILY MEDICINE CLINIC | Age: 69
End: 2024-02-07

## 2024-02-07 RX ORDER — METHOTREXATE 2.5 MG/1
2.5 TABLET ORAL WEEKLY
COMMUNITY

## 2024-02-09 NOTE — TELEPHONE ENCOUNTER
Patient is asking that her FOLIC ACID Rx is sent to [facility] [address] Spoke with Nia Suggs, nurse mgr at facility, states pt fell today trying to escape facility again, c/o right leg pain, -loc, - thinners, pt was aggressive behavior yesterday and today      Genaro Bhat RN  07/15/22 2332 none

## 2024-02-09 NOTE — CARE COORDINATION
Infusion Nursing Note:  Jose Luis Butts presents today for Ferumoxytol (Feraheme) infusion #2 of 2..    Patient seen by provider today: No   present during visit today: Not Applicable.    Note: Patient arrives in infusion at baseline. No new concerns or complaints. Patient denies having SOB, fatigue, or heart palpitations  Patient denies having pain today.      Intravenous Access:  Peripheral IV placed.    Treatment Conditions:  Not Applicable.      Post Infusion Assessment:  Patient tolerated infusion without incident.  Patient observed for 30 minutes post Feraheme per protocol.  Blood return noted pre and post infusion.  Site patent and intact, free from redness, edema or discomfort.  No evidence of extravasations.  Access discontinued per protocol.       Discharge Plan:   Discharge instructions reviewed with: Patient and Family.  AVS to patient via [employer].  Patient will return 2/29/2024 for next appointment.   Patient discharged in stable condition accompanied by: self and wife.  Departure Mode: Ambulatory.      Chaitanya Berry RN   Telephone call to Mandi Doan/Juliann/Case Management. Mary Joyce indicated that voicemail was full. Unable to leave a message.

## 2024-02-19 ENCOUNTER — TELEMEDICINE (OUTPATIENT)
Dept: PULMONOLOGY | Age: 69
End: 2024-02-19
Payer: COMMERCIAL

## 2024-02-19 DIAGNOSIS — R05.9 COUGH, UNSPECIFIED TYPE: Primary | ICD-10-CM

## 2024-02-19 DIAGNOSIS — G25.81 RLS (RESTLESS LEGS SYNDROME): ICD-10-CM

## 2024-02-19 DIAGNOSIS — G47.00 INSOMNIA, UNSPECIFIED TYPE: ICD-10-CM

## 2024-02-19 DIAGNOSIS — R56.9 SEIZURE (HCC): ICD-10-CM

## 2024-02-19 PROCEDURE — 99443 PR PHYS/QHP TELEPHONE EVALUATION 21-30 MIN: CPT | Performed by: INTERNAL MEDICINE

## 2024-02-19 ASSESSMENT — ENCOUNTER SYMPTOMS
CHEST TIGHTNESS: 0
SORE THROAT: 0
TROUBLE SWALLOWING: 0
COUGH: 1
RHINORRHEA: 0
EYE DISCHARGE: 0
NAUSEA: 0
SINUS PRESSURE: 0
EYE ITCHING: 0
SHORTNESS OF BREATH: 0
VOMITING: 0
VOICE CHANGE: 0
WHEEZING: 0
DIARRHEA: 0
ABDOMINAL PAIN: 0

## 2024-02-19 NOTE — PROGRESS NOTES
Lauren Olson, was evaluated through a synchronous (real-time) audio-video encounter. The patient (or guardian if applicable) is aware that this is a billable service, which includes applicable co-pays. This Virtual Visit was conducted with patient's (and/or legal guardian's) consent. Patient identification was verified, and a caregiver was present when appropriate.   The patient was located at Home: [address] [place] [address]  [facility] [address]  Provider was located at Facility (Appt Dept): [address] [facility]  [address]  [place] [address]      Lauren Olson (:  1955) is a Established patient, presenting virtually for evaluation of the following:    Assessment & Plan   Below is the assessment and plan developed based on review of pertinent history, physical exam, labs, studies, and medications.  1. Cough, unspecified type  -     XR CHEST STANDARD (2 VW); Future  2. RLS (restless legs syndrome)  3. Insomnia, unspecified type    She has cough off and on. Dry cough.  She has albuterol  HFA.inhaler but she is not using bronchodilator.  C/o shortness of breath , No Wheezing. Feels tired with exertion.   HRCT on 2022: No evidence of ILD, clear lung parenchyma.  RLS on requip.  Chest x-ray before next visit.    Return in about 2 months (around 2024) for cough.       Subjective   HPI  She has cough off and on . Dry cough.  She has albuterol  HFA.inhaler but she is not using bronchodilator  C/o shortness of breath , No Wheezing. Feels tired with exertion. No Hemoptysis.No Chest tightness. No Chest pain with radiation or pleuritic pain.No leg edema. No orthopnea.No Fever or chills. No Rhinorrhea and postnasal drip.    HRCT on 2022: No evidence of ILD, clear lung parenchyma.  RLS on requip. She has chronic insomnia.     Review of Systems   Constitutional:  Positive for fatigue. Negative for chills, diaphoresis and fever.   HENT:  Negative for congestion, mouth sores, nosebleeds, postnasal drip,

## 2024-02-21 RX ORDER — LEVETIRACETAM 500 MG/1
TABLET ORAL
Qty: 60 TABLET | Refills: 12 | Status: SHIPPED | OUTPATIENT
Start: 2024-02-21

## 2024-03-08 ENCOUNTER — HOSPITAL ENCOUNTER (OUTPATIENT)
Dept: GENERAL RADIOLOGY | Age: 69
End: 2024-03-08
Attending: INTERNAL MEDICINE
Payer: COMMERCIAL

## 2024-03-08 DIAGNOSIS — R79.89 LOW SERUM CALCIUM: ICD-10-CM

## 2024-03-08 DIAGNOSIS — R05.9 COUGH, UNSPECIFIED TYPE: ICD-10-CM

## 2024-03-08 DIAGNOSIS — R73.03 PREDIABETES: ICD-10-CM

## 2024-03-08 DIAGNOSIS — R73.09 ABNORMAL BLOOD SUGAR: ICD-10-CM

## 2024-03-08 LAB
ANION GAP SERPL CALCULATED.3IONS-SCNC: 12 MEQ/L (ref 9–15)
BUN SERPL-MCNC: 30 MG/DL (ref 8–23)
CALCIUM SERPL-MCNC: 9.3 MG/DL (ref 8.5–9.9)
CHLORIDE SERPL-SCNC: 101 MEQ/L (ref 95–107)
CO2 SERPL-SCNC: 27 MEQ/L (ref 20–31)
CREAT SERPL-MCNC: 1.13 MG/DL (ref 0.5–0.9)
GLUCOSE SERPL-MCNC: 143 MG/DL (ref 70–99)
HBA1C MFR BLD: 7.2 % (ref 4.8–5.9)
POTASSIUM SERPL-SCNC: 4.2 MEQ/L (ref 3.4–4.9)
SODIUM SERPL-SCNC: 140 MEQ/L (ref 135–144)

## 2024-03-08 PROCEDURE — 71046 X-RAY EXAM CHEST 2 VIEWS: CPT

## 2024-03-11 DIAGNOSIS — R11.0 NAUSEA: ICD-10-CM

## 2024-03-11 NOTE — TELEPHONE ENCOUNTER
Comments:     Last Office Visit (last PCP visit):   1/17/2024    Next Visit Date:  Future Appointments   Date Time Provider Department Center   4/26/2024 11:30 AM Enrique Diallo MD [facility]   7/17/2024 11:30 AM Dickson Mancilla [facility]       **If hasn't been seen in over a year OR hasn't followed up according to last diabetes/ADHD visit, make appointment for patient before sending refill to provider.    Rx requested:  Requested Prescriptions     Pending Prescriptions Disp Refills    ondansetron (ZOFRAN) 4 MG tablet [Pharmacy Med Name: ONDANSETRON HCL 4 MG TABLET] 30 tablet 0     Sig: TAKE 1 TABLET BY MOUTH DAILY AS NEEDED FOR NAUSEA OR VOMITING

## 2024-03-12 RX ORDER — ONDANSETRON 4 MG/1
4 TABLET, FILM COATED ORAL DAILY PRN
Qty: 30 TABLET | Refills: 0 | Status: SHIPPED | OUTPATIENT
Start: 2024-03-12

## 2024-03-20 ENCOUNTER — TELEPHONE (OUTPATIENT)
Dept: FAMILY MEDICINE CLINIC | Age: 69
End: 2024-03-20

## 2024-03-20 NOTE — TELEPHONE ENCOUNTER
Pt is aware of results and is scheduled two weeks out. Pt started with a Headache on Friday and  congestion on one side of nostril , coughing  started Saturday. Pt took home test Monday and came back positive. Pt doesn't drive so she can't come out here. She is requesting medication for her congestion, cough sent to [facility] in [place].

## 2024-03-22 DIAGNOSIS — R11.0 NAUSEA: ICD-10-CM

## 2024-03-22 RX ORDER — ONDANSETRON 4 MG/1
4 TABLET, FILM COATED ORAL DAILY PRN
Qty: 30 TABLET | Refills: 0 | OUTPATIENT
Start: 2024-03-22

## 2024-03-24 ENCOUNTER — HOSPITAL ENCOUNTER (EMERGENCY)
Age: 69
Discharge: HOME | End: 2024-03-24
Attending: STUDENT IN AN ORGANIZED HEALTH CARE EDUCATION/TRAINING PROGRAM
Payer: COMMERCIAL

## 2024-03-24 VITALS
HEART RATE: 90 BPM | OXYGEN SATURATION: 94 % | TEMPERATURE: 97.5 F | DIASTOLIC BLOOD PRESSURE: 74 MMHG | RESPIRATION RATE: 18 BRPM | SYSTOLIC BLOOD PRESSURE: 110 MMHG

## 2024-03-24 DIAGNOSIS — R09.81 NASAL CONGESTION: Primary | ICD-10-CM

## 2024-03-24 PROCEDURE — 99283 EMERGENCY DEPT VISIT LOW MDM: CPT

## 2024-03-24 PROCEDURE — 6370000000 HC RX 637 (ALT 250 FOR IP): Performed by: STUDENT IN AN ORGANIZED HEALTH CARE EDUCATION/TRAINING PROGRAM

## 2024-03-24 RX ORDER — FLUTICASONE PROPIONATE 50 MCG
2 SPRAY, SUSPENSION (ML) NASAL ONCE
Status: COMPLETED | OUTPATIENT
Start: 2024-03-24 | End: 2024-03-24

## 2024-03-24 RX ORDER — FLUTICASONE PROPIONATE 50 MCG
2 SPRAY, SUSPENSION (ML) NASAL DAILY
Qty: 16 G | Refills: 0 | Status: SHIPPED | OUTPATIENT
Start: 2024-03-24

## 2024-03-24 RX ADMIN — FLUTICASONE PROPIONATE 2 SPRAY: 50 SPRAY, METERED NASAL at 04:48

## 2024-03-24 ASSESSMENT — PAIN - FUNCTIONAL ASSESSMENT: PAIN_FUNCTIONAL_ASSESSMENT: NONE - DENIES PAIN

## 2024-03-24 NOTE — DISCHARGE INSTRUCTIONS
Use of Flonase nasal spray to help with your congestion daily for the next 3 to 5 days, do not use it longer than that    Take Tylenol and Motrin for pain relief    Continue to take your breathing medications at home as needed, you can use Mucinex for congestion and cough    Follow-up with your physician assistant this week for recheck    Return if you develop fevers, chest pain, shortness of breath, nausea vomiting, abdominal pain, lightheadedness, or other worsening symptoms or concerns

## 2024-03-24 NOTE — ED PROVIDER NOTES
[facility] ED  Emergency Department Encounter  Emergency Medicine      Pt Name: Lauren Olson  MRN:[id number]  Birthdate 1955  Date of evaluation: 3/24/24  PCP:  Dickson Mancilla PA    CHIEF COMPLAINT       Chief Complaint   Patient presents with    Illness     Pt states she is covid positive, ems states she was given 1 duoneb       HISTORY OF PRESENT ILLNESS  (Location/Symptom, Timing/Onset, Context/Setting, Quality, Duration, ModifyingFactors, Severity.)      Lauren Olson is a 68 y.o. female presents with chief complaint of nasal congestion.  She said she was diagnosed with COVID last Saturday.  She said that her nose feels stuffy and she feels like she needs a treatment.  She was apparently given a DuoNeb treatment and route.  She still has the nasal congestion.  Denies any runny nose.  Denies any chest pain not feeling short of breath currently no cough no hemoptysis no abdominal pain nausea vomiting no diarrhea no leg swelling no fevers.  She has not taken anything for nasal congestion.  She has no other complaints at this time    PAST MEDICAL / SURGICAL / SOCIAL /FAMILY HISTORY      has a past medical history of Adhesive capsulitis of right shoulder, Alcoholism ([facility]), Arthritis, Bilateral leg and foot pain, Chronic back pain, Chronic lung disease, Cognitive impairment, Depression, Diabetes mellitus ([facility]), Diarrhea, Encephalopathy, Grief reaction, Hyperlipidemia, Hypertension, Hypokalemia, Hypomagnesemia, Hypothyroidism, Lymphadenopathy, axillary, Numbness and tingling of upper and lower extremities of both sides, RBBB (right bundle branch block), Second hand smoke exposure, Seizure ([facility]), Seronegative arthritis, Squamous blepharitis of upper and lower eyelids of both eyes, Stage 3b chronic kidney disease ([facility]), Syncope and collapse, and Thoracogenic scoliosis of thoracolumbar region.  No other pertinent PMH on review with patient/guardian.     has a past surgical history that

## 2024-03-24 NOTE — ED TRIAGE NOTES
Pt arrived to ED with c/o of illness. Pt states she tested positive for covid. Pt states ems was at her house earlier and gave her a duoneb tx and she felt better afterwards. PT states she called ems again because she started to feel worse. Pt states she can't breath out of her nose because of congestion. Pt is a&ox4

## 2024-03-24 NOTE — ED NOTES
Discharge instructions reviewed with pt.   Pt verbalized understanding with no questions or concerns.  Resps even, non labored.  Skin p/w/d.  No acute distress noted.  Pt is ambulatory - gait is steady.  Pt verbalized understanding to  prescription from pharmacy located on discharge papers.  VSS  GCS 15  ABCs intact.

## 2024-03-26 ENCOUNTER — TELEPHONE (OUTPATIENT)
Dept: FAMILY MEDICINE CLINIC | Age: 69
End: 2024-03-26

## 2024-03-26 NOTE — TELEPHONE ENCOUNTER
Pt called and stated that she tested positive for covid on 3/23/2024.    Pt went to the emergency room on 3/24/2024.    Pt stated that the ER told her to follow up with her provider.    Pt has an appointment here in the office on 4/3/2024 but doesn't want an earlier appointment. She just wants to make the office aware.    Pt stated that she will call the office if she starts feeling any worse.    Pt phone number [phone]

## 2024-04-03 ENCOUNTER — TELEMEDICINE (OUTPATIENT)
Dept: FAMILY MEDICINE CLINIC | Age: 69
End: 2024-04-03
Payer: COMMERCIAL

## 2024-04-03 DIAGNOSIS — E11.65 TYPE 2 DIABETES MELLITUS WITH HYPERGLYCEMIA, WITHOUT LONG-TERM CURRENT USE OF INSULIN (HCC): Primary | ICD-10-CM

## 2024-04-03 PROCEDURE — 3017F COLORECTAL CA SCREEN DOC REV: CPT | Performed by: PHYSICIAN ASSISTANT

## 2024-04-03 PROCEDURE — 3051F HG A1C>EQUAL 7.0%<8.0%: CPT | Performed by: PHYSICIAN ASSISTANT

## 2024-04-03 PROCEDURE — 2022F DILAT RTA XM EVC RTNOPTHY: CPT | Performed by: PHYSICIAN ASSISTANT

## 2024-04-03 PROCEDURE — 1123F ACP DISCUSS/DSCN MKR DOCD: CPT | Performed by: PHYSICIAN ASSISTANT

## 2024-04-03 PROCEDURE — G8427 DOCREV CUR MEDS BY ELIG CLIN: HCPCS | Performed by: PHYSICIAN ASSISTANT

## 2024-04-03 PROCEDURE — 99214 OFFICE O/P EST MOD 30 MIN: CPT | Performed by: PHYSICIAN ASSISTANT

## 2024-04-03 PROCEDURE — 1090F PRES/ABSN URINE INCON ASSESS: CPT | Performed by: PHYSICIAN ASSISTANT

## 2024-04-03 PROCEDURE — G8399 PT W/DXA RESULTS DOCUMENT: HCPCS | Performed by: PHYSICIAN ASSISTANT

## 2024-04-03 SDOH — ECONOMIC STABILITY: FOOD INSECURITY: WITHIN THE PAST 12 MONTHS, YOU WORRIED THAT YOUR FOOD WOULD RUN OUT BEFORE YOU GOT MONEY TO BUY MORE.: NEVER TRUE

## 2024-04-03 SDOH — ECONOMIC STABILITY: FOOD INSECURITY: WITHIN THE PAST 12 MONTHS, THE FOOD YOU BOUGHT JUST DIDN'T LAST AND YOU DIDN'T HAVE MONEY TO GET MORE.: NEVER TRUE

## 2024-04-03 SDOH — ECONOMIC STABILITY: INCOME INSECURITY: HOW HARD IS IT FOR YOU TO PAY FOR THE VERY BASICS LIKE FOOD, HOUSING, MEDICAL CARE, AND HEATING?: NOT HARD AT ALL

## 2024-04-03 ASSESSMENT — ENCOUNTER SYMPTOMS
NAUSEA: 0
SHORTNESS OF BREATH: 0
COUGH: 0
SINUS PAIN: 0
VOMITING: 0
CHEST TIGHTNESS: 0
ABDOMINAL PAIN: 0
SINUS PRESSURE: 0
SORE THROAT: 0
DIARRHEA: 0
BACK PAIN: 0

## 2024-04-03 NOTE — PROGRESS NOTES
Lauren Olson, was evaluated through a synchronous (real-time) audio-video encounter. The patient (or guardian if applicable) is aware that this is a billable service, which includes applicable co-pays. This Virtual Visit was conducted with patient's (and/or legal guardian's) consent. Patient identification was verified, and a caregiver was present when appropriate.   The patient was located at Home: [address] [place] [address]  [facility] [address]  Provider was located at Facility (Appt Dept): [address] [facility]  [address]  [place] [id number]  Confirm you are appropriately licensed, registered, or certified to deliver care in the state where the patient is located as indicated above. If you are not or unsure, please re-schedule the visit: Yes, I confirm.     Lauren Olson (:  1955) is a Established patient, presenting virtually for evaluation of the following:    Assessment & Plan   Below is the assessment and plan developed based on review of pertinent history, physical exam, labs, studies, and medications.  1. Type 2 diabetes mellitus with hyperglycemia, without long-term current use of insulin (HCC)  -     metFORMIN (GLUCOPHAGE) 500 MG tablet; Take 1 tablet by mouth 2 times daily (with meals), Disp-180 tablet, R-1Normal  - new onset. Having blurry vision at times. Needs ophthalmology evaluation. Will schedule.   No follow-ups on file.       Subjective   HPI  DM  - went over results  - A1c 7.2% on 24  - not on medication. Will start meds    Needs letter for jury duty.   Review of Systems   Constitutional:  Negative for activity change, appetite change, chills and fever.   HENT:  Negative for congestion, drooling, sinus pressure, sinus pain and sore throat.    Eyes:  Negative for visual disturbance.   Respiratory:  Negative for cough, chest tightness and shortness of breath.    Cardiovascular:  Negative for chest pain.   Gastrointestinal:  Negative for abdominal pain, diarrhea, nausea and vomiting. 
Last 3 Encounters:   03/24/24 110/74   01/17/24 116/76   11/17/23 110/68     Wt Readings from Last 3 Encounters:   01/17/24 48.1 kg (106 lb)   11/17/23 47.6 kg (105 lb)   10/19/23 47.6 kg (105 lb)       Lab Results   Component Value Date    LABA1C 7.2 (H) 03/08/2024    LABA1C 6.0 08/02/2023    LABA1C 6.9 (H) 05/01/2023     Lab Results   Component Value Date    CREATININE 1.13 (H) 03/08/2024     Lab Results   Component Value Date    ALT 20 01/17/2024    AST 30 01/17/2024     Lab Results   Component Value Date    CHOL 115 02/21/2023    TRIG 163 (H) 02/21/2023    HDL 39 (L) 02/21/2023    LDLCALC 43 02/21/2023          Physical Exam  Assessment & Plan   {There are no diagnoses linked to this encounter. (Refresh or delete this SmartLink)}   No orders of the defined types were placed in this encounter.    No orders of the defined types were placed in this encounter.    There are no discontinued medications.  No follow-ups on file.    {Time Documentation Optional:[id number]}    Reviewed with the patient: current clinical status, medications, activities and diet.     Side effects, adverse effects of the medication prescribed today, as well as treatment plan/ rationale and result expectations have been discussed with the patient who expresses understanding and desires to proceed.    Close follow up to evaluate treatment results and for coordination of care.  I have reviewed the patient's medical history in detail and updated the computerized patient record.    LEODAN Garcia

## 2024-04-05 ENCOUNTER — OFFICE VISIT (OUTPATIENT)
Dept: FAMILY MEDICINE CLINIC | Age: 69
End: 2024-04-05
Payer: COMMERCIAL

## 2024-04-05 VITALS
HEIGHT: 57 IN | WEIGHT: 102.29 LBS | SYSTOLIC BLOOD PRESSURE: 118 MMHG | HEART RATE: 96 BPM | BODY MASS INDEX: 22.07 KG/M2 | DIASTOLIC BLOOD PRESSURE: 70 MMHG | TEMPERATURE: 96.2 F | RESPIRATION RATE: 18 BRPM | OXYGEN SATURATION: 95 %

## 2024-04-05 DIAGNOSIS — E11.65 TYPE 2 DIABETES MELLITUS WITH HYPERGLYCEMIA, WITHOUT LONG-TERM CURRENT USE OF INSULIN (HCC): Primary | ICD-10-CM

## 2024-04-05 PROCEDURE — 3051F HG A1C>EQUAL 7.0%<8.0%: CPT | Performed by: PHYSICIAN ASSISTANT

## 2024-04-05 PROCEDURE — G8420 CALC BMI NORM PARAMETERS: HCPCS | Performed by: PHYSICIAN ASSISTANT

## 2024-04-05 PROCEDURE — 1123F ACP DISCUSS/DSCN MKR DOCD: CPT | Performed by: PHYSICIAN ASSISTANT

## 2024-04-05 PROCEDURE — G8427 DOCREV CUR MEDS BY ELIG CLIN: HCPCS | Performed by: PHYSICIAN ASSISTANT

## 2024-04-05 PROCEDURE — G8399 PT W/DXA RESULTS DOCUMENT: HCPCS | Performed by: PHYSICIAN ASSISTANT

## 2024-04-05 PROCEDURE — 99213 OFFICE O/P EST LOW 20 MIN: CPT | Performed by: PHYSICIAN ASSISTANT

## 2024-04-05 PROCEDURE — 3017F COLORECTAL CA SCREEN DOC REV: CPT | Performed by: PHYSICIAN ASSISTANT

## 2024-04-05 PROCEDURE — 1090F PRES/ABSN URINE INCON ASSESS: CPT | Performed by: PHYSICIAN ASSISTANT

## 2024-04-05 PROCEDURE — 2022F DILAT RTA XM EVC RTNOPTHY: CPT | Performed by: PHYSICIAN ASSISTANT

## 2024-04-05 PROCEDURE — 1036F TOBACCO NON-USER: CPT | Performed by: PHYSICIAN ASSISTANT

## 2024-04-05 ASSESSMENT — ENCOUNTER SYMPTOMS
CHEST TIGHTNESS: 0
BACK PAIN: 0
VOMITING: 0
SINUS PAIN: 0
DIARRHEA: 0
SHORTNESS OF BREATH: 0
NAUSEA: 0
COUGH: 0
SINUS PRESSURE: 0
ABDOMINAL PAIN: 0
SORE THROAT: 0

## 2024-04-05 ASSESSMENT — PATIENT HEALTH QUESTIONNAIRE - PHQ9
SUM OF ALL RESPONSES TO PHQ QUESTIONS 1-9: 0
9. THOUGHTS THAT YOU WOULD BE BETTER OFF DEAD, OR OF HURTING YOURSELF: NOT AT ALL
3. TROUBLE FALLING OR STAYING ASLEEP: NOT AT ALL
4. FEELING TIRED OR HAVING LITTLE ENERGY: NOT AT ALL
1. LITTLE INTEREST OR PLEASURE IN DOING THINGS: NOT AT ALL
SUM OF ALL RESPONSES TO PHQ QUESTIONS 1-9: 0
SUM OF ALL RESPONSES TO PHQ9 QUESTIONS 1 & 2: 0
8. MOVING OR SPEAKING SO SLOWLY THAT OTHER PEOPLE COULD HAVE NOTICED. OR THE OPPOSITE, BEING SO FIGETY OR RESTLESS THAT YOU HAVE BEEN MOVING AROUND A LOT MORE THAN USUAL: NOT AT ALL
5. POOR APPETITE OR OVEREATING: NOT AT ALL
10. IF YOU CHECKED OFF ANY PROBLEMS, HOW DIFFICULT HAVE THESE PROBLEMS MADE IT FOR YOU TO DO YOUR WORK, TAKE CARE OF THINGS AT HOME, OR GET ALONG WITH OTHER PEOPLE: NOT DIFFICULT AT ALL
7. TROUBLE CONCENTRATING ON THINGS, SUCH AS READING THE NEWSPAPER OR WATCHING TELEVISION: NOT AT ALL
6. FEELING BAD ABOUT YOURSELF - OR THAT YOU ARE A FAILURE OR HAVE LET YOURSELF OR YOUR FAMILY DOWN: NOT AT ALL
2. FEELING DOWN, DEPRESSED OR HOPELESS: NOT AT ALL

## 2024-04-05 ASSESSMENT — VISUAL ACUITY: OU: 1

## 2024-04-05 NOTE — PROGRESS NOTES
have some limitations but states able to function independently for most self care tasks. She does not drive related to history of seizures.  referral made for concerns with housing and transportation.     Social Determinants of Health     Financial Resource Strain: Low Risk  (4/3/2024)    Overall Financial Resource Strain (CARDIA)     Difficulty of Paying Living Expenses: Not hard at all   Food Insecurity: No Food Insecurity (4/3/2024)    Hunger Vital Sign     Worried About Running Out of Food in the Last Year: Never true     Ran Out of Food in the Last Year: Never true   Transportation Needs: Unknown (4/3/2024)    PRAPARE - Transportation     Lack of Transportation (Medical): Not on file     Lack of Transportation (Non-Medical): No   Physical Activity: Inactive (8/2/2023)    Exercise Vital Sign     Days of Exercise per Week: 0 days     Minutes of Exercise per Session: 0 min   Stress: Stress Concern Present (3/4/2021)    [place] [employer] [place] [employer] - Occupational Stress Questionnaire     Feeling of Stress : Rather much   Social Connections: Socially Isolated (3/4/2021)    Social Connection and Isolation Panel [NHANES]     Frequency of Communication with Friends and Family: Once a week     Frequency of Social Gatherings with Friends and Family: Once a week     Attends [employer] Services: Never     Active Member of Clubs or Organizations: No     Attends Club or Organization Meetings: Never     Marital Status: Never    Intimate Partner Violence: Not At Risk (3/4/2021)    Humiliation, Afraid, Rape, and Kick questionnaire     Fear of Current or Ex-Partner: No     Emotionally Abused: No     Physically Abused: No     Sexually Abused: No   Housing Stability: Unknown (4/3/2024)    Housing Stability Vital Sign     Unable to Pay for Housing in the Last Year: Not on file     Number of Places Lived in the Last Year: Not on file     Unstable Housing in the Last Year: No     Family History

## 2024-04-24 ENCOUNTER — TELEPHONE (OUTPATIENT)
Dept: FAMILY MEDICINE CLINIC | Age: 69
End: 2024-04-24

## 2024-04-24 DIAGNOSIS — E11.65 TYPE 2 DIABETES MELLITUS WITH HYPERGLYCEMIA, WITHOUT LONG-TERM CURRENT USE OF INSULIN (HCC): Primary | ICD-10-CM

## 2024-04-24 RX ORDER — BLOOD-GLUCOSE METER
1 KIT MISCELLANEOUS DAILY
Qty: 1 KIT | Refills: 0 | Status: SHIPPED | OUTPATIENT
Start: 2024-04-24

## 2024-04-24 RX ORDER — LANCETS 30 GAUGE
1 EACH MISCELLANEOUS 2 TIMES DAILY
Qty: 300 EACH | Refills: 1 | Status: SHIPPED | OUTPATIENT
Start: 2024-04-24

## 2024-04-24 RX ORDER — GLUCOSAMINE HCL/CHONDROITIN SU 500-400 MG
CAPSULE ORAL
Qty: 200 STRIP | Refills: 5 | Status: SHIPPED | OUTPATIENT
Start: 2024-04-24

## 2024-04-24 RX ORDER — UBIQUINOL 100 MG
1 CAPSULE ORAL 2 TIMES DAILY
Qty: 60 EACH | Refills: 12 | Status: SHIPPED | OUTPATIENT
Start: 2024-04-24

## 2024-04-24 NOTE — TELEPHONE ENCOUNTER
Patient is asking if she needs a glucose monitor now that she is taking metformin.    Please advise    Callback [phone]

## 2024-04-26 ENCOUNTER — TELEMEDICINE (OUTPATIENT)
Dept: PULMONOLOGY | Age: 69
End: 2024-04-26
Payer: COMMERCIAL

## 2024-04-26 DIAGNOSIS — G47.00 INSOMNIA, UNSPECIFIED TYPE: ICD-10-CM

## 2024-04-26 DIAGNOSIS — R94.2 ABNORMAL PFT: ICD-10-CM

## 2024-04-26 DIAGNOSIS — G25.81 RLS (RESTLESS LEGS SYNDROME): ICD-10-CM

## 2024-04-26 DIAGNOSIS — R05.9 COUGH, UNSPECIFIED TYPE: Primary | ICD-10-CM

## 2024-04-26 PROCEDURE — 99443 PR PHYS/QHP TELEPHONE EVALUATION 21-30 MIN: CPT | Performed by: INTERNAL MEDICINE

## 2024-04-26 NOTE — PROGRESS NOTES
Lauren Olson, was evaluated through a synchronous (real-time) telephone encounter. The patient (or guardian if applicable) is aware that this is a billable service, which includes applicable co-pays. This Virtual Visit was conducted with patient's (and/or legal guardian's) consent. Patient identification was verified, and a caregiver was present when appropriate.   The patient was located at Home: [address] [place] [address]  [facility] [address]  Provider was located at Facility (Appt Dept): [address] [facility]  [address]  [place] [address]  Yes, I confirm.         Subjective     HPI  She said she had Covid  about 1 month ago   She has cough off and on . Dry cough or sometimes bring mucus out.  C/o shortness of breath , No Wheezing. Feels tired with exertion. No Hemoptysis.No Chest tightness. No Chest pain with radiation or pleuritic pain.No leg edema. No orthopnea.No Fever or chills. No Rhinorrhea and postnasal drip.    She has albuterol  HFA.inhaler but she is not using bronchodilator       CXR No acute process.  Severe scoliosis.     HRCT on 11/1/2022: No evidence of ILD, clear lung parenchyma.      RLS on requip. She has chronic insomnia.     Current Outpatient Medications on File Prior to Visit   Medication Sig Dispense Refill    glucose monitoring kit 1 kit by Does not apply route daily 1 kit 0    blood glucose monitor strips Test 2 times a day & as needed for symptoms of irregular blood glucose. Dispense sufficient amount for indicated testing frequency plus additional to accommodate PRN testing needs. 200 strip 5    Lancets MISC 1 each by Does not apply route 2 times daily 300 each 1    Alcohol Swabs (ALCOHOL PREP) 70 % PADS 1 each by Does not apply route in the morning and at bedtime 60 each 12    metFORMIN (GLUCOPHAGE) 500 MG tablet Take 1 tablet by mouth 2 times daily (with meals) 180 tablet 1    fluticasone (FLONASE) 50 MCG/ACT nasal spray 2 sprays by Each Nostril route daily Use for 3-5 days 16 g 0

## 2024-04-29 ENCOUNTER — TELEPHONE (OUTPATIENT)
Dept: FAMILY MEDICINE CLINIC | Age: 69
End: 2024-04-29

## 2024-04-29 DIAGNOSIS — M25.561 CHRONIC PAIN OF RIGHT KNEE: Chronic | ICD-10-CM

## 2024-04-29 DIAGNOSIS — F41.9 ANXIETY: Primary | ICD-10-CM

## 2024-04-29 DIAGNOSIS — G89.29 CHRONIC PAIN OF RIGHT KNEE: Chronic | ICD-10-CM

## 2024-04-29 RX ORDER — HYDROXYZINE HYDROCHLORIDE 25 MG/1
25 TABLET, FILM COATED ORAL EVERY 8 HOURS PRN
Qty: 30 TABLET | Refills: 0 | Status: SHIPPED | OUTPATIENT
Start: 2024-04-29 | End: 2024-05-09

## 2024-04-29 RX ORDER — ACETAMINOPHEN 500 MG
1000 TABLET ORAL EVERY 8 HOURS PRN
Qty: 120 TABLET | Refills: 3 | Status: SHIPPED | OUTPATIENT
Start: 2024-04-29

## 2024-04-29 NOTE — TELEPHONE ENCOUNTER
Pt. Called in to request something for Anxiety she thinks the metformin caused her anxiety to flare up, and she is also having trouble with arthritis in her left leg. If you see fit [facility] [address] [place]. Please advise

## 2024-05-01 ENCOUNTER — TELEPHONE (OUTPATIENT)
Dept: FAMILY MEDICINE CLINIC | Age: 69
End: 2024-05-01

## 2024-05-01 DIAGNOSIS — E11.65 TYPE 2 DIABETES MELLITUS WITH HYPERGLYCEMIA, WITHOUT LONG-TERM CURRENT USE OF INSULIN (HCC): Primary | ICD-10-CM

## 2024-05-01 RX ORDER — PROCHLORPERAZINE 25 MG/1
1 SUPPOSITORY RECTAL
Qty: 3 EACH | Refills: 5 | Status: SHIPPED | OUTPATIENT
Start: 2024-05-01

## 2024-05-01 RX ORDER — PROCHLORPERAZINE 25 MG/1
1 SUPPOSITORY RECTAL DAILY
Qty: 1 EACH | Refills: 0 | Status: SHIPPED | OUTPATIENT
Start: 2024-05-01

## 2024-05-01 NOTE — TELEPHONE ENCOUNTER
Patient states she needs a Dexacom Blood Glucose Monitor. She does have the test strips but does not want to prick her fingers any longer. I advised to discuss at next office visit.    Please advise    Callback [phone]

## 2024-05-03 ENCOUNTER — TELEPHONE (OUTPATIENT)
Dept: FAMILY MEDICINE CLINIC | Age: 69
End: 2024-05-03

## 2024-05-03 ENCOUNTER — HOSPITAL ENCOUNTER (EMERGENCY)
Age: 69
Discharge: HOME | End: 2024-05-03
Attending: STUDENT IN AN ORGANIZED HEALTH CARE EDUCATION/TRAINING PROGRAM
Payer: COMMERCIAL

## 2024-05-03 VITALS
SYSTOLIC BLOOD PRESSURE: 110 MMHG | HEIGHT: 57 IN | DIASTOLIC BLOOD PRESSURE: 75 MMHG | TEMPERATURE: 97.8 F | OXYGEN SATURATION: 97 % | RESPIRATION RATE: 17 BRPM | BODY MASS INDEX: 22.01 KG/M2 | WEIGHT: 102 LBS | HEART RATE: 110 BPM

## 2024-05-03 DIAGNOSIS — M25.562 CHRONIC PAIN OF LEFT KNEE: Primary | ICD-10-CM

## 2024-05-03 DIAGNOSIS — G89.29 CHRONIC PAIN OF LEFT KNEE: Primary | ICD-10-CM

## 2024-05-03 PROCEDURE — 99283 EMERGENCY DEPT VISIT LOW MDM: CPT

## 2024-05-03 PROCEDURE — 6360000002 HC RX W HCPCS: Performed by: STUDENT IN AN ORGANIZED HEALTH CARE EDUCATION/TRAINING PROGRAM

## 2024-05-03 PROCEDURE — 96372 THER/PROPH/DIAG INJ SC/IM: CPT

## 2024-05-03 PROCEDURE — 99284 EMERGENCY DEPT VISIT MOD MDM: CPT

## 2024-05-03 RX ORDER — KETOROLAC TROMETHAMINE 15 MG/ML
15 INJECTION, SOLUTION INTRAMUSCULAR; INTRAVENOUS ONCE
Status: COMPLETED | OUTPATIENT
Start: 2024-05-03 | End: 2024-05-03

## 2024-05-03 RX ADMIN — KETOROLAC TROMETHAMINE 15 MG: 15 INJECTION, SOLUTION INTRAMUSCULAR; INTRAVENOUS at 04:06

## 2024-05-03 ASSESSMENT — LIFESTYLE VARIABLES
HOW MANY STANDARD DRINKS CONTAINING ALCOHOL DO YOU HAVE ON A TYPICAL DAY: PATIENT DOES NOT DRINK
HOW OFTEN DO YOU HAVE A DRINK CONTAINING ALCOHOL: NEVER

## 2024-05-03 ASSESSMENT — PAIN DESCRIPTION - LOCATION: LOCATION: LEG

## 2024-05-03 ASSESSMENT — PAIN - FUNCTIONAL ASSESSMENT: PAIN_FUNCTIONAL_ASSESSMENT: 0-10

## 2024-05-03 ASSESSMENT — PAIN SCALES - GENERAL
PAINLEVEL_OUTOF10: 7
PAINLEVEL_OUTOF10: 10

## 2024-05-03 NOTE — DISCHARGE INSTRUCTIONS
Use Voltaren gel as needed for pain  Continue Tylenol as needed  Follow-up with your regular doctor regarding your pain  Return for fevers redness worsening pain, rash or other symptoms or concerns

## 2024-05-03 NOTE — TELEPHONE ENCOUNTER
Sravani patients caregiver states patient was in the ED and was discharged early this morning (5/3/2024).    She states the patient was in the ED for anxiety and leg weakness.    She states the patient is also shivering and states she is not cold.    Sravani is concerned that the patient may be taking too much metformin, she is currently taking 500 mg 2x a day.    I spoke with the patients provider and he recommended taking 500 mg of metformin once a day. I advise Sravani of providers recommendations and scheduled the patient on 5/6/2024 for an ED follow up. I also advised to bring in the patients new dexcom continuous glucose monitor for help setting up if they are unable to set up at home.    # [phone]

## 2024-05-03 NOTE — ED PROVIDER NOTES
[facility] ED  Emergency Department Encounter  Emergency Medicine      Pt Name: Lauren Olson  MRN:[id number]  Birthdate 1955  Date of evaluation: 5/3/24  PCP:  Dickson Mancilla PA    CHIEF COMPLAINT       Chief Complaint   Patient presents with    Leg Pain     Chronic left knee pain. States she took tylenol around 1 AM without any relief.       HISTORY OF PRESENT ILLNESS  (Location/Symptom, Timing/Onset, Context/Setting, Quality, Duration, ModifyingFactors, Severity.)      Lauren Olson is a 68 y.o. female presents with chronic left knee pain.  She says she has arthritis, took Tylenol with minimal relief.  She denies any falls no rash no redness no fevers or chills.  She denies any other complaints, no numbness no back pain    PAST MEDICAL / SURGICAL / SOCIAL /FAMILY HISTORY      has a past medical history of Adhesive capsulitis of right shoulder, Alcoholism ([facility]), Arthritis, Bilateral leg and foot pain, Chronic back pain, Chronic lung disease, Cognitive impairment, Depression, Diabetes mellitus (HCC), Diarrhea, Encephalopathy, Grief reaction, Hyperlipidemia, Hypertension, Hypokalemia, Hypomagnesemia, Hypothyroidism, Lymphadenopathy, axillary, Numbness and tingling of upper and lower extremities of both sides, RBBB (right bundle branch block), Second hand smoke exposure, Seizure (HCC), Seronegative arthritis, Squamous blepharitis of upper and lower eyelids of both eyes, Stage 3b chronic kidney disease (HCC), Syncope and collapse, and Thoracogenic scoliosis of thoracolumbar region.  No other pertinent PMH on review with patient/guardian.     has a past surgical history that includes Hysterectomy; Appendectomy; Cholecystectomy; Biopsy mouth lesion (Bilateral, 12/19/2016); Upper gastrointestinal endoscopy (N/A, 01/21/2021); Colonoscopy (N/A, 01/21/2021); and Ovary removal.  No other pertinent PSH on review with patient/guardian.  Social History     Socioeconomic History    Marital status:

## 2024-05-03 NOTE — ED NOTES
D/C instructions given to patient no questions ask.Patient verbalized understanding and patient taken to waiting room to wait for a ride via wheelchair without any complications.

## 2024-05-07 ENCOUNTER — HOSPITAL ENCOUNTER (EMERGENCY)
Age: 69
Discharge: HOME | End: 2024-05-07
Attending: EMERGENCY MEDICINE
Payer: COMMERCIAL

## 2024-05-07 VITALS
TEMPERATURE: 97.7 F | BODY MASS INDEX: 22.01 KG/M2 | OXYGEN SATURATION: 97 % | HEART RATE: 103 BPM | SYSTOLIC BLOOD PRESSURE: 119 MMHG | WEIGHT: 102 LBS | RESPIRATION RATE: 17 BRPM | DIASTOLIC BLOOD PRESSURE: 76 MMHG | HEIGHT: 57 IN

## 2024-05-07 DIAGNOSIS — M25.562 CHRONIC PAIN OF LEFT KNEE: Primary | ICD-10-CM

## 2024-05-07 DIAGNOSIS — G89.29 CHRONIC PAIN OF LEFT KNEE: Primary | ICD-10-CM

## 2024-05-07 PROCEDURE — 6360000002 HC RX W HCPCS: Performed by: EMERGENCY MEDICINE

## 2024-05-07 PROCEDURE — 99283 EMERGENCY DEPT VISIT LOW MDM: CPT

## 2024-05-07 PROCEDURE — 96372 THER/PROPH/DIAG INJ SC/IM: CPT

## 2024-05-07 RX ORDER — LIDOCAINE 4 G/G
1 PATCH TOPICAL DAILY
Status: DISCONTINUED | OUTPATIENT
Start: 2024-05-07 | End: 2024-05-07 | Stop reason: HOSPADM

## 2024-05-07 RX ORDER — LIDOCAINE 50 MG/G
1 PATCH TOPICAL DAILY
Qty: 10 PATCH | Refills: 0 | Status: SHIPPED | OUTPATIENT
Start: 2024-05-07 | End: 2024-05-17

## 2024-05-07 RX ORDER — KETOROLAC TROMETHAMINE 15 MG/ML
15 INJECTION, SOLUTION INTRAMUSCULAR; INTRAVENOUS ONCE
Status: COMPLETED | OUTPATIENT
Start: 2024-05-07 | End: 2024-05-07

## 2024-05-07 RX ADMIN — KETOROLAC TROMETHAMINE 15 MG: 15 INJECTION, SOLUTION INTRAMUSCULAR; INTRAVENOUS at 03:53

## 2024-05-07 ASSESSMENT — ENCOUNTER SYMPTOMS
BACK PAIN: 0
EYE REDNESS: 0
FACIAL SWELLING: 0
VOMITING: 0
CHEST TIGHTNESS: 0
COUGH: 0
CHOKING: 0
PHOTOPHOBIA: 0
NAUSEA: 0
DIARRHEA: 0
EYE DISCHARGE: 0
COLOR CHANGE: 0
CONSTIPATION: 0
SHORTNESS OF BREATH: 0
ALLERGIC/IMMUNOLOGIC NEGATIVE: 1
SORE THROAT: 0
RHINORRHEA: 0
ABDOMINAL PAIN: 0

## 2024-05-07 ASSESSMENT — PAIN DESCRIPTION - LOCATION: LOCATION: KNEE

## 2024-05-07 ASSESSMENT — PAIN SCALES - GENERAL: PAINLEVEL_OUTOF10: 7

## 2024-05-07 ASSESSMENT — PAIN DESCRIPTION - ORIENTATION: ORIENTATION: LEFT

## 2024-05-07 ASSESSMENT — PAIN - FUNCTIONAL ASSESSMENT: PAIN_FUNCTIONAL_ASSESSMENT: 0-10

## 2024-05-07 NOTE — ED NOTES
Patient walking to the room door and said she is ready to go.  D/C instructions given to patient no questions ask.Patient verbalized understanding and ambulated from ED without any complications.

## 2024-05-07 NOTE — DISCHARGE INSTRUCTIONS
We would recommend that you follow up with your rheumatologist for further treatment and care.  We suspect that your knee pain is due to arthritis.  We would recommend follow-up in the emergency department for any worsening knee pain, difficulty walking, numbness, tingling, swelling, or any other concerns

## 2024-05-07 NOTE — ED PROVIDER NOTES
recognition program.  Efforts were made to edit the dictations but occasionally words are mis-transcribed.)    Lily Cha MD (electronically signed)  Attending Emergency Physician             Lily Cha MD  05/07/24 0706

## 2024-05-21 ENCOUNTER — TELEPHONE (OUTPATIENT)
Dept: FAMILY MEDICINE CLINIC | Age: 69
End: 2024-05-21

## 2024-05-21 DIAGNOSIS — E11.65 TYPE 2 DIABETES MELLITUS WITH HYPERGLYCEMIA, WITHOUT LONG-TERM CURRENT USE OF INSULIN (HCC): ICD-10-CM

## 2024-05-21 NOTE — TELEPHONE ENCOUNTER
Patients Caregiver states patient needs the Transmitter for her [facility]acom [facility] [address] She has everything else but no transmitter    Please advise    Callback [phone]

## 2024-05-22 DIAGNOSIS — Z79.4 TYPE 2 DIABETES MELLITUS WITHOUT COMPLICATION, WITH LONG-TERM CURRENT USE OF INSULIN (HCC): Primary | ICD-10-CM

## 2024-05-22 DIAGNOSIS — E11.9 TYPE 2 DIABETES MELLITUS WITHOUT COMPLICATION, WITH LONG-TERM CURRENT USE OF INSULIN (HCC): Primary | ICD-10-CM

## 2024-05-22 RX ORDER — PROCHLORPERAZINE 25 MG/1
1 SUPPOSITORY RECTAL
Qty: 3 EACH | Refills: 5 | Status: SHIPPED | OUTPATIENT
Start: 2024-05-22 | End: 2024-05-23 | Stop reason: SDUPTHER

## 2024-05-23 DIAGNOSIS — E11.65 TYPE 2 DIABETES MELLITUS WITH HYPERGLYCEMIA, WITHOUT LONG-TERM CURRENT USE OF INSULIN (HCC): ICD-10-CM

## 2024-05-23 RX ORDER — PROCHLORPERAZINE 25 MG/1
1 SUPPOSITORY RECTAL
Qty: 3 EACH | Refills: 5 | Status: SHIPPED | OUTPATIENT
Start: 2024-05-23

## 2024-05-23 RX ORDER — PROCHLORPERAZINE 25 MG/1
1 SUPPOSITORY RECTAL DAILY
Qty: 1 EACH | Refills: 0 | Status: CANCELLED | OUTPATIENT
Start: 2024-05-23

## 2024-05-23 NOTE — TELEPHONE ENCOUNTER
Patient needs refill on Dexcom G6 TRANSMITTER   [facility] [address] (Did not see on her med list)

## 2024-05-23 NOTE — TELEPHONE ENCOUNTER
Comments:     Last Office Visit (last PCP visit):   2024    Next Visit Date:  Future Appointments   Date Time Provider Department Center   2024 11:30 AM Dickson Mancilla PA [place] [facility]   2024 11:30 AM Enrique Diallo MD [place] [facility] [place]       **If hasn't been seen in over a year OR hasn't followed up according to last diabetes/ADHD visit, make appointment for patient before sending refill to provider.    Rx requested:  Requested Prescriptions     Pending Prescriptions Disp Refills    Continuous Glucose  (DEXCOM G6 ) TORI 1 each 0     Si each by Does not apply route daily

## 2024-05-24 ENCOUNTER — TELEPHONE (OUTPATIENT)
Dept: FAMILY MEDICINE CLINIC | Age: 69
End: 2024-05-24

## 2024-05-24 DIAGNOSIS — E11.65 TYPE 2 DIABETES MELLITUS WITH HYPERGLYCEMIA, WITHOUT LONG-TERM CURRENT USE OF INSULIN (HCC): Primary | ICD-10-CM

## 2024-05-24 RX ORDER — PROCHLORPERAZINE 25 MG/1
1 SUPPOSITORY RECTAL
Qty: 2 EACH | Refills: 5 | Status: SHIPPED | OUTPATIENT
Start: 2024-05-24

## 2024-05-24 NOTE — TELEPHONE ENCOUNTER
Caregiver states the transmitter still is not at pharmacy. She has sensor and  already. Only needs a transmitter.    Please advise    Callback [phone]

## 2024-05-26 DIAGNOSIS — R11.0 NAUSEA: ICD-10-CM

## 2024-05-26 DIAGNOSIS — F41.9 ANXIETY: ICD-10-CM

## 2024-05-26 DIAGNOSIS — U07.1 COVID: ICD-10-CM

## 2024-05-28 RX ORDER — DEXTROMETHORPHAN HYDROBROMIDE AND PROMETHAZINE HYDROCHLORIDE 15; 6.25 MG/5ML; MG/5ML
SYRUP ORAL
Qty: 118 ML | Refills: 0 | Status: SHIPPED | OUTPATIENT
Start: 2024-05-28

## 2024-05-28 RX ORDER — ONDANSETRON 4 MG/1
4 TABLET, FILM COATED ORAL DAILY PRN
Qty: 30 TABLET | Refills: 0 | Status: SHIPPED | OUTPATIENT
Start: 2024-05-28

## 2024-05-28 RX ORDER — HYDROXYZINE HYDROCHLORIDE 25 MG/1
25 TABLET, FILM COATED ORAL EVERY 8 HOURS PRN
Qty: 30 TABLET | Refills: 0 | Status: SHIPPED | OUTPATIENT
Start: 2024-05-28 | End: 2024-06-07

## 2024-05-28 RX ORDER — GUAIFENESIN 600 MG/1
600 TABLET, EXTENDED RELEASE ORAL 2 TIMES DAILY
Qty: 30 TABLET | Refills: 0 | Status: SHIPPED | OUTPATIENT
Start: 2024-05-28

## 2024-05-28 NOTE — TELEPHONE ENCOUNTER
Comments: \    Last Office Visit (last PCP visit):   4/5/2024    Next Visit Date:  Future Appointments   Date Time Provider Department Center   7/17/2024 11:30 AM Dickson Mancilla PA [place] [facility] [place]   8/26/2024 11:30 AM Enrique Diallo MD [place] [facility] [place]       **If hasn't been seen in over a year OR hasn't followed up according to last diabetes/ADHD visit, make appointment for patient before sending refill to provider.    Rx requested:  Requested Prescriptions     Pending Prescriptions Disp Refills    promethazine-dextromethorphan (PROMETHAZINE-DM) 6.25-15 MG/5ML syrup [Pharmacy Med Name: PROMETHAZINE-DM 6.25-15 MG/5ML] 118 mL 0     Sig: TAKE 5 MILLILITERS BY MOUTH 4 TIMES A DAY AS NEEDED FOR COUGH    MUCUS RELIEF 600 MG extended release tablet [Pharmacy Med Name: MUCUS RELIEF  MG TABLET] 30 tablet 0     Sig: TAKE 1 TABLET BY MOUTH 2 TIMES DAILY FOR 15 DAYS    hydrOXYzine HCl (ATARAX) 25 MG tablet [Pharmacy Med Name: HYDROXYZINE HCL 25 MG TABLET] 30 tablet 0     Sig: TAKE 1 TABLET BY MOUTH EVERY 8 HOURS AS NEEDED FOR ITCHING    ondansetron (ZOFRAN) 4 MG tablet [Pharmacy Med Name: ONDANSETRON HCL 4 MG TABLET] 30 tablet 0     Sig: TAKE 1 TABLET BY MOUTH DAILY AS NEEDED FOR NAUSEA OR VOMITING

## 2024-05-29 DIAGNOSIS — E11.65 TYPE 2 DIABETES MELLITUS WITH HYPERGLYCEMIA, WITHOUT LONG-TERM CURRENT USE OF INSULIN (HCC): ICD-10-CM

## 2024-05-30 RX ORDER — PROCHLORPERAZINE 25 MG/1
SUPPOSITORY RECTAL
Qty: 1 EACH | Refills: 5 | Status: SHIPPED | OUTPATIENT
Start: 2024-05-30

## 2024-05-30 NOTE — TELEPHONE ENCOUNTER
Last Office Visit (last PCP visit):   4/5/2024    Next Visit Date:  Future Appointments   Date Time Provider Department Center   7/17/2024 11:30 AM Dickson Mancilla PA [place] [facility] [place]   8/26/2024 11:30 AM Enrique Diallo MD [place] [facility] [place]       **If hasn't been seen in over a year OR hasn't followed up according to last diabetes/ADHD visit, make appointment for patient before sending refill to provider.    Rx requested:  Requested Prescriptions     Pending Prescriptions Disp Refills    Continuous Glucose  (DEXCOM G6 ) TORI [Pharmacy Med Name: DEXCOM G6 ]       Sig: USE AS DIRECTED

## 2024-06-24 DIAGNOSIS — R11.0 NAUSEA: ICD-10-CM

## 2024-06-24 RX ORDER — ONDANSETRON 4 MG/1
4 TABLET, FILM COATED ORAL DAILY PRN
Qty: 30 TABLET | Refills: 0 | Status: SHIPPED | OUTPATIENT
Start: 2024-06-24

## 2024-06-24 NOTE — TELEPHONE ENCOUNTER
Comments:     Last Office Visit (last PCP visit):   4/5/2024    Next Visit Date:  Future Appointments   Date Time Provider Department Center   7/17/2024 11:30 AM Dickson Mancilla PA [place] [facility] [place]   8/26/2024 11:30 AM Enrique Diallo MD [place] [facility] [place]       **If hasn't been seen in over a year OR hasn't followed up according to last diabetes/ADHD visit, make appointment for patient before sending refill to provider.    Rx requested:  Requested Prescriptions     Pending Prescriptions Disp Refills    ondansetron (ZOFRAN) 4 MG tablet [Pharmacy Med Name: ONDANSETRON HCL 4 MG TABLET] 30 tablet 0     Sig: TAKE 1 TABLET BY MOUTH DAILY AS NEEDED FOR NAUSEA OR VOMITING

## 2024-07-12 ENCOUNTER — TELEPHONE (OUTPATIENT)
Dept: FAMILY MEDICINE CLINIC | Age: 69
End: 2024-07-12

## 2024-07-12 DIAGNOSIS — M85.89 OSTEOPENIA OF MULTIPLE SITES: ICD-10-CM

## 2024-07-12 RX ORDER — FOLIC ACID 1 MG/1
1 TABLET ORAL DAILY
Qty: 30 TABLET | Refills: 5 | Status: CANCELLED | OUTPATIENT
Start: 2024-07-12

## 2024-07-12 RX ORDER — DENOSUMAB 60 MG/ML
60 INJECTION SUBCUTANEOUS ONCE
Qty: 1 ML | Refills: 0 | Status: SHIPPED | OUTPATIENT
Start: 2024-07-12 | End: 2024-07-12

## 2024-07-12 RX ORDER — DENOSUMAB 60 MG/ML
60 INJECTION SUBCUTANEOUS ONCE
Qty: 1 ML | Refills: 0 | Status: CANCELLED | OUTPATIENT
Start: 2024-07-12 | End: 2024-07-12

## 2024-07-12 NOTE — PROGRESS NOTES
LEODAN Forman   Continuous Glucose Transmitter (DEXCOM G6 TRANSMITTER) MISC 1 each by Does not apply route every 3 months Yes Dickson Mancilla PA   Continuous Glucose Sensor (DEXCOM G6 SENSOR) MISC 1 each by Does not apply route every 10 days Yes Dickson Mancilla PA   acetaminophen (TYLENOL) 500 MG tablet Take 2 tablets by mouth every 8 hours as needed for Pain Yes Dickson Mancilla PA   metFORMIN (GLUCOPHAGE) 500 MG tablet Take 1 tablet by mouth 2 times daily (with meals)  Patient taking differently: Take 0.5 tablets by mouth 2 times daily (with meals) 1 in morning Yes Dickson Mancilla PA   levETIRAcetam (KEPPRA) 500 MG tablet TAKE 1 TABLET BY MOUTH TWICE A DAY Yes Dickson Mancilla PA   methotrexate (RHEUMATREX) 2.5 MG chemo tablet Take 1 tablet by mouth once a week Take 4 tablets weekly by mouth Yes Provider, MD Tiara   pantoprazole (PROTONIX) 40 MG tablet TAKE 1 TABLET DAILY Yes Dickson Mancilla PA   rOPINIRole (REQUIP) 0.5 MG tablet Take 1 tablet by mouth 3 times daily Yes Dickson Mancilla PA   calcium carbonate (CALCIUM 600) 600 MG TABS tablet Take 1 tablet by mouth daily Yes Dickson Mancilla PA   Cholecalciferol (HM VITAMIN D3) 100 MCG (4000 UT) CAPS Take 1 tablet by mouth daily Yes Dickson Mancilla PA   DULoxetine (CYMBALTA) 60 MG extended release capsule TAKE 1 CAPSULE DAILY Yes Dickson Mancilla PA   fenofibrate (TRIGLIDE) 160 MG tablet Take 1 tablet by mouth daily Yes Dickson Mancilla PA   levothyroxine (SYNTHROID) 50 MCG tablet Take 1 tablet by mouth Daily Yes Dickson Mancilla PA   atorvastatin (LIPITOR) 40 MG tablet TAKE 1 TABLET BY MOUTH ONCE DAILY Yes Dickson Mancilla PA   Calcium Carb-Cholecalciferol 1000-20 MG-MCG TABS Take 1 tablet by mouth daily Yes Dickson Mancilla PA   folic acid (FOLVITE) 1 MG tablet Take 1 tablet by mouth daily Yes Dickson Mancilla PA   Misc. Devices (ROLLATOR ULTRA-LIGHT) MISC 1 each by Does not apply route daily Yes Dickson Mancilla PA   Misc. Devices (FOLDING REACHER) MISC 1 each by Does

## 2024-07-17 ENCOUNTER — OFFICE VISIT (OUTPATIENT)
Dept: FAMILY MEDICINE CLINIC | Age: 69
End: 2024-07-17

## 2024-07-17 VITALS
TEMPERATURE: 97.8 F | WEIGHT: 88.2 LBS | HEIGHT: 57 IN | SYSTOLIC BLOOD PRESSURE: 116 MMHG | BODY MASS INDEX: 19.03 KG/M2 | OXYGEN SATURATION: 99 % | RESPIRATION RATE: 16 BRPM | HEART RATE: 98 BPM | DIASTOLIC BLOOD PRESSURE: 76 MMHG

## 2024-07-17 DIAGNOSIS — G25.81 RESTLESS LEG SYNDROME: ICD-10-CM

## 2024-07-17 DIAGNOSIS — E78.2 MIXED HYPERLIPIDEMIA: ICD-10-CM

## 2024-07-17 DIAGNOSIS — R79.89 ABNORMAL TSH: Primary | ICD-10-CM

## 2024-07-17 DIAGNOSIS — Z00.00 MEDICARE ANNUAL WELLNESS VISIT, SUBSEQUENT: ICD-10-CM

## 2024-07-17 DIAGNOSIS — M85.89 OSTEOPENIA OF MULTIPLE SITES: ICD-10-CM

## 2024-07-17 DIAGNOSIS — R42 VERTIGO: ICD-10-CM

## 2024-07-17 DIAGNOSIS — R63.4 WEIGHT LOSS: ICD-10-CM

## 2024-07-17 DIAGNOSIS — E03.9 HYPOTHYROIDISM, UNSPECIFIED TYPE: ICD-10-CM

## 2024-07-17 DIAGNOSIS — R42 DIZZINESS: ICD-10-CM

## 2024-07-17 DIAGNOSIS — E11.65 TYPE 2 DIABETES MELLITUS WITH HYPERGLYCEMIA, WITHOUT LONG-TERM CURRENT USE OF INSULIN (HCC): ICD-10-CM

## 2024-07-17 DIAGNOSIS — H53.8 BLURRED VISION, LEFT EYE: ICD-10-CM

## 2024-07-17 DIAGNOSIS — N18.32 STAGE 3B CHRONIC KIDNEY DISEASE (HCC): ICD-10-CM

## 2024-07-17 DIAGNOSIS — G45.9 TIA (TRANSIENT ISCHEMIC ATTACK): ICD-10-CM

## 2024-07-17 ASSESSMENT — PATIENT HEALTH QUESTIONNAIRE - PHQ9
5. POOR APPETITE OR OVEREATING: SEVERAL DAYS
4. FEELING TIRED OR HAVING LITTLE ENERGY: SEVERAL DAYS
SUM OF ALL RESPONSES TO PHQ QUESTIONS 1-9: 4
SUM OF ALL RESPONSES TO PHQ QUESTIONS 1-9: 4
9. THOUGHTS THAT YOU WOULD BE BETTER OFF DEAD, OR OF HURTING YOURSELF: NOT AT ALL
10. IF YOU CHECKED OFF ANY PROBLEMS, HOW DIFFICULT HAVE THESE PROBLEMS MADE IT FOR YOU TO DO YOUR WORK, TAKE CARE OF THINGS AT HOME, OR GET ALONG WITH OTHER PEOPLE: NOT DIFFICULT AT ALL
1. LITTLE INTEREST OR PLEASURE IN DOING THINGS: NOT AT ALL
8. MOVING OR SPEAKING SO SLOWLY THAT OTHER PEOPLE COULD HAVE NOTICED. OR THE OPPOSITE, BEING SO FIGETY OR RESTLESS THAT YOU HAVE BEEN MOVING AROUND A LOT MORE THAN USUAL: NOT AT ALL
6. FEELING BAD ABOUT YOURSELF - OR THAT YOU ARE A FAILURE OR HAVE LET YOURSELF OR YOUR FAMILY DOWN: SEVERAL DAYS
SUM OF ALL RESPONSES TO PHQ QUESTIONS 1-9: 4
3. TROUBLE FALLING OR STAYING ASLEEP: NOT AT ALL
SUM OF ALL RESPONSES TO PHQ9 QUESTIONS 1 & 2: 1
2. FEELING DOWN, DEPRESSED OR HOPELESS: SEVERAL DAYS
7. TROUBLE CONCENTRATING ON THINGS, SUCH AS READING THE NEWSPAPER OR WATCHING TELEVISION: NOT AT ALL
SUM OF ALL RESPONSES TO PHQ QUESTIONS 1-9: 4

## 2024-07-17 ASSESSMENT — LIFESTYLE VARIABLES: HOW MANY STANDARD DRINKS CONTAINING ALCOHOL DO YOU HAVE ON A TYPICAL DAY: PATIENT DOES NOT DRINK

## 2024-07-19 DIAGNOSIS — E03.9 HYPOTHYROIDISM, UNSPECIFIED TYPE: ICD-10-CM

## 2024-07-19 DIAGNOSIS — N18.32 STAGE 3B CHRONIC KIDNEY DISEASE (HCC): ICD-10-CM

## 2024-07-19 DIAGNOSIS — E78.2 MIXED HYPERLIPIDEMIA: ICD-10-CM

## 2024-07-19 DIAGNOSIS — R79.89 ABNORMAL TSH: ICD-10-CM

## 2024-07-19 DIAGNOSIS — E11.65 TYPE 2 DIABETES MELLITUS WITH HYPERGLYCEMIA, WITHOUT LONG-TERM CURRENT USE OF INSULIN (HCC): ICD-10-CM

## 2024-07-19 LAB
ANION GAP SERPL CALCULATED.3IONS-SCNC: 15 MEQ/L (ref 9–15)
BUN SERPL-MCNC: 22 MG/DL (ref 8–23)
CALCIUM SERPL-MCNC: 9.8 MG/DL (ref 8.5–9.9)
CHLORIDE SERPL-SCNC: 103 MEQ/L (ref 95–107)
CHOLEST SERPL-MCNC: 121 MG/DL (ref 0–199)
CO2 SERPL-SCNC: 24 MEQ/L (ref 20–31)
CREAT SERPL-MCNC: 0.83 MG/DL (ref 0.5–0.9)
ESTIMATED AVERAGE GLUCOSE: 120 MG/DL
GLUCOSE SERPL-MCNC: 117 MG/DL (ref 70–99)
HBA1C MFR BLD: 5.8 % (ref 4–6)
HDLC SERPL-MCNC: 39 MG/DL (ref 40–59)
LDLC SERPL CALC-MCNC: 51 MG/DL (ref 0–129)
POTASSIUM SERPL-SCNC: 4.4 MEQ/L (ref 3.4–4.9)
SODIUM SERPL-SCNC: 142 MEQ/L (ref 135–144)
T4 FREE SERPL-MCNC: 1.97 NG/DL (ref 0.84–1.68)
TRIGL SERPL-MCNC: 157 MG/DL (ref 0–150)
TSH SERPL-MCNC: 0.31 UIU/ML (ref 0.44–3.86)

## 2024-07-21 DIAGNOSIS — E11.65 TYPE 2 DIABETES MELLITUS WITH HYPERGLYCEMIA, WITHOUT LONG-TERM CURRENT USE OF INSULIN (HCC): ICD-10-CM

## 2024-07-21 DIAGNOSIS — R11.0 NAUSEA: ICD-10-CM

## 2024-07-21 DIAGNOSIS — E03.9 ACQUIRED HYPOTHYROIDISM: Primary | ICD-10-CM

## 2024-07-22 RX ORDER — ONDANSETRON 4 MG/1
4 TABLET, FILM COATED ORAL DAILY PRN
Qty: 30 TABLET | Refills: 0 | Status: SHIPPED | OUTPATIENT
Start: 2024-07-22

## 2024-07-22 NOTE — TELEPHONE ENCOUNTER
Comments:     Last Office Visit (last PCP visit):   Visit date not found    Next Visit Date:  Future Appointments   Date Time Provider Department Center   8/14/2024 11:30 AM Dickson Mancilla PA [place] [facility] [place]   8/26/2024 11:30 AM Enrique Diallo MD [place] [facility] [place]   9/4/2024  2:00 PM Slavik-Bosworth, Kelly J, APRN - CNP [facility]       **If hasn't been seen in over a year OR hasn't followed up according to last diabetes/ADHD visit, make appointment for patient before sending refill to provider.    Rx requested:  Requested Prescriptions     Pending Prescriptions Disp Refills    ondansetron (ZOFRAN) 4 MG tablet [Pharmacy Med Name: ONDANSETRON HCL 4 MG TABLET] 30 tablet 0     Sig: TAKE 1 TABLET BY MOUTH DAILY AS NEEDED FOR NAUSEA OR VOMITING

## 2024-07-23 ENCOUNTER — TELEPHONE (OUTPATIENT)
Dept: FAMILY MEDICINE CLINIC | Age: 69
End: 2024-07-23

## 2024-07-23 DIAGNOSIS — H53.8 BLURRED VISION, LEFT EYE: Primary | ICD-10-CM

## 2024-07-23 NOTE — TELEPHONE ENCOUNTER
In the future please obtain what referral patient is speaking of and provider.   - eye examination can be done in [place] at the location provide. Not sure why she wasn't scheduled there. Possible miss communication.     Please advise patient and/or help her schedule closer

## 2024-07-23 NOTE — TELEPHONE ENCOUNTER
Patient states she is not gong to see a provider all the way out in [place].    Patient states she has an appointment on 7/31/2024 for vertigo.    # [phone]

## 2024-07-23 NOTE — TELEPHONE ENCOUNTER
Called patient and left a VM for her to call back about scheduling with a closer provider.    Patients referral was closed after they were unable to reach her to schedule. We will need a new referral.

## 2024-07-23 NOTE — TELEPHONE ENCOUNTER
Patient will need a new referral, the previous one was closed after they were unable to contact her to schedule.

## 2024-07-26 ENCOUNTER — APPOINTMENT (OUTPATIENT)
Dept: ULTRASOUND IMAGING | Age: 69
End: 2024-07-26
Payer: COMMERCIAL

## 2024-07-26 ENCOUNTER — HOSPITAL ENCOUNTER (OUTPATIENT)
Dept: ULTRASOUND IMAGING | Age: 69
End: 2024-07-26
Payer: COMMERCIAL

## 2024-07-26 DIAGNOSIS — R42 DIZZINESS: ICD-10-CM

## 2024-07-26 DIAGNOSIS — G45.9 TIA (TRANSIENT ISCHEMIC ATTACK): ICD-10-CM

## 2024-07-26 DIAGNOSIS — H53.8 BLURRED VISION, LEFT EYE: ICD-10-CM

## 2024-07-26 PROCEDURE — 93880 EXTRACRANIAL BILAT STUDY: CPT

## 2024-08-13 NOTE — PROGRESS NOTES
Subjective  Lauren Olson 1955 is a 69 y.o. female who presents today with:  Chief Complaint   Patient presents with    Follow-up Chronic Condition     Pt here for fU and is concerned with thyroid   Pt left urine but has no symptoms of UTI  Pt reports dizziness         HPI  Dizziness  - carotid US neg     Hypothyroidism  - her TSH and T4: 0.311 and 1.97 respectively.   - She was to hold her levothyroxine 50 mcg. She did not repeat her TSH and T4 levels  - did not repeat thyroid testing as requested before seeing me. We will check. Continue to keep holding.     DM  - last appointment patient was started on metformin 500 mg BID- was switched to 250 mg due to SE. On 250 mg BID.  -A1c 5.8% at last appointment. She would like to stop metformin.     Osteopenia  - prolia injection sent in, but never processed. We will send to local pharmacy.     GERD/Delayed gastric Emptying  - on protonix.   - seen with GI for this. Losing weight from not eating.      Weakness/Falls   complaining of limited mobility and gait. Patient feels that she is having harder time getter around due to pain. She feel unsteady when ambulating on days she is having worse pain.  - additionally patient having harder time with overhead motion. Unable to reach above her head due to chronic tendinopathy  of bilateral shoulders.   - patient at increased risk of falling. She is also weak due to inability to eat. She would benefit from walk-in shower.   Review of Systems   Constitutional:  Negative for activity change, appetite change, chills and fever.   HENT:  Negative for congestion, drooling, sinus pressure, sinus pain and sore throat.    Eyes:  Negative for visual disturbance.   Respiratory:  Negative for cough, chest tightness and shortness of breath.    Cardiovascular:  Negative for chest pain.   Gastrointestinal:  Positive for abdominal distention and nausea. Negative for abdominal pain, diarrhea and vomiting.   Endocrine: Negative for

## 2024-08-14 ENCOUNTER — OFFICE VISIT (OUTPATIENT)
Dept: FAMILY MEDICINE CLINIC | Age: 69
End: 2024-08-14
Payer: COMMERCIAL

## 2024-08-14 VITALS
TEMPERATURE: 96.8 F | WEIGHT: 89.29 LBS | BODY MASS INDEX: 19.26 KG/M2 | SYSTOLIC BLOOD PRESSURE: 120 MMHG | DIASTOLIC BLOOD PRESSURE: 80 MMHG | HEIGHT: 57 IN | OXYGEN SATURATION: 96 % | RESPIRATION RATE: 16 BRPM | HEART RATE: 87 BPM

## 2024-08-14 DIAGNOSIS — Z91.81 AT MODERATE RISK FOR FALL: ICD-10-CM

## 2024-08-14 DIAGNOSIS — R20.2 NUMBNESS AND TINGLING OF UPPER AND LOWER EXTREMITIES OF BOTH SIDES: Chronic | ICD-10-CM

## 2024-08-14 DIAGNOSIS — E03.9 HYPOTHYROIDISM, UNSPECIFIED TYPE: ICD-10-CM

## 2024-08-14 DIAGNOSIS — R29.898 WEAKNESS OF LEFT LOWER EXTREMITY: ICD-10-CM

## 2024-08-14 DIAGNOSIS — E03.9 HYPOTHYROIDISM, UNSPECIFIED TYPE: Primary | ICD-10-CM

## 2024-08-14 DIAGNOSIS — G89.29 CHRONIC PAIN OF RIGHT KNEE: Chronic | ICD-10-CM

## 2024-08-14 DIAGNOSIS — R20.0 NUMBNESS AND TINGLING OF UPPER AND LOWER EXTREMITIES OF BOTH SIDES: Chronic | ICD-10-CM

## 2024-08-14 DIAGNOSIS — M85.89 OSTEOPENIA OF MULTIPLE SITES: ICD-10-CM

## 2024-08-14 DIAGNOSIS — R42 VERTIGO: ICD-10-CM

## 2024-08-14 DIAGNOSIS — M25.561 CHRONIC PAIN OF RIGHT KNEE: Chronic | ICD-10-CM

## 2024-08-14 LAB
T4 FREE SERPL-MCNC: 0.85 NG/DL (ref 0.84–1.68)
TSH SERPL-MCNC: 14.45 UIU/ML (ref 0.44–3.86)

## 2024-08-14 PROCEDURE — 99214 OFFICE O/P EST MOD 30 MIN: CPT | Performed by: PHYSICIAN ASSISTANT

## 2024-08-14 PROCEDURE — 3017F COLORECTAL CA SCREEN DOC REV: CPT | Performed by: PHYSICIAN ASSISTANT

## 2024-08-14 PROCEDURE — G8399 PT W/DXA RESULTS DOCUMENT: HCPCS | Performed by: PHYSICIAN ASSISTANT

## 2024-08-14 PROCEDURE — G8427 DOCREV CUR MEDS BY ELIG CLIN: HCPCS | Performed by: PHYSICIAN ASSISTANT

## 2024-08-14 PROCEDURE — G8420 CALC BMI NORM PARAMETERS: HCPCS | Performed by: PHYSICIAN ASSISTANT

## 2024-08-14 PROCEDURE — 1123F ACP DISCUSS/DSCN MKR DOCD: CPT | Performed by: PHYSICIAN ASSISTANT

## 2024-08-14 PROCEDURE — 1090F PRES/ABSN URINE INCON ASSESS: CPT | Performed by: PHYSICIAN ASSISTANT

## 2024-08-14 PROCEDURE — 1036F TOBACCO NON-USER: CPT | Performed by: PHYSICIAN ASSISTANT

## 2024-08-14 RX ORDER — MECLIZINE HCL 12.5 MG/1
12.5 TABLET ORAL 3 TIMES DAILY PRN
Qty: 30 TABLET | Refills: 1 | Status: SHIPPED | OUTPATIENT
Start: 2024-08-14

## 2024-08-14 RX ORDER — DENOSUMAB 60 MG/ML
60 INJECTION SUBCUTANEOUS ONCE
Qty: 1 ML | Refills: 0 | Status: SHIPPED | OUTPATIENT
Start: 2024-08-14 | End: 2024-08-14

## 2024-08-14 ASSESSMENT — ENCOUNTER SYMPTOMS
COUGH: 0
NAUSEA: 1
SHORTNESS OF BREATH: 0
SORE THROAT: 0
SINUS PAIN: 0
BACK PAIN: 0
CHEST TIGHTNESS: 0
SINUS PRESSURE: 0
ABDOMINAL PAIN: 0
VOMITING: 0
ABDOMINAL DISTENTION: 1
DIARRHEA: 0

## 2024-08-14 ASSESSMENT — VISUAL ACUITY: OU: 1

## 2024-08-15 ENCOUNTER — TELEPHONE (OUTPATIENT)
Dept: FAMILY MEDICINE CLINIC | Age: 69
End: 2024-08-15

## 2024-08-15 DIAGNOSIS — E03.9 ACQUIRED HYPOTHYROIDISM: Chronic | ICD-10-CM

## 2024-08-15 RX ORDER — LEVOTHYROXINE SODIUM 0.03 MG/1
50 TABLET ORAL DAILY
Qty: 60 TABLET | Refills: 2 | Status: SHIPPED | OUTPATIENT
Start: 2024-08-15 | End: 2024-11-13

## 2024-08-16 NOTE — TELEPHONE ENCOUNTER
Patient is aware she may d/c the Dexcom. She also received her lab results and will  Levothyroxine at pharmacy.

## 2024-08-20 DIAGNOSIS — R11.0 NAUSEA: ICD-10-CM

## 2024-08-20 RX ORDER — ONDANSETRON 4 MG/1
4 TABLET, FILM COATED ORAL DAILY PRN
Qty: 30 TABLET | Refills: 0 | OUTPATIENT
Start: 2024-08-20

## 2024-08-20 NOTE — TELEPHONE ENCOUNTER
Comments:     Last Office Visit (last PCP visit):   8/14/2024    Next Visit Date:  Future Appointments   Date Time Provider Department Center   8/26/2024 11:30 AM Enrique Diallo MD [facility]   9/4/2024  2:00 PM Slavik-Bosworth, Kelly J, ALLEN - CNP [facility]   11/14/2024 11:00 AM Dickson Mancilla PA [facility]       **If hasn't been seen in over a year OR hasn't followed up according to last diabetes/ADHD visit, make appointment for patient before sending refill to provider.    Rx requested:  Requested Prescriptions     Pending Prescriptions Disp Refills    ondansetron (ZOFRAN) 4 MG tablet [Pharmacy Med Name: ONDANSETRON HCL 4 MG TABLET] 30 tablet 0     Sig: TAKE 1 TABLET BY MOUTH DAILY AS NEEDED FOR NAUSEA OR VOMITING

## 2024-09-04 ENCOUNTER — OFFICE VISIT (OUTPATIENT)
Dept: GASTROENTEROLOGY | Age: 69
End: 2024-09-04
Payer: COMMERCIAL

## 2024-09-04 VITALS — HEART RATE: 76 BPM | HEIGHT: 57 IN | WEIGHT: 89 LBS | BODY MASS INDEX: 19.2 KG/M2 | OXYGEN SATURATION: 99 %

## 2024-09-04 DIAGNOSIS — R68.81 EARLY SATIETY: ICD-10-CM

## 2024-09-04 DIAGNOSIS — K59.01 SLOW TRANSIT CONSTIPATION: ICD-10-CM

## 2024-09-04 DIAGNOSIS — R63.4 WEIGHT LOSS, UNINTENTIONAL: ICD-10-CM

## 2024-09-04 DIAGNOSIS — K30 DELAYED GASTRIC EMPTYING: Primary | ICD-10-CM

## 2024-09-04 DIAGNOSIS — K21.9 GASTROESOPHAGEAL REFLUX DISEASE, UNSPECIFIED WHETHER ESOPHAGITIS PRESENT: ICD-10-CM

## 2024-09-04 PROCEDURE — G8427 DOCREV CUR MEDS BY ELIG CLIN: HCPCS | Performed by: NURSE PRACTITIONER

## 2024-09-04 PROCEDURE — 1036F TOBACCO NON-USER: CPT | Performed by: NURSE PRACTITIONER

## 2024-09-04 PROCEDURE — 1123F ACP DISCUSS/DSCN MKR DOCD: CPT | Performed by: NURSE PRACTITIONER

## 2024-09-04 PROCEDURE — G8420 CALC BMI NORM PARAMETERS: HCPCS | Performed by: NURSE PRACTITIONER

## 2024-09-04 PROCEDURE — 99213 OFFICE O/P EST LOW 20 MIN: CPT | Performed by: NURSE PRACTITIONER

## 2024-09-04 PROCEDURE — 3017F COLORECTAL CA SCREEN DOC REV: CPT | Performed by: NURSE PRACTITIONER

## 2024-09-04 PROCEDURE — G8399 PT W/DXA RESULTS DOCUMENT: HCPCS | Performed by: NURSE PRACTITIONER

## 2024-09-04 PROCEDURE — 1090F PRES/ABSN URINE INCON ASSESS: CPT | Performed by: NURSE PRACTITIONER

## 2024-09-04 RX ORDER — METOCLOPRAMIDE 5 MG/1
5 TABLET ORAL 2 TIMES DAILY
Qty: 120 TABLET | Refills: 3 | Status: SHIPPED | OUTPATIENT
Start: 2024-09-04

## 2024-09-04 RX ORDER — WHEAT DEXTRIN 3 G/3.8 G
4 POWDER (GRAM) ORAL
COMMUNITY

## 2024-09-04 ASSESSMENT — ENCOUNTER SYMPTOMS
CONSTIPATION: 1
ABDOMINAL PAIN: 1

## 2024-09-04 NOTE — PROGRESS NOTES
additionally reports abdominal bloating, distention.  Denies any hematemesis.  No change in bowel habits.  No melena     Previous GI work up/Endoscopic investigations:   EGD and colonoscopy: 1/21/2021: 3 cm sliding hiatal hernia, 2 mm gastric polyp, which bled on removal, hemostatic clip placed.  Colonoscopy with left-sided diverticulosis and hypertrophied anal papilla.    Review of Systems   Constitutional:  Positive for unexpected weight change.   Gastrointestinal:  Positive for abdominal pain and constipation.   All other systems reviewed and are negative.       Past medical history, past surgical history, medication list, social and familyhistory reviewed    Pulse 76, height 1.448 m (4' 9\"), weight 40.4 kg (89 lb), SpO2 99%, not currently breastfeeding.    Physical Exam  Vitals reviewed.   Constitutional:       General: She is not in acute distress.     Appearance: Normal appearance.   Eyes:      General: No scleral icterus.     Pupils: Pupils are equal, round, and reactive to light.   Cardiovascular:      Rate and Rhythm: Normal rate and regular rhythm.   Pulmonary:      Effort: Pulmonary effort is normal.      Breath sounds: Normal breath sounds.   Abdominal:      General: Bowel sounds are normal. There is no distension.      Palpations: Abdomen is soft. There is no mass.      Tenderness: There is no abdominal tenderness. There is no guarding or rebound.   Musculoskeletal:         General: Normal range of motion.   Skin:     General: Skin is warm and dry.   Neurological:      Mental Status: She is alert and oriented to person, place, and time.   Psychiatric:         Behavior: Behavior normal.         Thought Content: Thought content normal.         Judgment: Judgment normal.       Laboratory, Pathology, Radiology reviewed in detail with relevantimportant investigations summarized below:    No results for input(s): \"WBC\", \"HGB\", \"HCT\", \"MCV\", \"PLT\" in the last 720 hours.   Lab Results   Component Value Date

## 2024-09-12 ENCOUNTER — TELEPHONE (OUTPATIENT)
Dept: GASTROENTEROLOGY | Age: 69
End: 2024-09-12

## 2024-09-16 ENCOUNTER — TELEPHONE (OUTPATIENT)
Dept: FAMILY MEDICINE CLINIC | Age: 69
End: 2024-09-16

## 2024-09-20 DIAGNOSIS — E78.2 MIXED HYPERLIPIDEMIA: Chronic | ICD-10-CM

## 2024-09-20 RX ORDER — ATORVASTATIN CALCIUM 40 MG/1
TABLET, FILM COATED ORAL
Qty: 90 TABLET | Refills: 3 | Status: SHIPPED | OUTPATIENT
Start: 2024-09-20

## 2024-09-23 ENCOUNTER — TELEPHONE (OUTPATIENT)
Dept: FAMILY MEDICINE CLINIC | Age: 69
End: 2024-09-23

## 2024-09-26 DIAGNOSIS — E03.9 ACQUIRED HYPOTHYROIDISM: Chronic | ICD-10-CM

## 2024-09-26 LAB
T4 FREE SERPL-MCNC: 1.47 NG/DL (ref 0.84–1.68)
TSH SERPL-MCNC: 1.44 UIU/ML (ref 0.44–3.86)

## 2024-09-28 DIAGNOSIS — R11.0 NAUSEA: ICD-10-CM

## 2024-09-30 RX ORDER — ONDANSETRON 4 MG/1
4 TABLET, FILM COATED ORAL DAILY PRN
Qty: 30 TABLET | Refills: 0 | Status: SHIPPED | OUTPATIENT
Start: 2024-09-30

## 2024-09-30 NOTE — TELEPHONE ENCOUNTER
Comments:     Last Office Visit (last PCP visit):   8/14/2024    Next Visit Date:  Future Appointments   Date Time Provider Department Center   10/15/2024 11:30 AM Enrique Diallo MD [facility]   11/14/2024 11:00 AM Dickson Mancilla PA [facility]   11/29/2024 11:30 AM Slavik-Bosworth, Kelly J, APRN - CNP [facility]       **If hasn't been seen in over a year OR hasn't followed up according to last diabetes/ADHD visit, make appointment for patient before sending refill to provider.    Rx requested:  Requested Prescriptions     Pending Prescriptions Disp Refills    ondansetron (ZOFRAN) 4 MG tablet [Pharmacy Med Name: ONDANSETRON HCL 4 MG TABLET] 30 tablet 0     Sig: TAKE 1 TABLET BY MOUTH DAILY AS NEEDED FOR NAUSEA OR VOMITING

## 2024-10-10 ENCOUNTER — TELEPHONE (OUTPATIENT)
Dept: FAMILY MEDICINE CLINIC | Age: 69
End: 2024-10-10

## 2024-10-10 NOTE — TELEPHONE ENCOUNTER
Patient is asking if D3 125 and multivitmen with 20mg D2 is too much.    Please advise    Callback [phone]

## 2024-10-15 ENCOUNTER — OFFICE VISIT (OUTPATIENT)
Dept: PULMONOLOGY | Age: 69
End: 2024-10-15
Payer: COMMERCIAL

## 2024-10-15 VITALS
BODY MASS INDEX: 20.06 KG/M2 | SYSTOLIC BLOOD PRESSURE: 112 MMHG | DIASTOLIC BLOOD PRESSURE: 71 MMHG | RESPIRATION RATE: 16 BRPM | HEIGHT: 57 IN | HEART RATE: 94 BPM | WEIGHT: 93 LBS | OXYGEN SATURATION: 98 %

## 2024-10-15 DIAGNOSIS — G47.00 INSOMNIA, UNSPECIFIED TYPE: ICD-10-CM

## 2024-10-15 DIAGNOSIS — G25.81 RLS (RESTLESS LEGS SYNDROME): ICD-10-CM

## 2024-10-15 DIAGNOSIS — R06.02 SHORTNESS OF BREATH: Primary | ICD-10-CM

## 2024-10-15 PROCEDURE — G8484 FLU IMMUNIZE NO ADMIN: HCPCS | Performed by: INTERNAL MEDICINE

## 2024-10-15 PROCEDURE — G8427 DOCREV CUR MEDS BY ELIG CLIN: HCPCS | Performed by: INTERNAL MEDICINE

## 2024-10-15 PROCEDURE — 3017F COLORECTAL CA SCREEN DOC REV: CPT | Performed by: INTERNAL MEDICINE

## 2024-10-15 PROCEDURE — 1036F TOBACCO NON-USER: CPT | Performed by: INTERNAL MEDICINE

## 2024-10-15 PROCEDURE — 1090F PRES/ABSN URINE INCON ASSESS: CPT | Performed by: INTERNAL MEDICINE

## 2024-10-15 PROCEDURE — 1123F ACP DISCUSS/DSCN MKR DOCD: CPT | Performed by: INTERNAL MEDICINE

## 2024-10-15 PROCEDURE — G8399 PT W/DXA RESULTS DOCUMENT: HCPCS | Performed by: INTERNAL MEDICINE

## 2024-10-15 PROCEDURE — 99214 OFFICE O/P EST MOD 30 MIN: CPT | Performed by: INTERNAL MEDICINE

## 2024-10-15 PROCEDURE — G8420 CALC BMI NORM PARAMETERS: HCPCS | Performed by: INTERNAL MEDICINE

## 2024-10-15 ASSESSMENT — ENCOUNTER SYMPTOMS
NAUSEA: 0
EYE DISCHARGE: 0
COUGH: 0
VOMITING: 0
WHEEZING: 0
TROUBLE SWALLOWING: 0
SORE THROAT: 0
SHORTNESS OF BREATH: 1
ABDOMINAL PAIN: 0
CHEST TIGHTNESS: 0
EYE ITCHING: 0
RHINORRHEA: 0
SINUS PRESSURE: 0
VOICE CHANGE: 0
DIARRHEA: 0

## 2024-10-22 ENCOUNTER — TELEPHONE (OUTPATIENT)
Dept: FAMILY MEDICINE CLINIC | Age: 69
End: 2024-10-22

## 2024-10-22 NOTE — TELEPHONE ENCOUNTER
Called pt to inform her that the prolia arrived for her. Pt stated she will get injection when she comes on 11/14/24

## 2024-10-29 ENCOUNTER — TELEPHONE (OUTPATIENT)
Dept: FAMILY MEDICINE CLINIC | Age: 69
End: 2024-10-29

## 2024-10-29 NOTE — TELEPHONE ENCOUNTER
Marco Antonio is asking if patient is still taking Metformin. Patient states she is not. Please verify and callback.    Callback [phone]

## 2024-11-10 DIAGNOSIS — E03.9 ACQUIRED HYPOTHYROIDISM: Chronic | ICD-10-CM

## 2024-11-11 RX ORDER — LEVOTHYROXINE SODIUM 25 UG/1
50 TABLET ORAL DAILY
Qty: 60 TABLET | Refills: 2 | Status: SHIPPED | OUTPATIENT
Start: 2024-11-11 | End: 2024-11-14 | Stop reason: DRUGHIGH

## 2024-11-11 NOTE — TELEPHONE ENCOUNTER
Comments:     Last Office Visit (last PCP visit):   8/14/2024    Next Visit Date:  Future Appointments   Date Time Provider Department Center   11/14/2024 11:00 AM Dickson Mancilla PA [facility]   11/29/2024 11:30 AM Slavik-Bosworth, Kelly J, ALLEN - CNP [place] [facility]   4/28/2025 11:45 AM Enrique Diallo MD [facility]         Rx requested:  Requested Prescriptions     Pending Prescriptions Disp Refills    levothyroxine (SYNTHROID) 25 MCG tablet [Pharmacy Med Name: LEVOTHYROXINE 25 MCG TABLET] 60 tablet 2     Sig: Take 2 tablets by mouth daily

## 2024-11-14 ENCOUNTER — OFFICE VISIT (OUTPATIENT)
Age: 69
End: 2024-11-14

## 2024-11-14 VITALS
SYSTOLIC BLOOD PRESSURE: 110 MMHG | HEIGHT: 57 IN | BODY MASS INDEX: 20.02 KG/M2 | DIASTOLIC BLOOD PRESSURE: 66 MMHG | WEIGHT: 92.81 LBS

## 2024-11-14 DIAGNOSIS — N18.32 STAGE 3B CHRONIC KIDNEY DISEASE (HCC): Chronic | ICD-10-CM

## 2024-11-14 DIAGNOSIS — E11.65 TYPE 2 DIABETES MELLITUS WITH HYPERGLYCEMIA, WITHOUT LONG-TERM CURRENT USE OF INSULIN (HCC): ICD-10-CM

## 2024-11-14 DIAGNOSIS — R56.9 SEIZURE (HCC): ICD-10-CM

## 2024-11-14 DIAGNOSIS — M85.89 OSTEOPENIA OF MULTIPLE SITES: Chronic | ICD-10-CM

## 2024-11-14 DIAGNOSIS — F33.41 RECURRENT MAJOR DEPRESSIVE DISORDER, IN PARTIAL REMISSION (HCC): ICD-10-CM

## 2024-11-14 DIAGNOSIS — E03.9 HYPOTHYROIDISM, UNSPECIFIED TYPE: Primary | Chronic | ICD-10-CM

## 2024-11-14 DIAGNOSIS — G40.311 GENERALIZED IDIOPATHIC EPILEPSY AND EPILEPTIC SYNDROMES, INTRACTABLE, WITH STATUS EPILEPTICUS (HCC): ICD-10-CM

## 2024-11-14 RX ORDER — LEVOTHYROXINE SODIUM 50 UG/1
50 TABLET ORAL DAILY
Qty: 90 TABLET | Refills: 1 | Status: SHIPPED | OUTPATIENT
Start: 2024-11-14

## 2024-11-14 ASSESSMENT — ENCOUNTER SYMPTOMS
SINUS PAIN: 0
VOMITING: 0
COUGH: 0
SHORTNESS OF BREATH: 0
ABDOMINAL PAIN: 0
SINUS PRESSURE: 0
NAUSEA: 0
BACK PAIN: 0
ABDOMINAL DISTENTION: 1
SORE THROAT: 0
DIARRHEA: 0
CHEST TIGHTNESS: 0

## 2024-11-14 ASSESSMENT — VISUAL ACUITY: OU: 1

## 2024-11-14 NOTE — PROGRESS NOTES
After obtaining consent, and per orders of Dickson Mancilla, injection of Prolia given in Left upper quad. abdomen by Dickson Mancilla Patient instructed to remain in clinic for 20 minutes afterwards, and to report any adverse reaction to me immediately.   Prolia Lot [id number]  Exp 4/30/27  
(PROLIA) 60 MG/ML SOSY SC injection DOSE ADJUSTMENT    levothyroxine (SYNTHROID) 25 MCG tablet DOSE ADJUSTMENT     No follow-ups on file.        Reviewed with the patient: current clinical status, medications, activities and diet.     Side effects, adverse effects of the medication prescribed today, as well as treatment plan/ rationale and result expectations have been discussed with the patient who expresses understanding and desires to proceed.    Close follow up to evaluate treatment results and for coordination of care.  I have reviewed the patient's medical history in detail and updated the computerized patient record.    LEODAN Garcia

## 2024-12-09 ENCOUNTER — TELEPHONE (OUTPATIENT)
Dept: GASTROENTEROLOGY | Age: 69
End: 2024-12-09

## 2024-12-09 NOTE — TELEPHONE ENCOUNTER
Can you please give patient a call in regards to a few questions she has regarding her colonoscopy

## 2024-12-11 ENCOUNTER — APPOINTMENT (OUTPATIENT)
Dept: CT IMAGING | Age: 69
End: 2024-12-11
Payer: COMMERCIAL

## 2024-12-11 ENCOUNTER — APPOINTMENT (OUTPATIENT)
Dept: GENERAL RADIOLOGY | Age: 69
End: 2024-12-11
Payer: COMMERCIAL

## 2024-12-11 ENCOUNTER — HOSPITAL ENCOUNTER (OUTPATIENT)
Age: 69
Setting detail: OBSERVATION
Discharge: HOME | End: 2024-12-12
Attending: EMERGENCY MEDICINE | Admitting: INTERNAL MEDICINE
Payer: COMMERCIAL

## 2024-12-11 DIAGNOSIS — R42 DIZZINESS: Primary | ICD-10-CM

## 2024-12-11 LAB
ALBUMIN SERPL-MCNC: 4 G/DL (ref 3.5–4.6)
ALP SERPL-CCNC: 72 U/L (ref 40–130)
ALT SERPL-CCNC: 18 U/L (ref 0–33)
ANION GAP SERPL CALCULATED.3IONS-SCNC: 12 MEQ/L (ref 9–15)
APTT PPP: 23 SEC (ref 24.4–36.8)
AST SERPL-CCNC: 33 U/L (ref 0–35)
BASOPHILS # BLD: 0 K/UL (ref 0–0.2)
BASOPHILS NFR BLD: 0.5 %
BILIRUB SERPL-MCNC: 0.3 MG/DL (ref 0.2–0.7)
BILIRUB UR QL STRIP: NEGATIVE
BUN SERPL-MCNC: 35 MG/DL (ref 8–23)
CALCIUM SERPL-MCNC: 9.1 MG/DL (ref 8.5–9.9)
CHLORIDE SERPL-SCNC: 102 MEQ/L (ref 95–107)
CHP ED QC CHECK: NORMAL
CLARITY UR: ABNORMAL
CO2 SERPL-SCNC: 26 MEQ/L (ref 20–31)
COLOR UR: YELLOW
CREAT SERPL-MCNC: 1.06 MG/DL (ref 0.5–0.9)
EOSINOPHIL # BLD: 0.2 K/UL (ref 0–0.7)
EOSINOPHIL NFR BLD: 2.3 %
ERYTHROCYTE [DISTWIDTH] IN BLOOD BY AUTOMATED COUNT: 15.9 % (ref 11.5–14.5)
GLOBULIN SER CALC-MCNC: 2.6 G/DL (ref 2.3–3.5)
GLUCOSE BLD-MCNC: 124 MG/DL
GLUCOSE SERPL-MCNC: 146 MG/DL (ref 70–99)
GLUCOSE UR STRIP-MCNC: NEGATIVE MG/DL
HCT VFR BLD AUTO: 34.8 % (ref 37–47)
HGB BLD-MCNC: 11.7 G/DL (ref 12–16)
HGB UR QL STRIP: NEGATIVE
INR PPP: 1
KETONES UR STRIP-MCNC: NEGATIVE MG/DL
LEUKOCYTE ESTERASE UR QL STRIP: NEGATIVE
LYMPHOCYTES # BLD: 1.2 K/UL (ref 1–4.8)
LYMPHOCYTES NFR BLD: 19.2 %
MAGNESIUM SERPL-MCNC: 2 MG/DL (ref 1.7–2.4)
MCH RBC QN AUTO: 31.7 PG (ref 27–31.3)
MCHC RBC AUTO-ENTMCNC: 33.6 % (ref 33–37)
MCV RBC AUTO: 94.3 FL (ref 79.4–94.8)
MONOCYTES # BLD: 0.5 K/UL (ref 0.2–0.8)
MONOCYTES NFR BLD: 8.1 %
NEUTROPHILS # BLD: 4.5 K/UL (ref 1.4–6.5)
NEUTS SEG NFR BLD: 69.1 %
NITRITE UR QL STRIP: NEGATIVE
PH UR STRIP: 8.5 [PH] (ref 5–9)
PLATELET # BLD AUTO: 159 K/UL (ref 130–400)
POTASSIUM SERPL-SCNC: 3.7 MEQ/L (ref 3.4–4.9)
PROT SERPL-MCNC: 6.6 G/DL (ref 6.3–8)
PROT UR STRIP-MCNC: NEGATIVE MG/DL
PROTHROMBIN TIME: 12.9 SEC (ref 12.3–14.9)
RBC # BLD AUTO: 3.69 M/UL (ref 4.2–5.4)
SODIUM SERPL-SCNC: 140 MEQ/L (ref 135–144)
SP GR UR STRIP: 1.03 (ref 1–1.03)
TROPONIN, HIGH SENSITIVITY: <6 NG/L (ref 0–19)
TROPONIN, HIGH SENSITIVITY: <6 NG/L (ref 0–19)
URINE REFLEX TO CULTURE: ABNORMAL
UROBILINOGEN UR STRIP-ACNC: 0.2 E.U./DL
WBC # BLD AUTO: 6.5 K/UL (ref 4.8–10.8)

## 2024-12-11 PROCEDURE — 6360000004 HC RX CONTRAST MEDICATION: Performed by: EMERGENCY MEDICINE

## 2024-12-11 PROCEDURE — 6360000002 HC RX W HCPCS: Performed by: EMERGENCY MEDICINE

## 2024-12-11 PROCEDURE — 36415 COLL VENOUS BLD VENIPUNCTURE: CPT

## 2024-12-11 PROCEDURE — 83735 ASSAY OF MAGNESIUM: CPT

## 2024-12-11 PROCEDURE — 96374 THER/PROPH/DIAG INJ IV PUSH: CPT

## 2024-12-11 PROCEDURE — 85025 COMPLETE CBC W/AUTO DIFF WBC: CPT

## 2024-12-11 PROCEDURE — 2580000003 HC RX 258: Performed by: INTERNAL MEDICINE

## 2024-12-11 PROCEDURE — 99285 EMERGENCY DEPT VISIT HI MDM: CPT

## 2024-12-11 PROCEDURE — G0378 HOSPITAL OBSERVATION PER HR: HCPCS

## 2024-12-11 PROCEDURE — 70496 CT ANGIOGRAPHY HEAD: CPT

## 2024-12-11 PROCEDURE — 85730 THROMBOPLASTIN TIME PARTIAL: CPT

## 2024-12-11 PROCEDURE — 70498 CT ANGIOGRAPHY NECK: CPT

## 2024-12-11 PROCEDURE — 85610 PROTHROMBIN TIME: CPT

## 2024-12-11 PROCEDURE — 93005 ELECTROCARDIOGRAM TRACING: CPT | Performed by: EMERGENCY MEDICINE

## 2024-12-11 PROCEDURE — 70450 CT HEAD/BRAIN W/O DYE: CPT

## 2024-12-11 PROCEDURE — 6360000002 HC RX W HCPCS: Performed by: INTERNAL MEDICINE

## 2024-12-11 PROCEDURE — 71045 X-RAY EXAM CHEST 1 VIEW: CPT

## 2024-12-11 PROCEDURE — 6370000000 HC RX 637 (ALT 250 FOR IP): Performed by: EMERGENCY MEDICINE

## 2024-12-11 PROCEDURE — 81003 URINALYSIS AUTO W/O SCOPE: CPT

## 2024-12-11 PROCEDURE — 6370000000 HC RX 637 (ALT 250 FOR IP): Performed by: INTERNAL MEDICINE

## 2024-12-11 PROCEDURE — 84484 ASSAY OF TROPONIN QUANT: CPT

## 2024-12-11 PROCEDURE — 96372 THER/PROPH/DIAG INJ SC/IM: CPT

## 2024-12-11 PROCEDURE — 80053 COMPREHEN METABOLIC PANEL: CPT

## 2024-12-11 RX ORDER — SODIUM CHLORIDE 9 MG/ML
INJECTION, SOLUTION INTRAVENOUS PRN
Status: DISCONTINUED | OUTPATIENT
Start: 2024-12-11 | End: 2024-12-12 | Stop reason: HOSPADM

## 2024-12-11 RX ORDER — MECLIZINE HYDROCHLORIDE 25 MG/1
25 TABLET ORAL 2 TIMES DAILY
Status: DISCONTINUED | OUTPATIENT
Start: 2024-12-11 | End: 2024-12-12 | Stop reason: HOSPADM

## 2024-12-11 RX ORDER — ASPIRIN 300 MG/1
300 SUPPOSITORY RECTAL DAILY
Status: DISCONTINUED | OUTPATIENT
Start: 2024-12-11 | End: 2024-12-12 | Stop reason: HOSPADM

## 2024-12-11 RX ORDER — ATORVASTATIN CALCIUM 80 MG/1
80 TABLET, FILM COATED ORAL NIGHTLY
Status: DISCONTINUED | OUTPATIENT
Start: 2024-12-11 | End: 2024-12-12 | Stop reason: HOSPADM

## 2024-12-11 RX ORDER — IOPAMIDOL 755 MG/ML
75 INJECTION, SOLUTION INTRAVASCULAR
Status: COMPLETED | OUTPATIENT
Start: 2024-12-11 | End: 2024-12-11

## 2024-12-11 RX ORDER — ENOXAPARIN SODIUM 100 MG/ML
30 INJECTION SUBCUTANEOUS DAILY
Status: DISCONTINUED | OUTPATIENT
Start: 2024-12-11 | End: 2024-12-12 | Stop reason: HOSPADM

## 2024-12-11 RX ORDER — ASPIRIN 81 MG/1
81 TABLET, CHEWABLE ORAL DAILY
Status: DISCONTINUED | OUTPATIENT
Start: 2024-12-11 | End: 2024-12-12 | Stop reason: HOSPADM

## 2024-12-11 RX ORDER — ONDANSETRON 2 MG/ML
4 INJECTION INTRAMUSCULAR; INTRAVENOUS EVERY 6 HOURS PRN
Status: DISCONTINUED | OUTPATIENT
Start: 2024-12-11 | End: 2024-12-12 | Stop reason: HOSPADM

## 2024-12-11 RX ORDER — SODIUM CHLORIDE 0.9 % (FLUSH) 0.9 %
5-40 SYRINGE (ML) INJECTION PRN
Status: DISCONTINUED | OUTPATIENT
Start: 2024-12-11 | End: 2024-12-12 | Stop reason: HOSPADM

## 2024-12-11 RX ORDER — POLYETHYLENE GLYCOL 3350 17 G/17G
17 POWDER, FOR SOLUTION ORAL DAILY PRN
Status: DISCONTINUED | OUTPATIENT
Start: 2024-12-11 | End: 2024-12-12 | Stop reason: HOSPADM

## 2024-12-11 RX ORDER — ONDANSETRON 2 MG/ML
4 INJECTION INTRAMUSCULAR; INTRAVENOUS ONCE
Status: COMPLETED | OUTPATIENT
Start: 2024-12-11 | End: 2024-12-11

## 2024-12-11 RX ORDER — CLOPIDOGREL BISULFATE 75 MG/1
150 TABLET ORAL ONCE
Status: COMPLETED | OUTPATIENT
Start: 2024-12-11 | End: 2024-12-11

## 2024-12-11 RX ORDER — SODIUM CHLORIDE 0.9 % (FLUSH) 0.9 %
5-40 SYRINGE (ML) INJECTION EVERY 12 HOURS SCHEDULED
Status: DISCONTINUED | OUTPATIENT
Start: 2024-12-11 | End: 2024-12-12 | Stop reason: HOSPADM

## 2024-12-11 RX ORDER — ONDANSETRON 4 MG/1
4 TABLET, ORALLY DISINTEGRATING ORAL EVERY 8 HOURS PRN
Status: DISCONTINUED | OUTPATIENT
Start: 2024-12-11 | End: 2024-12-12 | Stop reason: HOSPADM

## 2024-12-11 RX ORDER — ACETAMINOPHEN 500 MG
1000 TABLET ORAL ONCE
Status: COMPLETED | OUTPATIENT
Start: 2024-12-11 | End: 2024-12-11

## 2024-12-11 RX ADMIN — MECLIZINE HYDROCHLORIDE 25 MG: 25 TABLET ORAL at 18:42

## 2024-12-11 RX ADMIN — ACETAMINOPHEN 1000 MG: 500 TABLET ORAL at 17:52

## 2024-12-11 RX ADMIN — ONDANSETRON 4 MG: 2 INJECTION, SOLUTION INTRAMUSCULAR; INTRAVENOUS at 17:52

## 2024-12-11 RX ADMIN — IOPAMIDOL 75 ML: 755 INJECTION, SOLUTION INTRAVENOUS at 17:03

## 2024-12-11 RX ADMIN — ATORVASTATIN CALCIUM 80 MG: 80 TABLET, FILM COATED ORAL at 22:33

## 2024-12-11 RX ADMIN — ASPIRIN 81 MG: 81 TABLET, CHEWABLE ORAL at 18:11

## 2024-12-11 RX ADMIN — Medication 10 ML: at 22:36

## 2024-12-11 RX ADMIN — CLOPIDOGREL BISULFATE 150 MG: 75 TABLET ORAL at 18:11

## 2024-12-11 RX ADMIN — ENOXAPARIN SODIUM 30 MG: 100 INJECTION SUBCUTANEOUS at 18:41

## 2024-12-11 ASSESSMENT — VISUAL ACUITY
OD: 20/40
OU: 20/40
OS: 20/40

## 2024-12-11 ASSESSMENT — ENCOUNTER SYMPTOMS
NAUSEA: 0
ABDOMINAL PAIN: 0
SHORTNESS OF BREATH: 0
SINUS PAIN: 0
COUGH: 0
EYE PAIN: 0
EYE REDNESS: 0
VOMITING: 0
BACK PAIN: 0

## 2024-12-11 ASSESSMENT — PAIN DESCRIPTION - LOCATION: LOCATION: HEAD

## 2024-12-11 ASSESSMENT — PAIN SCALES - GENERAL: PAINLEVEL_OUTOF10: 6

## 2024-12-11 NOTE — ED NOTES
Pt updated on admit status. Family at bedside, assist w bedpan. Urine sent. Electronically signed by Ivania Tarango RN on 12/11/2024 at 6:18 PM

## 2024-12-11 NOTE — ED PROVIDER NOTES
[facility] ED  EMERGENCY DEPARTMENT ENCOUNTER      Pt Name: Lauren Olson  MRN: [id number]  Birthdate 1955  Date of evaluation: 12/11/2024  Provider: Ricardo Lawton DO  4:33 PM EST    CHIEF COMPLAINT       Chief Complaint   Patient presents with    Blurred Vision     About an hour prior to arrival     Dizziness         HISTORY OF PRESENT ILLNESS   (Location/Symptom, Timing/Onset, Context/Setting, Quality, Duration, Modifying Factors, Severity)  Note limiting factors.     Lauren Olson is a 69-year-old female with significant past medical history of diabetes, alcoholism, hypertension, hyperlipidemia who presents to the emergency department for evaluation after blurry vision and dizziness episode at home 1 hour prior to arrival.  Patient reports she was getting out of the shower when she became dizzy.  She describes the dizziness as the room was spinning with subsequent blurry vision which she is still having at this current moment.  She reports she was awaiting her friend to come visit her at her senior living facility.  Patient denies any chest pain or shortness of breath.  She states she has not had history of her stroke prior to this.          Nursing Notes were reviewed.    REVIEW OF SYSTEMS    (2-9 systems for level 4, 10 or more for level 5)     Review of Systems   Constitutional:  Negative for chills and fever.   HENT:  Negative for ear pain and sinus pain.    Eyes:  Positive for visual disturbance. Negative for pain and redness.   Respiratory:  Negative for cough and shortness of breath.    Cardiovascular:  Negative for chest pain.   Gastrointestinal:  Negative for abdominal pain, nausea and vomiting.   Genitourinary:  Negative for dysuria and flank pain.   Musculoskeletal:  Negative for back pain.   Skin:  Negative for rash.   Neurological:  Positive for dizziness and weakness. Negative for headaches.   Psychiatric/Behavioral:  Negative for confusion. The patient is not

## 2024-12-11 NOTE — ED NOTES
Tele VERIFIED. Report called. Belongings gathered. Pt t0 271. Electronically signed by Ivania Tarango RN on 12/11/2024 at 6:36 PM

## 2024-12-11 NOTE — H&P
Patient 69-year-old woman with past medical history of hypothyroidism, hypokalemia hypertension comes in here for blurry vision dizziness 2 hours prior to arrival, she was called back.  CT head and neck was done spoke with Dr. Mancilla recommended patient to admit to observation and give Antivert and Plavix for now patient already on aspirin at home for coronary disease.      Past Medical History:   Diagnosis Date    Adhesive capsulitis of right shoulder 8/12/2021    Alcoholism (HCC)     Arthritis     Bilateral leg and foot pain 8/12/2021    Chronic back pain 5/17/2022    Chronic lung disease     Cognitive impairment 4/15/2021    Depression     Diabetes mellitus (HCC)     Diarrhea 1/4/2021    Possibly related to Metformin, Keppra, constipation, colitis.  Stop Metformin.  Treat constipation.  Consider adjustment to Keppra given side effects.    Encephalopathy 12/10/2020    Grief reaction 4/15/2021    Hyperlipidemia     Hypertension     Hypokalemia 11/6/2020    Hypomagnesemia 11/6/2020    Hypothyroidism     Lymphadenopathy, axillary 1/4/2021    Numbness and tingling of upper and lower extremities of both sides 8/12/2021    RBBB (right bundle branch block) 6/30/2023    Second hand smoke exposure     Seizure (HCC)     Seronegative arthritis 6/28/2022    Squamous blepharitis of upper and lower eyelids of both eyes 5/17/2022    Stage 3b chronic kidney disease (HCC) 8/12/2021    Syncope and collapse 12/10/2020    Thoracogenic scoliosis of thoracolumbar region 5/17/2022     Past Surgical History:   Procedure Laterality Date    APPENDECTOMY      BIOPSY MOUTH LESION Bilateral 12/19/2016    REMOVAL  OF BILATERAL LINGUAL MACIE performed by Analisa Okeefe DDS at [facility] [place]    CHOLECYSTECTOMY      COLONOSCOPY N/A 01/21/2021    COLONOSCOPY DIAGNOSTIC performed by She Cisneros MD at [facility]    HYSTERECTOMY (CERVIX STATUS UNKNOWN)      OVARY REMOVAL      UPPER GASTROINTESTINAL ENDOSCOPY N/A 01/21/2021    EGD with

## 2024-12-11 NOTE — ED TRIAGE NOTES
Pt to er, complaints of headache and dizziness. States headache is resolving, complaints of dizziness. Alert and oriented, resp even and non labored. Electronically signed by Ivania Tarango RN on 12/11/2024 at 4:28 PM

## 2024-12-12 ENCOUNTER — APPOINTMENT (OUTPATIENT)
Dept: MRI IMAGING | Age: 69
End: 2024-12-12
Payer: COMMERCIAL

## 2024-12-12 VITALS
HEIGHT: 57 IN | RESPIRATION RATE: 18 BRPM | WEIGHT: 93.5 LBS | HEART RATE: 72 BPM | TEMPERATURE: 98.3 F | OXYGEN SATURATION: 99 % | BODY MASS INDEX: 20.17 KG/M2 | DIASTOLIC BLOOD PRESSURE: 68 MMHG | SYSTOLIC BLOOD PRESSURE: 102 MMHG

## 2024-12-12 PROBLEM — R42 DIZZINESS: Status: ACTIVE | Noted: 2024-12-12

## 2024-12-12 LAB
ANION GAP SERPL CALCULATED.3IONS-SCNC: 9 MEQ/L (ref 9–15)
BUN SERPL-MCNC: 29 MG/DL (ref 8–23)
CALCIUM SERPL-MCNC: 8.8 MG/DL (ref 8.5–9.9)
CHLORIDE SERPL-SCNC: 106 MEQ/L (ref 95–107)
CHOLEST SERPL-MCNC: 117 MG/DL (ref 0–199)
CO2 SERPL-SCNC: 26 MEQ/L (ref 20–31)
CREAT SERPL-MCNC: 0.89 MG/DL (ref 0.5–0.9)
EKG ATRIAL RATE: 69 BPM
EKG P AXIS: 35 DEGREES
EKG P-R INTERVAL: 240 MS
EKG Q-T INTERVAL: 468 MS
EKG QRS DURATION: 136 MS
EKG QTC CALCULATION (BAZETT): 501 MS
EKG R AXIS: -41 DEGREES
EKG T AXIS: 22 DEGREES
EKG VENTRICULAR RATE: 69 BPM
ERYTHROCYTE [DISTWIDTH] IN BLOOD BY AUTOMATED COUNT: 15.9 % (ref 11.5–14.5)
ESTIMATED AVERAGE GLUCOSE: 120 MG/DL
GLUCOSE BLD-MCNC: 124 MG/DL (ref 70–99)
GLUCOSE SERPL-MCNC: 72 MG/DL (ref 70–99)
HBA1C MFR BLD: 5.8 % (ref 4–6)
HCT VFR BLD AUTO: 30.8 % (ref 37–47)
HDLC SERPL-MCNC: 44 MG/DL (ref 40–59)
HGB BLD-MCNC: 10.3 G/DL (ref 12–16)
LDLC SERPL CALC-MCNC: 52 MG/DL (ref 0–129)
MCH RBC QN AUTO: 31.3 PG (ref 27–31.3)
MCHC RBC AUTO-ENTMCNC: 33.4 % (ref 33–37)
MCV RBC AUTO: 93.6 FL (ref 79.4–94.8)
PERFORMED ON: ABNORMAL
PERFORMED ON: ABNORMAL
PLATELET # BLD AUTO: 132 K/UL (ref 130–400)
POC CREATININE: 1.1 MG/DL (ref 0.6–1.2)
POC SAMPLE TYPE: ABNORMAL
POTASSIUM SERPL-SCNC: 4 MEQ/L (ref 3.4–4.9)
RBC # BLD AUTO: 3.29 M/UL (ref 4.2–5.4)
SODIUM SERPL-SCNC: 141 MEQ/L (ref 135–144)
TRIGL SERPL-MCNC: 106 MG/DL (ref 0–150)
WBC # BLD AUTO: 7.7 K/UL (ref 4.8–10.8)

## 2024-12-12 PROCEDURE — 83036 HEMOGLOBIN GLYCOSYLATED A1C: CPT

## 2024-12-12 PROCEDURE — 6360000002 HC RX W HCPCS: Performed by: INTERNAL MEDICINE

## 2024-12-12 PROCEDURE — 93010 ELECTROCARDIOGRAM REPORT: CPT | Performed by: INTERNAL MEDICINE

## 2024-12-12 PROCEDURE — G0378 HOSPITAL OBSERVATION PER HR: HCPCS

## 2024-12-12 PROCEDURE — 6370000000 HC RX 637 (ALT 250 FOR IP): Performed by: INTERNAL MEDICINE

## 2024-12-12 PROCEDURE — 96372 THER/PROPH/DIAG INJ SC/IM: CPT

## 2024-12-12 PROCEDURE — APPSS45 APP SPLIT SHARED TIME 31-45 MINUTES: Performed by: NURSE PRACTITIONER

## 2024-12-12 PROCEDURE — 80048 BASIC METABOLIC PNL TOTAL CA: CPT

## 2024-12-12 PROCEDURE — 70551 MRI BRAIN STEM W/O DYE: CPT

## 2024-12-12 PROCEDURE — 97165 OT EVAL LOW COMPLEX 30 MIN: CPT

## 2024-12-12 PROCEDURE — 97161 PT EVAL LOW COMPLEX 20 MIN: CPT

## 2024-12-12 PROCEDURE — 36415 COLL VENOUS BLD VENIPUNCTURE: CPT

## 2024-12-12 PROCEDURE — 92610 EVALUATE SWALLOWING FUNCTION: CPT

## 2024-12-12 PROCEDURE — 2580000003 HC RX 258: Performed by: INTERNAL MEDICINE

## 2024-12-12 PROCEDURE — 80061 LIPID PANEL: CPT

## 2024-12-12 PROCEDURE — 92523 SPEECH SOUND LANG COMPREHEN: CPT

## 2024-12-12 PROCEDURE — 85027 COMPLETE CBC AUTOMATED: CPT

## 2024-12-12 PROCEDURE — 99223 1ST HOSP IP/OBS HIGH 75: CPT | Performed by: PSYCHIATRY & NEUROLOGY

## 2024-12-12 RX ORDER — PANTOPRAZOLE SODIUM 40 MG/1
40 TABLET, DELAYED RELEASE ORAL
Status: DISCONTINUED | OUTPATIENT
Start: 2024-12-13 | End: 2024-12-12 | Stop reason: HOSPADM

## 2024-12-12 RX ORDER — ROPINIROLE 0.5 MG/1
0.5 TABLET, FILM COATED ORAL 3 TIMES DAILY
Status: DISCONTINUED | OUTPATIENT
Start: 2024-12-12 | End: 2024-12-12 | Stop reason: HOSPADM

## 2024-12-12 RX ORDER — DULOXETIN HYDROCHLORIDE 60 MG/1
60 CAPSULE, DELAYED RELEASE ORAL DAILY
Status: DISCONTINUED | OUTPATIENT
Start: 2024-12-12 | End: 2024-12-12 | Stop reason: HOSPADM

## 2024-12-12 RX ORDER — LEVOTHYROXINE SODIUM 50 UG/1
50 TABLET ORAL DAILY
Status: DISCONTINUED | OUTPATIENT
Start: 2024-12-12 | End: 2024-12-12 | Stop reason: HOSPADM

## 2024-12-12 RX ORDER — LEVETIRACETAM 500 MG/1
500 TABLET ORAL 2 TIMES DAILY
Status: DISCONTINUED | OUTPATIENT
Start: 2024-12-12 | End: 2024-12-12 | Stop reason: HOSPADM

## 2024-12-12 RX ADMIN — DULOXETINE HYDROCHLORIDE 60 MG: 60 CAPSULE, DELAYED RELEASE ORAL at 09:35

## 2024-12-12 RX ADMIN — ASPIRIN 81 MG: 81 TABLET, CHEWABLE ORAL at 09:35

## 2024-12-12 RX ADMIN — ENOXAPARIN SODIUM 30 MG: 100 INJECTION SUBCUTANEOUS at 09:35

## 2024-12-12 RX ADMIN — Medication 10 ML: at 09:42

## 2024-12-12 RX ADMIN — MECLIZINE HYDROCHLORIDE 25 MG: 25 TABLET ORAL at 09:35

## 2024-12-12 RX ADMIN — LEVOTHYROXINE SODIUM 50 MCG: 0.05 TABLET ORAL at 09:35

## 2024-12-12 RX ADMIN — LEVETIRACETAM 500 MG: 500 TABLET, FILM COATED ORAL at 09:35

## 2024-12-12 ASSESSMENT — ENCOUNTER SYMPTOMS
NAUSEA: 0
CHEST TIGHTNESS: 0
COLOR CHANGE: 0
SHORTNESS OF BREATH: 0
DIARRHEA: 0
WHEEZING: 0
COUGH: 0
VOMITING: 0
TROUBLE SWALLOWING: 0

## 2024-12-12 ASSESSMENT — PAIN SCALES - GENERAL
PAINLEVEL_OUTOF10: 0
PAINLEVEL_OUTOF10: 0

## 2024-12-12 NOTE — CONSULTS
[facility] Consult Note  Name: Lauren Olson  Age: 69 y.o.  Gender: female  CodeStatus: Full Code  Allergies: Morphine    Chief Complaint:Blurred Vision (About an hour prior to arrival ) and Dizziness    Primary Care Provider: Dickson Mancilla PA  InpatientTreatment Team: Treatment Team:   Oskar Perry MD Patel, Dhruv R, MD Cullom, Katherine, Alexandra Tavares RN Diaz, Kimberly, RN Visnyai, Lisa, RN Hannibal, Lillian  Admission Date: 12/11/2024      HPI   Consulting provider: Dr Oskar Perry for BPV  Pt seen and examined for neurology consult.  Patient is a 69-year-old  female with past medical history of alcoholism, cognitive impairment, depression, diabetes mellitus, hyperlipidemia, hypertension, hypothyroid, seizure, TIA who presented to [facility] emergency room on 12/11/2024 with complaints of headache, dizziness, blurred vision that began suddenly while getting out of the shower an hour prior to arrival.  NIH score 0  Vital signs in the emergency room 136/65, 64, 16, 97 °F, 100%.  EKG showed sinus rhythm with underlying right bundle branch block.  CTA of the head and neck showed right vertebral artery high-grade stenosis with heavy calcification of the proximal left internal carotid artery without stenosis.  No intracranial stenosis.  CT of the head negative for acute findings.  Initial lab testing remarkable for hemoglobin of 11.7, BUN 35, creatinine 1.06.   MRI of the brain has been completed and negative for acute findings    Patient is currently alert and oriented x 3, no acute distress, cooperative.  Headache and vertigo have resolved.  Blurred vision resolved.  No focal deficits.  No seizure activity reported.  Does complain of tinnitus.  No, otalgia, hearing changes.  No reports of chest pain pressure palpitations.  Slight horizontal nystagmus.  Patient seen and examined events as noted above.  Patient has significant dizziness upon getting out of the shower and 1 hour  spent on evaluating patient and discussed with er      Pritesh Mancilla MD, SCOTT  Diplomate, American Board of Psychiatry & Neurology  Board Certified in Vascular Neurology  Board Certified in Neuromuscular Medicine  Certified in Neurorehabilitation           Collaborating physicians: Dr Mancilla    Electronically signed by ALLEN Oleary CNP on 12/12/2024 at 11:46 AM

## 2024-12-12 NOTE — CARE COORDINATION
Case Management Assessment  Initial Evaluation    Date/Time of Evaluation: 12/11/2024 8:38 PM  Assessment Completed by: Nelida Meadows RN    If patient is discharged prior to next notation, then this note serves as note for discharge by case management.    Patient Name: Lauren Olson                   YOB: 1955  Diagnosis: TIA (transient ischemic attack) [G45.9]  Dizziness [R42]                   Date / Time: 12/11/2024  3:55 PM    Patient Admission Status: Observation   Readmission Risk (Low < 19, Mod (19-27), High > 27): No data recorded  Current PCP: Dickson Mancilla PA  PCP verified by CM? (P) Yes    Chart Reviewed: Yes      History Provided by: (P) Patient  Patient Orientation: (P) Alert and Oriented, Person, Place, Situation, Self    Patient Cognition: (P) Alert    Hospitalization in the last 30 days (Readmission):  No    If yes, Readmission Assessment in  Navigator will be completed.    Advance Directives:      Code Status: Full Code   Patient's Primary Decision Maker is: (P) Legal Next of Kin (nieces Eva and Gloria.)    Primary Decision Maker: GLORIA JIMENEZ - Niece/Nephew - [phone]    Discharge Planning:    Patient lives with: (P) Alone Type of Home: (P) Apartment  Primary Care Giver: (P) Self  Patient Support Systems include: (P) Family Members   Current Financial resources: (P) Medicaid, Medicare  Current community resources: (P) ECF/Home Care  Current services prior to admission: (P) Durable Medical Equipment, Home Care, Other (Comment) (home care aide, caresource nurse visits every 2 months.)            Current DME: (P) Cane, Other (Comment), Shower Chair (rollator.)            Type of Home Care services:  (P) Aide Services, Nursing Services    ADLS  Prior functional level: (P) Independent in ADLs/IADLs  Current functional level: (P) Independent in ADLs/IADLs    PT AM-PAC:   /24  OT AM-PAC:   /24    Family can provide assistance at DC: (P) Yes  Would you like Case Management to  Universal Safety Interventions

## 2024-12-12 NOTE — PROGRESS NOTES
[facility] OCC[facility]ATIONAL THERAPY EVALUATION - ACUTE     NAME: Lauren Olson  : 1955 (69 y.o.)  MRN: [id number]  CODE STATUS: Full Code  Room: W271/W271-01    Date of Service: 2024    Patient Diagnosis(es): TIA (transient ischemic attack) [G45.9]  Dizziness [R42]   Patient Active Problem List    Diagnosis Date Noted    Seronegative arthritis 2022    Thoracogenic scoliosis of thoracolumbar region 2022    Chronic back pain 2022    Squamous blepharitis of upper and lower eyelids of both eyes 2022    Type 2 diabetes mellitus with hyperglycemia, without long-term current use of insulin ([facility]) 2024    Facial droop 2023    TIA (transient ischemic attack) 07/10/2023    RBBB (right bundle branch block) 2023    Seizure (HCC) 2021    Therapeutic drug monitoring 2021    Stage 3b chronic kidney disease ([facility]) 2021    Adhesive capsulitis of right shoulder 2021    Bilateral leg and foot pain 2021    Numbness and tingling of upper and lower extremities of both sides 2021    Major depressive disorder 2021    Cognitive impairment 04/15/2021    Anxiety 04/15/2021    Grief reaction 04/15/2021    Epigastric pain     Gastric polyp     Altered bowel habits     Lymphadenopathy, axillary 2021    Fatigue 2021    Generalized idiopathic epilepsy and epileptic syndromes, intractable, with status epilepticus (HCC) 12/10/2020    Polyneuropathy associated with underlying disease ([facility])     Hypercalcemia 11/10/2020    Hypomagnesemia 2020    Hypokalemia 2020    Anemia 2020    Weight loss 2020    Chronic pain of right knee 2018    Facet arthropathy, multilevel 2018    Osteopenia of multiple sites 2018    Hypothyroidism 2018    Psoriasis with arthropathy (HCC) 2017    Vitamin D deficiency 2017    Restless leg syndrome 2017    Alcoholism ([facility])     Gastroesophageal reflux disease  without esophagitis 10/26/2016    Abnormal glucose 10/03/2016    Mixed hyperlipidemia 01/21/1981        Past Medical History:   Diagnosis Date    Adhesive capsulitis of right shoulder 8/12/2021    Alcoholism (HCC)     Arthritis     Bilateral leg and foot pain 8/12/2021    Chronic back pain 5/17/2022    Chronic lung disease     Cognitive impairment 4/15/2021    Depression     Diabetes mellitus (HCC)     Diarrhea 1/4/2021    Possibly related to Metformin, Keppra, constipation, colitis.  Stop Metformin.  Treat constipation.  Consider adjustment to Keppra given side effects.    Encephalopathy 12/10/2020    Grief reaction 4/15/2021    Hyperlipidemia     Hypertension     Hypokalemia 11/6/2020    Hypomagnesemia 11/6/2020    Hypothyroidism     Lymphadenopathy, axillary 1/4/2021    Numbness and tingling of upper and lower extremities of both sides 8/12/2021    RBBB (right bundle branch block) 6/30/2023    Second hand smoke exposure     Seizure (HCC)     Seronegative arthritis 6/28/2022    Squamous blepharitis of upper and lower eyelids of both eyes 5/17/2022    Stage 3b chronic kidney disease (HCC) 8/12/2021    Syncope and collapse 12/10/2020    Thoracogenic scoliosis of thoracolumbar region 5/17/2022     Past Surgical History:   Procedure Laterality Date    APPENDECTOMY      BIOPSY MOUTH LESION Bilateral 12/19/2016    REMOVAL  OF BILATERAL LINGUAL MACIE performed by Analisa Okeefe DDS at [facility] OR    CHOLECYSTECTOMY      COLONOSCOPY N/A 01/21/2021    COLONOSCOPY DIAGNOSTIC performed by She Cisneros MD at [facility]    HYSTERECTOMY (CERVIX STATUS UNKNOWN)      OVARY REMOVAL      UPPER GASTROINTESTINAL ENDOSCOPY N/A 01/21/2021    EGD with polypectomy performed by She Cisneros MD at [facility]        Restrictions  Restrictions/Precautions: Fall Risk     Safety Devices: Safety Devices  Type of Devices: All fall risk precautions in place;Call light within reach;Chair alarm in place;Left in chair  may require a device   Stand by assistance = Pt. does not perform task at an independent level but does not need physical assistance, requires verbal cues  Minimal, Moderate, Maximal Assistance = Pt. requires physical assistance (25%, 50%, 75% assist from helper) for task but is able to actively participate in task   Dependent = Pt. requires total assistance with task and is not able to actively participate with task completion     Plan:  Occupational Therapy Plan  Times Per Week: No acute OT    Goals:     Patient Goal: Patient goals : \"I want to get home\"      Discussed and agreed upon: Yes Comments:       Therapy Time:   Individual   Time In 1008   Time Out 1019   Minutes 11     Eval: 11 minutes     Electronically signed by:    DANIEL Arellano/KIMBERLY,   12/12/2024, 12:28 PM

## 2024-12-12 NOTE — PROGRESS NOTES
[facility]   Facility/Department: [address] ORTHO TELE  Speech Language Pathology  Clinical Bedside Swallow Evaluation    NAME:Lauren Olson  : 1955 (69 y.o.)   [x]   confirmed    MRN: [id number]  ROOM: [place] [address]  ADMISSION DATE: 2024  PATIENT DIAGNOSIS(ES): TIA (transient ischemic attack) [G45.9]  Dizziness [R42]  Chief Complaint   Patient presents with    Blurred Vision     About an hour prior to arrival     Dizziness     Patient Active Problem List    Diagnosis Date Noted    Seronegative arthritis 2022    Thoracogenic scoliosis of thoracolumbar region 2022    Chronic back pain 2022    Squamous blepharitis of upper and lower eyelids of both eyes 2022    Type 2 diabetes mellitus with hyperglycemia, without long-term current use of insulin (HCC) 2024    Facial droop 2023    TIA (transient ischemic attack) 07/10/2023    RBBB (right bundle branch block) 2023    Seizure (HCC) 2021    Therapeutic drug monitoring 2021    Stage 3b chronic kidney disease (HCC) 2021    Adhesive capsulitis of right shoulder 2021    Bilateral leg and foot pain 2021    Numbness and tingling of upper and lower extremities of both sides 2021    Major depressive disorder 2021    Cognitive impairment 04/15/2021    Anxiety 04/15/2021    Grief reaction 04/15/2021    Epigastric pain     Gastric polyp     Altered bowel habits     Lymphadenopathy, axillary 2021    Fatigue 2021    Generalized idiopathic epilepsy and epileptic syndromes, intractable, with status epilepticus (HCC) 12/10/2020    Polyneuropathy associated with underlying disease (HCC)     Hypercalcemia 11/10/2020    Hypomagnesemia 2020    Hypokalemia 2020    Anemia 2020    Weight loss 2020    Chronic pain of right knee 2018    Facet arthropathy, multilevel 2018    Osteopenia of multiple sites 2018    Hypothyroidism 2018     Thin    Oral Motor  Labial: No impairment  Dentition: Limited  Oral Hygiene: Clean;Moist  Lingual: No impairment  Velum: No Impairment  Mandible: No impairment    Oral/Pharyngeal Phase  PO Trials  Neuromuscular Estim Used: No  Assessment Method(s): Observation  Patient Position: upright in bed  Vocal Quality:  (hyonasality)  Consistency Presented: Regular;Thin  How Presented: Self-fed/presented  How much presented:  (6 oz thin; 3 regular)  Bolus Acceptance: No impairment  Bolus Formation/Control: No impairment  Propulsion: No impairment  Oral Residue: None  Initiation of Swallow: Other (comment) (suspected WFL)  Aspiration Signs/Symptoms: None  Pharyngeal Phase Characteristics: Other (comment) (suspected WFL)        Dysphagia Diagnosis  Dysphagia Diagnosis: Swallow function appears WFL  Oral Phase - Comment: Pt presents with functional oral phase of swallow at bedside this date characterized by adequate mastication, A-P transfer, with no observed pocketing and good oral clearance on trials of regular solids and  thin liquids.  Pharyngeal Phase Comment: Pharyngeal dysphagia not suspected. No overt clinical s/s of aspiration on trials of thin or regular present at bedside. Pt passed 3 oz swallow screen.   Cranial nerve assessment of CN V, VII, IX, X, XII unremarkable.  Dysphagia Outcome Severity Scale: Level 7: Normal in all situations     Recommendations  Requires SLP Intervention: No    Education  Patient Education: Role of SLP, rationale of evaluation, results of evaluation.  Patient Education Response: Verbalizes understanding  Individuals consulted  Consulted and agree with results and recommendations: Patient;RN  RN Name: Juanita    Safety Devices  Safety Devices  Safety Devices in place: Yes  Type of devices: Bed alarm in place;Nurse notified;Call light within reach    Pain Assessment  Patient does not c/o pain.    Pain Re-assessment  Patient does not c/o pain.    Dysphagia Outcomes Severity Scale  Dysphagia  Outcome Severity Scale: Level 7: Normal in all situations    Therapy Time  SLP Individual Minutes  Time In: 1112  Time Out: 1120  Minutes: 8              Signature: Electronically signed by CLAIRE Ellison on 12/12/2024 at 12:18 PM

## 2024-12-12 NOTE — ACP (ADVANCE CARE PLANNING)
Advance Care Planning     Advance Care Planning Activator (Inpatient)  Conversation Note      Date of ACP Conversation: 12/11/2024     Conversation Conducted with: Patient with Decision Making Capacity    ACP Activator: Nelida Meadows, CHLOE        Health Care Decision Maker:     Current Designated Health Care Decision Maker:     Primary Decision Maker: GLORIA JIMENEZ - Niece/Nephew - [phone]    Secondary Decision Maker: Eva Kauffman - Valeriece/Nephew - [phone]

## 2024-12-12 NOTE — PLAN OF CARE
See OT evaluation for all goals and OT POC. Electronically signed by Roxy Whiting OTR/L on 12/12/2024 at 12:30 PM

## 2024-12-12 NOTE — PROGRESS NOTES
Progress Note  Date:2024       [facility]  Patient Name:Lauren Olson     YOB: 1955     Age:69 y.o.        Subjective    Subjective:  Symptoms:  No shortness of breath, malaise, cough, chest pain, weakness, headache, chest pressure, anorexia or diarrhea.    Diet:  No nausea or vomiting.       Review of Systems   Respiratory:  Negative for cough and shortness of breath.    Cardiovascular:  Negative for chest pain.   Gastrointestinal:  Negative for anorexia, diarrhea, nausea and vomiting.   Neurological:  Negative for weakness.     Objective         Vitals Last 24 Hours:  TEMPERATURE:  Temp  Av.8 °F (36.6 °C)  Min: 97 °F (36.1 °C)  Max: 98.4 °F (36.9 °C)  RESPIRATIONS RANGE: Resp  Avg: 15.3  Min: 10  Max: 20  PULSE OXIMETRY RANGE: SpO2  Av.7 %  Min: 98 %  Max: 100 %  PULSE RANGE: Pulse  Av.9  Min: 64  Max: 81  BLOOD PRESSURE RANGE: Systolic (24hrs), Av , Min:102 , Max:147   ; Diastolic (24hrs), Av, Min:58, Max:123    I/O (24Hr):  No intake or output data in the 24 hours ending 24 1414  Objective:  General Appearance:  Comfortable, well-appearing and in no acute distress.    Vital signs: (most recent): Blood pressure 102/68, pulse 72, temperature 98.3 °F (36.8 °C), temperature source Tympanic, resp. rate 18, height 1.448 m (4' 9\"), weight 42.4 kg (93 lb 8 oz), SpO2 99%, not currently breastfeeding.    HEENT: Normal HEENT exam.    Heart: S1 normal and S2 normal.    Abdomen: Abdomen is soft.  Bowel sounds are normal.   There is no epigastric area or suprapubic area tenderness.     Neurological: Patient is alert.    Pupils:  Pupils are equal, round, and reactive to light.    Skin:  Warm.      Labs/Imaging/Diagnostics    Labs:  CBC:  Recent Labs     24  1610 24  0749   WBC 6.5 7.7   RBC 3.69* 3.29*   HGB 11.7* 10.3*   HCT 34.8* 30.8*   MCV 94.3 93.6   RDW 15.9* 15.9*    132     CHEMISTRIES:  Recent Labs     24  1541 24  1610  marrow signal intensity is normal.     1. No acute infarct or acute intracranial process identified. 2. Mild chronic small vessel ischemic changes.     CTA HEAD W WO CONTRAST    Result Date: 12/11/2024  EXAMINATION: CTA OF THE HEAD WITHOUT AND WITH CONTRAST; CTA OF THE NECK 12/11/2024 4:39 pm: TECHNIQUE: CTA of the neck was performed with the administration of intravenous contrast. Multiplanar reformatted images are provided for review.  MIP images are provided for review. Stenosis of the internal carotid arteries measured using NASCET criteria. CTA of the head/brain was performed with the administration of intravenous contrast. Multiplanar reformatted images are provided for review.  MIP images are provided for review. Automated exposure control, iterative reconstruction, and/or weight based adjustment of the mA/kV was utilized to reduce the radiation dose to as low as reasonably achievable. COMPARISON: CT of the head from earlier today. HISTORY: ORDERING SYSTEM PROVIDED HISTORY: Stroke R/O Large Vessel Occlusion TECHNOLOGIST PROVIDED HISTORY: Additional Contrast?->1 Reason for exam:->Stroke R/O Large Vessel Occlusion What reading provider will be dictating this exam?->CRC FINDINGS: CTA NECK: AORTIC ARCH/ARCH VESSELS: The aortic arch is quite high, projecting into the supraclavicular region. No dissection or arterial injury.  No significant stenosis of the brachiocephalic or subclavian arteries. CAROTID ARTERIES: No dissection, arterial injury, or hemodynamically significant stenosis by NASCET criteria.  There is heavy calcification of the proximal left internal carotid artery without stenosis using NASCET criteria. VERTEBRAL ARTERIES: The right vertebral artery is dominant.  The left vertebral artery is distinctly smaller in caliber. There is high-grade stenosis of the origin of the dominant right vertebral artery from calcified plaque.  The rest of the right vertebral artery is widely patent. There is mild  of intravenous contrast. Multiplanar reformatted images are provided for review.  MIP images are provided for review. Stenosis of the internal carotid arteries measured using NASCET criteria. CTA of the head/brain was performed with the administration of intravenous contrast. Multiplanar reformatted images are provided for review.  MIP images are provided for review. Automated exposure control, iterative reconstruction, and/or weight based adjustment of the mA/kV was utilized to reduce the radiation dose to as low as reasonably achievable. COMPARISON: CT of the head from earlier today. HISTORY: ORDERING SYSTEM PROVIDED HISTORY: Stroke R/O Large Vessel Occlusion TECHNOLOGIST PROVIDED HISTORY: Additional Contrast?->1 Reason for exam:->Stroke R/O Large Vessel Occlusion What reading provider will be dictating this exam?->CRC FINDINGS: CTA NECK: AORTIC ARCH/ARCH VESSELS: The aortic arch is quite high, projecting into the supraclavicular region. No dissection or arterial injury.  No significant stenosis of the brachiocephalic or subclavian arteries. CAROTID ARTERIES: No dissection, arterial injury, or hemodynamically significant stenosis by NASCET criteria.  There is heavy calcification of the proximal left internal carotid artery without stenosis using NASCET criteria. VERTEBRAL ARTERIES: The right vertebral artery is dominant.  The left vertebral artery is distinctly smaller in caliber. There is high-grade stenosis of the origin of the dominant right vertebral artery from calcified plaque.  The rest of the right vertebral artery is widely patent. There is mild calcific plaque of the origin of the non dominant left vertebral artery with mild, less than 50% stenosis.  The rest of the left vertebral artery is widely patent. SOFT TISSUES: The lung apices are clear.  No cervical or superior mediastinal lymphadenopathy.  The larynx and pharynx are unremarkable.  No acute abnormality of the salivary and thyroid glands. BONES:

## 2024-12-12 NOTE — PLAN OF CARE
Problem: Chronic Conditions and Co-morbidities  Goal: Patient's chronic conditions and co-morbidity symptoms are monitored and maintained or improved  12/12/2024 0924 by Brenda Charles RN  Outcome: Progressing  12/12/2024 0056 by Rosanna Reis RN  Outcome: Progressing     Problem: Discharge Planning  Goal: Discharge to home or other facility with appropriate resources  12/12/2024 0924 by Brenda Charles RN  Outcome: Progressing  12/12/2024 0056 by Rosanna Reis RN  Outcome: Progressing     Problem: Safety - Adult  Goal: Free from fall injury  12/12/2024 0924 by Brenda Charles RN  Outcome: Progressing  12/12/2024 0056 by Rosanna Reis RN  Outcome: Progressing     Problem: ABCDS Injury Assessment  Goal: Absence of physical injury  12/12/2024 0924 by Brenda Charles RN  Outcome: Progressing  12/12/2024 0056 by Rosanna Reis RN  Outcome: Progressing     Problem: Pain  Goal: Verbalizes/displays adequate comfort level or baseline comfort level  Outcome: Progressing

## 2024-12-12 NOTE — PROGRESS NOTES
Physical Therapy Med Surg Initial Assessment  Facility/Department: [address] ORTHO TELE  Room: [facility]       NAME: Lauren Olson  : 1955 (69 y.o.)  MRN: [id number]  CODE STATUS: Full Code    Date of Service: 2024    Patient Diagnosis(es): TIA (transient ischemic attack) [G45.9]  Dizziness [R42]   Chief Complaint   Patient presents with    Blurred Vision     About an hour prior to arrival     Dizziness     Patient Active Problem List    Diagnosis Date Noted    Seronegative arthritis 2022    Thoracogenic scoliosis of thoracolumbar region 2022    Chronic back pain 2022    Squamous blepharitis of upper and lower eyelids of both eyes 2022    Type 2 diabetes mellitus with hyperglycemia, without long-term current use of insulin (HCC) 2024    Facial droop 2023    TIA (transient ischemic attack) 07/10/2023    RBBB (right bundle branch block) 2023    Seizure (HCC) 2021    Therapeutic drug monitoring 2021    Stage 3b chronic kidney disease (HCC) 2021    Adhesive capsulitis of right shoulder 2021    Bilateral leg and foot pain 2021    Numbness and tingling of upper and lower extremities of both sides 2021    Major depressive disorder 2021    Cognitive impairment 04/15/2021    Anxiety 04/15/2021    Grief reaction 04/15/2021    Epigastric pain     Gastric polyp     Altered bowel habits     Lymphadenopathy, axillary 2021    Fatigue 2021    Generalized idiopathic epilepsy and epileptic syndromes, intractable, with status epilepticus (HCC) 12/10/2020    Polyneuropathy associated with underlying disease (HCC)     Hypercalcemia 11/10/2020    Hypomagnesemia 2020    Hypokalemia 2020    Anemia 2020    Weight loss 2020    Chronic pain of right knee 2018    Facet arthropathy, multilevel 2018    Osteopenia of multiple sites 2018    Hypothyroidism 2018    Psoriasis with  arthropathy (HCC) 11/06/2017    Vitamin D deficiency 11/06/2017    Restless leg syndrome 11/06/2017    Alcoholism ([facility])     Gastroesophageal reflux disease without esophagitis 10/26/2016    Abnormal glucose 10/03/2016    Mixed hyperlipidemia 01/21/1981        Past Medical History:   Diagnosis Date    Adhesive capsulitis of right shoulder 8/12/2021    Alcoholism (HCC)     Arthritis     Bilateral leg and foot pain 8/12/2021    Chronic back pain 5/17/2022    Chronic lung disease     Cognitive impairment 4/15/2021    Depression     Diabetes mellitus (HCC)     Diarrhea 1/4/2021    Possibly related to Metformin, Keppra, constipation, colitis.  Stop Metformin.  Treat constipation.  Consider adjustment to Keppra given side effects.    Encephalopathy 12/10/2020    Grief reaction 4/15/2021    Hyperlipidemia     Hypertension     Hypokalemia 11/6/2020    Hypomagnesemia 11/6/2020    Hypothyroidism     Lymphadenopathy, axillary 1/4/2021    Numbness and tingling of upper and lower extremities of both sides 8/12/2021    RBBB (right bundle branch block) 6/30/2023    Second hand smoke exposure     Seizure ([facility])     Seronegative arthritis 6/28/2022    Squamous blepharitis of upper and lower eyelids of both eyes 5/17/2022    Stage 3b chronic kidney disease (HCC) 8/12/2021    Syncope and collapse 12/10/2020    Thoracogenic scoliosis of thoracolumbar region 5/17/2022     Past Surgical History:   Procedure Laterality Date    APPENDECTOMY      BIOPSY MOUTH LESION Bilateral 12/19/2016    REMOVAL  OF BILATERAL LINGUAL MACIE performed by Analisa Okeefe DDS at [facility] [place]    CHOLECYSTECTOMY      COLONOSCOPY N/A 01/21/2021    COLONOSCOPY DIAGNOSTIC performed by She Cisneros MD at [facility]    HYSTERECTOMY (CERVIX STATUS UNKNOWN)      OVARY REMOVAL      UPPER GASTROINTESTINAL ENDOSCOPY N/A 01/21/2021    EGD with polypectomy performed by She Cisneros MD at [facility]       Patient assessed for rehabilitation services?:

## 2024-12-12 NOTE — PROGRESS NOTES
[facility]   Facility/Department: [address] ORTHO TELE  Speech Language Pathology  Initial Speech/Language/Cognitive Assessment    NAME:Lauren Olson  : 1955 (69 y.o.)   [x]   confirmed    MRN: [id number]  ROOM: [place] [address]  ADMISSION DATE: 2024  PATIENT DIAGNOSIS(ES): TIA (transient ischemic attack) [G45.9]  Dizziness [R42]  Chief Complaint   Patient presents with    Blurred Vision     About an hour prior to arrival     Dizziness     Patient Active Problem List    Diagnosis Date Noted    Seronegative arthritis 2022    Thoracogenic scoliosis of thoracolumbar region 2022    Chronic back pain 2022    Squamous blepharitis of upper and lower eyelids of both eyes 2022    Type 2 diabetes mellitus with hyperglycemia, without long-term current use of insulin (HCC) 2024    Facial droop 2023    TIA (transient ischemic attack) 07/10/2023    RBBB (right bundle branch block) 2023    Seizure (HCC) 2021    Therapeutic drug monitoring 2021    Stage 3b chronic kidney disease (HCC) 2021    Adhesive capsulitis of right shoulder 2021    Bilateral leg and foot pain 2021    Numbness and tingling of upper and lower extremities of both sides 2021    Major depressive disorder 2021    Cognitive impairment 04/15/2021    Anxiety 04/15/2021    Grief reaction 04/15/2021    Epigastric pain     Gastric polyp     Altered bowel habits     Lymphadenopathy, axillary 2021    Fatigue 2021    Generalized idiopathic epilepsy and epileptic syndromes, intractable, with status epilepticus (HCC) 12/10/2020    Polyneuropathy associated with underlying disease (HCC)     Hypercalcemia 11/10/2020    Hypomagnesemia 2020    Hypokalemia 2020    Anemia 2020    Weight loss 2020    Chronic pain of right knee 2018    Facet arthropathy, multilevel 2018    Osteopenia of multiple sites 2018    Hypothyroidism  02/20/2018    Psoriasis with arthropathy (HCC) 11/06/2017    Vitamin D deficiency 11/06/2017    Restless leg syndrome 11/06/2017    Alcoholism ([facility])     Gastroesophageal reflux disease without esophagitis 10/26/2016    Abnormal glucose 10/03/2016    Mixed hyperlipidemia 01/21/1981     Past Medical History:   Diagnosis Date    Adhesive capsulitis of right shoulder 8/12/2021    Alcoholism (HCC)     Arthritis     Bilateral leg and foot pain 8/12/2021    Chronic back pain 5/17/2022    Chronic lung disease     Cognitive impairment 4/15/2021    Depression     Diabetes mellitus (HCC)     Diarrhea 1/4/2021    Possibly related to Metformin, Keppra, constipation, colitis.  Stop Metformin.  Treat constipation.  Consider adjustment to Keppra given side effects.    Encephalopathy 12/10/2020    Grief reaction 4/15/2021    Hyperlipidemia     Hypertension     Hypokalemia 11/6/2020    Hypomagnesemia 11/6/2020    Hypothyroidism     Lymphadenopathy, axillary 1/4/2021    Numbness and tingling of upper and lower extremities of both sides 8/12/2021    RBBB (right bundle branch block) 6/30/2023    Second hand smoke exposure     Seizure ([facility])     Seronegative arthritis 6/28/2022    Squamous blepharitis of upper and lower eyelids of both eyes 5/17/2022    Stage 3b chronic kidney disease (HCC) 8/12/2021    Syncope and collapse 12/10/2020    Thoracogenic scoliosis of thoracolumbar region 5/17/2022     Past Surgical History:   Procedure Laterality Date    APPENDECTOMY      BIOPSY MOUTH LESION Bilateral 12/19/2016    REMOVAL  OF BILATERAL LINGUAL MACIE performed by Analisa Okeefe DDS at [facility] [place]    CHOLECYSTECTOMY      COLONOSCOPY N/A 01/21/2021    COLONOSCOPY DIAGNOSTIC performed by She Cisneros MD at [facility]    HYSTERECTOMY (CERVIX STATUS UNKNOWN)      OVARY REMOVAL      UPPER GASTROINTESTINAL ENDOSCOPY N/A 01/21/2021    EGD with polypectomy performed by She Cisneros MD at [facility]       Date of Onset:  12/11/24    Date of Evaluation: 12/12/2024   Evaluating Therapist: CLAIRE Ellison        Diagnosis: Pt presents with receptive/expressive language skills deemed functional this date. Pt was able to complete naming tasks, answer questions appropriately, completed abstract reasoning tasks, follow multi step directions, and express wants/needs. Pt presents with 100% intelligibility at conversational level in known and unknown contexts.  Pt able to ind completed congitive screening.    Requires SLP Intervention: No          General  General Comment  Comments: Pt admitted with dizzness and blurred vision. TIA with MRI results pending. Past medical history of diabetes, alcoholism, hypertension, hyperlipidemia  Behavior/Cognition  Behavior/Cognition: Alert;Cooperative;Pleasant mood   Respiratory Status: Room air  O2 Device: None (Room air)  Previous level of function and limitations:   Social/Functional History  Lives With: Alone  Type of Home: Senior housing apartment  Receives Help From: Home health;Other (comment);Family;Friend(s)  Prior Level of Assist for ADLs: Independent  Toileting: Independent  Prior Level of Assist for Homemaking: Needs assistance  Homemaking Responsibilities: Yes  Prior Level of Assist for Ambulation: Independent household ambulator, with or without device  Active : No  Patient's  Info: friend  Occupation: On disability  Additional Comments: 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

## 2024-12-19 NOTE — PROGRESS NOTES
Subjective  Lauren Olson 1955 is a 69 y.o. female who presents today with:  Chief Complaint   Patient presents with    Follow-up     Pt was in hospital  12/11/24   Pt states she is fine today just very tired with no appetite        HPI  History of Present Illness  The patient presents for a hospital follow-up.    She experienced an episode of dizziness characterized by blurred vision and a sensation of the room spinning, which was a new symptom for her. She was evaluated in the emergency room, where all tests, including two CT scans and an MRI, returned negative results. The following day, she was admitted to the hospital for further evaluation. Her dizziness has since resolved. She was informed that her symptoms could be due to vertigo or an inner ear infection. She also reported a recent severe cough, which she attributed to bronchitis, and described a sensation of heaviness on her chest. She was advised to take meclizine for her symptoms. She is currently taking Prolia and has been advised against using Tylenol, Aleve, Advil, or ibuprofen due to potential kidney interference. She is seeking alternative pain management options. She is not taking pantoprazole or Reglan anymore. She is taking duloxetine and needs refills for it. She has a history of experiencing dizzy spells but has never had an episode like this before.    She has been experiencing blurry vision and watery eyes. She consulted an ophthalmologist 3 weeks ago and was prescribed glasses. She was informed that she might have thyroid eye disease. She is currently taking levothyroxine 25 mg twice daily.    Supplemental Information  She has arthritis and is planning to consult with Dr. Cao regarding her condition.    MEDICATIONS  Current: Duloxetine, levothyroxine, meclizine, Prolia.  Discontinued: Pantoprazole, Reglan.      Hospital Follow-Up  - seen in the ER for dizziness. She was seen to have vestibular dysfunction. TIA and stroke ruled

## 2024-12-23 ENCOUNTER — OFFICE VISIT (OUTPATIENT)
Age: 69
End: 2024-12-23
Payer: COMMERCIAL

## 2024-12-23 VITALS
DIASTOLIC BLOOD PRESSURE: 60 MMHG | BODY MASS INDEX: 20.49 KG/M2 | SYSTOLIC BLOOD PRESSURE: 110 MMHG | OXYGEN SATURATION: 98 % | WEIGHT: 95 LBS | HEART RATE: 78 BPM | HEIGHT: 57 IN

## 2024-12-23 DIAGNOSIS — K21.9 GASTROESOPHAGEAL REFLUX DISEASE WITHOUT ESOPHAGITIS: Chronic | ICD-10-CM

## 2024-12-23 DIAGNOSIS — F32.A ANXIETY AND DEPRESSION: ICD-10-CM

## 2024-12-23 DIAGNOSIS — E03.9 HYPOTHYROIDISM, UNSPECIFIED TYPE: Chronic | ICD-10-CM

## 2024-12-23 DIAGNOSIS — H57.89 THYROID EYE DISEASE: Primary | ICD-10-CM

## 2024-12-23 DIAGNOSIS — E78.2 MIXED HYPERLIPIDEMIA: Chronic | ICD-10-CM

## 2024-12-23 DIAGNOSIS — R42 VERTIGO: ICD-10-CM

## 2024-12-23 DIAGNOSIS — F41.9 ANXIETY AND DEPRESSION: ICD-10-CM

## 2024-12-23 DIAGNOSIS — I65.09 VERTEBRAL ARTERY STENOSIS, UNSPECIFIED LATERALITY: ICD-10-CM

## 2024-12-23 DIAGNOSIS — E07.9 THYROID EYE DISEASE: Primary | ICD-10-CM

## 2024-12-23 PROCEDURE — 1036F TOBACCO NON-USER: CPT | Performed by: PHYSICIAN ASSISTANT

## 2024-12-23 PROCEDURE — 1123F ACP DISCUSS/DSCN MKR DOCD: CPT | Performed by: PHYSICIAN ASSISTANT

## 2024-12-23 PROCEDURE — 99214 OFFICE O/P EST MOD 30 MIN: CPT | Performed by: PHYSICIAN ASSISTANT

## 2024-12-23 PROCEDURE — G8399 PT W/DXA RESULTS DOCUMENT: HCPCS | Performed by: PHYSICIAN ASSISTANT

## 2024-12-23 PROCEDURE — 1160F RVW MEDS BY RX/DR IN RCRD: CPT | Performed by: PHYSICIAN ASSISTANT

## 2024-12-23 PROCEDURE — G8484 FLU IMMUNIZE NO ADMIN: HCPCS | Performed by: PHYSICIAN ASSISTANT

## 2024-12-23 PROCEDURE — G8427 DOCREV CUR MEDS BY ELIG CLIN: HCPCS | Performed by: PHYSICIAN ASSISTANT

## 2024-12-23 PROCEDURE — G8420 CALC BMI NORM PARAMETERS: HCPCS | Performed by: PHYSICIAN ASSISTANT

## 2024-12-23 PROCEDURE — 3017F COLORECTAL CA SCREEN DOC REV: CPT | Performed by: PHYSICIAN ASSISTANT

## 2024-12-23 PROCEDURE — 1090F PRES/ABSN URINE INCON ASSESS: CPT | Performed by: PHYSICIAN ASSISTANT

## 2024-12-23 PROCEDURE — 1159F MED LIST DOCD IN RCRD: CPT | Performed by: PHYSICIAN ASSISTANT

## 2024-12-23 RX ORDER — DULOXETIN HYDROCHLORIDE 60 MG/1
60 CAPSULE, DELAYED RELEASE ORAL DAILY
Qty: 90 CAPSULE | Refills: 3 | Status: SHIPPED | OUTPATIENT
Start: 2024-12-23

## 2024-12-23 RX ORDER — DULOXETIN HYDROCHLORIDE 60 MG/1
60 CAPSULE, DELAYED RELEASE ORAL DAILY
Qty: 90 CAPSULE | Refills: 3 | Status: SHIPPED | OUTPATIENT
Start: 2024-12-23 | End: 2024-12-23

## 2024-12-23 RX ORDER — LEVOTHYROXINE SODIUM 50 UG/1
50 TABLET ORAL DAILY
Qty: 90 TABLET | Refills: 1 | Status: SHIPPED | OUTPATIENT
Start: 2024-12-23 | End: 2024-12-23

## 2024-12-23 RX ORDER — PANTOPRAZOLE SODIUM 40 MG/1
TABLET, DELAYED RELEASE ORAL
Qty: 90 TABLET | Refills: 4 | Status: SHIPPED | OUTPATIENT
Start: 2024-12-23

## 2024-12-23 RX ORDER — FENOFIBRATE 160 MG/1
160 TABLET ORAL DAILY
Qty: 90 TABLET | Refills: 4 | Status: SHIPPED | OUTPATIENT
Start: 2024-12-23

## 2024-12-23 RX ORDER — PANTOPRAZOLE SODIUM 40 MG/1
TABLET, DELAYED RELEASE ORAL
Qty: 90 TABLET | Refills: 4 | Status: SHIPPED | OUTPATIENT
Start: 2024-12-23 | End: 2024-12-23

## 2024-12-23 RX ORDER — LEVOTHYROXINE SODIUM 50 UG/1
50 TABLET ORAL DAILY
Qty: 90 TABLET | Refills: 1 | Status: SHIPPED | OUTPATIENT
Start: 2024-12-23

## 2024-12-23 RX ORDER — MECLIZINE HCL 12.5 MG 12.5 MG/1
12.5 TABLET ORAL 3 TIMES DAILY PRN
Qty: 30 TABLET | Refills: 1 | Status: SHIPPED | OUTPATIENT
Start: 2024-12-23

## 2024-12-23 RX ORDER — FENOFIBRATE 160 MG/1
160 TABLET ORAL DAILY
Qty: 90 TABLET | Refills: 4 | Status: SHIPPED | OUTPATIENT
Start: 2024-12-23 | End: 2024-12-23

## 2024-12-23 ASSESSMENT — ENCOUNTER SYMPTOMS
NAUSEA: 0
VOMITING: 0
DIARRHEA: 0
BACK PAIN: 0
SORE THROAT: 0
CHEST TIGHTNESS: 0
ABDOMINAL PAIN: 0
ABDOMINAL DISTENTION: 1
COUGH: 0
SHORTNESS OF BREATH: 0
SINUS PRESSURE: 0
SINUS PAIN: 0

## 2024-12-23 ASSESSMENT — VISUAL ACUITY: OU: 1

## 2025-02-25 DIAGNOSIS — R42 VERTIGO: ICD-10-CM

## 2025-02-25 NOTE — TELEPHONE ENCOUNTER
Comments:     Last Office Visit (last PCP visit):   12/23/2024    Next Visit Date:  Future Appointments   Date Time Provider Department Center   4/28/2025 11:45 AM Enrique Diallo MD [facility]   5/14/2025 11:30 AM Dickson Mancilla PA [facility]       **If hasn't been seen in over a year OR hasn't followed up according to last diabetes/ADHD visit, make appointment for patient before sending refill to provider.    Rx requested:  Requested Prescriptions     Pending Prescriptions Disp Refills    meclizine (ANTIVERT) 12.5 MG tablet 30 tablet 1     Sig: Take 1 tablet by mouth 3 times daily as needed for Dizziness

## 2025-02-26 ENCOUNTER — TELEPHONE (OUTPATIENT)
Age: 70
End: 2025-02-26

## 2025-02-26 RX ORDER — MECLIZINE HCL 12.5 MG 12.5 MG/1
12.5 TABLET ORAL 3 TIMES DAILY PRN
Qty: 30 TABLET | Refills: 1 | Status: SHIPPED | OUTPATIENT
Start: 2025-02-26

## 2025-02-26 NOTE — TELEPHONE ENCOUNTER
Patient is asking if Shiro XL is something that would help with her arthritis? If yes, the patient is asking for a Rx for Shiro XL.    Callback  [phone]

## 2025-03-03 DIAGNOSIS — G25.81 RESTLESS LEG SYNDROME: Chronic | ICD-10-CM

## 2025-03-03 NOTE — TELEPHONE ENCOUNTER
Comments:     Last Office Visit (last PCP visit):   12/23/2024    Next Visit Date:  Future Appointments   Date Time Provider Department Center   4/28/2025 11:45 AM Enrique Diallo MD [facility]   5/14/2025 11:30 AM Dickson Mancilla PA [facility]         Rx requested:  Requested Prescriptions     Pending Prescriptions Disp Refills    rOPINIRole (REQUIP) 0.5 MG tablet [Pharmacy Med Name: ROPINIROLE HCL TABS 0.5MG] 270 tablet 3     Sig: Take 1 tablet by mouth 3 times daily

## 2025-03-04 RX ORDER — ROPINIROLE 0.5 MG/1
0.5 TABLET, FILM COATED ORAL 3 TIMES DAILY
Qty: 270 TABLET | Refills: 3 | Status: SHIPPED | OUTPATIENT
Start: 2025-03-04

## 2025-03-14 DIAGNOSIS — R56.9 SEIZURE (HCC): ICD-10-CM

## 2025-03-14 RX ORDER — LEVETIRACETAM 500 MG/1
500 TABLET ORAL 2 TIMES DAILY
Qty: 180 TABLET | Refills: 4 | Status: SHIPPED | OUTPATIENT
Start: 2025-03-14

## 2025-03-14 NOTE — TELEPHONE ENCOUNTER
Comments:     Last Office Visit (last PCP visit):   12/23/2024    Next Visit Date:  Future Appointments   Date Time Provider Department Center   4/28/2025 11:45 AM Enrique Diallo MD [facility]   5/14/2025 11:30 AM Dickson Mancilla PA [facility]       **If hasn't been seen in over a year OR hasn't followed up according to last diabetes/ADHD visit, make appointment for patient before sending refill to provider.    Rx requested:  Requested Prescriptions     Pending Prescriptions Disp Refills    levETIRAcetam (KEPPRA) 500 MG tablet [Pharmacy Med Name: LEVETIRACETAM 500 MG TABLET] 180 tablet 4     Sig: TAKE 1 TABLET BY MOUTH TWICE A DAY

## 2025-04-28 ENCOUNTER — OFFICE VISIT (OUTPATIENT)
Age: 70
End: 2025-04-28
Payer: COMMERCIAL

## 2025-04-28 VITALS
HEART RATE: 81 BPM | WEIGHT: 90 LBS | BODY MASS INDEX: 19.48 KG/M2 | OXYGEN SATURATION: 98 % | SYSTOLIC BLOOD PRESSURE: 110 MMHG | DIASTOLIC BLOOD PRESSURE: 70 MMHG

## 2025-04-28 DIAGNOSIS — G47.00 INSOMNIA, UNSPECIFIED TYPE: ICD-10-CM

## 2025-04-28 DIAGNOSIS — G25.81 RLS (RESTLESS LEGS SYNDROME): ICD-10-CM

## 2025-04-28 DIAGNOSIS — R94.2 ABNORMAL PFT: ICD-10-CM

## 2025-04-28 DIAGNOSIS — R06.02 SHORTNESS OF BREATH: Primary | ICD-10-CM

## 2025-04-28 PROCEDURE — 1123F ACP DISCUSS/DSCN MKR DOCD: CPT | Performed by: INTERNAL MEDICINE

## 2025-04-28 PROCEDURE — G8399 PT W/DXA RESULTS DOCUMENT: HCPCS | Performed by: INTERNAL MEDICINE

## 2025-04-28 PROCEDURE — 1090F PRES/ABSN URINE INCON ASSESS: CPT | Performed by: INTERNAL MEDICINE

## 2025-04-28 PROCEDURE — 1159F MED LIST DOCD IN RCRD: CPT | Performed by: INTERNAL MEDICINE

## 2025-04-28 PROCEDURE — 1036F TOBACCO NON-USER: CPT | Performed by: INTERNAL MEDICINE

## 2025-04-28 PROCEDURE — 99213 OFFICE O/P EST LOW 20 MIN: CPT | Performed by: INTERNAL MEDICINE

## 2025-04-28 PROCEDURE — 3017F COLORECTAL CA SCREEN DOC REV: CPT | Performed by: INTERNAL MEDICINE

## 2025-04-28 PROCEDURE — G8420 CALC BMI NORM PARAMETERS: HCPCS | Performed by: INTERNAL MEDICINE

## 2025-04-28 PROCEDURE — G8427 DOCREV CUR MEDS BY ELIG CLIN: HCPCS | Performed by: INTERNAL MEDICINE

## 2025-04-28 ASSESSMENT — ENCOUNTER SYMPTOMS
SHORTNESS OF BREATH: 1
NAUSEA: 0
VOICE CHANGE: 0
ABDOMINAL PAIN: 0
RHINORRHEA: 0
EYE ITCHING: 0
SORE THROAT: 0
COUGH: 0
DIARRHEA: 0
EYE DISCHARGE: 0
CHEST TIGHTNESS: 0
TROUBLE SWALLOWING: 0
SINUS PRESSURE: 0
WHEEZING: 0
VOMITING: 0

## 2025-04-28 NOTE — PROGRESS NOTES
Subjective:             Lauren Olson is a 69 y.o. female who complains today of:     Chief Complaint   Patient presents with    Follow-up     6m f/u on cough, SOB       HPI  She had RSV in jan 2025 .CXR no acute process   Mild cough due to weather changes.  C/o shortness of breath, She just rest and it goes away.   She has albuterol  HFA.inhaler but she is not using bronchodilator.    C/o feeling tired . No Wheezing. Feels tired with exertion. No Chest tightness.   No Chest pain with radiation or pleuritic pain.  No leg edema. No orthopnea.  No Fever or chills.   No Rhinorrhea and postnasal drip.    CXR No acute process. Severe scoliosis.    RLS on requip.     She has chronic insomnia, will be taking Zquil.     Allergies:  Morphine  Past Medical History:   Diagnosis Date    Adhesive capsulitis of right shoulder 8/12/2021    Alcoholism (HCC)     Arthritis     Bilateral leg and foot pain 8/12/2021    Chronic back pain 5/17/2022    Chronic lung disease     Cognitive impairment 4/15/2021    Depression     Diabetes mellitus (HCC)     Diarrhea 1/4/2021    Possibly related to Metformin, Keppra, constipation, colitis.  Stop Metformin.  Treat constipation.  Consider adjustment to Keppra given side effects.    Encephalopathy 12/10/2020    Grief reaction 4/15/2021    Hyperlipidemia     Hypertension     Hypokalemia 11/6/2020    Hypomagnesemia 11/6/2020    Hypothyroidism     Lymphadenopathy, axillary 1/4/2021    Numbness and tingling of upper and lower extremities of both sides 8/12/2021    RBBB (right bundle branch block) 6/30/2023    Second hand smoke exposure     Seizure (HCC)     Seronegative arthritis 6/28/2022    Squamous blepharitis of upper and lower eyelids of both eyes 5/17/2022    Stage 3b chronic kidney disease (HCC) 8/12/2021    Syncope and collapse 12/10/2020    Thoracogenic scoliosis of thoracolumbar region 5/17/2022     Past Surgical History:   Procedure Laterality Date    APPENDECTOMY      BIOPSY MOUTH

## 2025-05-02 NOTE — CARE COORDINATION
Left message on mailbox for patient to call care manager back for transitional care management/hospital follow-up call.  Care  information included in message.     Telephone call [address] [facility]. She indicated that they are waiting on medical certificate from [facility] to move forward with [address] stated that she spoke with Efren Devries at Dr. [facility] office and they are working on getting medical certificate back to them.

## 2025-05-13 NOTE — PROGRESS NOTES
Medicare Annual Wellness Visit    Lauren Olson is here for Medicare AWV (Pt here to complete AWV /)    Assessment & Plan   Medicare annual wellness visit, subsequent  Type 2 diabetes mellitus with hyperglycemia, without long-term current use of insulin (HCC)  -     ESTABLISHED, LOW MDM, 20-29 MIN [00421]  Stable chronic condition. Management with diet and lifestyle modifications  Hypothyroidism, unspecified type  -     TSH; Future  -     T4, Free; Future  -     ESTABLISHED, LOW MDM, 20-29 MIN [69251]  - stable chronic condition. Continue levothyroxine as prescribed.   Recurrent major depressive disorder, in partial remission  -     ESTABLISHED, LOW MDM, 20-29 MIN [[id number]]  RBBB (right bundle branch block)  -     [facility], DO, [facility], [place]  -     ESTABLISHED, LOW MDM, 20-29 MIN [[id number]]  Coronary artery disease involving native heart, unspecified vessel or lesion type, unspecified whether angina present  -     [facility]on, DO, [facility], [place]  -     ESTABLISHED, LOW MDM, 20-29 MIN [[id number]]  1. Chest pain.  - pain is only occurring at night time when laying down. Pain only lasting 15 minutes. Pressure feeling on chest. No SOB at the time. No radiating pain. Patient denies change in vision or numbness or tingling in the hands. She will like to schedule with cardiology.   - The patient's chest pain may be attributed to her scoliosis, which can also induce shortness of breath. However, the possibility of cardiac involvement cannot be ruled out.  - Her blood pressure readings have been consistently within the normal range.  - She has expressed interest in trying Omega XL for pain management, which contains green-lipped mussel oil. There are no known interactions between this supplement and her current medications.  - An EKG will be conducted for further evaluation. A referral to a cardiologist will be made for annual follow-ups. She is advised to take Omega XL 4 tablets

## 2025-05-14 ENCOUNTER — OFFICE VISIT (OUTPATIENT)
Age: 70
End: 2025-05-14
Payer: COMMERCIAL

## 2025-05-14 VITALS
BODY MASS INDEX: 19.41 KG/M2 | DIASTOLIC BLOOD PRESSURE: 76 MMHG | WEIGHT: 90 LBS | HEART RATE: 74 BPM | SYSTOLIC BLOOD PRESSURE: 110 MMHG | OXYGEN SATURATION: 99 % | HEIGHT: 57 IN

## 2025-05-14 DIAGNOSIS — M85.89 OSTEOPENIA OF MULTIPLE SITES: Chronic | ICD-10-CM

## 2025-05-14 DIAGNOSIS — E11.65 TYPE 2 DIABETES MELLITUS WITH HYPERGLYCEMIA, WITHOUT LONG-TERM CURRENT USE OF INSULIN (HCC): ICD-10-CM

## 2025-05-14 DIAGNOSIS — I65.23 BILATERAL CAROTID ARTERY STENOSIS: ICD-10-CM

## 2025-05-14 DIAGNOSIS — Z51.81 ENCOUNTER FOR THERAPEUTIC DRUG MONITORING: ICD-10-CM

## 2025-05-14 DIAGNOSIS — I45.10 RBBB (RIGHT BUNDLE BRANCH BLOCK): ICD-10-CM

## 2025-05-14 DIAGNOSIS — I25.10 CORONARY ARTERY DISEASE INVOLVING NATIVE HEART, UNSPECIFIED VESSEL OR LESION TYPE, UNSPECIFIED WHETHER ANGINA PRESENT: ICD-10-CM

## 2025-05-14 DIAGNOSIS — Z00.00 MEDICARE ANNUAL WELLNESS VISIT, SUBSEQUENT: Primary | ICD-10-CM

## 2025-05-14 DIAGNOSIS — F33.41 RECURRENT MAJOR DEPRESSIVE DISORDER, IN PARTIAL REMISSION: ICD-10-CM

## 2025-05-14 DIAGNOSIS — E03.9 HYPOTHYROIDISM, UNSPECIFIED TYPE: Chronic | ICD-10-CM

## 2025-05-14 PROCEDURE — 99213 OFFICE O/P EST LOW 20 MIN: CPT | Performed by: PHYSICIAN ASSISTANT

## 2025-05-14 PROCEDURE — G0439 PPPS, SUBSEQ VISIT: HCPCS | Performed by: PHYSICIAN ASSISTANT

## 2025-05-14 PROCEDURE — 1159F MED LIST DOCD IN RCRD: CPT | Performed by: PHYSICIAN ASSISTANT

## 2025-05-14 PROCEDURE — 1090F PRES/ABSN URINE INCON ASSESS: CPT | Performed by: PHYSICIAN ASSISTANT

## 2025-05-14 PROCEDURE — G8399 PT W/DXA RESULTS DOCUMENT: HCPCS | Performed by: PHYSICIAN ASSISTANT

## 2025-05-14 PROCEDURE — 1126F AMNT PAIN NOTED NONE PRSNT: CPT | Performed by: PHYSICIAN ASSISTANT

## 2025-05-14 PROCEDURE — 1123F ACP DISCUSS/DSCN MKR DOCD: CPT | Performed by: PHYSICIAN ASSISTANT

## 2025-05-14 PROCEDURE — 1036F TOBACCO NON-USER: CPT | Performed by: PHYSICIAN ASSISTANT

## 2025-05-14 PROCEDURE — G8420 CALC BMI NORM PARAMETERS: HCPCS | Performed by: PHYSICIAN ASSISTANT

## 2025-05-14 PROCEDURE — 3017F COLORECTAL CA SCREEN DOC REV: CPT | Performed by: PHYSICIAN ASSISTANT

## 2025-05-14 PROCEDURE — 3046F HEMOGLOBIN A1C LEVEL >9.0%: CPT | Performed by: PHYSICIAN ASSISTANT

## 2025-05-14 PROCEDURE — 1160F RVW MEDS BY RX/DR IN RCRD: CPT | Performed by: PHYSICIAN ASSISTANT

## 2025-05-14 PROCEDURE — G8427 DOCREV CUR MEDS BY ELIG CLIN: HCPCS | Performed by: PHYSICIAN ASSISTANT

## 2025-05-14 PROCEDURE — 2022F DILAT RTA XM EVC RTNOPTHY: CPT | Performed by: PHYSICIAN ASSISTANT

## 2025-05-14 RX ORDER — FERROUS SULFATE 325(65) MG
1 TABLET ORAL DAILY
COMMUNITY
Start: 2025-04-25

## 2025-05-14 SDOH — ECONOMIC STABILITY: FOOD INSECURITY: WITHIN THE PAST 12 MONTHS, THE FOOD YOU BOUGHT JUST DIDN'T LAST AND YOU DIDN'T HAVE MONEY TO GET MORE.: NEVER TRUE

## 2025-05-14 ASSESSMENT — PATIENT HEALTH QUESTIONNAIRE - PHQ9
6. FEELING BAD ABOUT YOURSELF - OR THAT YOU ARE A FAILURE OR HAVE LET YOURSELF OR YOUR FAMILY DOWN: NOT AT ALL
SUM OF ALL RESPONSES TO PHQ QUESTIONS 1-9: 2
10. IF YOU CHECKED OFF ANY PROBLEMS, HOW DIFFICULT HAVE THESE PROBLEMS MADE IT FOR YOU TO DO YOUR WORK, TAKE CARE OF THINGS AT HOME, OR GET ALONG WITH OTHER PEOPLE: NOT DIFFICULT AT ALL
8. MOVING OR SPEAKING SO SLOWLY THAT OTHER PEOPLE COULD HAVE NOTICED. OR THE OPPOSITE, BEING SO FIGETY OR RESTLESS THAT YOU HAVE BEEN MOVING AROUND A LOT MORE THAN USUAL: NOT AT ALL
SUM OF ALL RESPONSES TO PHQ QUESTIONS 1-9: 2
SUM OF ALL RESPONSES TO PHQ QUESTIONS 1-9: 2
1. LITTLE INTEREST OR PLEASURE IN DOING THINGS: SEVERAL DAYS
7. TROUBLE CONCENTRATING ON THINGS, SUCH AS READING THE NEWSPAPER OR WATCHING TELEVISION: NOT AT ALL
3. TROUBLE FALLING OR STAYING ASLEEP: NOT AT ALL
9. THOUGHTS THAT YOU WOULD BE BETTER OFF DEAD, OR OF HURTING YOURSELF: NOT AT ALL
4. FEELING TIRED OR HAVING LITTLE ENERGY: NOT AT ALL
2. FEELING DOWN, DEPRESSED OR HOPELESS: SEVERAL DAYS
5. POOR APPETITE OR OVEREATING: NOT AT ALL
SUM OF ALL RESPONSES TO PHQ QUESTIONS 1-9: 2

## 2025-05-14 NOTE — PATIENT INSTRUCTIONS
make sure you're as safe as possible and able to care for yourself as well as you can. You may want to bring a caregiver, friend, or family member to your checkup. They can help you talk to your doctor.  Follow-up care is a key part of your treatment and safety. Be sure to make and go to all appointments, and call your doctor if you are having problems. It's also a good idea to know your test results and keep a list of the medicines you take.  Current as of: October 24, 2024  Content Version: 14.4  © 2024-2025 [employer].   Care instructions adapted under license by [facility]. If you have questions about a medical condition or this instruction, always ask your healthcare professional. [employer], disclaims any warranty or liability for your use of this information.         Advance Directives: Care Instructions  Overview  An advance directive is a legal way to state your wishes at the end of your life. It tells your family and your doctor what to do if you can't say what you want.  There are two main types of advance directives. You can change them any time your wishes change.  Living will.  This form tells your family and your doctor your wishes about life support and other treatment. The form is also called a declaration.  Medical power of .  This form lets you name a person to make treatment decisions for you when you can't speak for yourself. This person is called a health care agent (health care proxy, health care surrogate). The form is also called a durable power of  for health care.  If you do not have an advance directive, decisions about your medical care may be made by a family member, or by a doctor or a  who doesn't know you.  It may help to think of an advance directive as a gift to the people who care for you. If you have one, they won't have to make tough decisions by themselves.  For more information, including forms for your state, see the [employer]

## 2025-06-05 ENCOUNTER — TELEPHONE (OUTPATIENT)
Age: 70
End: 2025-06-05

## 2025-06-05 DIAGNOSIS — M85.89 OSTEOPENIA OF MULTIPLE SITES: Chronic | ICD-10-CM

## 2025-06-05 DIAGNOSIS — N18.32 STAGE 3B CHRONIC KIDNEY DISEASE (HCC): Chronic | ICD-10-CM

## 2025-06-05 NOTE — TELEPHONE ENCOUNTER
Patient calling to see if Prolia Injection has been received in the office. Per Liz patient advised it has not come in yet.

## 2025-06-05 NOTE — TELEPHONE ENCOUNTER
[facility] called bc the request for the prolia needs to go to their speciality pharmacy [facility]. They would like the request to be resent to them in order to process the RX. Please advise.

## 2025-06-12 ENCOUNTER — OFFICE VISIT (OUTPATIENT)
Age: 70
End: 2025-06-12
Payer: COMMERCIAL

## 2025-06-12 VITALS
WEIGHT: 91.4 LBS | HEART RATE: 85 BPM | BODY MASS INDEX: 19.78 KG/M2 | DIASTOLIC BLOOD PRESSURE: 80 MMHG | SYSTOLIC BLOOD PRESSURE: 120 MMHG

## 2025-06-12 DIAGNOSIS — R07.9 CHEST PAIN, UNSPECIFIED TYPE: ICD-10-CM

## 2025-06-12 DIAGNOSIS — I45.10 RBBB (RIGHT BUNDLE BRANCH BLOCK): Primary | ICD-10-CM

## 2025-06-12 DIAGNOSIS — E78.2 MIXED HYPERLIPIDEMIA: ICD-10-CM

## 2025-06-12 DIAGNOSIS — R06.02 SHORTNESS OF BREATH: ICD-10-CM

## 2025-06-12 DIAGNOSIS — E03.9 HYPOTHYROIDISM, UNSPECIFIED TYPE: ICD-10-CM

## 2025-06-12 LAB
T4 FREE SERPL-MCNC: 1.83 NG/DL (ref 0.84–1.68)
TSH SERPL-MCNC: 0.13 UIU/ML (ref 0.44–3.86)

## 2025-06-12 PROCEDURE — 99214 OFFICE O/P EST MOD 30 MIN: CPT | Performed by: INTERNAL MEDICINE

## 2025-06-12 PROCEDURE — 1159F MED LIST DOCD IN RCRD: CPT | Performed by: INTERNAL MEDICINE

## 2025-06-12 PROCEDURE — G8427 DOCREV CUR MEDS BY ELIG CLIN: HCPCS | Performed by: INTERNAL MEDICINE

## 2025-06-12 PROCEDURE — 3017F COLORECTAL CA SCREEN DOC REV: CPT | Performed by: INTERNAL MEDICINE

## 2025-06-12 PROCEDURE — 1123F ACP DISCUSS/DSCN MKR DOCD: CPT | Performed by: INTERNAL MEDICINE

## 2025-06-12 PROCEDURE — 1090F PRES/ABSN URINE INCON ASSESS: CPT | Performed by: INTERNAL MEDICINE

## 2025-06-12 PROCEDURE — 93000 ELECTROCARDIOGRAM COMPLETE: CPT | Performed by: INTERNAL MEDICINE

## 2025-06-12 PROCEDURE — G8399 PT W/DXA RESULTS DOCUMENT: HCPCS | Performed by: INTERNAL MEDICINE

## 2025-06-12 PROCEDURE — 1126F AMNT PAIN NOTED NONE PRSNT: CPT | Performed by: INTERNAL MEDICINE

## 2025-06-12 PROCEDURE — G8420 CALC BMI NORM PARAMETERS: HCPCS | Performed by: INTERNAL MEDICINE

## 2025-06-12 PROCEDURE — 1036F TOBACCO NON-USER: CPT | Performed by: INTERNAL MEDICINE

## 2025-06-12 NOTE — PROGRESS NOTES
CC: fatigue       S: This is a pleasant 65-year-old  female with past medical history significant for dyslipidemia, history of seizures, hypothyroidism, psoriasis, history of EtOH abuse and anxiety who presented to the emergency room yesterday with chief complaint of chest pain.  Patient was seen her family physician yesterday in the office at which time she was complaining of intermittent chest pain episodes and due to concern for angina she was sent to the ER for further evaluation.  Patient sister recently passed away in February and since that time she has been experiencing intermittent episodes of midsternal to left-sided chest pressure and heaviness which radiates toward her left arm.  These episodes occur mostly with exertion and are alleviated with rest.  She states that they have been increasing in frequency recently.  She does report being under a significant amount of stress and anxiety since the death of her sister.  Also, she reports some shortness of breath with exertion.  She denies nausea, vomiting, dizziness, lightheadedness, diaphoresis, palpitations, paroxysmal nocturnal dyspnea, orthopnea, lower extremity edema, syncope, fever or chills.     On presentation to the emergency room, blood pressure 145/74, heart rate 68, respiratory rate 16, pulse ox 100% on room air, amateur of 98.7 °F.  Sodium 138, potassium 4.0, chloride 101, total CO2 28, BUN elevated 34, creatinine elevated 0.96, GFR low at 58.5, glucose 89.  Troponin negative less than 0.010.  ALT 25, AST 34.  WBC 5.4, hemoglobin low at 9.8, hematocrit low at 29.6, platelets 170.  D-dimer elevated 0.84.  CTA of the chest showed no evidence of pulmonary embolism.  Chest x-ray revealed no radiographic evidence of acute intrathoracic process.  EKG showed sinus rhythm 62 bpm, right bundle branch block, ST depression with T wave inversion in inferior leads and V1 through V6 with poor R wave progression in V2 through V6,  ms.  She was

## 2025-06-18 ENCOUNTER — RESULTS FOLLOW-UP (OUTPATIENT)
Age: 70
End: 2025-06-18

## 2025-06-18 ENCOUNTER — TELEPHONE (OUTPATIENT)
Age: 70
End: 2025-06-18

## 2025-06-30 ENCOUNTER — HOSPITAL ENCOUNTER (OUTPATIENT)
Dept: WOMENS IMAGING | Age: 70
Discharge: HOME | End: 2025-07-02
Payer: COMMERCIAL

## 2025-06-30 DIAGNOSIS — M85.89 OSTEOPENIA OF MULTIPLE SITES: ICD-10-CM

## 2025-06-30 DIAGNOSIS — Z51.81 ENCOUNTER FOR THERAPEUTIC DRUG MONITORING: ICD-10-CM

## 2025-06-30 PROCEDURE — 77080 DXA BONE DENSITY AXIAL: CPT

## 2025-07-11 ENCOUNTER — OFFICE VISIT (OUTPATIENT)
Age: 70
End: 2025-07-11

## 2025-07-11 VITALS
TEMPERATURE: 97.9 F | BODY MASS INDEX: 20.34 KG/M2 | WEIGHT: 94 LBS | DIASTOLIC BLOOD PRESSURE: 74 MMHG | HEART RATE: 89 BPM | OXYGEN SATURATION: 98 % | SYSTOLIC BLOOD PRESSURE: 120 MMHG

## 2025-07-11 DIAGNOSIS — M85.89 OSTEOPENIA OF MULTIPLE SITES: Primary | ICD-10-CM

## 2025-07-11 ASSESSMENT — ENCOUNTER SYMPTOMS
DIARRHEA: 0
BACK PAIN: 0
SINUS PRESSURE: 0
NAUSEA: 0
SINUS PAIN: 0
ABDOMINAL DISTENTION: 1
SORE THROAT: 0
ABDOMINAL PAIN: 0
SHORTNESS OF BREATH: 0
VOMITING: 0
CHEST TIGHTNESS: 0
COUGH: 0

## 2025-07-11 ASSESSMENT — VISUAL ACUITY: OU: 1

## 2025-07-11 NOTE — PROGRESS NOTES
Subjective  Lauren Olson 1955 is a 70 y.o. female who presents today with:  Chief Complaint   Patient presents with    Follow-up     Discuss bone density scan, prolia injection        HPI  Osteopenia  - On prolia. Last injection 07/11/25 was having trouble obtaining it. We will give it today. Next on 05/14/25. Was not in today we will need to reach out to pharmacy.    - patient having CKD limiting her treatment plan. We decided prolia injection.. went over SE of medications: if you have any symptoms of a serious allergic reaction, including low blood pressure (hypotension); trouble breathing; throat tightness; swelling of your face, lips, or tongue; rash; itching; or hives go to ER.  - Went over signs for osteonecrosis.   - she will need to start a calcium supplement at 1200mg daily and 4000 IU of vitamin D.    - DEXA Scan showing improvement. Labs reviewed. Patient would like this to be the last injection.     Review of Systems   Constitutional:  Negative for activity change, appetite change, chills and fever.   HENT:  Negative for congestion, drooling, sinus pressure, sinus pain and sore throat.    Eyes:  Negative for visual disturbance.   Respiratory:  Negative for cough, chest tightness and shortness of breath.    Cardiovascular:  Negative for chest pain.   Gastrointestinal:  Positive for abdominal distention. Negative for abdominal pain, diarrhea, nausea and vomiting.   Endocrine: Negative for cold intolerance.   Genitourinary:  Negative for dysuria, flank pain, frequency and hematuria.   Musculoskeletal:  Positive for gait problem and myalgias. Negative for arthralgias and back pain.   Skin:  Negative for rash.   Allergic/Immunologic: Negative for food allergies.   Neurological:  Positive for dizziness (improved). Negative for weakness, light-headedness, numbness and headaches.   Hematological:  Does not bruise/bleed easily.       Past Medical History:   Diagnosis Date    Adhesive capsulitis of

## 2025-07-22 ENCOUNTER — HOSPITAL ENCOUNTER (OUTPATIENT)
Age: 70
Discharge: HOME | End: 2025-07-24
Attending: INTERNAL MEDICINE
Payer: COMMERCIAL

## 2025-07-22 VITALS — BODY MASS INDEX: 20.34 KG/M2 | WEIGHT: 94 LBS

## 2025-07-22 DIAGNOSIS — R07.9 CHEST PAIN, UNSPECIFIED TYPE: ICD-10-CM

## 2025-07-22 DIAGNOSIS — R06.02 SHORTNESS OF BREATH: ICD-10-CM

## 2025-07-22 LAB
ECHO LV EF PHYSICIAN: 60 %
STRESS BASELINE DIAS BP: 92 MMHG
STRESS BASELINE HR: 84 BPM
STRESS BASELINE ST DEPRESSION: 0 MM
STRESS BASELINE SYS BP: 149 MMHG
STRESS ESTIMATED WORKLOAD: 1 METS
STRESS EXERCISE DUR MIN: 8 MIN
STRESS EXERCISE DUR SEC: 17 SEC
STRESS PEAK DIAS BP: 77 MMHG
STRESS PEAK SYS BP: 165 MMHG
STRESS PERCENT HR ACHIEVED: 93 %
STRESS POST PEAK HR: 142 BPM
STRESS RATE PRESSURE PRODUCT: NORMAL BPM*MMHG
STRESS ST DEPRESSION: 0 MM
STRESS TARGET HR: 152 BPM

## 2025-07-22 PROCEDURE — 6360000002 HC RX W HCPCS: Performed by: INTERNAL MEDICINE

## 2025-07-22 PROCEDURE — 93017 CV STRESS TEST TRACING ONLY: CPT

## 2025-07-22 RX ORDER — ATROPINE SULFATE 0.1 MG/ML
2 INJECTION INTRAVENOUS ONCE
Status: COMPLETED | OUTPATIENT
Start: 2025-07-22 | End: 2025-07-22

## 2025-07-22 RX ORDER — METOPROLOL TARTRATE 1 MG/ML
5 INJECTION, SOLUTION INTRAVENOUS EVERY 6 HOURS
Status: DISCONTINUED | OUTPATIENT
Start: 2025-07-22 | End: 2025-07-25 | Stop reason: HOSPADM

## 2025-07-22 RX ORDER — DOBUTAMINE HYDROCHLORIDE 200 MG/100ML
10 INJECTION INTRAVENOUS CONTINUOUS
Status: DISCONTINUED | OUTPATIENT
Start: 2025-07-22 | End: 2025-07-25 | Stop reason: HOSPADM

## 2025-07-22 RX ORDER — SODIUM CHLORIDE 9 MG/ML
INJECTION, SOLUTION INTRAVENOUS CONTINUOUS
Status: DISCONTINUED | OUTPATIENT
Start: 2025-07-22 | End: 2025-07-25 | Stop reason: HOSPADM

## 2025-07-22 RX ADMIN — ATROPINE SULFATE INJECTION 0.4 MG: 0.1 INJECTION, SOLUTION INTRAVENOUS at 14:29

## 2025-07-22 RX ADMIN — DOBUTAMINE HYDROCHLORIDE 5 MCG/KG/MIN: 200 INJECTION INTRAVENOUS at 14:23

## 2025-08-20 ENCOUNTER — OFFICE VISIT (OUTPATIENT)
Age: 70
End: 2025-08-20

## 2025-08-20 VITALS
HEIGHT: 57 IN | HEART RATE: 58 BPM | TEMPERATURE: 97.6 F | DIASTOLIC BLOOD PRESSURE: 72 MMHG | OXYGEN SATURATION: 94 % | WEIGHT: 96.4 LBS | BODY MASS INDEX: 20.8 KG/M2 | SYSTOLIC BLOOD PRESSURE: 118 MMHG

## 2025-08-20 DIAGNOSIS — H60.331 ACUTE SWIMMER'S EAR OF RIGHT SIDE: ICD-10-CM

## 2025-08-20 DIAGNOSIS — L30.9 DERMATITIS: Primary | ICD-10-CM

## 2025-08-20 DIAGNOSIS — H61.23 HEARING LOSS DUE TO CERUMEN IMPACTION, BILATERAL: ICD-10-CM

## 2025-08-20 RX ORDER — NEOMYCIN SULFATE, POLYMYXIN B SULFATE AND HYDROCORTISONE 3.5; 10000; 1 MG/ML; [IU]/ML; MG/ML
3 SOLUTION AURICULAR (OTIC) 3 TIMES DAILY
Qty: 10 ML | Refills: 0 | Status: SHIPPED | OUTPATIENT
Start: 2025-08-20 | End: 2025-08-27

## 2025-08-20 RX ORDER — FLUOCINONIDE 0.5 MG/G
CREAM TOPICAL
Qty: 60 G | Refills: 0 | Status: SHIPPED | OUTPATIENT
Start: 2025-08-20

## 2025-08-20 ASSESSMENT — ENCOUNTER SYMPTOMS
EYE ITCHING: 0
PHOTOPHOBIA: 0
STRIDOR: 0
FACIAL SWELLING: 0
EYE DISCHARGE: 0
SHORTNESS OF BREATH: 0
RHINORRHEA: 0
WHEEZING: 0
COLOR CHANGE: 0
EYE REDNESS: 0
SORE THROAT: 0
EYE PAIN: 0
VOICE CHANGE: 0
COUGH: 0
SINUS PAIN: 0
SINUS PRESSURE: 0
CHEST TIGHTNESS: 0
TROUBLE SWALLOWING: 0

## (undated) DEVICE — BRUSH ENDO CLN L90.5IN SHTH DIA1.7MM BRIST DIA5-7MM 2-6MM

## (undated) DEVICE — SINGLE PORT MANIFOLD: Brand: NEPTUNE 2

## (undated) DEVICE — ENDO CARRY-ON PROCEDURE KIT: Brand: ENDO CARRY-ON PROCEDURE KIT

## (undated) DEVICE — INSTINCT ENDOSCOPIC HEMOCLIP: Brand: INSTINCT

## (undated) DEVICE — CONMED SCOPE SAVER BITE BLOCK, 20X27 MM: Brand: SCOPE SAVER

## (undated) DEVICE — TUBE SET 96 MM 64 MM H2O PERISTALTIC STD AUX CHANNEL

## (undated) DEVICE — Device: Brand: ENDO SMARTCAP

## (undated) DEVICE — ADAPTER FLSH PMP FLD MGMT GI IRRIG OFP 2 DISPOSABLE

## (undated) DEVICE — GLOVE ORANGE PI 8 1/2   MSG9085

## (undated) DEVICE — FORCEPS ENDOSCP BX OVL CUP SERR W/NEEDLE 2.3MM DIA 160CML

## (undated) DEVICE — TUBING, SUCTION, 1/4" X 10', STRAIGHT: Brand: MEDLINE